# Patient Record
Sex: FEMALE | Race: BLACK OR AFRICAN AMERICAN | NOT HISPANIC OR LATINO | URBAN - METROPOLITAN AREA
[De-identification: names, ages, dates, MRNs, and addresses within clinical notes are randomized per-mention and may not be internally consistent; named-entity substitution may affect disease eponyms.]

---

## 2017-01-04 ENCOUNTER — TELEPHONE (OUTPATIENT)
Dept: FAMILY MEDICINE | Facility: CLINIC | Age: 37
End: 2017-01-04

## 2017-01-04 NOTE — TELEPHONE ENCOUNTER
Received notification from [facility] that Carmen is still receiving Olanzapine in addition to her Risperidal (she was supposed to d/c the olanzapine).  I called the [facility] number listed in the notice ([phone]) and it forwarded to the [facility] in [employer], which sounds like is taking over for [facility].  I left a message that they should d/c the olanzapine from her records.

## 2017-01-06 ENCOUNTER — TELEPHONE (OUTPATIENT)
Dept: FAMILY MEDICINE | Facility: CLINIC | Age: 37
End: 2017-01-06

## 2017-01-06 NOTE — TELEPHONE ENCOUNTER
Returned call to home care nurse to give verbal orders per protocol as requested. Nurse verbalized understanding.    Tonia Man RN

## 2017-01-17 PROBLEM — F25.0 SCHIZOAFFECTIVE DISORDER, BIPOLAR TYPE (H): Status: ACTIVE | Noted: 2017-01-17

## 2017-01-17 PROBLEM — R41.89 NEUROCOGNITIVE DEFICITS: Status: ACTIVE | Noted: 2017-01-17

## 2017-01-17 PROBLEM — R29.818 NEUROCOGNITIVE DEFICITS: Status: ACTIVE | Noted: 2017-01-17

## 2017-01-18 ENCOUNTER — DOCUMENTATION ONLY (OUTPATIENT)
Dept: FAMILY MEDICINE | Facility: CLINIC | Age: 37
End: 2017-01-18

## 2017-01-18 NOTE — PROGRESS NOTES
"When opening a documentation only encounter, be sure to enter in \"Chief Complaint\" Forms and in \" Comments\" Title of form, description if needed.    Carmen is a 37 year old  female  Form received via: Fax  Form now resides in: Provider Ready    Anjana Louis CMA      Home healthcare recert faxed to [facility] on 01/18/2017.  Sent copy to patients chart.  No further action required.       Anjana Louis [facility]                "

## 2017-01-19 DIAGNOSIS — F33.1 MAJOR DEPRESSIVE DISORDER, RECURRENT EPISODE, MODERATE (H): Primary | ICD-10-CM

## 2017-01-19 NOTE — TELEPHONE ENCOUNTER
Request for medication refill:    Date of last visit at clinic: 10/25/2016    Please complete refill if appropriate and CLOSE ENCOUNTER.    Closing the encounter signifies the refill is complete.    If refill has been denied, please complete the smart phrase .smirefuse and route it to the [facility] RN TRIAGE pool to inform the patient and the pharmacy.    Anjana Louis, CMA

## 2017-01-25 NOTE — TELEPHONE ENCOUNTER
RUSHM with pt using language line Jorge #[phone], asking the pt to call back and provide the pharmacy she would like to have this medication sent. Once we have the pharmacy the medication can be sent.     ELISABET Orellana

## 2017-01-26 DIAGNOSIS — F33.1 MAJOR DEPRESSIVE DISORDER, RECURRENT EPISODE, MODERATE (H): Primary | ICD-10-CM

## 2017-01-26 DIAGNOSIS — F43.10 POSTTRAUMATIC STRESS DISORDER: ICD-10-CM

## 2017-01-26 NOTE — TELEPHONE ENCOUNTER
[facility] phone call message- patient requesting a refill:    Full Medication Name:  risperiDONE (RISPERDAL) 1 MG tablet Dose: Take 1-2 tablets (1-2 mg) by mouth At Bedtime start 1mg QHS x 1 wk, then increase to 2 mg nightly (while switching from olanzapine)  venlafaxine (EFFEXOR-XR) 150 MG 24 hr capsule Dose: Take 1 capsule (150 mg) by mouth daily    Pharmacy confirmed as   [facility] - [place], [place] -   [address] [place] [id number]  [phone]  : Yes    Additional Comments: Patients relative states patient would like refill for medication listed above.  Also states he would like a call in case there is any reason this cannot be done.     OK to leave a message on voice mail? Yes    Primary language: [place] citizen      needed? If calling relative, no.  If calling patient, yes.    Call taken on January 26, 2017 at 11:02 AM by Carin Vieira

## 2017-01-27 RX ORDER — VENLAFAXINE HYDROCHLORIDE 150 MG/1
150 CAPSULE, EXTENDED RELEASE ORAL DAILY
Qty: 30 CAPSULE | Refills: 2 | Status: SHIPPED | OUTPATIENT
Start: 2017-01-27 | End: 2017-02-14

## 2017-01-27 RX ORDER — RISPERIDONE 1 MG/1
1-2 TABLET ORAL AT BEDTIME
Qty: 60 TABLET | Refills: 3 | Status: SHIPPED | OUTPATIENT
Start: 2017-01-27 | End: 2017-02-14

## 2017-02-14 ENCOUNTER — OFFICE VISIT (OUTPATIENT)
Dept: FAMILY MEDICINE | Facility: CLINIC | Age: 37
End: 2017-02-14

## 2017-02-14 VITALS
RESPIRATION RATE: 18 BRPM | DIASTOLIC BLOOD PRESSURE: 100 MMHG | HEIGHT: 65 IN | WEIGHT: 190 LBS | BODY MASS INDEX: 31.65 KG/M2 | HEART RATE: 96 BPM | OXYGEN SATURATION: 100 % | TEMPERATURE: 97.9 F | SYSTOLIC BLOOD PRESSURE: 140 MMHG

## 2017-02-14 DIAGNOSIS — L50.9 HIVES: ICD-10-CM

## 2017-02-14 DIAGNOSIS — I10 ESSENTIAL HYPERTENSION WITH GOAL BLOOD PRESSURE LESS THAN 130/85: ICD-10-CM

## 2017-02-14 DIAGNOSIS — E56.9 VITAMIN DEFICIENCY: ICD-10-CM

## 2017-02-14 DIAGNOSIS — M79.89 SWELLING OF RIGHT THUMB: Primary | ICD-10-CM

## 2017-02-14 DIAGNOSIS — K21.9 GASTROESOPHAGEAL REFLUX DISEASE WITHOUT ESOPHAGITIS: ICD-10-CM

## 2017-02-14 DIAGNOSIS — F33.1 MAJOR DEPRESSIVE DISORDER, RECURRENT EPISODE, MODERATE (H): ICD-10-CM

## 2017-02-14 DIAGNOSIS — G44.219 EPISODIC TENSION-TYPE HEADACHE, NOT INTRACTABLE: ICD-10-CM

## 2017-02-14 DIAGNOSIS — F43.10 POSTTRAUMATIC STRESS DISORDER: ICD-10-CM

## 2017-02-14 LAB
% GRANULOCYTES: 55.6 %G (ref 40–75)
BUN SERPL-MCNC: 9.4 MG/DL (ref 7–19)
CALCIUM SERPL-MCNC: 9.4 MG/DL (ref 8.5–10.1)
CHLORIDE SERPLBLD-SCNC: 100 MMOL/L (ref 98–110)
CO2 SERPL-SCNC: 25.7 MMOL/L (ref 20–32)
CREAT SERPL-MCNC: 1 MG/DL (ref 0.5–1)
CRP SERPL-MCNC: 33 MG/L (ref 0–8)
ERYTHROCYTE [SEDIMENTATION RATE] IN BLOOD: 85 MM/HR (ref 0–20)
GFR SERPL CREATININE-BSD FRML MDRD: 65.6 ML/MIN/1.7 M2
GLUCOSE SERPL-MCNC: 115 MG'DL (ref 70–99)
GRANULOCYTES #: 4.8 K/UL (ref 1.6–8.3)
HCT VFR BLD AUTO: 41.5 % (ref 35–47)
HEMOGLOBIN: 12.5 G/DL (ref 11.7–15.7)
LYMPHOCYTES # BLD AUTO: 3 K/UL (ref 0.8–5.3)
LYMPHOCYTES NFR BLD AUTO: 34.5 %L (ref 20–48)
MCH RBC QN AUTO: 25.7 PG (ref 26.5–35)
MCHC RBC AUTO-ENTMCNC: 30.1 G/DL (ref 32–36)
MCV RBC AUTO: 85.3 FL (ref 78–100)
MID #: 0.9 K/UL (ref 0–2.2)
MID %: 9.9 %M (ref 0–20)
PLATELET # BLD AUTO: 278 K/UL (ref 150–450)
POTASSIUM SERPL-SCNC: 4.1 MMOL/DL (ref 3.3–4.5)
RBC # BLD AUTO: 4.87 M/UL (ref 3.8–5.2)
SODIUM SERPL-SCNC: 134 MMOL/L (ref 132.6–141.4)
URATE SERPL-MCNC: 5 MG/DL (ref 2.6–6)
WBC # BLD AUTO: 8.7 K/UL (ref 4–11)

## 2017-02-14 RX ORDER — TEMAZEPAM 15 MG/1
15 CAPSULE ORAL
Qty: 90 CAPSULE | Refills: 3 | Status: SHIPPED | OUTPATIENT
Start: 2017-02-14 | End: 2017-04-06

## 2017-02-14 RX ORDER — MULTIPLE VITAMINS W/ MINERALS TAB 9MG-400MCG
1 TAB ORAL DAILY
Qty: 100 EACH | Refills: 3 | Status: SHIPPED | OUTPATIENT
Start: 2017-02-14 | End: 2018-03-27

## 2017-02-14 RX ORDER — LISINOPRIL AND HYDROCHLOROTHIAZIDE 20; 25 MG/1; MG/1
1 TABLET ORAL DAILY
Qty: 90 TABLET | Refills: 3 | Status: SHIPPED | OUTPATIENT
Start: 2017-02-14 | End: 2017-05-02

## 2017-02-14 RX ORDER — CHLORAL HYDRATE 500 MG
2 CAPSULE ORAL DAILY
Qty: 90 CAPSULE | Refills: 3 | Status: SHIPPED | OUTPATIENT
Start: 2017-02-14 | End: 2017-12-21

## 2017-02-14 RX ORDER — RISPERIDONE 1 MG/1
1-2 TABLET ORAL AT BEDTIME
Qty: 60 TABLET | Refills: 3 | Status: SHIPPED | OUTPATIENT
Start: 2017-02-14 | End: 2017-05-02

## 2017-02-14 RX ORDER — TEMAZEPAM 15 MG/1
15 CAPSULE ORAL
Qty: 90 CAPSULE | Refills: 3 | Status: CANCELLED | OUTPATIENT
Start: 2017-02-14

## 2017-02-14 RX ORDER — NICOTINE POLACRILEX 4 MG/1
20 GUM, CHEWING ORAL DAILY
Qty: 90 TABLET | Refills: 2 | Status: SHIPPED | OUTPATIENT
Start: 2017-02-14 | End: 2017-08-31

## 2017-02-14 RX ORDER — VENLAFAXINE HYDROCHLORIDE 150 MG/1
150 CAPSULE, EXTENDED RELEASE ORAL DAILY
Qty: 30 CAPSULE | Refills: 2 | Status: SHIPPED | OUTPATIENT
Start: 2017-02-14 | End: 2017-05-02

## 2017-02-14 RX ORDER — IBUPROFEN 400 MG/1
400 TABLET, FILM COATED ORAL EVERY 6 HOURS PRN
Qty: 120 TABLET | Refills: 1 | Status: SHIPPED | OUTPATIENT
Start: 2017-02-14 | End: 2017-07-20

## 2017-02-14 RX ORDER — CETIRIZINE HYDROCHLORIDE 10 MG/1
10 TABLET ORAL DAILY
Qty: 30 TABLET | Refills: 3 | Status: SHIPPED | OUTPATIENT
Start: 2017-02-14 | End: 2017-05-02

## 2017-02-14 RX ORDER — SUMATRIPTAN 25 MG/1
50 TABLET, FILM COATED ORAL PRN
Qty: 30 TABLET | Refills: 0 | Status: SHIPPED | OUTPATIENT
Start: 2017-02-14 | End: 2017-05-02

## 2017-02-14 RX ORDER — TIZANIDINE 2 MG/1
4 TABLET ORAL EVERY 8 HOURS PRN
Qty: 90 TABLET | Refills: 1 | Status: SHIPPED | OUTPATIENT
Start: 2017-02-14 | End: 2017-05-02

## 2017-02-14 RX ORDER — VENLAFAXINE HYDROCHLORIDE 75 MG/1
75 CAPSULE, EXTENDED RELEASE ORAL DAILY
Qty: 90 CAPSULE | Refills: 3 | Status: SHIPPED | OUTPATIENT
Start: 2017-02-14 | End: 2017-05-02

## 2017-02-14 RX ORDER — AMLODIPINE BESYLATE 5 MG/1
5 TABLET ORAL DAILY
Qty: 30 TABLET | Refills: 0 | Status: SHIPPED | OUTPATIENT
Start: 2017-02-14 | End: 2017-04-06

## 2017-02-14 RX ORDER — NAPROXEN 500 MG/1
500 TABLET ORAL 2 TIMES DAILY WITH MEALS
Qty: 14 TABLET | Refills: 0 | Status: SHIPPED | OUTPATIENT
Start: 2017-02-14 | End: 2017-04-06

## 2017-02-14 RX ORDER — HYDROXYZINE HYDROCHLORIDE 25 MG/1
25-50 TABLET, FILM COATED ORAL EVERY 6 HOURS PRN
Qty: 60 TABLET | Refills: 1 | Status: SHIPPED | OUTPATIENT
Start: 2017-02-14 | End: 2018-12-03

## 2017-02-14 ASSESSMENT — ENCOUNTER SYMPTOMS
NAUSEA: 0
NERVOUS/ANXIOUS: 1
COUGH: 0
DYSPHORIC MOOD: 1
NECK PAIN: 1
DYSURIA: 0
ABDOMINAL PAIN: 1
DIARRHEA: 0
FREQUENCY: 0
SHORTNESS OF BREATH: 0
VOMITING: 0
BACK PAIN: 1
HEADACHES: 0
DIZZINESS: 0

## 2017-02-14 ASSESSMENT — ANXIETY QUESTIONNAIRES
5. BEING SO RESTLESS THAT IT IS HARD TO SIT STILL: NOT AT ALL
3. WORRYING TOO MUCH ABOUT DIFFERENT THINGS: SEVERAL DAYS
7. FEELING AFRAID AS IF SOMETHING AWFUL MIGHT HAPPEN: MORE THAN HALF THE DAYS
2. NOT BEING ABLE TO STOP OR CONTROL WORRYING: NEARLY EVERY DAY
6. BECOMING EASILY ANNOYED OR IRRITABLE: NEARLY EVERY DAY
IF YOU CHECKED OFF ANY PROBLEMS ON THIS QUESTIONNAIRE, HOW DIFFICULT HAVE THESE PROBLEMS MADE IT FOR YOU TO DO YOUR WORK, TAKE CARE OF THINGS AT HOME, OR GET ALONG WITH OTHER PEOPLE: VERY DIFFICULT
GAD7 TOTAL SCORE: 12
1. FEELING NERVOUS, ANXIOUS, OR ON EDGE: MORE THAN HALF THE DAYS

## 2017-02-14 ASSESSMENT — PATIENT HEALTH QUESTIONNAIRE - PHQ9: 5. POOR APPETITE OR OVEREATING: SEVERAL DAYS

## 2017-02-14 NOTE — PATIENT INSTRUCTIONS
Here is the plan from today's visit    1. Major depressive disorder, recurrent episode, moderate (H)  - venlafaxine (EFFEXOR-XR) 150 MG 24 hr capsule; Take 1 capsule (150 mg) by mouth daily  Dispense: 30 capsule; Refill: 2  - risperiDONE (RISPERDAL) 1 MG tablet; Take 1-2 tablets (1-2 mg) by mouth At Bedtime start 1mg QHS x 1 wk, then increase to 2 mg nightly (while switching from olanzapine)  Dispense: 60 tablet; Refill: 3  - venlafaxine (EFFEXOR-XR) 75 MG 24 hr capsule; Take 1 capsule (75 mg) by mouth daily (take with 150mg tablet - total dose 225mg)  Dispense: 90 capsule; Refill: 3  - temazepam (RESTORIL) 15 MG capsule; Take 1 capsule (15 mg) by mouth nightly as needed  Dispense: 90 capsule; Refill: 3  - BEHAVIORAL HEALTH REFERRAL ([facility] and external)    2. Posttraumatic stress disorder  - risperiDONE (RISPERDAL) 1 MG tablet; Take 1-2 tablets (1-2 mg) by mouth At Bedtime start 1mg QHS x 1 wk, then increase to 2 mg nightly (while switching from olanzapine)  Dispense: 60 tablet; Refill: 3  - BEHAVIORAL HEALTH REFERRAL ([facility] external)    3. Hives  - hydrOXYzine (ATARAX) 25 MG tablet; Take 1-2 tablets (25-50 mg) by mouth every 6 hours as needed for itching  Dispense: 60 tablet; Refill: 1  - cetirizine (ZYRTEC) 10 MG tablet; Take 1 tablet (10 mg) by mouth daily  Dispense: 30 tablet; Refill: 3    4. Episodic tension-type headache, not intractable  - tiZANidine (ZANAFLEX) 2 MG tablet; Take 2 tablets (4 mg) by mouth every 8 hours as needed for muscle spasms  Dispense: 90 tablet; Refill: 1  - ibuprofen (ADVIL/MOTRIN) 400 MG tablet; Take 1 tablet (400 mg) by mouth every 6 hours as needed for moderate pain  Dispense: 120 tablet; Refill: 1  - SUMAtriptan (IMITREX) 25 MG tablet; Take 2 tablets (50 mg) by mouth as needed  Dispense: 30 tablet; Refill: 0    5. Gastroesophageal reflux disease without esophagitis  - omeprazole 20 MG tablet; Take 1 tablet (20 mg) by mouth daily Take 30-60 minutes before a  meal.  Dispense: 90 tablet; Refill: 2    6. Vitamin deficiency  - multivitamin, therapeutic with minerals (THERA-VIT-M) TABS tablet; Take 1 tablet by mouth daily  Dispense: 100 each; Refill: 3    7. Essential hypertension with goal blood pressure less than 130/85  - lisinopril-hydrochlorothiazide (PRINZIDE/ZESTORETIC) 20-25 MG per tablet; Take 1 tablet by mouth daily  Dispense: 90 tablet; Refill: 3  - Omega-3 Fatty Acids (OMEGA-3 FISH OIL) 1000 MG CAPS; Take 2 capsules (2 g) by mouth daily  Dispense: 90 capsule; Refill: 3  - Basic Metabolic Panel ([facility])  - amLODIPine (NORVASC) 5 MG tablet; Take 1 tablet (5 mg) by mouth daily  Dispense: 30 tablet; Refill: 0    8. Swelling of right thumb  - XR FINGER RT  - Uric acid  - CRP inflammation  - Erythrocyte Sedimentation Rate ([facility])  - CBC with Diff Plt ([facility])  - naproxen (NAPROSYN) 500 MG tablet; Take 1 tablet (500 mg) by mouth 2 times daily (with meals)  Dispense: 14 tablet; Refill: 0  - Ice thumb every 2 hours for 10-15 minutes       Please call or return to clinic if your symptoms don't go away.    Follow up plan  Please make a clinic appointment for follow up with me (HAYES SPICER) in one week for joint swelling.     Thank you for coming to [facility] today.  Lab Testing:  **If you had lab testing today and your results are reassuring or normal they will be mailed to you or sent through Pryv within 7 days.   **If the lab tests need quick action we will call you with the results.  The phone number we will call with results is # [phone] (home) . If this is not the best number please call our clinic and change the number.  Medication Refills:  If you need any refills please call your pharmacy and they will contact us.   If you need to  your refill at a new pharmacy, please contact the new pharmacy directly. The new pharmacy will help you get your medications transferred faster.   Scheduling:  If you have any concerns about today's  visit or wish to schedule another appointment please call our office during normal business hours [phone] (8-5:00 M-F)  If a referral was made to a [facility] and you don't get a call from central scheduling please call [phone].  If a Mammogram was ordered for you at [facility] call [phone] to schedule or change your appointment.  If you had an XRay/CT/Ultrasound/MRI ordered the number is [phone] to schedule or change your radiology appointment.   Medical Concerns:  If you have urgent medical concerns please call [phone] at any time of the day.

## 2017-02-14 NOTE — LETTER
February 15, 2017      Carmen Mccoy Mohsen  [address]    [facility] [address]        Dear Carmen,    Thank you for getting your care at [facility]. Please see below for your test results. You have elevated inflammatory markers CRP and ESR, which can occur with gout attack. Other labs are normal and reassuring.     Resulted Orders   Basic Metabolic Panel (formerly [facility])   Result Value Ref Range    Urea Nitrogen 9.4 7.0 - 19.0 mg/dL    Calcium 9.4 8.5 - 10.1 mg/dL    Chloride 100.0 98.0 - 110.0 mmol/L    Carbon Dioxide 25.7 20.0 - 32.0 mmol/L    Creatinine 1.0 0.5 - 1.0 mg/dL    Glucose 115.0 (H) 70.0 - 99.0 mg'dL    Potassium 4.1 3.3 - 4.5 mmol/dL    Sodium 134.0 132.6 - 141.4 mmol/L    GFR Estimate 65.6 mL/min/1.7 m2    GFR Estimate If Black 79.4 mL/min/1.7 m2   Uric acid   Result Value Ref Range    Uric Acid 5.0 2.6 - 6.0 mg/dL   CRP inflammation   Result Value Ref Range    CRP Inflammation 33.0 (H) 0.0 - 8.0 mg/L   Erythrocyte Sedimentation Rate (formerly [facility])   Result Value Ref Range    Sed Rate 85 (H) 0 - 20 mm/hr   CBC with Diff Plt (formerly [facility])   Result Value Ref Range    WBC 8.7 4.0 - 11.0 K/uL    Lymphocytes # 3.0 0.8 - 5.3 K/uL    % Lymphocytes 34.5 20.0 - 48.0 %L    Mid # 0.9 0.0 - 2.2 K/uL    Mid % 9.9 0.0 - 20.0 %M    GRANULOCYTES # 4.8 1.6 - 8.3 K/uL    % Granulocytes 55.6 40.0 - 75.0 %G    RBC 4.87 3.80 - 5.20 M/uL    Hemoglobin 12.5 11.7 - 15.7 g/dL    Hematocrit 41.5 35.0 - 47.0 %    MCV 85.3 78.0 - 100.0 fL    MCH 25.7 (L) 26.5 - 35.0 pg    MCHC 30.1 (L) 32.0 - 36.0 g/dL    Platelets 278.0 150.0 - 450.0 K/uL       If you have any questions, please call the clinic to make an appointment with Dr. Arndt for a clinic visit. Please follow up in one week.     Sincerely,    Khushi Arndt MD

## 2017-02-14 NOTE — PROGRESS NOTES
Preceptor Attestation:   Patient seen and discussed with the resident. Assessment and plan reviewed with resident and agreed upon.   Supervising Physician:  Meri Carlson MD  [facility]

## 2017-02-14 NOTE — MR AVS SNAPSHOT
After Visit Summary   2/14/2017    Carmen Connolly    MRN: [id number]           Patient Information     Date Of Birth          1980        Visit Information        Provider Department      2/14/2017 2:20 PM Khushi Arndt MD [facility]        Today's Diagnoses     Swelling of right thumb    -  1    Major depressive disorder, recurrent episode, moderate (H)        Posttraumatic stress disorder        Hives        Episodic tension-type headache, not intractable        Gastroesophageal reflux disease without esophagitis        Vitamin deficiency        Essential hypertension with goal blood pressure less than 130/85          Care Instructions    Here is the plan from today's visit    1. Major depressive disorder, recurrent episode, moderate (H)  - venlafaxine (EFFEXOR-XR) 150 MG 24 hr capsule; Take 1 capsule (150 mg) by mouth daily  Dispense: 30 capsule; Refill: 2  - risperiDONE (RISPERDAL) 1 MG tablet; Take 1-2 tablets (1-2 mg) by mouth At Bedtime start 1mg QHS x 1 wk, then increase to 2 mg nightly (while switching from olanzapine)  Dispense: 60 tablet; Refill: 3  - venlafaxine (EFFEXOR-XR) 75 MG 24 hr capsule; Take 1 capsule (75 mg) by mouth daily (take with 150mg tablet - total dose 225mg)  Dispense: 90 capsule; Refill: 3  - temazepam (RESTORIL) 15 MG capsule; Take 1 capsule (15 mg) by mouth nightly as needed  Dispense: 90 capsule; Refill: 3  - BEHAVIORAL HEALTH REFERRAL ([facility] external)    2. Posttraumatic stress disorder  - risperiDONE (RISPERDAL) 1 MG tablet; Take 1-2 tablets (1-2 mg) by mouth At Bedtime start 1mg QHS x 1 wk, then increase to 2 mg nightly (while switching from olanzapine)  Dispense: 60 tablet; Refill: 3  - BEHAVIORAL HEALTH REFERRAL ([facility])    3. Hives  - hydrOXYzine (ATARAX) 25 MG tablet; Take 1-2 tablets (25-50 mg) by mouth every 6 hours as needed for itching  Dispense: 60 tablet; Refill: 1  - cetirizine  (ZYRTEC) 10 MG tablet; Take 1 tablet (10 mg) by mouth daily  Dispense: 30 tablet; Refill: 3    4. Episodic tension-type headache, not intractable  - tiZANidine (ZANAFLEX) 2 MG tablet; Take 2 tablets (4 mg) by mouth every 8 hours as needed for muscle spasms  Dispense: 90 tablet; Refill: 1  - ibuprofen (ADVIL/MOTRIN) 400 MG tablet; Take 1 tablet (400 mg) by mouth every 6 hours as needed for moderate pain  Dispense: 120 tablet; Refill: 1  - SUMAtriptan (IMITREX) 25 MG tablet; Take 2 tablets (50 mg) by mouth as needed  Dispense: 30 tablet; Refill: 0    5. Gastroesophageal reflux disease without esophagitis  - omeprazole 20 MG tablet; Take 1 tablet (20 mg) by mouth daily Take 30-60 minutes before a meal.  Dispense: 90 tablet; Refill: 2    6. Vitamin deficiency  - multivitamin, therapeutic with minerals (THERA-VIT-M) TABS tablet; Take 1 tablet by mouth daily  Dispense: 100 each; Refill: 3    7. Essential hypertension with goal blood pressure less than 130/85  - lisinopril-hydrochlorothiazide (PRINZIDE/ZESTORETIC) 20-25 MG per tablet; Take 1 tablet by mouth daily  Dispense: 90 tablet; Refill: 3  - Omega-3 Fatty Acids (OMEGA-3 FISH OIL) 1000 MG CAPS; Take 2 capsules (2 g) by mouth daily  Dispense: 90 capsule; Refill: 3  - Basic Metabolic Panel ([facility])  - amLODIPine (NORVASC) 5 MG tablet; Take 1 tablet (5 mg) by mouth daily  Dispense: 30 tablet; Refill: 0    8. Swelling of right thumb  - XR FINGER RT  - Uric acid  - CRP inflammation  - Erythrocyte Sedimentation Rate ([facility])  - CBC with Diff Plt ([facility])  - naproxen (NAPROSYN) 500 MG tablet; Take 1 tablet (500 mg) by mouth 2 times daily (with meals)  Dispense: 14 tablet; Refill: 0  - Ice thumb every 2 hours for 10-15 minutes       Please call or return to clinic if your symptoms don't go away.    Follow up plan  Please make a clinic appointment for follow up with me (HAYES SPICER) in one week for joint swelling.     Thank you for coming to [facility]  today.  Lab Testing:  **If you had lab testing today and your results are reassuring or normal they will be mailed to you or sent through Insights within 7 days.   **If the lab tests need quick action we will call you with the results.  The phone number we will call with results is # [phone] (home) . If this is not the best number please call our clinic and change the number.  Medication Refills:  If you need any refills please call your pharmacy and they will contact us.   If you need to  your refill at a new pharmacy, please contact the new pharmacy directly. The new pharmacy will help you get your medications transferred faster.   Scheduling:  If you have any concerns about today's visit or wish to schedule another appointment please call our office during normal business hours [phone] (8-5:00 M-F)  If a referral was made to a [facility] and you don't get a call from central scheduling please call [phone].  If a Mammogram was ordered for you at [facility] call [phone] to schedule or change your appointment.  If you had an XRay/CT/Ultrasound/MRI ordered the number is [phone] to schedule or change your radiology appointment.   Medical Concerns:  If you have urgent medical concerns please call [phone] at any time of the day.          Follow-ups after your visit        Additional Services     BEHAVIORAL HEALTH REFERRAL ([facility])       Referring MD: HAYES SPICER    May leave message on voicemail: Yes  PHQ-9 Score: 14  GAD7 Score: 14                  Your next 10 appointments already scheduled     Feb 21, 2017  2:40 PM CST   Return Visit with MD [facility] ([facility])    [address],  [address]  [facility] [address] [phone]              Who to contact     Please call your clinic at [phone] to:    Ask questions about your health    Make or cancel  "appointments    Discuss your medicines    Learn about your test results    Speak to your doctor   If you have compliments or concerns about an experience at your clinic, or if you wish to file a complaint, please contact [facility] Patient Relations at [phone] or email us at [email or web address]         Additional Information About Your Visit        [employer] Information     [employer] is an electronic gateway that provides easy, online access to your medical records. With [employer], you can request a clinic appointment, read your test results, renew a prescription or communicate with your care team.     To sign up for [employer] visit the website at [email or web address]   You will be asked to enter the access code listed below, as well as some personal information. Please follow the directions to create your username and password.     Your access code is: [email or web address]  Expires: 5/15/2017  3:53 PM     Your access code will  in 90 days. If you need help or a new code, please contact your [facility] or call [phone] for assistance.        Care EveryWhere ID     This is your Care EveryWhere ID. This could be used by other organizations to access your [facility] medical records  [id number]        Your Vitals Were     Pulse Temperature Respirations Height Pulse Oximetry BMI (Body Mass Index)    96 97.9  F (36.6  C) (Oral) 18 5' 5\" (165.1 cm) 100% 31.62 kg/m2       Blood Pressure from Last 3 Encounters:   17 (!) 140/100   10/25/16 (!) 137/98   10/20/16 (!) 148/103    Weight from Last 3 Encounters:   17 190 lb (86.2 kg)   10/25/16 181 lb (82.1 kg)   10/20/16 181 lb (82.1 kg)              We Performed the Following     Basic Metabolic Panel ([facility])     BEHAVIORAL HEALTH REFERRAL ([facility] external)     CBC with Diff Plt ([facility])     CRP inflammation     Erythrocyte Sedimentation Rate ([facility])     Uric acid     XR " FINGER RT          Today's Medication Changes          These changes are accurate as of: 2/14/17  3:53 PM.  If you have any questions, ask your nurse or doctor.               Start taking these medicines.        Dose/Directions    amLODIPine 5 MG tablet   Commonly known as:  NORVASC   Used for:  Essential hypertension with goal blood pressure less than 130/85   Started by:  Khushi Arndt MD        Dose:  5 mg   Take 1 tablet (5 mg) by mouth daily   Quantity:  30 tablet   Refills:  0       naproxen 500 MG tablet   Commonly known as:  NAPROSYN   Used for:  Swelling of right thumb   Started by:  Khushi Arndt MD        Dose:  500 mg   Take 1 tablet (500 mg) by mouth 2 times daily (with meals)   Quantity:  14 tablet   Refills:  0         These medicines have changed or have updated prescriptions.        Dose/Directions    Omega-3 Fish Oil 1000 MG Caps   This may have changed:  medication strength   Used for:  Essential hypertension with goal blood pressure less than 130/85   Changed by:  Khushi Arndt MD        Dose:  2 g   Take 2 capsules (2 g) by mouth daily   Quantity:  90 capsule   Refills:  3            Where to get your medicines      These medications were sent to [facility] [address] [place] [facility] [address] [id number]     Phone:  [phone]     amLODIPine 5 MG tablet    cetirizine 10 MG tablet    hydrOXYzine 25 MG tablet    ibuprofen 400 MG tablet    lisinopril-hydrochlorothiazide 20-25 MG per tablet    multivitamin, therapeutic with minerals Tabs tablet    naproxen 500 MG tablet    Omega-3 Fish Oil 1000 MG Caps    omeprazole 20 MG tablet    risperiDONE 1 MG tablet    SUMAtriptan 25 MG tablet    tiZANidine 2 MG tablet    venlafaxine 150 MG 24 hr capsule    venlafaxine 75 MG 24 hr capsule         Some of these will need a paper prescription and others can be bought over the counter.  Ask your nurse if you have questions.     Bring a paper  prescription for each of these medications     temazepam 15 MG capsule                Primary Care Provider Office Phone # Fax #    Khushi Cadena [phone]       [facility] [address] [place] [id number]        Thank you!     Thank you for choosing [facility]  for your care. Our goal is always to provide you with excellent care. Hearing back from our patients is one way we can continue to improve our services. Please take a few minutes to complete the written survey that you may receive in the mail after your visit with us. Thank you!             Your Updated Medication List - Protect others around you: Learn how to safely use, store and throw away your medicines at www.disposemymeds.org.          This list is accurate as of: 2/14/17  3:53 PM.  Always use your most recent med list.                   Brand Name Dispense Instructions for use    amLODIPine 5 MG tablet    NORVASC    30 tablet    Take 1 tablet (5 mg) by mouth daily       cetirizine 10 MG tablet    zyrTEC    30 tablet    Take 1 tablet (10 mg) by mouth daily       hydrOXYzine 25 MG tablet    ATARAX    60 tablet    Take 1-2 tablets (25-50 mg) by mouth every 6 hours as needed for itching       ibuprofen 400 MG tablet    ADVIL/MOTRIN    120 tablet    Take 1 tablet (400 mg) by mouth every 6 hours as needed for moderate pain       lisinopril-hydrochlorothiazide 20-25 MG per tablet    PRINZIDE/ZESTORETIC    90 tablet    Take 1 tablet by mouth daily       multivitamin, therapeutic with minerals Tabs tablet     100 each    Take 1 tablet by mouth daily       naproxen 500 MG tablet    NAPROSYN    14 tablet    Take 1 tablet (500 mg) by mouth 2 times daily (with meals)       Omega-3 Fish Oil 1000 MG Caps     90 capsule    Take 2 capsules (2 g) by mouth daily       omeprazole 20 MG tablet     90 tablet    Take 1 tablet (20 mg) by mouth daily Take 30-60 minutes before a meal.       risperiDONE 1 MG tablet     risperDAL    60 tablet    Take 1-2 tablets (1-2 mg) by mouth At Bedtime start 1mg QHS x 1 wk, then increase to 2 mg nightly (while switching from olanzapine)       SUMAtriptan 25 MG tablet    IMITREX    30 tablet    Take 2 tablets (50 mg) by mouth as needed       temazepam 15 MG capsule    RESTORIL    90 capsule    Take 1 capsule (15 mg) by mouth nightly as needed       tiZANidine 2 MG tablet    ZANAFLEX    90 tablet    Take 2 tablets (4 mg) by mouth every 8 hours as needed for muscle spasms       * venlafaxine 150 MG 24 hr capsule    EFFEXOR-XR    30 capsule    Take 1 capsule (150 mg) by mouth daily       * venlafaxine 75 MG 24 hr capsule    EFFEXOR-XR    90 capsule    Take 1 capsule (75 mg) by mouth daily (take with 150mg tablet - total dose 225mg)       * Notice:  This list has 2 medication(s) that are the same as other medications prescribed for you. Read the directions carefully, and ask your doctor or other care provider to review them with you.

## 2017-02-15 ASSESSMENT — ENCOUNTER SYMPTOMS
CONSTIPATION: 1
SLEEP DISTURBANCE: 0

## 2017-02-15 ASSESSMENT — PATIENT HEALTH QUESTIONNAIRE - PHQ9: SUM OF ALL RESPONSES TO PHQ QUESTIONS 1-9: 14

## 2017-02-15 ASSESSMENT — ANXIETY QUESTIONNAIRES: GAD7 TOTAL SCORE: 12

## 2017-02-16 ENCOUNTER — TELEPHONE (OUTPATIENT)
Dept: FAMILY MEDICINE | Facility: CLINIC | Age: 37
End: 2017-02-16

## 2017-02-16 NOTE — TELEPHONE ENCOUNTER
I called the patient using the language line. Patient 's daughter answered and stated she was not home. I asked her to have her mom call me at [facility].    Alethea Wang CMA

## 2017-02-16 NOTE — TELEPHONE ENCOUNTER
Mental Health Referral:  Please schedule with any on the team with availability.      If you are unable to reach the patient after two phone attempts, please send a letter and close the encounter.      Thank you!

## 2017-02-20 NOTE — TELEPHONE ENCOUNTER
Called patient again with the language line and scheduled a [facility] Intake on 02/22/17 at 11:00 AM with Dr. Edge.

## 2017-02-21 ENCOUNTER — TELEPHONE (OUTPATIENT)
Dept: FAMILY MEDICINE | Facility: CLINIC | Age: 37
End: 2017-02-21

## 2017-02-21 ENCOUNTER — CARE COORDINATION (OUTPATIENT)
Dept: FAMILY MEDICINE | Facility: CLINIC | Age: 37
End: 2017-02-21

## 2017-02-21 ENCOUNTER — OFFICE VISIT (OUTPATIENT)
Dept: FAMILY MEDICINE | Facility: CLINIC | Age: 37
End: 2017-02-21

## 2017-02-21 ENCOUNTER — OFFICE VISIT (OUTPATIENT)
Dept: PHARMACY | Facility: CLINIC | Age: 37
End: 2017-02-21

## 2017-02-21 VITALS
DIASTOLIC BLOOD PRESSURE: 89 MMHG | HEART RATE: 114 BPM | TEMPERATURE: 98.2 F | WEIGHT: 188.6 LBS | SYSTOLIC BLOOD PRESSURE: 135 MMHG | BODY MASS INDEX: 31.38 KG/M2 | OXYGEN SATURATION: 94 %

## 2017-02-21 DIAGNOSIS — Z76.89 HEALTH CARE HOME: ICD-10-CM

## 2017-02-21 DIAGNOSIS — I10 ESSENTIAL HYPERTENSION: ICD-10-CM

## 2017-02-21 DIAGNOSIS — F25.0 SCHIZOAFFECTIVE DISORDER, BIPOLAR TYPE (H): ICD-10-CM

## 2017-02-21 DIAGNOSIS — M19.90 ARTHRITIS: ICD-10-CM

## 2017-02-21 DIAGNOSIS — F25.0 SCHIZOAFFECTIVE DISORDER, BIPOLAR TYPE (H): Primary | ICD-10-CM

## 2017-02-21 DIAGNOSIS — F43.10 POSTTRAUMATIC STRESS DISORDER: ICD-10-CM

## 2017-02-21 DIAGNOSIS — I10 ESSENTIAL HYPERTENSION: Primary | ICD-10-CM

## 2017-02-21 ASSESSMENT — ENCOUNTER SYMPTOMS
CONFUSION: 1
DYSPHORIC MOOD: 0
WEAKNESS: 0
SLEEP DISTURBANCE: 1
FEVER: 0
GASTROINTESTINAL NEGATIVE: 1
CARDIOVASCULAR NEGATIVE: 1
DIZZINESS: 0
NUMBNESS: 0
RESPIRATORY NEGATIVE: 1
ACTIVITY CHANGE: 0
DECREASED CONCENTRATION: 1
HEADACHES: 0
HALLUCINATIONS: 1
FATIGUE: 1
APPETITE CHANGE: 0
NERVOUS/ANXIOUS: 1
MUSCULOSKELETAL NEGATIVE: 1
TREMORS: 0

## 2017-02-21 NOTE — MR AVS SNAPSHOT
After Visit Summary   2/21/2017    Carmen Connolly    MRN: [id number]           Patient Information     Date Of Birth          1980        Visit Information        Provider Department      2/21/2017 2:40 PM Khushi Cadena MD [facility]        Today's Diagnoses     Essential hypertension    -  1    Posttraumatic stress disorder        Schizoaffective disorder, bipolar type (H)        Arthritis          Care Instructions    Here is the plan from today's visit    1. Essential hypertension  Continue current medications    2. Posttraumatic stress disorder  Plan to follow up with HHN (PharmD to check)  May adjust meds based on what she is actually taking  Consider discussing prasozin with Dr. Thrasher (given the nightmares)    3. Schizoaffective disorder, bipolar type (H)  As above  Follow up with psychologist - M/W as scheduled    4. Arthritis  Resolved  Can use Naproxen prn with flare    Please call or return to clinic if your symptoms don't go away.    Follow up plan - tomorrow with Fadia Edge    Thank you for coming to [facility] today.  Lab Testing:  **If you had lab testing today and your results are reassuring or normal they will be mailed to you or sent through [employer] within 7 days.   **If the lab tests need quick action we will call you with the results.  The phone number we will call with results is # [phone] (home) . If this is not the best number please call our clinic and change the number.  Medication Refills:  If you need any refills please call your pharmacy and they will contact us.   If you need to  your refill at a new pharmacy, please contact the new pharmacy directly. The new pharmacy will help you get your medications transferred faster.   Scheduling:  If you have any concerns about today's visit or wish to schedule another appointment please call our office during normal business hours [phone] (8-5:00 M-F)  If a referral was made to a  [facility] and you don't get a call from central scheduling please call [phone].  If a Mammogram was ordered for you at [facility] call [phone] to schedule or change your appointment.  If you had an XRay/CT/Ultrasound/MRI ordered the number is [phone] to schedule or change your radiology appointment.   Medical Concerns:  If you have urgent medical concerns please call [phone] at any time of the day.          Follow-ups after your visit        Your next 10 appointments already scheduled     2017 11:00 AM CST   Return Visit with Fadia Edge [facility] ([facility])    [address],  [address]  [place] [address]   [phone]              Who to contact     Please call your clinic at [phone] to:    Ask questions about your health    Make or cancel appointments    Discuss your medicines    Learn about your test results    Speak to your doctor   If you have compliments or concerns about an experience at your clinic, or if you wish to file a complaint, please contact [facility] Patient Relations at [phone] or email us at [email or web address]         Additional Information About Your Visit        MyChart Information     [employer] is an electronic gateway that provides easy, online access to your medical records. With [employer], you can request a clinic appointment, read your test results, renew a prescription or communicate with your care team.     To sign up for [employer] visit the website at [email or web address]   You will be asked to enter the access code listed below, as well as some personal information. Please follow the directions to create your username and password.     Your access code is: [email or web address]  Expires: 5/15/2017  3:53 PM     Your access code will  in 90 days. If you need help or a new code, please contact your [facility] or call [phone] for assistance.        Care EveryWhere ID     This is your Care EveryWhere ID. This could be used by other organizations to access your [place] medical records  [id number]        Your Vitals Were     Pulse Temperature Last Period Pulse Oximetry Breastfeeding? BMI (Body Mass Index)    114 98.2  F (36.8  C) (Oral) 02/20/2017 94% No 31.38 kg/m2       Blood Pressure from Last 3 Encounters:   02/21/17 135/89   02/14/17 (!) 140/100   10/25/16 (!) 137/98    Weight from Last 3 Encounters:   02/21/17 188 lb 9.6 oz (85.5 kg)   02/14/17 190 lb (86.2 kg)   10/25/16 181 lb (82.1 kg)              Today, you had the following     No orders found for display       Primary Care Provider Office Phone # Fax #    Khushi Cadena [phone]       [facility] [address] [address]        Thank you!     Thank you for choosing [facility]  for your care. Our goal is always to provide you with excellent care. Hearing back from our patients is one way we can continue to improve our services. Please take a few minutes to complete the written survey that you may receive in the mail after your visit with us. Thank you!             Your Updated Medication List - Protect others around you: Learn how to safely use, store and throw away your medicines at www.disposemymeds.org.          This list is accurate as of: 2/21/17  3:32 PM.  Always use your most recent med list.                   Brand Name Dispense Instructions for use    amLODIPine 5 MG tablet    NORVASC    30 tablet    Take 1 tablet (5 mg) by mouth daily       cetirizine 10 MG tablet    zyrTEC    30 tablet    Take 1 tablet (10 mg) by mouth daily       hydrOXYzine 25 MG tablet    ATARAX    60 tablet    Take 1-2 tablets (25-50 mg) by mouth every 6 hours as needed for itching       ibuprofen 400 MG tablet    ADVIL/MOTRIN    120 tablet    Take 1 tablet (400 mg) by mouth every 6  hours as needed for moderate pain       lisinopril-hydrochlorothiazide 20-25 MG per tablet    PRINZIDE/ZESTORETIC    90 tablet    Take 1 tablet by mouth daily       multivitamin, therapeutic with minerals Tabs tablet     100 each    Take 1 tablet by mouth daily       naproxen 500 MG tablet    NAPROSYN    14 tablet    Take 1 tablet (500 mg) by mouth 2 times daily (with meals)       Omega-3 Fish Oil 1000 MG Caps     90 capsule    Take 2 capsules (2 g) by mouth daily       omeprazole 20 MG tablet     90 tablet    Take 1 tablet (20 mg) by mouth daily Take 30-60 minutes before a meal.       risperiDONE 1 MG tablet    risperDAL    60 tablet    Take 1-2 tablets (1-2 mg) by mouth At Bedtime start 1mg QHS x 1 wk, then increase to 2 mg nightly (while switching from olanzapine)       SUMAtriptan 25 MG tablet    IMITREX    30 tablet    Take 2 tablets (50 mg) by mouth as needed       temazepam 15 MG capsule    RESTORIL    90 capsule    Take 1 capsule (15 mg) by mouth nightly as needed       tiZANidine 2 MG tablet    ZANAFLEX    90 tablet    Take 2 tablets (4 mg) by mouth every 8 hours as needed for muscle spasms       * venlafaxine 150 MG 24 hr capsule    EFFEXOR-XR    30 capsule    Take 1 capsule (150 mg) by mouth daily       * venlafaxine 75 MG 24 hr capsule    EFFEXOR-XR    90 capsule    Take 1 capsule (75 mg) by mouth daily (take with 150mg tablet - total dose 225mg)       * Notice:  This list has 2 medication(s) that are the same as other medications prescribed for you. Read the directions carefully, and ask your doctor or other care provider to review them with you.

## 2017-02-21 NOTE — PATIENT INSTRUCTIONS
Here is the plan from today's visit    1. Essential hypertension  Continue current medications    2. Posttraumatic stress disorder  Plan to follow up with HHN (PharmD to check)  May adjust meds based on what she is actually taking  Consider discussing prasozin with Dr. Thrasher (given the nightmares)    3. Schizoaffective disorder, bipolar type (H)  As above  Follow up with psychologist - M/W as scheduled    4. Arthritis  Resolved  Can use Naproxen prn with flare    Please call or return to clinic if your symptoms don't go away.    Follow up plan - tomorrow with Fadia Edge    Thank you for coming to [facility] today.  Lab Testing:  **If you had lab testing today and your results are reassuring or normal they will be mailed to you or sent through [employer] within 7 days.   **If the lab tests need quick action we will call you with the results.  The phone number we will call with results is # [phone] (home) . If this is not the best number please call our clinic and change the number.  Medication Refills:  If you need any refills please call your pharmacy and they will contact us.   If you need to  your refill at a new pharmacy, please contact the new pharmacy directly. The new pharmacy will help you get your medications transferred faster.   Scheduling:  If you have any concerns about today's visit or wish to schedule another appointment please call our office during normal business hours [phone] (8-5:00 M-F)  If a referral was made to a [facility] and you don't get a call from central scheduling please call [phone].  If a Mammogram was ordered for you at [facility] call [phone] to schedule or change your appointment.  If you had an XRay/CT/Ultrasound/MRI ordered the number is [phone] to schedule or change your radiology appointment.   Medical Concerns:  If you have urgent medical concerns please call [phone] at any time of the day.

## 2017-02-21 NOTE — PROGRESS NOTES
"  Clinical Pharmacy Note     Carmen was referred by Dr. Cadena for pharmacy services for medication therapy management.      Med List Review:  Discussed today medication indications, dosage and effectiveness.    Patient does not bring their medication to the visit today.  Discussed use of OTC meds today: none    Patient reported being compliant all of the time   Patient reports no side effects.    Subjective:  Carmen is here today for medication follow up with Dr. Cadena and PharmD was consulted for medication review.     Patient states that she has a home health care nurse set up her medications on a weekly basis and she therefore does not know the names of her medications. Her only complaint today is trouble sleeping at night. She has been taking temazepam at bedtime for \"a long time\" and feels it no longer works. Patient agreed for me to call her home care nurse and pharmacy to discuss her medications.    Per home care nurse, medication discrepancies include the following:    Olanzapine 5 mg at bedtime still on list (but not in EPIC) - per pharmacy, last filled 1/20/17 (risperidone last filled 2/18)    Amlodipine 5 mg daily not on list (but in EPIC, started 2/14) - per pharmacy, filled 2/14/17    Tizanidine 4 mg every 8 hours prn not on list (but in EPIC) - last filled 2/14/17    RN recalls there being an amitriptyline prescription at the home, but this is not in [facility]. Pharmacy states this has not been filled. Nortriptyline in EPIC from May 2016.   All other medications are consistent with what home care is aware of. Rose, RN, states that last week an LPN filled in for her, so she is not sure how the medications were set up. Rose would like an updated medication list so that she sets up the right medications at the scheduled home visit tomorrow, 2/22/17.     Other pertinent information from call with patient's pharmacy:    Last temazepam refill on 1/18/17    Venlafaxine 150 mg last filled 2/18/17 and 75 mg last " filled 2/14/17    Objective:    GFR Estimate   Date Value Ref Range Status   02/14/2017 65.6 mL/min/1.7 m2 Final   04/11/2016 77 >60 mL/min/1.7m2 Final     Comment:     Non  GFR Calc   02/24/2016 74.5 mL/min/1.7 m2 Final     GFR Estimate If Black   Date Value Ref Range Status   02/14/2017 79.4 mL/min/1.7 m2 Final   04/11/2016 >90   GFR Calc   >60 mL/min/1.7m2 Final   02/24/2016 90.1 mL/min/1.7 m2 Final       BP Readings from Last 3 Encounters:   02/21/17 135/89   02/14/17 (!) 140/100   10/25/16 (!) 137/98       Drug Therapy Assessment:  Drug therapy problems identified:     1. Schizoaffective disorder, bipolar type (H)       Status:  Not assesed       DTP:  Unnecessary Drug Therapy: duplicate therapy, DTP degree:2            Home care nurse had both risperidone and olanzapine on her medication list. It is unclear whether the patient has been taking both of these medications concomitantly or not, but the last refill of olanzapine appears to have been 1/20 and risperidone was 2/18, which leads me to believe that the patient should be out of olanzapine and should just be taking risperidone.     2. Essential hypertension       Status:  controlled       DTP:  Patient may not be taking - amlodipine, DTP degree:1            Home care nurse did not have amlodipine on her list; however, this medication was filled at the pharmacy, so she may have started taking it. Due to the patient's historically high BP despite lisinopril/HCTZ, would recommend that amlodipine be continued at this time.     3. Arthritis       Status:  controlled       DTP:  Adverse Drug Reaction: clinically relevant drug interaction, DTP degree:2            It is unclear what the patient is currently taking, but at the last clinic visit, both ibuprofen and naproxen were prescribed. These medications are both NSAIDs and should thus not be taken concomitantly to prevent kidney injury and GI bleed. Recommend that patient take  only one of these medications at a time for arthritis - preferably whichever is more effective for her. It appears as though the naproxen was a short-course, so she can resume ibuprofen after naproxen is finished.    4. Insomnia       Status:  uncontrolled       DTP:  Medication not available, DTP degree:2            Although patient states that temazepam is no longer effective, she does not know which medications she takes in the evening and so I have suspicions that she ran out of temazepam pills and has actually been taking risperidone at bedtime, thinking this is her sleeping pill. Her last temazepam prescription was filled on 1/18/17 and there is a refill available, so I wonder if she ran out of medication. Recommend that patient refill this medication and assess her sleeping patterns for this month. Will discuss further with PCP.      All medications were reviewed and found to be indicated, effective, safe and convenient unless drug therapy problem identified as described above.        Drug Therapy Plan and Follow up:    Plan    Spoke with home care nurse to ensure the following medications are included in the patient's pill box:    Amlodipine 5 mg daily     Cetirizine 10 mg daily     Lisinopril/HCTZ 20-25 mg daily    Multivitamin daily     Omega3 2 g daily    Omeprazole 20 mg daily    Risperidone 2 mg daily at bedtime (olanzapine should be removed)    Temazepam 15 mg daily at bedtime    Venlafaxine 150 mg + 75 mg daily     As-needed medications include: hydroxyzine, naproxen, ibuprofen, sumatriptan, tizanidine     Patient and home care nurse instructed to not take naproxen and ibuprofen in the same day      Follow up: with Dr. Cadena in one month  Patient was provided with written instructions/medication list via AVS        Options for treatment and/or follow-up care were reviewed with the patient. Patient was engaged and actively involved in the decision making process.  Carmen Connolly verbalized  understanding of the options discussed and was satisfied with the final plan.      Dr. Cadena was provided my action  in clinic today and Dr. Cadena was available for supervision during this visit and is the authorizing prescriber for this visit through the pharmacist collaborative practice agreement.      Dorinda Garcia, Pharm.D         Total Time: 20  # DTPs Identified: 4    Medical Condition 1: Pain (i.e. somatic, visceral, neuropathic), Goals of therapy: At Goal, Drug Class: Analgesic, OTC,  ,  , Safety: Drug - drug interaction,  , Intervention: Discontinue drug, Verification: Patient Agreed - OTC, Patient Agreed - Compliance/Education  Medical Condition 2: HTN, Goals of therapy 2: At goal, Drug Class 2: CCB,  ,  ,  , Convenience 2: Other (Pt may not be taking), Intervention 2: Educate patient, Verification 2: Patient Agreed - Compliance/Education  Medical Condition 3: Other psychiatric (i.e. bipolar schizophrenia), Goals of therapy 3: At Goal, Drug Class 3: Antipsychotic, Indication 3: Unnecessary drug therapy,  ,  ,  , Intervention 3: Discontinue drug, Verification 3: Provider Agreed  Medical Condition 4: Insomnia, Goals of therapy 4: Not at goal, Drug Class 4: Z-drugs,

## 2017-02-21 NOTE — PROGRESS NOTES
HPI:       Carmen Connolly is a 37 year old who presents for the following  Patient presents with:  RECHECK: re check Blood pressure        Mood Symptoms     Concerns: night is the worst, daytime better    How long have you been feeling this way? years    Description:   Depressed Mood?: YES - sometimes, often feels angry  Anxious Mood?: No     Accompanying Signs & Symptoms:  Still participating in activities that you used to enjoy?: Yes  Fatigue:  YES   Irritability:  YES   Difficulty concentrating:  YES   Changes in appetite: No   Problems with sleep:  YES   ++++++++++++++++++++++++++++++++      Social Support           Who do you live with? Children (6)          Who do you turn to for support? Family, some therapy         Resources         What makes you feel better?: talking, getting rest         What do you do to manage stress? rest           Today's PHQ-9 Score:   PHQ-9 SCORE 2/14/2017   Total Score 14       ANDRY Score:  ANDRY-7 SCORE 7/20/2016 11/23/2016 2/14/2017   Total Score 17 15 12          Sleep disturbances - wakes up with nightmares, fearful, hard to rest  - still having visual hallucinations, worse without sleep  - some auditory hallucinations, too (screaming)    BP - seems better, no CP, vision disturbance, SOB  - gets some exercise, no issues    Still following with therapist in [place] ([place])  - sees therapist M/W  - was referred to our behavioral health team last week, but unclear if they were aware she has a therapist already    Unaware of her medications - need to check with HHN and pharmacy    Treated for painful joint last week - much better now, stopped the naproxen, no swelling or redness    A [employer]  was used for  this visit.      Problem, Medication and Allergy Lists were reviewed and are current.  Patient is an established patient of this clinic.         Review of Systems:   Review of Systems   Constitutional: Positive for fatigue. Negative for activity change, appetite  "change and fever.   Respiratory: Negative.    Cardiovascular: Negative.    Gastrointestinal: Negative.    Musculoskeletal: Negative.    Neurological: Negative for dizziness, tremors, weakness, numbness and headaches.   Psychiatric/Behavioral: Positive for confusion, decreased concentration, hallucinations (see HPI) and sleep disturbance (see HPI). Negative for dysphoric mood, self-injury and suicidal ideas. The patient is nervous/anxious.              Physical Exam:   Patient Vitals for the past 24 hrs:   BP Temp Temp src Pulse SpO2 Weight   02/21/17 1450 135/89 98.2  F (36.8  C) Oral 114 94 % 188 lb 9.6 oz (85.5 kg)     Body mass index is 31.38 kg/(m^2).  Vitals were reviewed and were normal     Physical Exam   Constitutional: She appears well-developed and well-nourished.   HENT:   Head: Normocephalic and atraumatic.   Cardiovascular: Normal rate and regular rhythm.    Pulmonary/Chest: Effort normal and breath sounds normal.   Abdominal: Soft.   Musculoskeletal: Normal range of motion. She exhibits no edema.   Neurological: She is alert.     Alertness:  alert but a little disoriented  Appearance:  well groomed  Behavior/Demeanor:  cooperative, pleasant and calm, with good  eye contact.  Speech:  normal  Psychomotor:  normal or unremarkable    Mood:  \"ok\"  Affect:  appropriate and a little flat and was congruent to speech content.  Thought Process/Associations: unremarkable   Thought Content: devoid of  active/passive suicidal ideation.   Perception: significant for report of AH/VH - especially at night, not attending to them now  Insight:  limited.  Judgment: limited.    These cognitive functions grossly appear as described, but were not formally tested.    Results:     Results from last visit:  Office Visit on 02/14/2017   Component Date Value Ref Range Status     Urea Nitrogen 02/14/2017 9.4  7.0 - 19.0 mg/dL Final     Calcium 02/14/2017 9.4  8.5 - 10.1 mg/dL Final     Chloride 02/14/2017 100.0  98.0 - 110.0 " mmol/L Final     Carbon Dioxide 02/14/2017 25.7  20.0 - 32.0 mmol/L Final     Creatinine 02/14/2017 1.0  0.5 - 1.0 mg/dL Final     Glucose 02/14/2017 115.0* 70.0 - 99.0 mg'dL Final     Potassium 02/14/2017 4.1  3.3 - 4.5 mmol/dL Final     Sodium 02/14/2017 134.0  132.6 - 141.4 mmol/L Final     GFR Estimate 02/14/2017 65.6  mL/min/1.7 m2 Final     GFR Estimate If Black 02/14/2017 79.4  mL/min/1.7 m2 Final     Uric Acid 02/14/2017 5.0  2.6 - 6.0 mg/dL Final     CRP Inflammation 02/14/2017 33.0* 0.0 - 8.0 mg/L Final     Sed Rate 02/14/2017 85* 0 - 20 mm/hr Final     WBC 02/14/2017 8.7  4.0 - 11.0 K/uL Final     Lymphocytes # 02/14/2017 3.0  0.8 - 5.3 K/uL Final     % Lymphocytes 02/14/2017 34.5  20.0 - 48.0 %L Final     Mid # 02/14/2017 0.9  0.0 - 2.2 K/uL Final     Mid % 02/14/2017 9.9  0.0 - 20.0 %M Final     GRANULOCYTES # 02/14/2017 4.8  1.6 - 8.3 K/uL Final     % Granulocytes 02/14/2017 55.6  40.0 - 75.0 %G Final     RBC 02/14/2017 4.87  3.80 - 5.20 M/uL Final     Hemoglobin 02/14/2017 12.5  11.7 - 15.7 g/dL Final     Hematocrit 02/14/2017 41.5  35.0 - 47.0 % Final     MCV 02/14/2017 85.3  78.0 - 100.0 fL Final     MCH 02/14/2017 25.7* 26.5 - 35.0 pg Final     MCHC 02/14/2017 30.1* 32.0 - 36.0 g/dL Final     Platelets 02/14/2017 278.0  150.0 - 450.0 K/uL Final     Assessment and Plan     Patient Instructions   Here is the plan from today's visit    1. Essential hypertension  Continue current medications - BP ok today    2. Posttraumatic stress disorder  Plan to follow up with HHN (PharmD to check)  May adjust meds based on what she is actually taking  Consider discussing prasozin with Dr. Thrasher (given the nightmares)    3. Schizoaffective disorder, bipolar type (H)  As above  Follow up with psychologist - M/W as scheduled    4. Arthritis  Resolved  Can use Naproxen prn with flare    Please call or return to clinic if your symptoms don't go away.    Follow up plan - will cancel appt with Fadia Edge, continue  psychotherapy in [place]  - follow up monthly with Dr. Cadena  - waiting on HHN/pharmacy confirmation of medications    There are no discontinued medications.  Will gather information form HHN from   Options for treatment and follow-up care were reviewed with the patient. Carmen Connolly  engaged in the decision making process and verbalized understanding of the options discussed and agreed with the final plan.    Khushi Cadena MD

## 2017-02-21 NOTE — MR AVS SNAPSHOT
After Visit Summary   2017    Carmen Connolly    MRN: [id number]           Patient Information     Date Of Birth          1980        Visit Information        Provider Department      2017 3:00 PM Dorinda Garcia [facility]        Today's Diagnoses     Schizoaffective disorder, bipolar type (H)    -  1    Essential hypertension        Arthritis           Follow-ups after your visit        Who to contact     Please call your clinic at [phone] to:    Ask questions about your health    Make or cancel appointments    Discuss your medicines    Learn about your test results    Speak to your doctor   If you have compliments or concerns about an experience at your clinic, or if you wish to file a complaint, please contact [facility] Patient Relations at [phone] or email us at [email or web address]         Additional Information About Your Visit        MyChart Information     K[employer]Zhong is an electronic gateway that provides easy, online access to your medical records. With [employer]hong, you can request a clinic appointment, read your test results, renew a prescription or communicate with your care team.     To sign up for [employer] visit the website at [email or web address]   You will be asked to enter the access code listed below, as well as some personal information. Please follow the directions to create your username and password.     Your access code is: [email or web address]  Expires: 5/15/2017  3:53 PM     Your access code will  in 90 days. If you need help or a new code, please contact your [facility] or call [phone] for assistance.        Care EveryWhere ID     This is your Care EveryWhere ID. This could be used by other organizations to access your [place] medical records  [id number]        Your Vitals Were     Last Period                   2017            Blood Pressure  from Last 3 Encounters:   02/21/17 135/89   02/14/17 (!) 140/100   10/25/16 (!) 137/98    Weight from Last 3 Encounters:   02/21/17 188 lb 9.6 oz (85.5 kg)   02/14/17 190 lb (86.2 kg)   10/25/16 181 lb (82.1 kg)              Today, you had the following     No orders found for display       Primary Care Provider Office Phone # Fax #    Khushi Cadena [phone]       [facility] [address] [place] [id number]        Thank you!     Thank you for choosing [facility]  for your care. Our goal is always to provide you with excellent care. Hearing back from our patients is one way we can continue to improve our services. Please take a few minutes to complete the written survey that you may receive in the mail after your visit with us. Thank you!             Your Updated Medication List - Protect others around you: Learn how to safely use, store and throw away your medicines at www.disposemymeds.org.          This list is accurate as of: 2/21/17 11:59 PM.  Always use your most recent med list.                   Brand Name Dispense Instructions for use    amLODIPine 5 MG tablet    NORVASC    30 tablet    Take 1 tablet (5 mg) by mouth daily       cetirizine 10 MG tablet    zyrTEC    30 tablet    Take 1 tablet (10 mg) by mouth daily       hydrOXYzine 25 MG tablet    ATARAX    60 tablet    Take 1-2 tablets (25-50 mg) by mouth every 6 hours as needed for itching       ibuprofen 400 MG tablet    ADVIL/MOTRIN    120 tablet    Take 1 tablet (400 mg) by mouth every 6 hours as needed for moderate pain       lisinopril-hydrochlorothiazide 20-25 MG per tablet    PRINZIDE/ZESTORETIC    90 tablet    Take 1 tablet by mouth daily       multivitamin, therapeutic with minerals Tabs tablet     100 each    Take 1 tablet by mouth daily       naproxen 500 MG tablet    NAPROSYN    14 tablet    Take 1 tablet (500 mg) by mouth 2 times daily (with meals)       Omega-3 Fish Oil 1000 MG  Caps     90 capsule    Take 2 capsules (2 g) by mouth daily       omeprazole 20 MG tablet     90 tablet    Take 1 tablet (20 mg) by mouth daily Take 30-60 minutes before a meal.       risperiDONE 1 MG tablet    risperDAL    60 tablet    Take 1-2 tablets (1-2 mg) by mouth At Bedtime start 1mg QHS x 1 wk, then increase to 2 mg nightly (while switching from olanzapine)       SUMAtriptan 25 MG tablet    IMITREX    30 tablet    Take 2 tablets (50 mg) by mouth as needed       temazepam 15 MG capsule    RESTORIL    90 capsule    Take 1 capsule (15 mg) by mouth nightly as needed       tiZANidine 2 MG tablet    ZANAFLEX    90 tablet    Take 2 tablets (4 mg) by mouth every 8 hours as needed for muscle spasms       * venlafaxine 150 MG 24 hr capsule    EFFEXOR-XR    30 capsule    Take 1 capsule (150 mg) by mouth daily       * venlafaxine 75 MG 24 hr capsule    EFFEXOR-XR    90 capsule    Take 1 capsule (75 mg) by mouth daily (take with 150mg tablet - total dose 225mg)       * Notice:  This list has 2 medication(s) that are the same as other medications prescribed for you. Read the directions carefully, and ask your doctor or other care provider to review them with you.

## 2017-02-21 NOTE — TELEPHONE ENCOUNTER
Per Alyssa Seth, home care nurse [facility] requested verbal order for skilled nursing once per week for 9 weeks.    Returned call to home care nurse to give verbal orders per protocol as requested. Nurse verbalized understanding.    Tonia Man RN

## 2017-02-24 ENCOUNTER — MEDICAL CORRESPONDENCE (OUTPATIENT)
Dept: HEALTH INFORMATION MANAGEMENT | Facility: CLINIC | Age: 37
End: 2017-02-24

## 2017-02-28 PROBLEM — Z76.89 HEALTH CARE HOME: Status: ACTIVE | Noted: 2017-02-21

## 2017-02-28 NOTE — PROGRESS NOTES
"[facility] Initial    Health care home care coordination discussed with patient.  Patient agrees to participate in care coordination.  Date of enrollment 2/21/17  Patient has been informed of his/her care coordinator. Yes  Patient was informed of 24/7 access to the clinic by calling the main clinic number.  Patient understands the answering service may answer and a provider will call patient back. Yes  Patient was given the \"Welcome to your Health Care Home\" sheet? Yes  Patient's preferred communication: Home number on file [phone] (home)  Patient's race/ethnicity:   Patient's primary language: Ethiopian     needed? Yes  Name of : Javid  Telephone number for : [phone]    In the past year, how many times have you been to the Emergency Room? 2  In the past year, how many times have you been hospitalized? 0    Major diagnoses and/or issues requiring coordination services  See problem list    Summary notes: Met with patient, she agrees to participate in [facility]. No urgent needs at this time. Would like assistance in keeping appointments organized. Per patient, she does see a therapist in [facility] on Monday's and Wednesday's. Patient has my direct number and will call me directly for all her needs, questions.    Counseling and coordination activities with: patient, PCP and Care Coordinator    Follow-up date: monthly follow up per PCP, next appointment not yet scheduled.  "

## 2017-03-08 NOTE — PROGRESS NOTES
===================    Attestation Statement:    I have reviewed and agree with the pharmacy assessment and plan presented.    Cruz Torres PharmD.    ===================

## 2017-03-10 ENCOUNTER — DOCUMENTATION ONLY (OUTPATIENT)
Dept: FAMILY MEDICINE | Facility: CLINIC | Age: 37
End: 2017-03-10

## 2017-03-10 NOTE — PROGRESS NOTES
"When opening a documentation only encounter, be sure to enter in \"Chief Complaint\" Forms and in \" Comments\" Title of form, description if needed.    Carmen is a 37 year old  female  Form received via: Fax  Form now resides in: Provider Ready    Anjana Louis CMA                Forms faxed to [facility].  Sent copy to patients chart.  No further action required.     Anjana Louis CMA    "

## 2017-04-06 ENCOUNTER — TELEPHONE (OUTPATIENT)
Dept: PHARMACY | Facility: CLINIC | Age: 37
End: 2017-04-06

## 2017-04-06 DIAGNOSIS — F33.1 MAJOR DEPRESSIVE DISORDER, RECURRENT EPISODE, MODERATE (H): ICD-10-CM

## 2017-04-06 DIAGNOSIS — I10 ESSENTIAL HYPERTENSION WITH GOAL BLOOD PRESSURE LESS THAN 130/85: ICD-10-CM

## 2017-04-06 RX ORDER — TEMAZEPAM 15 MG/1
15 CAPSULE ORAL
Qty: 90 CAPSULE | Refills: 3 | Status: SHIPPED | OUTPATIENT
Start: 2017-04-06 | End: 2017-04-20

## 2017-04-06 RX ORDER — AMLODIPINE BESYLATE 5 MG/1
5 TABLET ORAL DAILY
Qty: 90 TABLET | Refills: 1 | Status: SHIPPED | OUTPATIENT
Start: 2017-04-06 | End: 2017-11-30

## 2017-04-06 NOTE — TELEPHONE ENCOUNTER
Request for medication refill:    Date of last visit at clinic: 2/21/17    Please complete refill if appropriate and CLOSE ENCOUNTER.    Closing the encounter signifies the refill is complete.    If refill has been denied, please complete the smart phrase .smirefuse and route it to the [facility] RN TRIAGE pool to inform the patient and the pharmacy.    Diane Gil

## 2017-04-06 NOTE — TELEPHONE ENCOUNTER
Clinical Pharmacy Note - Telephone Encounter     PharmD called patient's HHN, [facility], to discuss patient's medication list. There has been confusion in the past about which medications she should be taking, so Dr. Cadena consulted PharmD to complete another medication reconciliation.     Per HHN, these were the discrepancies identified:    Amlodipine on list but out of refills    Amitriptyline on list but out of refills - believes this should have been stopped    Instructed HHN to not administer amitriptyline any longer - this has been discontinued. PharmD refilled amlodipine prescription.    HHN mentioned that a LPN would be visiting the patient next Wednesday and HHN left a note for the LPN to call and schedule a follow up appointment with a provider at [facility], as requested by Dr. Cadena.      Dr. Wegener was the authorizing prescriber for this visit through the pharmacist collaborative practice agreement.    Dorinda Garcia, Pharm.D.

## 2017-04-20 DIAGNOSIS — F33.1 MAJOR DEPRESSIVE DISORDER, RECURRENT EPISODE, MODERATE (H): ICD-10-CM

## 2017-04-20 RX ORDER — TEMAZEPAM 15 MG/1
15 CAPSULE ORAL
Qty: 90 CAPSULE | Refills: 3 | Status: SHIPPED | OUTPATIENT
Start: 2017-04-20 | End: 2017-09-28

## 2017-04-20 NOTE — TELEPHONE ENCOUNTER
Date of last visit at clinic: 2-21-17    Please complete refill and CLOSE ENCOUNTER.  Closing the encounter signifies the refill is complete.

## 2017-04-21 ENCOUNTER — TELEPHONE (OUTPATIENT)
Dept: FAMILY MEDICINE | Facility: CLINIC | Age: 37
End: 2017-04-21

## 2017-04-21 NOTE — TELEPHONE ENCOUNTER
[facility] phone call message - order or referral request for patient:     Order or referral being requested: Home Care      Additional Comments: 1x per week for 9 weeks, 3 prn    OK to leave a message on voice mail? Yes    Primary language: Honduran      needed? Yes    Call taken on April 21, 2017 at 9:25 AM by Alethea Wang

## 2017-04-25 ENCOUNTER — MEDICAL CORRESPONDENCE (OUTPATIENT)
Dept: HEALTH INFORMATION MANAGEMENT | Facility: CLINIC | Age: 37
End: 2017-04-25

## 2017-05-02 ENCOUNTER — OFFICE VISIT (OUTPATIENT)
Dept: FAMILY MEDICINE | Facility: CLINIC | Age: 37
End: 2017-05-02

## 2017-05-02 VITALS
DIASTOLIC BLOOD PRESSURE: 86 MMHG | HEART RATE: 101 BPM | SYSTOLIC BLOOD PRESSURE: 124 MMHG | OXYGEN SATURATION: 97 % | WEIGHT: 186.6 LBS | BODY MASS INDEX: 31.05 KG/M2 | TEMPERATURE: 97.9 F | RESPIRATION RATE: 22 BRPM

## 2017-05-02 DIAGNOSIS — G44.219 EPISODIC TENSION-TYPE HEADACHE, NOT INTRACTABLE: ICD-10-CM

## 2017-05-02 DIAGNOSIS — F43.10 POSTTRAUMATIC STRESS DISORDER: ICD-10-CM

## 2017-05-02 DIAGNOSIS — I10 ESSENTIAL HYPERTENSION WITH GOAL BLOOD PRESSURE LESS THAN 130/85: ICD-10-CM

## 2017-05-02 DIAGNOSIS — F33.1 MAJOR DEPRESSIVE DISORDER, RECURRENT EPISODE, MODERATE (H): ICD-10-CM

## 2017-05-02 RX ORDER — VENLAFAXINE HYDROCHLORIDE 150 MG/1
150 CAPSULE, EXTENDED RELEASE ORAL DAILY
Qty: 90 CAPSULE | Refills: 3 | Status: SHIPPED | OUTPATIENT
Start: 2017-05-02 | End: 2018-05-02

## 2017-05-02 RX ORDER — CETIRIZINE HYDROCHLORIDE 10 MG/1
10 TABLET ORAL DAILY
Qty: 30 TABLET | Refills: 11 | Status: SHIPPED | OUTPATIENT
Start: 2017-05-02 | End: 2018-05-02

## 2017-05-02 RX ORDER — SUMATRIPTAN 25 MG/1
50 TABLET, FILM COATED ORAL PRN
Qty: 30 TABLET | Refills: 3 | Status: SHIPPED | OUTPATIENT
Start: 2017-05-02 | End: 2018-05-02

## 2017-05-02 RX ORDER — LISINOPRIL AND HYDROCHLOROTHIAZIDE 20; 25 MG/1; MG/1
1 TABLET ORAL DAILY
Qty: 90 TABLET | Refills: 3 | Status: SHIPPED | OUTPATIENT
Start: 2017-05-02 | End: 2018-03-27

## 2017-05-02 RX ORDER — KETOROLAC TROMETHAMINE 30 MG/ML
30 INJECTION, SOLUTION INTRAMUSCULAR; INTRAVENOUS ONCE
Qty: 1 ML | Refills: 0 | OUTPATIENT
Start: 2017-05-02 | End: 2017-05-02

## 2017-05-02 RX ORDER — RISPERIDONE 1 MG/1
2 TABLET ORAL AT BEDTIME
Qty: 60 TABLET | Refills: 11 | Status: SHIPPED | OUTPATIENT
Start: 2017-05-02 | End: 2017-11-16

## 2017-05-02 RX ORDER — VENLAFAXINE HYDROCHLORIDE 75 MG/1
75 CAPSULE, EXTENDED RELEASE ORAL DAILY
Qty: 90 CAPSULE | Refills: 3 | Status: SHIPPED | OUTPATIENT
Start: 2017-05-02 | End: 2018-05-02

## 2017-05-02 ASSESSMENT — ENCOUNTER SYMPTOMS
GASTROINTESTINAL NEGATIVE: 1
DECREASED CONCENTRATION: 1
CARDIOVASCULAR NEGATIVE: 1
SLEEP DISTURBANCE: 0
HALLUCINATIONS: 0
ACTIVITY CHANGE: 0
NERVOUS/ANXIOUS: 1
APPETITE CHANGE: 1
HEADACHES: 1
LIGHT-HEADEDNESS: 0
FATIGUE: 0
RESPIRATORY NEGATIVE: 1
DYSPHORIC MOOD: 1

## 2017-05-02 NOTE — PATIENT INSTRUCTIONS
Here is the plan from today's visit    1. Major depressive disorder, recurrent episode, moderate (H)  Refills on meds today  - venlafaxine (EFFEXOR-XR) 150 MG 24 hr capsule; Take 1 capsule (150 mg) by mouth daily  Dispense: 90 capsule; Refill: 3  - risperiDONE (RISPERDAL) 1 MG tablet; Take 2 tablets (2 mg) by mouth At Bedtime  Dispense: 60 tablet; Refill: 11  - venlafaxine (EFFEXOR-XR) 75 MG 24 hr capsule; Take 1 capsule (75 mg) by mouth daily (take with 150mg tablet - total dose 225mg)  Dispense: 90 capsule; Refill: 3    2. Posttraumatic stress disorder  Refills on meds  Continue with outpatient therapy  - risperiDONE (RISPERDAL) 1 MG tablet; Take 2 tablets (2 mg) by mouth At Bedtime  Dispense: 60 tablet; Refill: 11    3. Essential hypertension with goal blood pressure less than 130/85  Refill on meds  BP well controlled  Monitor salt intake  - lisinopril-hydrochlorothiazide (PRINZIDE/ZESTORETIC) 20-25 MG per tablet; Take 1 tablet by mouth daily  Dispense: 90 tablet; Refill: 3    4. Episodic tension-type headache, not intractable  Toradol today in clinic  Resume prn imitrex (refills provided)  - SUMAtriptan (IMITREX) 25 MG tablet; Take 2 tablets (50 mg) by mouth as needed  Dispense: 30 tablet; Refill: 3  - ketorolac (TORADOL) 30 MG/ML injection; Inject 1 mL (30 mg) into the muscle once for 1 dose  Dispense: 1 mL; Refill: 0    Please call or return to clinic if your symptoms don't go away.    Follow up plan  prn    Thank you for coming to [facility] today.  Lab Testing:  **If you had lab testing today and your results are reassuring or normal they will be mailed to you or sent through [employer] within 7 days.   **If the lab tests need quick action we will call you with the results.  The phone number we will call with results is # [phone] (home) . If this is not the best number please call our clinic and change the number.  Medication Refills:  If you need any refills please call your pharmacy and they will  contact us.   If you need to  your refill at a new pharmacy, please contact the new pharmacy directly. The new pharmacy will help you get your medications transferred faster.   Scheduling:  If you have any concerns about today's visit or wish to schedule another appointment please call our office during normal business hours [phone] (8-5:00 M-F)  If a referral was made to a [facility] and you don't get a call from central scheduling please call [phone].  If a Mammogram was ordered for you at [facility] call [phone] to schedule or change your appointment.  If you had an XRay/CT/Ultrasound/MRI ordered the number is [phone] to schedule or change your radiology appointment.   Medical Concerns:  If you have urgent medical concerns please call [phone] at any time of the day.  If you have a medical emergency please call 100.

## 2017-05-02 NOTE — PROGRESS NOTES
HPI:       Carmen Connolly is a 37 year old who presents for the following  Patient presents with:  Refill Request: all meds  Hypertension: taking Bp at home in range 140-130/80-90  Heart Problem: feeling much better      Due for some refills today  - BP in good control  - denies CP, palpitations, vision changes  - taking meds as prescribed    Headache     Onset: about 1 week ago, on and off    Description:   Location: bilateral in the temporal area   Character: throbbing pain  Frequency:  daily  Duration:  Comes and goes    Intensity: moderate    Progression of Symptoms:  same    Accompanying Signs & Symptoms:  Stiff neck: no  Neck or upper back pain: no  Fever: no  Sinus pressure: no  Nausea or vomitingno  Dizziness: { no yes  :no  Numbness::no  Weakness (in one part of the body?:no  Visual changes: no   History:   Head trauma: no  Family history of migraines: no  Previous tests for headaches: no  Neurologist evaluations: no  Able to do daily activities: YES  Wake with a headaches: no  Do headaches wake you up:no  Daily pain medication use:  Yes Details: ibuprofen  New life stressors  :no    Precipitating factors:   Does light make it worse: no  Does sound make it worse: no    Alleviating factors:  Does sleep help: YES         Therapies Tried and outcome: Ibuprofen (Advil, Motrin), no longer has imitrex at home    Eating/drinking ok  - only a cup of tea today  - not feeling hungry recently  - eating less over the past month    A [employer]  was used for  this visit.      Problem, Medication and Allergy Lists were reviewed and are current.  Patient is an established patient of this clinic.         Review of Systems:   Review of Systems   Constitutional: Positive for appetite change (not really very hungry). Negative for activity change and fatigue.   Respiratory: Negative.    Cardiovascular: Negative.    Gastrointestinal: Negative.    Neurological: Positive for headaches. Negative for light-headedness.    Psychiatric/Behavioral: Positive for decreased concentration and dysphoric mood. Negative for hallucinations and sleep disturbance. The patient is nervous/anxious.              Physical Exam:   Patient Vitals for the past 24 hrs:   BP Temp Temp src Pulse Resp SpO2 Weight   05/02/17 1500 124/86 97.9  F (36.6  C) Oral 101 22 97 % 186 lb 9.6 oz (84.6 kg)     Body mass index is 31.05 kg/(m^2).  Vitals were reviewed and were normal     Physical Exam   Constitutional: She is oriented to person, place, and time. She appears well-developed and well-nourished.   Cardiovascular: Normal rate and regular rhythm.    Pulmonary/Chest: Effort normal and breath sounds normal.   Musculoskeletal: Normal range of motion. She exhibits no edema.   Neurological: She is alert and oriented to person, place, and time.     MENTAL STATUS EXAM  Appearance: appropriate  Attitude: cooperative  Behavior: normal  Eye Contact: normal  Speech: normal  Orientation: oriented to person, place, time and situation  Mood: normal  Affect: Congruent with mood - a little flat  Thought Process: appropriate  Suicidal Ideation: reports no suicidal ideation  Hallucination: denies currently       Results:     Results from last visit:  Office Visit on 02/14/2017   Component Date Value Ref Range Status     Urea Nitrogen 02/14/2017 9.4  7.0 - 19.0 mg/dL Final     Calcium 02/14/2017 9.4  8.5 - 10.1 mg/dL Final     Chloride 02/14/2017 100.0  98.0 - 110.0 mmol/L Final     Carbon Dioxide 02/14/2017 25.7  20.0 - 32.0 mmol/L Final     Creatinine 02/14/2017 1.0  0.5 - 1.0 mg/dL Final     Glucose 02/14/2017 115.0* 70.0 - 99.0 mg'dL Final     Potassium 02/14/2017 4.1  3.3 - 4.5 mmol/dL Final     Sodium 02/14/2017 134.0  132.6 - 141.4 mmol/L Final     GFR Estimate 02/14/2017 65.6  mL/min/1.7 m2 Final     GFR Estimate If Black 02/14/2017 79.4  mL/min/1.7 m2 Final     Uric Acid 02/14/2017 5.0  2.6 - 6.0 mg/dL Final     CRP Inflammation 02/14/2017 33.0* 0.0 - 8.0 mg/L Final      Sed Rate 02/14/2017 85* 0 - 20 mm/hr Final     WBC 02/14/2017 8.7  4.0 - 11.0 K/uL Final     Lymphocytes # 02/14/2017 3.0  0.8 - 5.3 K/uL Final     % Lymphocytes 02/14/2017 34.5  20.0 - 48.0 %L Final     Mid # 02/14/2017 0.9  0.0 - 2.2 K/uL Final     Mid % 02/14/2017 9.9  0.0 - 20.0 %M Final     GRANULOCYTES # 02/14/2017 4.8  1.6 - 8.3 K/uL Final     % Granulocytes 02/14/2017 55.6  40.0 - 75.0 %G Final     RBC 02/14/2017 4.87  3.80 - 5.20 M/uL Final     Hemoglobin 02/14/2017 12.5  11.7 - 15.7 g/dL Final     Hematocrit 02/14/2017 41.5  35.0 - 47.0 % Final     MCV 02/14/2017 85.3  78.0 - 100.0 fL Final     MCH 02/14/2017 25.7* 26.5 - 35.0 pg Final     MCHC 02/14/2017 30.1* 32.0 - 36.0 g/dL Final     Platelets 02/14/2017 278.0  150.0 - 450.0 K/uL Final     Assessment and Plan     Patient Instructions   Here is the plan from today's visit    1. Major depressive disorder, recurrent episode, moderate (H)  Refills on meds today  - venlafaxine (EFFEXOR-XR) 150 MG 24 hr capsule; Take 1 capsule (150 mg) by mouth daily  Dispense: 90 capsule; Refill: 3  - risperiDONE (RISPERDAL) 1 MG tablet; Take 2 tablets (2 mg) by mouth At Bedtime  Dispense: 60 tablet; Refill: 11  - venlafaxine (EFFEXOR-XR) 75 MG 24 hr capsule; Take 1 capsule (75 mg) by mouth daily (take with 150mg tablet - total dose 225mg)  Dispense: 90 capsule; Refill: 3    2. Posttraumatic stress disorder  Refills on meds  Continue with outpatient therapy  - risperiDONE (RISPERDAL) 1 MG tablet; Take 2 tablets (2 mg) by mouth At Bedtime  Dispense: 60 tablet; Refill: 11    3. Essential hypertension with goal blood pressure less than 130/85  Refill on meds  BP well controlled  Monitor salt intake  - lisinopril-hydrochlorothiazide (PRINZIDE/ZESTORETIC) 20-25 MG per tablet; Take 1 tablet by mouth daily  Dispense: 90 tablet; Refill: 3    4. Episodic tension-type headache, not intractable  Toradol today in clinic  Resume prn imitrex (refills provided)  - SUMAtriptan (IMITREX) 25  MG tablet; Take 2 tablets (50 mg) by mouth as needed  Dispense: 30 tablet; Refill: 3  - ketorolac (TORADOL) 30 MG/ML injection; Inject 1 mL (30 mg) into the muscle once for 1 dose  Dispense: 1 mL; Refill: 0    Please call or return to clinic if your symptoms don't go away.    Follow up plan  prn    There are no discontinued medications.  Options for treatment and follow-up care were reviewed with the patient. Carmen Connolly  engaged in the decision making process and verbalized understanding of the options discussed and agreed with the final plan.    Khushi Cadena MD

## 2017-05-02 NOTE — MR AVS SNAPSHOT
After Visit Summary   5/2/2017    Carmen Connolly    MRN: [id number]           Patient Information     Date Of Birth          1980        Visit Information        Provider Department      5/2/2017 2:40 PM Khushi Cadena MD [facility]        Today's Diagnoses     Major depressive disorder, recurrent episode, moderate (H)        Posttraumatic stress disorder        Essential hypertension with goal blood pressure less than 130/85        Episodic tension-type headache, not intractable          Care Instructions    Here is the plan from today's visit    1. Major depressive disorder, recurrent episode, moderate (H)  Refills on meds today  - venlafaxine (EFFEXOR-XR) 150 MG 24 hr capsule; Take 1 capsule (150 mg) by mouth daily  Dispense: 90 capsule; Refill: 3  - risperiDONE (RISPERDAL) 1 MG tablet; Take 2 tablets (2 mg) by mouth At Bedtime  Dispense: 60 tablet; Refill: 11  - venlafaxine (EFFEXOR-XR) 75 MG 24 hr capsule; Take 1 capsule (75 mg) by mouth daily (take with 150mg tablet - total dose 225mg)  Dispense: 90 capsule; Refill: 3    2. Posttraumatic stress disorder  Refills on meds  Continue with outpatient therapy  - risperiDONE (RISPERDAL) 1 MG tablet; Take 2 tablets (2 mg) by mouth At Bedtime  Dispense: 60 tablet; Refill: 11    3. Essential hypertension with goal blood pressure less than 130/85  Refill on meds  BP well controlled  Monitor salt intake  - lisinopril-hydrochlorothiazide (PRINZIDE/ZESTORETIC) 20-25 MG per tablet; Take 1 tablet by mouth daily  Dispense: 90 tablet; Refill: 3    4. Episodic tension-type headache, not intractable  Toradol today in clinic  Resume prn imitrex (refills provided)  - SUMAtriptan (IMITREX) 25 MG tablet; Take 2 tablets (50 mg) by mouth as needed  Dispense: 30 tablet; Refill: 3  - ketorolac (TORADOL) 30 MG/ML injection; Inject 1 mL (30 mg) into the muscle once for 1 dose  Dispense: 1 mL; Refill: 0    Please call or return to clinic if your  symptoms don't go away.    Follow up plan  prn    Thank you for coming to [facility] today.  Lab Testing:  **If you had lab testing today and your results are reassuring or normal they will be mailed to you or sent through [facility] within 7 days.   **If the lab tests need quick action we will call you with the results.  The phone number we will call with results is # [phone] (home) . If this is not the best number please call our clinic and change the number.  Medication Refills:  If you need any refills please call your pharmacy and they will contact us.   If you need to  your refill at a new pharmacy, please contact the new pharmacy directly. The new pharmacy will help you get your medications transferred faster.   Scheduling:  If you have any concerns about today's visit or wish to schedule another appointment please call our office during normal business hours [phone] (8-5:00 M-F)  If a referral was made to a [facility] and you don't get a call from central scheduling please call [phone].  If a Mammogram was ordered for you at [facility] call [phone] to schedule or change your appointment.  If you had an XRay/CT/Ultrasound/MRI ordered the number is [phone] to schedule or change your radiology appointment.   Medical Concerns:  If you have urgent medical concerns please call [phone] at any time of the day.  If you have a medical emergency please call [phone].          Follow-ups after your visit        Who to contact     Please call your clinic at [phone] to:    Ask questions about your health    Make or cancel appointments    Discuss your medicines    Learn about your test results    Speak to your doctor   If you have compliments or concerns about an experience at your clinic, or if you wish to file a complaint, please contact [facility] Patient Relations at [phone] or email us at  [email or web address]         Additional Information About Your Visit        [employer] [facility] Information     Gl[employer]et is an electronic gateway that provides easy, online access to your medical records. With Glownet, you can request a clinic appointment, read your test results, renew a prescription or communicate with your care team.     To sign up for Glownet visit the website at [email or web address]   You will be asked to enter the access code listed below, as well as some personal information. Please follow the directions to create your username and password.     Your access code is: [email or web address]  Expires: 5/15/2017  4:53 PM     Your access code will  in 90 days. If you need help or a new code, please contact your [facility] or call [phone] for assistance.        Care EveryWhere ID     This is your Care EveryWhere ID. This could be used by other organizations to access your [place] medical records  [id number]        Your Vitals Were     Pulse Temperature Respirations Last Period Pulse Oximetry BMI (Body Mass Index)    101 97.9  F (36.6  C) (Oral) 22 2017 97% 31.05 kg/m2       Blood Pressure from Last 3 Encounters:   17 124/86   17 135/89   17 (!) 140/100    Weight from Last 3 Encounters:   17 186 lb 9.6 oz (84.6 kg)   17 188 lb 9.6 oz (85.5 kg)   17 190 lb (86.2 kg)              Today, you had the following     No orders found for display         Today's Medication Changes          These changes are accurate as of: 17  3:36 PM.  If you have any questions, ask your nurse or doctor.               Start taking these medicines.        Dose/Directions    ketorolac 30 MG/ML injection   Commonly known as:  TORADOL   Used for:  Episodic tension-type headache, not intractable   Started by:  Khushi Cadena MD        Dose:  30 mg   Inject 1 mL (30 mg) into the muscle once for 1 dose   Quantity:  1 mL   Refills:  0          These medicines have changed or have updated prescriptions.        Dose/Directions    risperiDONE 1 MG tablet   Commonly known as:  risperDAL   This may have changed:    - how much to take  - additional instructions   Used for:  Major depressive disorder, recurrent episode, moderate (H), Posttraumatic stress disorder   Changed by:  Khushi Cadena MD        Dose:  2 mg   Take 2 tablets (2 mg) by mouth At Bedtime   Quantity:  60 tablet   Refills:  11         Stop taking these medicines if you haven't already. Please contact your care team if you have questions.     tiZANidine 2 MG tablet   Commonly known as:  ZANAFLEX   Stopped by:  Khushi Cadena MD                Where to get your medicines      These medications were sent to [facility] [address] [place] [facility] [place], [place] - [address] [facility]  [address] [facility] [address]     Phone:  [phone]     cetirizine 10 MG tablet    lisinopril-hydrochlorothiazide 20-25 MG per tablet    risperiDONE 1 MG tablet    SUMAtriptan 25 MG tablet    venlafaxine 150 MG 24 hr capsule    venlafaxine 75 MG 24 hr capsule         Some of these will need a paper prescription and others can be bought over the counter.  Ask your nurse if you have questions.     You don't need a prescription for these medications     ketorolac 30 MG/ML injection                Primary Care Provider Office Phone # Fax #    Khushi Cadena [phone]       [facility] [address]   [facility] [address]        Thank you!     Thank you for choosing [facility]  for your care. Our goal is always to provide you with excellent care. Hearing back from our patients is one way we can continue to improve our services. Please take a few minutes to complete the written survey that you may receive in the mail after your visit with us. Thank you!             Your Updated Medication List - Protect others around you: Learn how to safely use, store and throw  away your medicines at www.disposemymeds.org.          This list is accurate as of: 5/2/17  3:36 PM.  Always use your most recent med list.                   Brand Name Dispense Instructions for use    amLODIPine 5 MG tablet    NORVASC    90 tablet    Take 1 tablet (5 mg) by mouth daily       cetirizine 10 MG tablet    zyrTEC    30 tablet    Take 1 tablet (10 mg) by mouth daily       fish oil-omega-3 fatty acids 1000 MG capsule     90 capsule    Take 2 capsules (2 g) by mouth daily       hydrOXYzine 25 MG tablet    ATARAX    60 tablet    Take 1-2 tablets (25-50 mg) by mouth every 6 hours as needed for itching       ibuprofen 400 MG tablet    ADVIL/MOTRIN    120 tablet    Take 1 tablet (400 mg) by mouth every 6 hours as needed for moderate pain       ketorolac 30 MG/ML injection    TORADOL    1 mL    Inject 1 mL (30 mg) into the muscle once for 1 dose       lisinopril-hydrochlorothiazide 20-25 MG per tablet    PRINZIDE/ZESTORETIC    90 tablet    Take 1 tablet by mouth daily       multivitamin, therapeutic with minerals Tabs tablet     100 each    Take 1 tablet by mouth daily       omeprazole 20 MG tablet     90 tablet    Take 1 tablet (20 mg) by mouth daily Take 30-60 minutes before a meal.       risperiDONE 1 MG tablet    risperDAL    60 tablet    Take 2 tablets (2 mg) by mouth At Bedtime       SUMAtriptan 25 MG tablet    IMITREX    30 tablet    Take 2 tablets (50 mg) by mouth as needed       temazepam 15 MG capsule    RESTORIL    90 capsule    Take 1 capsule (15 mg) by mouth nightly as needed       * venlafaxine 150 MG 24 hr capsule    EFFEXOR-XR    90 capsule    Take 1 capsule (150 mg) by mouth daily       * venlafaxine 75 MG 24 hr capsule    EFFEXOR-XR    90 capsule    Take 1 capsule (75 mg) by mouth daily (take with 150mg tablet - total dose 225mg)       * Notice:  This list has 2 medication(s) that are the same as other medications prescribed for you. Read the directions carefully, and ask your doctor or other  care provider to review them with you.

## 2017-05-04 ENCOUNTER — DOCUMENTATION ONLY (OUTPATIENT)
Dept: FAMILY MEDICINE | Facility: CLINIC | Age: 37
End: 2017-05-04

## 2017-05-04 NOTE — PROGRESS NOTES
"When opening a documentation only encounter, be sure to enter in \"Chief Complaint\" Forms and in \" Comments\" Title of form, description if needed.    Carmen is a 37 year old  female  Form received via: Fax  Form now resides in: Provider Ready    Anjana Louis CMA      Faxed requested forms to [facility]. Sent copy to patients chart.  No further action required.     Anjana Louis CMA                  "

## 2017-05-16 NOTE — NURSING NOTE
The following medication was given:     MEDICATION: Ketorolac Tromethamine  (30 mg/mL) (Toradol)  ROUTE: IM  SITE: Forearm - Right  DOSE: 1ml  LOT #: [id number]  :  Hospira  EXPIRATION DATE:  03/01/18  NDC#: 5230-2103-77     Was entire vial of medication used? Yes    Dr. Cadena onsite and available for questions at time of injection.   Problem list reviewed for plan for this injection. Patient has orders and plan written on AVS.  Patient should be seen and plan renewed before one year.       ELISABET Bob

## 2017-06-02 ENCOUNTER — TELEPHONE (OUTPATIENT)
Dept: FAMILY MEDICINE | Facility: CLINIC | Age: 37
End: 2017-06-02

## 2017-06-02 DIAGNOSIS — F33.1 MAJOR DEPRESSIVE DISORDER, RECURRENT EPISODE, MODERATE (H): ICD-10-CM

## 2017-06-02 NOTE — TELEPHONE ENCOUNTER
Most recent AVS faxed to Rose at number provided. Rose called and notified.     Isreal Espitia RN

## 2017-06-02 NOTE — TELEPHONE ENCOUNTER
Request for medication refill:    Date of last visit at clinic: 5/2/17    Please complete refill if appropriate and CLOSE ENCOUNTER.    Closing the encounter signifies the refill is complete.    If refill has been denied, please complete the smart phrase .smirefuse and route it to the [facility] RN TRIAGE pool to inform the patient and the pharmacy.    Diane Gil

## 2017-06-02 NOTE — TELEPHONE ENCOUNTER
[facility] phone call message- general phone call:    Reason for call: Rose home care nurse, requesting that a copy of patient's current med list be faxed to her at [phone], attn: Rose.    Return call needed: No    OK to leave a message on voice mail? Yes    Primary language: Norwegian      needed? Yes    Call taken on June 2, 2017 at 2:05 PM by Nidhi Medel

## 2017-06-07 DIAGNOSIS — F33.1 MAJOR DEPRESSIVE DISORDER, RECURRENT EPISODE, MODERATE (H): ICD-10-CM

## 2017-06-07 RX ORDER — TEMAZEPAM 15 MG/1
15 CAPSULE ORAL
Qty: 90 CAPSULE | Refills: 3 | OUTPATIENT
Start: 2017-06-07

## 2017-06-21 ENCOUNTER — TELEPHONE (OUTPATIENT)
Dept: FAMILY MEDICINE | Facility: CLINIC | Age: 37
End: 2017-06-21

## 2017-06-21 NOTE — TELEPHONE ENCOUNTER
[facility] phone call message - order or referral request for patient:     Order or referral being requested: Skilled nursing 1x/week for 9 weeks and 2 PRN    Additional Comments: Rose requesting that copy of current med list be faxed to her at [phone]    OK to leave a message on voice mail? Yes    Primary language: Austrian      needed? Yes    Call taken on June 21, 2017 at 3:33 PM by Nidhi Medel

## 2017-06-21 NOTE — TELEPHONE ENCOUNTER
Returned call to home care nurse to give verbal orders per protocol as requested. Nurse verbalized understanding. Faxed current med list to nurse at number provided, fax successful.    Tonia Man RN

## 2017-06-24 ENCOUNTER — MEDICAL CORRESPONDENCE (OUTPATIENT)
Dept: HEALTH INFORMATION MANAGEMENT | Facility: CLINIC | Age: 37
End: 2017-06-24

## 2017-07-07 ENCOUNTER — DOCUMENTATION ONLY (OUTPATIENT)
Dept: FAMILY MEDICINE | Facility: CLINIC | Age: 37
End: 2017-07-07

## 2017-07-07 NOTE — PROGRESS NOTES
"When opening a documentation only encounter, be sure to enter in \"Chief Complaint\" Forms and in \" Comments\" Title of form, description if needed.    Carmen is a 37 year old  female  Form received via: Fax  Form now resides in: Provider Ready    Anjana Louis CMA            [facility] recert 06/24/2017 through 08/22/2017 faxed to valorie ruff.  Sent copy to patients chart.  No further action required.     Anjana Louis CMA        "

## 2017-07-20 ENCOUNTER — OFFICE VISIT (OUTPATIENT)
Dept: FAMILY MEDICINE | Facility: CLINIC | Age: 37
End: 2017-07-20

## 2017-07-20 VITALS
HEART RATE: 94 BPM | WEIGHT: 189.4 LBS | OXYGEN SATURATION: 96 % | DIASTOLIC BLOOD PRESSURE: 91 MMHG | HEIGHT: 65 IN | BODY MASS INDEX: 31.56 KG/M2 | TEMPERATURE: 98.1 F | SYSTOLIC BLOOD PRESSURE: 130 MMHG

## 2017-07-20 DIAGNOSIS — S46.811A STRAIN OF RIGHT TRAPEZIUS MUSCLE, INITIAL ENCOUNTER: ICD-10-CM

## 2017-07-20 DIAGNOSIS — M25.561 POSTERIOR RIGHT KNEE PAIN: ICD-10-CM

## 2017-07-20 DIAGNOSIS — S43.421A SPRAIN OF RIGHT ROTATOR CUFF CAPSULE, INITIAL ENCOUNTER: Primary | ICD-10-CM

## 2017-07-20 DIAGNOSIS — G44.219 EPISODIC TENSION-TYPE HEADACHE, NOT INTRACTABLE: ICD-10-CM

## 2017-07-20 RX ORDER — IBUPROFEN 400 MG/1
400-800 TABLET, FILM COATED ORAL EVERY 8 HOURS PRN
Qty: 120 TABLET | Refills: 1 | Status: SHIPPED | OUTPATIENT
Start: 2017-07-20 | End: 2018-05-02

## 2017-07-20 NOTE — PROGRESS NOTES
"      HPI:       Carmen Connolly is a 37 year old who presents for the following  Patient presents with:  Shoulder Pain: Right, side pain  Knee Pain: right is worse      R shoulder pain  - new (had an injection in the L shoulder in the past)  - pain with movement, pain in the upper arm (seems to come from the neck first)  - not stuck or difficult to move  - is R handed    R knee pain  - new  - hurts when standing after prolonged sitting  - numbness in the back  - doesn't give away or lock up on her  - no redness, swelling  - no known injury    A [employer]  was used for  this visit.      Problem, Medication and Allergy Lists were reviewed and are current.  Patient is an established patient of this clinic.         Review of Systems:   Review of Systems   Constitutional: Negative for activity change and appetite change.   Respiratory: Negative.    Cardiovascular: Negative.    Musculoskeletal:        See HPI   Psychiatric/Behavioral:        Stable mood, doing well overall; wants to attend Adult Day Program (feels better when she's around people)             Physical Exam:   Patient Vitals for the past 24 hrs:   BP Temp Temp src Pulse SpO2 Height Weight   07/20/17 1251 (!) 130/91 - - - - - -   07/20/17 1248 (!) 132/94 98.1  F (36.7  C) Oral 94 96 % 5' 5\" (165.1 cm) 189 lb 6.4 oz (85.9 kg)     Body mass index is 31.52 kg/(m^2).  Vitals were reviewed and were normal     Physical Exam   Constitutional: She is oriented to person, place, and time. She appears well-developed and well-nourished.   Neck: Normal range of motion. Neck supple.   Pulmonary/Chest: Effort normal.   Musculoskeletal: She exhibits no edema.        Right shoulder: She exhibits tenderness (anterior and posterior joint lines) and spasm (spasm across the upper trap and SCM on R side). She exhibits normal range of motion, no swelling, no effusion, no crepitus, no deformity and normal strength.        Right knee: She exhibits swelling (fullness, but no " obvious cystic structure behind the R knee). She exhibits no effusion, no LCL laxity, normal patellar mobility, normal meniscus and no MCL laxity. Tenderness (tender laterally, behind the knee (along the ligaments)) found. No medial joint line and no lateral joint line tenderness noted.   Empty can test - Positive  Speed's test - Negative  Cross-arm - Negative  Apprehension - Negative   Neurological: She is alert and oriented to person, place, and time.   Psychiatric: She has a normal mood and affect. Her behavior is normal.         Results:       Assessment and Plan     Patient Instructions   Here is the plan from today's visit    1. Sprain of right rotator cuff capsule, initial encounter  - PHYSICAL THERAPY REFERRAL - EXTERNAL    2. Strain of right trapezius muscle, initial encounter  - PHYSICAL THERAPY REFERRAL - EXTERNAL    3. Posterior right knee pain  Use ibuprofen as needed  Ice for comfort, and after more activity  Discuss exercises with PT (home regimen)  - PHYSICAL THERAPY REFERRAL - EXTERNAL  - ibuprofen (ADVIL/MOTRIN) 400 MG tablet; Take 1-2 tablets (400-800 mg) by mouth every 8 hours as needed for moderate pain  Dispense: 120 tablet; Refill: 1      Recommend a Wellness Visit (Physical exam) to review preventive health - pap smear, etc in the next few months    Please call or return to clinic if your symptoms don't go away.    Follow up plan - in 1-2 months      There are no discontinued medications.  Options for treatment and follow-up care were reviewed with the patient. Carmen Connolly  engaged in the decision making process and verbalized understanding of the options discussed and agreed with the final plan.    Khushi Cadena MD

## 2017-07-20 NOTE — PATIENT INSTRUCTIONS
Here is the plan from today's visit    1. Sprain of right rotator cuff capsule, initial encounter  - PHYSICAL THERAPY REFERRAL - EXTERNAL    2. Strain of right trapezius muscle, initial encounter  - PHYSICAL THERAPY REFERRAL - EXTERNAL    3. Posterior right knee pain  Use ibuprofen as needed  Ice for comfort, and after more activity  Discuss exercises with PT (home regimen)  - PHYSICAL THERAPY REFERRAL - EXTERNAL  - ibuprofen (ADVIL/MOTRIN) 400 MG tablet; Take 1-2 tablets (400-800 mg) by mouth every 8 hours as needed for moderate pain  Dispense: 120 tablet; Refill: 1      Recommend a Wellness Visit (Physical exam) to review preventive health - pap smear, etc in the next few months    Please call or return to clinic if your symptoms don't go away.    Follow up plan - in 1-2 months    Thank you for coming to [facility] today.  Lab Testing:  **If you had lab testing today and your results are reassuring or normal they will be mailed to you or sent through [employer] within 7 days.   **If the lab tests need quick action we will call you with the results.  The phone number we will call with results is # [phone] (home) . If this is not the best number please call our clinic and change the number.  Medication Refills:  If you need any refills please call your pharmacy and they will contact us.   If you need to  your refill at a new pharmacy, please contact the new pharmacy directly. The new pharmacy will help you get your medications transferred faster.   Scheduling:  If you have any concerns about today's visit or wish to schedule another appointment please call our office during normal business hours [phone] (8-5:00 M-F)  If a referral was made to a [facility] and you don't get a call from central scheduling please call [phone].  If a Mammogram was ordered for you at [facility] call [phone] to schedule or change your appointment.  If you had an  XRay/CT/Ultrasound/MRI ordered the number is [phone] to schedule or change your radiology appointment.   Medical Concerns:  If you have urgent medical concerns please call [phone] at any time of the day.  If you have a medical emergency please call [phone].

## 2017-07-20 NOTE — MR AVS SNAPSHOT
After Visit Summary   7/20/2017    Carmen Mondragonr    MRN: [id number]           Patient Information     Date Of Birth          1980        Visit Information        Provider Department      7/20/2017 12:50 PM Khushi Cadena MD [facility]        Today's Diagnoses     Sprain of right rotator cuff capsule, initial encounter    -  1    Strain of right trapezius muscle, initial encounter        Posterior right knee pain        Episodic tension-type headache, not intractable          Care Instructions    Here is the plan from today's visit    1. Sprain of right rotator cuff capsule, initial encounter  - PHYSICAL THERAPY REFERRAL - EXTERNAL    2. Strain of right trapezius muscle, initial encounter  - PHYSICAL THERAPY REFERRAL - EXTERNAL    3. Posterior right knee pain  Use ibuprofen as needed  Ice for comfort, and after more activity  Discuss exercises with PT (home regimen)  - PHYSICAL THERAPY REFERRAL - EXTERNAL  - ibuprofen (ADVIL/MOTRIN) 400 MG tablet; Take 1-2 tablets (400-800 mg) by mouth every 8 hours as needed for moderate pain  Dispense: 120 tablet; Refill: 1      Recommend a Wellness Visit (Physical exam) to review preventive health - pap smear, etc in the next few months    Please call or return to clinic if your symptoms don't go away.    Follow up plan - in 1-2 months    Thank you for coming to [facility] today.  Lab Testing:  **If you had lab testing today and your results are reassuring or normal they will be mailed to you or sent through [employer] within 7 days.   **If the lab tests need quick action we will call you with the results.  The phone number we will call with results is # [phone] (home) . If this is not the best number please call our clinic and change the number.  Medication Refills:  If you need any refills please call your pharmacy and they will contact us.   If you need to  your refill at a new pharmacy, please contact the new pharmacy  directly. The new pharmacy will help you get your medications transferred faster.   Scheduling:  If you have any concerns about today's visit or wish to schedule another appointment please call our office during normal business hours [phone] (8-5:00 M-F)  If a referral was made to a [facility] and you don't get a call from central scheduling please call [phone].  If a Mammogram was ordered for you at [facility] call [phone] to schedule or change your appointment.  If you had an XRay/CT/Ultrasound/MRI ordered the number is [phone] to schedule or change your radiology appointment.   Medical Concerns:  If you have urgent medical concerns please call [phone] at any time of the day.  If you have a medical emergency please call 911.            Follow-ups after your visit        Additional Services     PHYSICAL THERAPY REFERRAL - EXTERNAL       [facility]  Address: [address] [place] [address]  Hours: Open today   9AM-5PM  Phone: [phone]    Patient will  to schedule their own appointment    Reason for referral:  1) R shoulder pain - referred from trapezius strain; also rotator cuff tendinosis  2) R posterior knee pain (mild swelling may be consistent with Baker's cyst)    Please evaluate and treat - will need relaxation and rehab of R shoulder                  Who to contact     Please call your clinic at [phone] to:    Ask questions about your health    Make or cancel appointments    Discuss your medicines    Learn about your test results    Speak to your doctor   If you have compliments or concerns about an experience at your clinic, or if you wish to file a complaint, please contact [facility] Patient Relations at [phone] or email us at [email or web address]         Additional Information About Your Visit        [employer] Information     [address] is an electronic gateway  "that provides easy, online access to your medical records. With eyeQ, you can request a clinic appointment, read your test results, renew a prescription or communicate with your care team.     To sign up for eyeQ visit the website at [email or web address]   You will be asked to enter the access code listed below, as well as some personal information. Please follow the directions to create your username and password.     Your access code is: [email or web address]  Expires: 10/18/2017  1:17 PM     Your access code will  in 90 days. If you need help or a new code, please contact your [facility] or call [phone] for assistance.        Care EveryWhere ID     This is your Care EveryWhere ID. This could be used by other organizations to access your [place] medical records  [id number]        Your Vitals Were     Pulse Temperature Height Pulse Oximetry BMI (Body Mass Index)       94 98.1  F (36.7  C) (Oral) 5' 5\" (165.1 cm) 96% 31.52 kg/m2        Blood Pressure from Last 3 Encounters:   17 (!) 130/91   17 124/86   17 135/89    Weight from Last 3 Encounters:   17 189 lb 6.4 oz (85.9 kg)   17 186 lb 9.6 oz (84.6 kg)   17 188 lb 9.6 oz (85.5 kg)              We Performed the Following     PHYSICAL THERAPY REFERRAL - EXTERNAL          Today's Medication Changes          These changes are accurate as of: 17  1:19 PM.  If you have any questions, ask your nurse or doctor.               These medicines have changed or have updated prescriptions.        Dose/Directions    ibuprofen 400 MG tablet   Commonly known as:  ADVIL/MOTRIN   This may have changed:    - how much to take  - when to take this   Used for:  Episodic tension-type headache, not intractable   Changed by:  Khushi Cadena MD        Dose:  400-800 mg   Take 1-2 tablets (400-800 mg) by mouth every 8 hours as needed for moderate pain   Quantity:  120 tablet   Refills:  1          "   Where to get your medicines      These medications were sent to [facility] [id number] [facility] - [place], [place] - [address]  [address] [address]     Phone:  [phone]     ibuprofen 400 MG tablet                Primary Care Provider Office Phone # Fax #    Khushi Cadena [phone]       [facility] [address]  [facility] [address]        Equal Access to Services     BRITTANY ADAMS : Hadii aad ku hadasho Soomaali, waaxda luqadaha, qaybta kaalmada adeegyada, waxay trena wlofseamusisabel pereira . So [facility] [phone].    ATENCIÓN: Si efrala espramsey, tiene a armendariz disposición servicios gratuitos de asistencia lingüística. Akira al [phone].    We comply with applicable federal civil rights laws and [place] laws. We do not discriminate on the basis of race, color, national origin, age, disability sex, sexual orientation or gender identity.            Thank you!     Thank you for choosing [facility]  for your care. Our goal is always to provide you with excellent care. Hearing back from our patients is one way we can continue to improve our services. Please take a few minutes to complete the written survey that you may receive in the mail after your visit with us. Thank you!             Your Updated Medication List - Protect others around you: Learn how to safely use, store and throw away your medicines at www.disposemymeds.org.          This list is accurate as of: 7/20/17  1:19 PM.  Always use your most recent med list.                   Brand Name Dispense Instructions for use Diagnosis    amLODIPine 5 MG tablet    NORVASC    90 tablet    Take 1 tablet (5 mg) by mouth daily    Essential hypertension with goal blood pressure less than 130/85       cetirizine 10 MG tablet    zyrTEC    30 tablet    Take 1 tablet (10 mg) by mouth daily        fish oil-omega-3 fatty acids 1000 MG capsule     90 capsule    Take 2 capsules (2 g) by  mouth daily    Essential hypertension with goal blood pressure less than 130/85       hydrOXYzine 25 MG tablet    ATARAX    60 tablet    Take 1-2 tablets (25-50 mg) by mouth every 6 hours as needed for itching    Hives       ibuprofen 400 MG tablet    ADVIL/MOTRIN    120 tablet    Take 1-2 tablets (400-800 mg) by mouth every 8 hours as needed for moderate pain    Episodic tension-type headache, not intractable       lisinopril-hydrochlorothiazide 20-25 MG per tablet    PRINZIDE/ZESTORETIC    90 tablet    Take 1 tablet by mouth daily    Essential hypertension with goal blood pressure less than 130/85       multivitamin, therapeutic with minerals Tabs tablet     100 each    Take 1 tablet by mouth daily    Vitamin deficiency       omeprazole 20 MG tablet     90 tablet    Take 1 tablet (20 mg) by mouth daily Take 30-60 minutes before a meal.    Gastroesophageal reflux disease without esophagitis       risperiDONE 1 MG tablet    risperDAL    60 tablet    Take 2 tablets (2 mg) by mouth At Bedtime    Major depressive disorder, recurrent episode, moderate (H), Posttraumatic stress disorder       SUMAtriptan 25 MG tablet    IMITREX    30 tablet    Take 2 tablets (50 mg) by mouth as needed    Episodic tension-type headache, not intractable       temazepam 15 MG capsule    RESTORIL    90 capsule    Take 1 capsule (15 mg) by mouth nightly as needed    Major depressive disorder, recurrent episode, moderate (H)       * venlafaxine 150 MG 24 hr capsule    EFFEXOR-XR    90 capsule    Take 1 capsule (150 mg) by mouth daily    Major depressive disorder, recurrent episode, moderate (H)       * venlafaxine 75 MG 24 hr capsule    EFFEXOR-XR    90 capsule    Take 1 capsule (75 mg) by mouth daily (take with 150mg tablet - total dose 225mg)    Major depressive disorder, recurrent episode, moderate (H)       * Notice:  This list has 2 medication(s) that are the same as other medications prescribed for you. Read the directions carefully, and  ask your doctor or other care provider to review them with you.

## 2017-07-21 ASSESSMENT — ENCOUNTER SYMPTOMS
APPETITE CHANGE: 0
ACTIVITY CHANGE: 0
RESPIRATORY NEGATIVE: 1
CARDIOVASCULAR NEGATIVE: 1

## 2017-08-22 ENCOUNTER — TELEPHONE (OUTPATIENT)
Dept: FAMILY MEDICINE | Facility: CLINIC | Age: 37
End: 2017-08-22

## 2017-08-22 NOTE — TELEPHONE ENCOUNTER
[facility] phone call message - order or referral request for patient:     Order or referral being requested: orders for home care 1x every other week for 9 weeks and 3 PRN      Additional Comments: please call home care nurse    OK to leave a message on voice mail? Yes    Primary language: Nepalese      needed? Yes    Call taken on August 22, 2017 at 12:21 PM by Brynn Leary

## 2017-08-22 NOTE — TELEPHONE ENCOUNTER
Returned call to home care nurse to give verbal orders per protocol as requested. Nurse verbalized understanding.    Shaniqua Blair RN

## 2017-08-23 ENCOUNTER — MEDICAL CORRESPONDENCE (OUTPATIENT)
Dept: HEALTH INFORMATION MANAGEMENT | Facility: CLINIC | Age: 37
End: 2017-08-23

## 2017-08-31 ENCOUNTER — OFFICE VISIT (OUTPATIENT)
Dept: FAMILY MEDICINE | Facility: CLINIC | Age: 37
End: 2017-08-31

## 2017-08-31 VITALS
DIASTOLIC BLOOD PRESSURE: 76 MMHG | RESPIRATION RATE: 18 BRPM | BODY MASS INDEX: 30.69 KG/M2 | TEMPERATURE: 98.4 F | WEIGHT: 184.2 LBS | OXYGEN SATURATION: 98 % | HEART RATE: 101 BPM | SYSTOLIC BLOOD PRESSURE: 104 MMHG | HEIGHT: 65 IN

## 2017-08-31 DIAGNOSIS — Z00.00 ROUTINE GENERAL MEDICAL EXAMINATION AT A HEALTH CARE FACILITY: Primary | ICD-10-CM

## 2017-08-31 DIAGNOSIS — Z13.9 SCREENING FOR CONDITION: ICD-10-CM

## 2017-08-31 DIAGNOSIS — K21.9 GASTROESOPHAGEAL REFLUX DISEASE, ESOPHAGITIS PRESENCE NOT SPECIFIED: ICD-10-CM

## 2017-08-31 LAB
CHOLEST SERPL-MCNC: 221 MG/DL (ref 0–200)
CHOLEST/HDLC SERPL: 4.3 {RATIO} (ref 0–5)
HBA1C MFR BLD: 5.4 % (ref 4.1–5.7)
HDLC SERPL-MCNC: 51.3 MG/DL
LDLC SERPL CALC-MCNC: 153 MG/DL (ref 0–129)
TRIGL SERPL-MCNC: 82.3 MG/DL (ref 0–150)
VLDL CHOLESTEROL: 16.5 MG/DL (ref 7–32)

## 2017-08-31 NOTE — LETTER
September 11, 2017      Carmen Mccoy Mohsen  [address]    [place] [address]        Dear Carmen,    Thank you for getting your care at [facility]. Please see below for your test results.  Everything was normal.  Your next pap test will be due in 5 years.    Resulted Orders   Pap imaged thin layer screen with HPV - recommended age 30 - 65 years (select HPV order below)   Result Value Ref Range    PAP NIL     Copath Report         Acc#: [id number]   Signed: 9/5/2017 14:29   MR#: [id number]    SPECIMEN/STAIN PROCESS:  Pap imaged thin layer prep screening (Surepath, FocalPoint with guided  screening)       Pap-Cyto x 1, HPV ordered x 1    SOURCE: Cervical, endocervical  ----------------------------------------------------------------   Pap imaged thin layer prep screening (Surepath, FocalPoint with guided  screening)  SPECIMEN ADEQUACY:  Satisfactory for evaluation.  -Transformation zone component present.    CYTOLOGIC INTERPRETATION:    Negative for intraepithelial lesion or malignancy    Electronically signed by:  RYANNE Hollingsworth (ASCP)    CLINICAL HISTORY:    Papanicolaou Test Limitations:  Cervical cytology is a screening test  with limited sensitivity; regular screening is critical for cancer  prevention; Pap tests are primarily effective for the  diagnosis/prevention of squamous cell carcinoma, not adenocarcinomas or  other cancers.  TESTING LAB LOCATION:  [facility] [address] [address], [phone]  Processed and screened at [facility],  [facility]     HIV Antigen Antibody Combo   Result Value Ref Range    HIV Antigen Antibody Combo Nonreactive NR^Nonreactive          Comment:      HIV-1 p24 Ag & HIV-1/HIV-2 Ab Not Detected   Hemoglobin A1c ([facility])   Result Value Ref Range    Hemoglobin A1C 5.4 4.1 - 5.7 %   Lipid Panel ([facility])   Result Value Ref Range    Cholesterol 221.0  (H) 0.0 - 200.0 mg/dL    Cholesterol/HDL Ratio 4.3 0.0 - 5.0    HDL Cholesterol 51.3 >40.0 mg/dL    LDL Cholesterol Calculated 153 (H) 0 - 129 mg/dL    Triglycerides 82.3 0.0 - 150.0 mg/dL    VLDL Cholesterol 16.5 7.0 - 32.0 mg/dL   HPV High Risk Types DNA Cervical   Result Value Ref Range    HPV 16 DNA Negative NEG^Negative    HPV 18 DNA Negative NEG^Negative    Other HR HPV Negative NEG^Negative    Final Diagnosis This patient's sample is negative for HPV DNA.       Comment:      (Note)  METHODOLOGY:  The Roche renee 4800 system uses automated extraction,   simultaneous amplification of HPV (L1 region) and beta-globin,    followed by  real time detection of fluorescent labeled HPV and beta   globin using specific oligonucleotide probes . The test specifically   identifies types HPV 16 DNA and HPV 18 DNA while concurrently   detecting the rest of the high risk types (31, 33, 35, 39, 45, 51,   52, 56, 58, 59, 66 or 68).  COMMENTS:  This test is not intended for use as a screening device   for women under age 30 with normal cervical cytology.  Results should   be correlated with cytologic and histologic findings. Close clinical   followup is recommended.  This test was developed and its performance characteristics   determined by the [facility], [facility]   [facility]. It has not been cleared or approved by the   FDA. The laboratory is regulated under CLIA as qualified to perform   high-complexity testing. This test is used   for clinical purposes. It   should not be regarded as investigational or for research.      Specimen Description Cervical Cells       Comment:      [id number]       If you have any concerns about these results please call and leave a message for me or send a MyChart message to the clinic.    Sincerely,    Khushi Cadena MD

## 2017-08-31 NOTE — Clinical Note
Hi there - I must have missed ordering the HPV test for this patient, I've placed it now.  Can you add it to the pap from last week??  Thank you!!  Khushi

## 2017-08-31 NOTE — PROGRESS NOTES
"  Female Physical Note          HPI         Concerns today: No special concerns today.  Here to get her routine health screening.  Mood is stable, continues to see her therapist.  No questions about medications.  Did need a few refills (particularly for \"gas\")    A [employer]  was used for  this visit.     Patient Active Problem List   Diagnosis     Essential hypertension     Gastroesophageal reflux disease without esophagitis     Tachycardia     Shortened AL interval     Vitamin D deficiency     Posttraumatic stress disorder     Female circumcision     Chronic tension-type headache, not intractable     Schizoaffective disorder, bipolar type (H)     Neurocognitive deficits     Arthritis     Health Care Home Active Coordination       Past Medical History:   Diagnosis Date     Depressive disorder      Hypertension      Vaginal delivery     6 deliveries        Family History   Problem Relation Age of Onset     DIABETES No family hx of      Coronary Artery Disease No family hx of      Hypertension No family hx of      Other Cancer No family hx of      Breast Cancer No family hx of      Colon Cancer No family hx of               Review of Systems:     Review of Systems:  CONSTITUTIONAL: NEGATIVE for fever, chills, change in weight  INTEGUMENTARY/SKIN: NEGATIVE for worrisome rashes, moles or lesions  EYES: NEGATIVE for vision changes or irritation  ENT/MOUTH: NEGATIVE for ear, mouth and throat problems  RESP: NEGATIVE for significant cough or SOB  BREAST: NEGATIVE for masses, tenderness or discharge  CV: NEGATIVE for chest pain, palpitations or peripheral edema  GI: NEGATIVE for nausea, abdominal pain, heartburn, or change in bowel habits  : NEGATIVE for frequency, dysuria, or hematuria  MUSCULOSKELETAL: NEGATIVE for significant arthralgias or myalgia  NEURO: NEGATIVE for weakness, dizziness or paresthesias  ENDOCRINE: NEGATIVE for temperature intolerance, skin/hair changes  HEME/ALLERGY: NEGATIVE for bleeding " "problems  PSYCHIATRIC: NEGATIVE for changes in mood or affect  Sleep:   Do you snore most or the night (as reported by a family member)? No  Do you feel sleepy or extremely tired during most of the day? No           Social History     Social History     Social History     Marital status: Single     Spouse name: N/A     Number of children: N/A     Years of education: N/A     Occupational History     Not on file.     Social History Main Topics     Smoking status: Never Smoker     Smokeless tobacco: Never Used     Alcohol use No     Drug use: Not on file     Sexual activity: No     Other Topics Concern     Not on file     Social History Narrative    Lives at home with 6 kids (range in age from 8-20); not working outside the home; has been in the [place] for past 5 years       Marital Status:Single  Who lives in your household? Self and children    Has anyone hurt you physically, for example by pushing, hitting, slapping or kicking you or forcing you to have sex? Denies  Do you feel threatened or controlled by a partner, ex-partner or anyone in your life? Denies    Sexual Health     Sexual concerns: No   STI History: Neg  Pregnancy History:   LMP No LMP recorded. \"last month\"  Last Pap Smear Date: No results found for: PAP  Abnormal Pap History: unknown    Recommended Screening     Pap/HPV cotest every 5 years for women 30-65   Recommended and patient accepted testing  HIV screening:  Recommended and patient accepted testing.           Physical Exam:     Vitals: /76  Pulse 101  Temp 98.4  F (36.9  C) (Oral)  Resp 18  Ht 5' 5\" (165.1 cm)  Wt 184 lb 3.2 oz (83.6 kg)  SpO2 98%  BMI 30.65 kg/m2  BMI= Body mass index is 30.65 kg/(m^2).   GENERAL: healthy, alert and no distress  EYES: Eyes grossly normal to inspection, extraocular movements - intact, and PERRL  HENT: ear canals- normal; TMs- normal; Nose- normal; Mouth- no ulcers, no lesions  NECK: no tenderness, no adenopathy, no asymmetry, no masses, no " stiffness; thyroid- normal to palpation  RESP: lungs clear to auscultation - no rales, no rhonchi, no wheezes  BREAST: no masses, no tenderness, no nipple discharge, no palpable axillary masses or adenopathy  CV: regular rates and rhythm, normal S1 S2, no S3 or S4 and no murmur, no click or rub -  ABDOMEN: soft, no tenderness, no  hepatosplenomegaly, no masses, normal bowel sounds  MS: extremities- no gross deformities noted, no edema  SKIN: no suspicious lesions, no rashes  NEURO: strength and tone- normal, sensory exam- grossly normal, mentation- intact, speech- normal, reflexes- symmetric  BACK: no CVA tenderness, no paralumbar tenderness  - female: cervix- normal, no discharge  PSYCH: Alert and oriented times 3; speech- coherent , normal rate and volume; able to articulate logical thoughts, able to abstract reason, no tangential thoughts, no hallucinations or delusions, affect- a little flat  LYMPHATICS: ant. cervical- normal, post. cervical- normal, axillary- normal, supraclavicular- normal      Assessment and Plan      Carmen was seen today for physical.    Diagnoses and all orders for this visit:    Routine general medical examination at a health care facility  -     Pap imaged thin layer screen with HPV - recommended age 30 - 65 years (select HPV order below)  -     HPV High Risk Types DNA Cervical    Gastroesophageal reflux disease, esophagitis presence not specified  -     ranitidine (ZANTAC) 150 MG tablet; Take 1 tablet (150 mg) by mouth 2 times daily    Screening for condition  -     HIV Antigen Antibody Combo  -     Hemoglobin A1c ([facility])  -     Lipid Panel ([facility])      PLAN:  1. For NOREEN, switched to H2 blocker instead of PPI for a trial, attempting to move away from meds that can leech calcium from her bones  2. Contraception: not needed  3. Health screening updated: pap/HPV, Lipids, A1c, HIV  4. F/u as needed on BP, mood - both currently stable    Options for treatment and follow-up care were  reviewed with the patient . Carmen Mccoy Rosston and/or guardian engaged in the decision making process and verbalized understanding of the options discussed and agreed with the final plan.    Khushi Cadena MD

## 2017-08-31 NOTE — MR AVS SNAPSHOT
After Visit Summary   8/31/2017    Carmen Connolly    MRN: [id number]           Patient Information     Date Of Birth          1980        Visit Information        Provider Department      8/31/2017 3:40 PM Khushi Cadena MD [facility]        Today's Diagnoses     Routine general medical examination at a health care facility    -  1    Gastroesophageal reflux disease, esophagitis presence not specified        Screening for condition          Care Instructions      Preventive Health Recommendations  Female Ages 26 - 39  Yearly exam:   See your health care provider every year in order to    Review health changes.     Discuss preventive care.      Review your medicines if you your doctor has prescribed any.    Until age 30: Get a Pap test every three years (more often if you have had an abnormal result).    After age 30: Talk to your doctor about whether you should have a Pap test every 3 years or have a Pap test with HPV screening every 5 years.   You do not need a Pap test if your uterus was removed (hysterectomy) and you have not had cancer.  You should be tested each year for STDs (sexually transmitted diseases), if you're at risk.   Talk to your provider about how often to have your cholesterol checked.  If you are at risk for diabetes, you should have a diabetes test (fasting glucose).  Shots: Get a flu shot each year. Get a tetanus shot every 10 years.   Nutrition:     Eat at least 5 servings of fruits and vegetables each day.    Eat whole-grain bread, whole-wheat pasta and brown rice instead of white grains and rice.    Talk to your provider about Calcium and Vitamin D.     Lifestyle    Exercise at least 150 minutes a week (30 minutes a day, 5 days of the week). This will help you control your weight and prevent disease.    Limit alcohol to one drink per day.    No smoking.     Wear sunscreen to prevent skin cancer.    See your dentist every six months for an exam and  "cleaning.            Follow-ups after your visit        Who to contact     Please call your clinic at [phone] to:    Ask questions about your health    Make or cancel appointments    Discuss your medicines    Learn about your test results    Speak to your doctor   If you have compliments or concerns about an experience at your clinic, or if you wish to file a complaint, please contact [facility] Patient Relations at [phone] or email us at [email or web address]         Additional Information About Your Visit        MagineharSPARQ Information     [employer] is an electronic gateway that provides easy, online access to your medical records. With [employer], you can request a clinic appointment, read your test results, renew a prescription or communicate with your care team.     To sign up for [employer] visit the website at [email or web address]   You will be asked to enter the access code listed below, as well as some personal information. Please follow the directions to create your username and password.     Your access code is: [email or web address]  Expires: 10/18/2017  1:17 PM     Your access code will  in 90 days. If you need help or a new code, please contact your [facility] or call [phone] for assistance.        Care EveryWhere ID     This is your Care EveryWhere ID. This could be used by other organizations to access your [place] medical records  [id number]        Your Vitals Were     Pulse Temperature Respirations Height Pulse Oximetry BMI (Body Mass Index)    101 98.4  F (36.9  C) (Oral) 18 5' 5\" (165.1 cm) 98% 30.65 kg/m2       Blood Pressure from Last 3 Encounters:   17 104/76   17 (!) 130/91   17 124/86    Weight from Last 3 Encounters:   17 184 lb 3.2 oz (83.6 kg)   17 189 lb 6.4 oz (85.9 kg)   17 186 lb 9.6 oz (84.6 kg)              We Performed the Following     Hemoglobin A1c (Gabriela's)     HIV " Antigen Antibody Combo     Lipid Panel (LabDAQ)     Pap imaged thin layer screen with HPV - recommended age 30 - 65 years (select HPV order below)          Today's Medication Changes          These changes are accurate as of: 8/31/17  4:25 PM.  If you have any questions, ask your nurse or doctor.               Start taking these medicines.        Dose/Directions    ranitidine 150 MG tablet   Commonly known as:  ZANTAC   Used for:  Gastroesophageal reflux disease, esophagitis presence not specified   Started by:  Khushi Cadena MD        Dose:  150 mg   Take 1 tablet (150 mg) by mouth 2 times daily   Quantity:  60 tablet   Refills:  11         Stop taking these medicines if you haven't already. Please contact your care team if you have questions.     omeprazole 20 MG tablet   Stopped by:  Khushi Cadena MD                Where to get your medicines      These medications were sent to [facility] [address] [place], [place] - [address]  [address] [address]     Phone:  [phone]     ranitidine 150 MG tablet                Primary Care Provider Office Phone # Fax #    Khushi Cadena [phone]       [address] [address]        Equal Access to Services     [facility] CenterARAB[facility]LLA : Hadii moses mcphersono Somayur, waaxda luqadaha, qaybta kaalmada donis, chalo pereira . So [facility] [phone].    ATENCIÓN: Si habla español, tiene a armendariz disposición servicios gratuitos de asistencia lingüística. Akira al [phone].    We comply with applicable federal civil rights laws and [place] laws. We do not discriminate on the basis of race, color, national origin, age, disability sex, sexual orientation or gender identity.            Thank you!     Thank you for choosing [facility]  for your care. Our goal is always to provide you with excellent care. Hearing back from our patients is one way we can continue  to improve our services. Please take a few minutes to complete the written survey that you may receive in the mail after your visit with us. Thank you!             Your Updated Medication List - Protect others around you: Learn how to safely use, store and throw away your medicines at www.disposemymeds.org.          This list is accurate as of: 8/31/17  4:25 PM.  Always use your most recent med list.                   Brand Name Dispense Instructions for use Diagnosis    amLODIPine 5 MG tablet    NORVASC    90 tablet    Take 1 tablet (5 mg) by mouth daily    Essential hypertension with goal blood pressure less than 130/85       cetirizine 10 MG tablet    zyrTEC    30 tablet    Take 1 tablet (10 mg) by mouth daily        fish oil-omega-3 fatty acids 1000 MG capsule     90 capsule    Take 2 capsules (2 g) by mouth daily    Essential hypertension with goal blood pressure less than 130/85       hydrOXYzine 25 MG tablet    ATARAX    60 tablet    Take 1-2 tablets (25-50 mg) by mouth every 6 hours as needed for itching    Hives       ibuprofen 400 MG tablet    ADVIL/MOTRIN    120 tablet    Take 1-2 tablets (400-800 mg) by mouth every 8 hours as needed for moderate pain    Episodic tension-type headache, not intractable       lisinopril-hydrochlorothiazide 20-25 MG per tablet    PRINZIDE/ZESTORETIC    90 tablet    Take 1 tablet by mouth daily    Essential hypertension with goal blood pressure less than 130/85       multivitamin, therapeutic with minerals Tabs tablet     100 each    Take 1 tablet by mouth daily    Vitamin deficiency       ranitidine 150 MG tablet    ZANTAC    60 tablet    Take 1 tablet (150 mg) by mouth 2 times daily    Gastroesophageal reflux disease, esophagitis presence not specified       risperiDONE 1 MG tablet    risperDAL    60 tablet    Take 2 tablets (2 mg) by mouth At Bedtime    Major depressive disorder, recurrent episode, moderate (H), Posttraumatic stress disorder       SUMAtriptan 25 MG tablet     IMITREX    30 tablet    Take 2 tablets (50 mg) by mouth as needed    Episodic tension-type headache, not intractable       temazepam 15 MG capsule    RESTORIL    90 capsule    Take 1 capsule (15 mg) by mouth nightly as needed    Major depressive disorder, recurrent episode, moderate (H)       * venlafaxine 150 MG 24 hr capsule    EFFEXOR-XR    90 capsule    Take 1 capsule (150 mg) by mouth daily    Major depressive disorder, recurrent episode, moderate (H)       * venlafaxine 75 MG 24 hr capsule    EFFEXOR-XR    90 capsule    Take 1 capsule (75 mg) by mouth daily (take with 150mg tablet - total dose 225mg)    Major depressive disorder, recurrent episode, moderate (H)       * Notice:  This list has 2 medication(s) that are the same as other medications prescribed for you. Read the directions carefully, and ask your doctor or other care provider to review them with you.

## 2017-09-05 LAB
COPATH REPORT: NORMAL
HIV 1+2 AB+HIV1 P24 AG SERPL QL IA: NONREACTIVE
PAP: NORMAL

## 2017-09-06 ENCOUNTER — PATIENT OUTREACH (OUTPATIENT)
Dept: FAMILY MEDICINE | Facility: CLINIC | Age: 37
End: 2017-09-06

## 2017-09-06 LAB
FINAL DIAGNOSIS: NORMAL
HPV HR 12 DNA CVX QL NAA+PROBE: NEGATIVE
HPV16 DNA SPEC QL NAA+PROBE: NEGATIVE
HPV18 DNA SPEC QL NAA+PROBE: NEGATIVE
SPECIMEN DESCRIPTION: NORMAL

## 2017-09-28 DIAGNOSIS — F33.1 MAJOR DEPRESSIVE DISORDER, RECURRENT EPISODE, MODERATE (H): ICD-10-CM

## 2017-09-28 NOTE — TELEPHONE ENCOUNTER
Request for medication refill:    Date of last visit at clinic: 8/31/2017    Please complete refill if appropriate and CLOSE ENCOUNTER.    Closing the encounter signifies the refill is complete.    If refill has been denied, please complete the smart phrase .smirefuse and route it to the [facility] RN TRIAGE pool to inform the patient and the pharmacy.    Bing Fonseca, CMA

## 2017-10-03 RX ORDER — TEMAZEPAM 15 MG/1
15 CAPSULE ORAL
Qty: 90 CAPSULE | Refills: 3 | Status: SHIPPED | OUTPATIENT
Start: 2017-10-03 | End: 2018-05-02

## 2017-10-09 NOTE — TELEPHONE ENCOUNTER
RN faxed signed script to [facility] pharmacy. Fax successful    Shaniqua Blair RN

## 2017-10-18 ENCOUNTER — TELEPHONE (OUTPATIENT)
Dept: FAMILY MEDICINE | Facility: CLINIC | Age: 37
End: 2017-10-18

## 2017-10-18 NOTE — TELEPHONE ENCOUNTER
[facility] phone call message - order or referral request for patient:     Order or referral being requested: continued service for skilled nursing every other week for med set up and 3 prn.      Additional Comments: see above    OK to leave a message on voice mail? Yes    Primary language: Andorran      needed? Yes    Call taken on October 18, 2017 at 2:04 PM by Lorene Garcia

## 2017-10-22 ENCOUNTER — MEDICAL CORRESPONDENCE (OUTPATIENT)
Dept: HEALTH INFORMATION MANAGEMENT | Facility: CLINIC | Age: 37
End: 2017-10-22

## 2017-10-27 ENCOUNTER — TELEPHONE (OUTPATIENT)
Dept: FAMILY MEDICINE | Facility: CLINIC | Age: 37
End: 2017-10-27

## 2017-10-27 NOTE — TELEPHONE ENCOUNTER
"Patient has no showed 2 scheduled appointments. Please use following script to explain no show policy to patient:    \"We see that you have missed some of your recently scheduled appointments. At [facility] we have a new no show policy, where if you miss too many appointments within 1 year, we may not be able to continue to schedule you. If you are unable to make your scheduled appointments, please call the clinic to cancel prior to your appointment time.\"  "

## 2017-11-16 ENCOUNTER — OFFICE VISIT (OUTPATIENT)
Dept: FAMILY MEDICINE | Facility: CLINIC | Age: 37
End: 2017-11-16

## 2017-11-16 VITALS
HEIGHT: 65 IN | TEMPERATURE: 98 F | RESPIRATION RATE: 16 BRPM | SYSTOLIC BLOOD PRESSURE: 134 MMHG | DIASTOLIC BLOOD PRESSURE: 87 MMHG | BODY MASS INDEX: 31.29 KG/M2 | WEIGHT: 187.8 LBS | OXYGEN SATURATION: 97 % | HEART RATE: 98 BPM

## 2017-11-16 DIAGNOSIS — F33.1 MAJOR DEPRESSIVE DISORDER, RECURRENT EPISODE, MODERATE (H): ICD-10-CM

## 2017-11-16 DIAGNOSIS — M25.512 ACUTE PAIN OF LEFT SHOULDER: Primary | ICD-10-CM

## 2017-11-16 DIAGNOSIS — F43.10 POSTTRAUMATIC STRESS DISORDER: ICD-10-CM

## 2017-11-16 DIAGNOSIS — Q69.9 EXTRA DIGITS: ICD-10-CM

## 2017-11-16 RX ORDER — RISPERIDONE 1 MG/1
2 TABLET ORAL AT BEDTIME
Qty: 60 TABLET | Refills: 11 | Status: SHIPPED | OUTPATIENT
Start: 2017-11-16 | End: 2018-12-03

## 2017-11-16 ASSESSMENT — ENCOUNTER SYMPTOMS
CHILLS: 0
MYALGIAS: 0
HEADACHES: 0
SHORTNESS OF BREATH: 0
SLEEP DISTURBANCE: 0
ABDOMINAL PAIN: 0
ARTHRALGIAS: 1
NAUSEA: 0
COUGH: 0
CONSTIPATION: 0
PALPITATIONS: 0
FEVER: 0
VOMITING: 0
BACK PAIN: 0
DYSURIA: 0
DIARRHEA: 0

## 2017-11-16 ASSESSMENT — PATIENT HEALTH QUESTIONNAIRE - PHQ9: SUM OF ALL RESPONSES TO PHQ QUESTIONS 1-9: 0

## 2017-11-16 NOTE — NURSING NOTE
I have recommended that this patient have a flu shot but she declines at this time.Bing Fonseca CMA     name: Zander Harrell  Language: Lao   Agency: [facility]   Phone number: [phone]  Bing Fonseca CMA

## 2017-11-16 NOTE — PATIENT INSTRUCTIONS
Here is the plan from today's visit    1. Major depressive disorder, recurrent episode, moderate (H)  Refill today  - risperiDONE (RISPERDAL) 1 MG tablet; Take 2 tablets (2 mg) by mouth At Bedtime  Dispense: 60 tablet; Refill: 11    2. Posttraumatic stress disorder  Refill today  - risperiDONE (RISPERDAL) 1 MG tablet; Take 2 tablets (2 mg) by mouth At Bedtime  Dispense: 60 tablet; Refill: 11    3. Acute pain of left shoulder  Referral to Sports Med  Gentle range of motion exercises at home  Consider PT if not improving  - [facility] INTERNAL REFERRAL      Please call or return to clinic if your symptoms don't go away.    Follow up plan - as needed  Thank you for coming to [facility] today.  Lab Testing:  **If you had lab testing today and your results are reassuring or normal they will be mailed to you or sent through [employer] within 7 days.   **If the lab tests need quick action we will call you with the results.  The phone number we will call with results is # [phone] (home) . If this is not the best number please call our clinic and change the number.  Medication Refills:  If you need any refills please call your pharmacy and they will contact us.   If you need to  your refill at a new pharmacy, please contact the new pharmacy directly. The new pharmacy will help you get your medications transferred faster.   Scheduling:  If you have any concerns about today's visit or wish to schedule another appointment please call our office during normal business hours [phone] (8-5:00 M-F)  If a referral was made to a [facility] and you don't get a call from central scheduling please call [phone].  If a Mammogram was ordered for you at [facility] call [phone] to schedule or change your appointment.  If you had an XRay/CT/Ultrasound/MRI ordered the number is [phone] to schedule or change your radiology appointment.   Medical Concerns:  If  you have urgent medical concerns please call [phone] at any time of the day.  If you have a medical emergency please call [phone].

## 2017-11-16 NOTE — MR AVS SNAPSHOT
After Visit Summary   11/16/2017    Carmen Connolly    MRN: [id number]           Patient Information     Date Of Birth          1980        Visit Information        Provider Department      11/16/2017 2:00 PM Khushi Cadena MD [facility]        Today's Diagnoses     Acute pain of left shoulder    -  1    Major depressive disorder, recurrent episode, moderate (H)        Posttraumatic stress disorder          Care Instructions    Here is the plan from today's visit    1. Major depressive disorder, recurrent episode, moderate (H)  Refill today  - risperiDONE (RISPERDAL) 1 MG tablet; Take 2 tablets (2 mg) by mouth At Bedtime  Dispense: 60 tablet; Refill: 11    2. Posttraumatic stress disorder  Refill today  - risperiDONE (RISPERDAL) 1 MG tablet; Take 2 tablets (2 mg) by mouth At Bedtime  Dispense: 60 tablet; Refill: 11    3. Acute pain of left shoulder  Referral to Sports Med  Gentle range of motion exercises at home  Consider PT if not improving  [facility]      Please call or return to clinic if your symptoms don't go away.    Follow up plan - as needed  Thank you for coming to [facility] today.  Lab Testing:  **If you had lab testing today and your results are reassuring or normal they will be mailed to you or sent through [employer] within 7 days.   **If the lab tests need quick action we will call you with the results.  The phone number we will call with results is # [phone] (home) . If this is not the best number please call our clinic and change the number.  Medication Refills:  If you need any refills please call your pharmacy and they will contact us.   If you need to  your refill at a new pharmacy, please contact the new pharmacy directly. The new pharmacy will help you get your medications transferred faster.   Scheduling:  If you have any concerns about today's visit or wish to schedule another appointment please  call our office during normal business hours [phone] (8-5:00 M-F)  If a referral was made to a [facility] and you don't get a call from central scheduling please call [phone].  If a Mammogram was ordered for you at [facility] call [phone] to schedule or change your appointment.  If you had an XRay/CT/Ultrasound/MRI ordered the number is [phone] to schedule or change your radiology appointment.   Medical Concerns:  If you have urgent medical concerns please call [phone] at any time of the day.  If you have a medical emergency please call [phone].            Follow-ups after your visit        Additional Services     [facility] INTERNAL REFERRAL       Reason: left shoulder tendonitis (has had a successful CSI on the R in the past, would like one on the L)  Urgency of Appointment: Next Available  Length of Problem: Sub-Acute (3-12 weeks since onset).  What are you requesting be done? Injection                  Who to contact     Please call your clinic at [phone] to:    Ask questions about your health    Make or cancel appointments    Discuss your medicines    Learn about your test results    Speak to your doctor   If you have compliments or concerns about an experience at your clinic, or if you wish to file a complaint, please contact [facility] Patient Relations at [phone] or email us at [email or web address]         Additional Information About Your Visit        [email or web address] Information     [employer] is an electronic gateway that provides easy, online access to your medical records. With [employer], you can request a clinic appointment, read your test results, renew a prescription or communicate with your care team.     To sign up for [employer] visit the website at [email or web address]   You will be asked to enter the access code listed below, as well as some personal information. Please follow the  "directions to create your username and password.     Your access code is: [email or web address]  Expires: 2018  2:53 PM     Your access code will  in 90 days. If you need help or a new code, please contact your [facility] or call [phone] for assistance.        Care EveryWhere ID     This is your Care EveryWhere ID. This could be used by other organizations to access your [place] medical records  [id number]        Your Vitals Were     Pulse Temperature Respirations Height Pulse Oximetry Breastfeeding?    98 98  F (36.7  C) (Oral) 16 5' 5\" (165.1 cm) 97% No    BMI (Body Mass Index)                   31.25 kg/m2            Blood Pressure from Last 3 Encounters:   17 134/87   17 104/76   17 (!) 130/91    Weight from Last 3 Encounters:   17 187 lb 12.8 oz (85.2 kg)   17 184 lb 3.2 oz (83.6 kg)   17 189 lb 6.4 oz (85.9 kg)              We Performed the Following     [facility] INTERNAL REFERRAL          Where to get your medicines      These medications were sent to [place] [address] [place] [facility] [address] [address]     Phone:  [phone]     risperiDONE 1 MG tablet          Primary Care Provider Office Phone # Fax #    Khushi Cadena [phone]        [address] [place] [address]        Equal Access to Services     BRITTANY ADAMS : Hadbrodie mcphersono Somayur, waaxda luqadaha, qaybta kaalmada adesteven, chalo garcias. So [facility] [phone].    ATENCIÓN: Si habla español, tiene a armendariz disposición servicios gratuitos de asistencia lingüística. Llame al [phone].    We comply with applicable federal civil rights laws and [place] laws. We do not discriminate on the basis of race, color, national origin, age, disability, sex, sexual orientation, or gender identity.            Thank you!     Thank you for " choosing [facility]  for your care. Our goal is always to provide you with excellent care. Hearing back from our patients is one way we can continue to improve our services. Please take a few minutes to complete the written survey that you may receive in the mail after your visit with us. Thank you!             Your Updated Medication List - Protect others around you: Learn how to safely use, store and throw away your medicines at www.disposemymeds.org.          This list is accurate as of: 11/16/17  2:53 PM.  Always use your most recent med list.                   Brand Name Dispense Instructions for use Diagnosis    amLODIPine 5 MG tablet    NORVASC    90 tablet    Take 1 tablet (5 mg) by mouth daily    Essential hypertension with goal blood pressure less than 130/85       cetirizine 10 MG tablet    zyrTEC    30 tablet    Take 1 tablet (10 mg) by mouth daily        fish oil-omega-3 fatty acids 1000 MG capsule     90 capsule    Take 2 capsules (2 g) by mouth daily    Essential hypertension with goal blood pressure less than 130/85       hydrOXYzine 25 MG tablet    ATARAX    60 tablet    Take 1-2 tablets (25-50 mg) by mouth every 6 hours as needed for itching    Hives       ibuprofen 400 MG tablet    ADVIL/MOTRIN    120 tablet    Take 1-2 tablets (400-800 mg) by mouth every 8 hours as needed for moderate pain    Episodic tension-type headache, not intractable       lisinopril-hydrochlorothiazide 20-25 MG per tablet    PRINZIDE/ZESTORETIC    90 tablet    Take 1 tablet by mouth daily    Essential hypertension with goal blood pressure less than 130/85       multivitamin, therapeutic with minerals Tabs tablet     100 each    Take 1 tablet by mouth daily    Vitamin deficiency       ranitidine 150 MG tablet    ZANTAC    60 tablet    Take 1 tablet (150 mg) by mouth 2 times daily    Gastroesophageal reflux disease, esophagitis presence not specified       risperiDONE 1 MG tablet    risperDAL    60  tablet    Take 2 tablets (2 mg) by mouth At Bedtime    Major depressive disorder, recurrent episode, moderate (H), Posttraumatic stress disorder       SUMAtriptan 25 MG tablet    IMITREX    30 tablet    Take 2 tablets (50 mg) by mouth as needed    Episodic tension-type headache, not intractable       temazepam 15 MG capsule    RESTORIL    90 capsule    Take 1 capsule (15 mg) by mouth nightly as needed    Major depressive disorder, recurrent episode, moderate (H)       * venlafaxine 150 MG 24 hr capsule    EFFEXOR-XR    90 capsule    Take 1 capsule (150 mg) by mouth daily    Major depressive disorder, recurrent episode, moderate (H)       * venlafaxine 75 MG 24 hr capsule    EFFEXOR-XR    90 capsule    Take 1 capsule (75 mg) by mouth daily (take with 150mg tablet - total dose 225mg)    Major depressive disorder, recurrent episode, moderate (H)       * Notice:  This list has 2 medication(s) that are the same as other medications prescribed for you. Read the directions carefully, and ask your doctor or other care provider to review them with you.

## 2017-11-16 NOTE — PROGRESS NOTES
HPI:       Carmen Connolly is a 37 year old who presents for the following  Patient presents with:  RECHECK: F/U Results  Pain: Left shoulder pain X5-6 weeks    38y/o F with h/o controlled HTN presents for lab f/u and 6wks of atraumatic left shoulder arthralgia she describes as aching, exacerbated by active ROM. Pt has tried PT in the past but is no longer interested as massage caused pain. She would like CSI since this was effective for her right shoulder '16. Pt declines flu shot this year and has no other concerns.    Also here to f/u on recent labs (review results)    Joint/Muscle Pain      Onset: 5-6 weeks ago    Injury?  no    Description:   Location(s): Left shoulder  Character: Dull ache    Intensity: moderate    Progression of Symptoms: worse    Accompanying Signs & Symptoms:  Other symptoms: none    History:   Previous similar pain: no      Worsened by    Overuse?: no  Morning Stiffness?:no    Alleviating factors:  Improved by: nothing and Ibuprofen  Therapies Tried and outcome: Nothing      A [employer]  was used for  this visit.      Concern: Review of labs   Description of the problem :Pt would like to talk about the results from lab     Adherence and Exercise  Medication side effects: no  How often is a medication missed? Never  Exercise:walking  2-3 days/week for an average of 15-30 minutes     Problem, Medication and Allergy Lists were reviewed and are current.  Patient is an established patient of this clinic.         Review of Systems:   Review of Systems   Constitutional: Negative for chills and fever.   Respiratory: Negative for cough and shortness of breath.    Cardiovascular: Negative for chest pain, palpitations and leg swelling.   Gastrointestinal: Negative for abdominal pain, constipation, diarrhea, nausea and vomiting.   Genitourinary: Negative for dysuria.   Musculoskeletal: Positive for arthralgias (L shoulder aches). Negative for back pain, gait problem and myalgias.  "  Neurological: Negative for headaches.   Psychiatric/Behavioral: Negative for sleep disturbance.             Physical Exam:   Patient Vitals for the past 24 hrs:   BP Temp Temp src Pulse Resp SpO2 Height Weight   11/16/17 1408 134/87 98  F (36.7  C) Oral 98 16 97 % 5' 5\" (165.1 cm) 187 lb 12.8 oz (85.2 kg)     Body mass index is 31.25 kg/(m^2).  Vitals were reviewed and were normal  Blood pressure 134/87, pulse 98, temperature 98  F (36.7  C), temperature source Oral, resp. rate 16, height 5' 5\" (165.1 cm), weight 187 lb 12.8 oz (85.2 kg), SpO2 97 %, not currently breastfeeding.       Physical Exam   Constitutional: She is oriented to person, place, and time. She appears well-developed and well-nourished. No distress.   HENT:   Head: Normocephalic and atraumatic.   Mouth/Throat: Oropharynx is clear and moist.   Eyes: EOM are normal. No scleral icterus.   Pulmonary/Chest: Effort normal.   Musculoskeletal: She exhibits tenderness (left AC joint and superior scapula). She exhibits no edema or deformity.        Left shoulder: She exhibits decreased range of motion (decreased due to pain with active ROM) and tenderness (AC joint). She exhibits no swelling, no effusion and no crepitus.   Can't really complete Neer due to discomfort with passive ROM; +Nathan; negative infraspinatus testing; strength testing is normal; negative Speed's test   Neurological: She is alert and oriented to person, place, and time.   Skin: She is not diaphoretic.   Psychiatric: She has a normal mood and affect. Her behavior is normal. Judgment and thought content normal.         Results:     Results from last visit:  Office Visit on 08/31/2017   Component Date Value Ref Range Status     PAP 08/31/2017 NIL   Final     Copath Report 08/31/2017    Final                    Value:  Acc#: [id number]   Signed: 9/5/2017 14:29   MR#: [id number]    SPECIMEN/STAIN PROCESS:  Pap imaged thin layer prep screening (Surepath, FocalPoint with " guided  screening)       Pap-Cyto x 1, HPV ordered x 1    SOURCE: Cervical, endocervical  ----------------------------------------------------------------   Pap imaged thin layer prep screening (Surepath, FocalPoint with guided  screening)  SPECIMEN ADEQUACY:  Satisfactory for evaluation.  -Transformation zone component present.    CYTOLOGIC INTERPRETATION:    Negative for intraepithelial lesion or malignancy    Electronically signed by:  RYANNE Hollingsworth (ASCP)    CLINICAL HISTORY:    Papanicolaou Test Limitations:  Cervical cytology is a screening test  with limited sensitivity; regular screening is critical for cancer  prevention; Pap tests are primarily effective for the  diagnosis/prevention of squamous cell carcinoma, not adenocarcinomas or  other cancers.  TESTING LAB LOCATION:  [facility] [address] [address], [phone]  Processed and screened at [facility],  [facility]       HIV Antigen Antibody Combo 08/31/2017 Nonreactive  NR^Nonreactive     Final    HIV-1 p24 Ag & HIV-1/HIV-2 Ab Not Detected     Hemoglobin A1C 08/31/2017 5.4  4.1 - 5.7 % Final     Cholesterol 08/31/2017 221.0* 0.0 - 200.0 mg/dL Final     Cholesterol/HDL Ratio 08/31/2017 4.3  0.0 - 5.0 Final     HDL Cholesterol 08/31/2017 51.3  >40.0 mg/dL Final     LDL Cholesterol Calculated 08/31/2017 153* 0 - 129 mg/dL Final     Triglycerides 08/31/2017 82.3  0.0 - 150.0 mg/dL Final     VLDL Cholesterol 08/31/2017 16.5  7.0 - 32.0 mg/dL Final     HPV 16 DNA 08/31/2017 Negative  NEG^Negative Final     HPV 18 DNA 08/31/2017 Negative  NEG^Negative Final     Other HR HPV 08/31/2017 Negative  NEG^Negative Final     Final Diagnosis 08/31/2017 This patient's sample is negative for HPV DNA.   Final    Comment: (Note)  METHODOLOGY:  The Roche renee 4800 system uses automated extraction,   simultaneous  amplification of HPV (L1 region) and beta-globin,    followed by  real time detection of fluorescent labeled HPV and beta   globin using specific oligonucleotide probes . The test specifically   identifies types HPV 16 DNA and HPV 18 DNA while concurrently   detecting the rest of the high risk types (31, 33, 35, 39, 45, 51,   52, 56, 58, 59, 66 or 68).  COMMENTS:  This test is not intended for use as a screening device   for women under age 30 with normal cervical cytology.  Results should   be correlated with cytologic and histologic findings. Close clinical   followup is recommended.  This test was developed and its performance characteristics   determined by the [facility]. It has not been cleared or approved by the   FDA. The laboratory is regulated under CLIA as qualified to perform   high-complexity testing. This test is used                            for clinical purposes. It   should not be regarded as investigational or for research.       Specimen Description 08/31/2017 Cervical Cells   Final    C17 30626     Assessment and Plan     1. Acute pain of left shoulder  Strength intact w/ 6wks of pain and ROM limited by pain. Rotator cuff dysfunction (likely supraspinatus tendonitis) seems consistent w/ Hx and PE. Unlikely to be adhesive capsulitis given ROM.  - Instruct pt to practice gentle ROM exercises at home daily, PT if not improving  - [facility], consider CSI  - may benefit from XR to eval for possible OA during Sports Med appt.     2. Hyperlipidemia  - no therapy indicated as CV risk 0.9%    Scribed by medical student, Edwin Sepulveda, on behalf of Dr. Cadena.    There are no discontinued medications.  Options for treatment and follow-up care were reviewed with the patient. Carmen Connolly  engaged in the decision making process and verbalized understanding of the options discussed and agreed with  the final plan.    The medical student acted as scribe and the encounter documented above was completely performed by myself and the documentation reflects the work I have performed today. Khushi Cadena MD

## 2017-11-28 ENCOUNTER — OFFICE VISIT (OUTPATIENT)
Dept: FAMILY MEDICINE | Facility: CLINIC | Age: 37
End: 2017-11-28

## 2017-11-28 VITALS
TEMPERATURE: 97.5 F | SYSTOLIC BLOOD PRESSURE: 138 MMHG | WEIGHT: 184.8 LBS | RESPIRATION RATE: 16 BRPM | DIASTOLIC BLOOD PRESSURE: 83 MMHG | HEART RATE: 96 BPM | OXYGEN SATURATION: 95 % | BODY MASS INDEX: 30.75 KG/M2

## 2017-11-28 DIAGNOSIS — S46.812A STRAIN OF LEFT TRAPEZIUS MUSCLE, INITIAL ENCOUNTER: Primary | ICD-10-CM

## 2017-11-28 DIAGNOSIS — E55.9 VITAMIN D DEFICIENCY: ICD-10-CM

## 2017-11-28 RX ORDER — ACETAMINOPHEN 325 MG/1
650 TABLET ORAL EVERY 4 HOURS PRN
Qty: 100 TABLET | Refills: 0 | Status: SHIPPED | OUTPATIENT
Start: 2017-11-28 | End: 2018-05-02

## 2017-11-28 NOTE — PROGRESS NOTES
HPI:       Carmen Connolly is a 37 year old who presents for the following  Patient presents with:  Shoulder Pain: sharp pain in her shoulder when she lifts her arm. ROM is limited. No tingling or numbness per patient. x's 6 wks.     Joint/Right shoulder pain      Onset: Six weeks ago     Injury?  no    Description:   Location(s): Left shoulder pain that radiates down the arm   Character: Sharp    Intensity: mild, 7/10    Progression of Symptoms: same    Accompanying Signs & Symptoms:  Other symptoms: radiation of pain down the arm. She denies numbness, tingling, warmth, swelling, redness and discoloration of the affected shoulder     History:   Previous similar pain: no      Worsened by    Overuse?: no  Morning Stiffness?:no    Alleviating factors:  Improved by: immobilization  Therapies Tried and outcome: Tylenol with mild improvement.  Patient has a history of right shoulder pain that improved after a steroid injection last year. She would like shoulder evaluated for a possible repeated injection. She does not have a job but takes care of her older kids at home.       A [employer]  was used for  this visit.      Problem, Medication and Allergy Lists were   reviewed and are current.     Patient Active Problem List    Diagnosis Date Noted     Arthritis 02/21/2017     Priority: Medium     Health Care Home Active Coordination 02/21/2017     Priority: Medium     Tier 2  Care Coordination Accept Date: 02/21/17  Language/Barrier to Learning: Yes         Schizoaffective disorder, bipolar type (H) 01/17/2017     Priority: Medium     See Psychological Evaluation from [facility] (3/2016) in Media Tab; reports occasional visual hallucinations (especially when alone); recommend ongoing PCA services, case management and possible [facility] or [facility] services       Neurocognitive deficits 01/17/2017     Priority: Medium     See [facility] Psychological evaluation (3/2016) in Media tab; determined to be unable to make legal and  financial decisions; recommended that she obtain guardianship or conservatorship       Vitamin D deficiency 04/15/2016     Priority: Medium     Posttraumatic stress disorder 04/15/2016     Priority: Medium     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     Priority: Medium     Chronic tension-type headache, not intractable 04/15/2016     Priority: Medium     Tachycardia 04/06/2016     Priority: Medium     Shortened OK interval 04/06/2016     Priority: Medium     Gastroesophageal reflux disease without esophagitis 02/24/2016     Priority: Medium     Essential hypertension 02/23/2016     Priority: Medium         Current Outpatient Prescriptions   Medication Sig Dispense Refill     acetaminophen (TYLENOL) 325 MG tablet Take 2 tablets (650 mg) by mouth every 4 hours as needed for mild pain 100 tablet 0     diclofenac (VOLTAREN) 1 % GEL topical gel Place 2 g onto the skin 3 times daily as needed for moderate pain 100 g 0     order for DME One Thera-cane 1 Units 0     risperiDONE (RISPERDAL) 1 MG tablet Take 2 tablets (2 mg) by mouth At Bedtime 60 tablet 11     temazepam (RESTORIL) 15 MG capsule Take 1 capsule (15 mg) by mouth nightly as needed 90 capsule 3     ranitidine (ZANTAC) 150 MG tablet Take 1 tablet (150 mg) by mouth 2 times daily 60 tablet 11     ibuprofen (ADVIL/MOTRIN) 400 MG tablet Take 1-2 tablets (400-800 mg) by mouth every 8 hours as needed for moderate pain 120 tablet 1     venlafaxine (EFFEXOR-XR) 150 MG 24 hr capsule Take 1 capsule (150 mg) by mouth daily 90 capsule 3     venlafaxine (EFFEXOR-XR) 75 MG 24 hr capsule Take 1 capsule (75 mg) by mouth daily (take with 150mg tablet - total dose 225mg) 90 capsule 3     lisinopril-hydrochlorothiazide (PRINZIDE/ZESTORETIC) 20-25 MG per tablet Take 1 tablet by mouth daily 90 tablet 3     SUMAtriptan (IMITREX) 25 MG tablet Take 2 tablets (50 mg) by mouth as needed 30 tablet 3     cetirizine (ZYRTEC) 10 MG tablet Take 1 tablet (10 mg) by mouth daily 30 tablet 11      amLODIPine (NORVASC) 5 MG tablet Take 1 tablet (5 mg) by mouth daily 90 tablet 1     hydrOXYzine (ATARAX) 25 MG tablet Take 1-2 tablets (25-50 mg) by mouth every 6 hours as needed for itching 60 tablet 1     multivitamin, therapeutic with minerals (THERA-VIT-M) TABS tablet Take 1 tablet by mouth daily 100 each 3     Omega-3 Fatty Acids (OMEGA-3 FISH OIL) 1000 MG CAPS Take 2 capsules (2 g) by mouth daily 90 capsule 3       No Known Allergies  Patient is an established patient of this clinic.         Review of Systems:   Review of Systems Review of system negative except as mentioned in HPI.           Physical Exam:   Patient Vitals for the past 24 hrs:   BP Temp Temp src Pulse Resp SpO2 Weight   11/28/17 1127 138/83 - - - - - -   11/28/17 1126 (!) 144/96 97.5  F (36.4  C) Oral 96 16 95 % 184 lb 12.8 oz (83.8 kg)     Body mass index is 30.75 kg/(m^2).  Vitals were reviewed and were normal     Physical Exam   Constitutional: She appears well-developed and well-nourished.   Musculoskeletal: She exhibits no edema.        Back:    Point tenderness of the trapezius muscle. Tense muscle in comparison for the right side. No bony tenderness noted (Clavicle, sternum, acromion). Special test (Bear can, lift off) all unremarkable. Unable to abduct left arm past 90 degrees secondary to pain. No midline cervical vertebral tenderness. 4/5 strength LUE vs 5/5 RUE.          Results:      Results from the last 24 hoursNo results found for this or any previous visit (from the past 24 hour(s)).  Assessment and Plan     Carmen was seen today for shoulder pain.    Diagnoses and all orders for this visit:    Strain of left trapezius muscle, initial encounter:  Patient presented with left sided shoulder pain. Her physical exam revealed point tenderness of the trapezius muscle. Special rotator cuff test unremarkable making shoulder joint pathology unlikely. Discussed analgesics and heat packs. Home exercise instructions was provided. GABRIELLA  order for Thera-Cane provided. She will follow up as needed or if symptoms worsening. Physical therapy referral would be considered if symptoms worsen.   -     acetaminophen (TYLENOL) 325 MG tablet; Take 2 tablets (650 mg) by mouth every 4 hours as needed for mild pain  -     diclofenac (VOLTAREN) 1 % GEL topical gel; Place 2 g onto the skin 3 times daily as needed for moderate pain  -     order for DME; One Thera-cane    There are no discontinued medications.  Options for treatment and follow-up care were reviewed with the patient. Carmen Connolly  engaged in the decision making process and verbalized understanding of the options discussed and agreed with the final plan.    Giovanni Bergman. MD  PGY3 [facility] Resident   Pager: [phone]

## 2017-11-28 NOTE — PATIENT INSTRUCTIONS
Here is the plan from today's visit    1. Strain of left trapezius muscle, initial encounter    - acetaminophen (TYLENOL) 325 MG tablet; Take 2 tablets (650 mg) by mouth every 4 hours as needed for mild pain  Dispense: 100 tablet; Refill: 0  - diclofenac (VOLTAREN) 1 % GEL topical gel; Place 2 g onto the skin 3 times daily as needed for moderate pain  Dispense: 100 g; Refill: 0    Please call or return to clinic if your symptoms don't go away.    Follow up plan: As needed     Thank you for coming to [facility] today.  Lab Testing:  **If you had lab testing today and your results are reassuring or normal they will be mailed to you or sent through [facility] within 7 days.   **If the lab tests need quick action we will call you with the results.  The phone number we will call with results is # [phone] (home) . If this is not the best number please call our clinic and change the number.  Medication Refills:  If you need any refills please call your pharmacy and they will contact us.   If you need to  your refill at a new pharmacy, please contact the new pharmacy directly. The new pharmacy will help you get your medications transferred faster.   Scheduling:  If you have any concerns about today's visit or wish to schedule another appointment please call our office during normal business hours [phone] (8-5:00 M-F)  If a referral was made to a [facility] and you don't get a call from central scheduling please call [phone].  If a Mammogram was ordered for you at [facility] call [phone] to schedule or change your appointment.  If you had an XRay/CT/Ultrasound/MRI ordered the number is [phone] to schedule or change your radiology appointment.   Medical Concerns:  If you have urgent medical concerns please call [phone] at any time of the day.

## 2017-11-28 NOTE — MR AVS SNAPSHOT
After Visit Summary   11/28/2017    Carmen Connolly    MRN: [id number]           Patient Information     Date Of Birth          1980        Visit Information        Provider Department      11/28/2017 11:20 AM Meri Carlson MD [facility]        Today's Diagnoses     Strain of left trapezius muscle, initial encounter    -  1      Care Instructions    Here is the plan from today's visit    1. Strain of left trapezius muscle, initial encounter    - acetaminophen (TYLENOL) 325 MG tablet; Take 2 tablets (650 mg) by mouth every 4 hours as needed for mild pain  Dispense: 100 tablet; Refill: 0  - diclofenac (VOLTAREN) 1 % GEL topical gel; Place 2 g onto the skin 3 times daily as needed for moderate pain  Dispense: 100 g; Refill: 0    Please call or return to clinic if your symptoms don't go away.    Follow up plan: As needed     Thank you for coming to [facility] today.  Lab Testing:  **If you had lab testing today and your results are reassuring or normal they will be mailed to you or sent through [employer] within 7 days.   **If the lab tests need quick action we will call you with the results.  The phone number we will call with results is # [phone] (home) . If this is not the best number please call our clinic and change the number.  Medication Refills:  If you need any refills please call your pharmacy and they will contact us.   If you need to  your refill at a new pharmacy, please contact the new pharmacy directly. The new pharmacy will help you get your medications transferred faster.   Scheduling:  If you have any concerns about today's visit or wish to schedule another appointment please call our office during normal business hours [phone] (8-5:00 M-F)  If a referral was made to a [facility] and you don't get a call from central scheduling please call [phone].  If a Mammogram was ordered for you at [facility] call [phone] to schedule or change your appointment.  If you had an XRay/CT/Ultrasound/MRI ordered the number is [phone] to schedule or change your radiology appointment.   Medical Concerns:  If you have urgent medical concerns please call [phone] at any time of the day.            Follow-ups after your visit        Who to contact     Please call your clinic at [phone] to:    Ask questions about your health    Make or cancel appointments    Discuss your medicines    Learn about your test results    Speak to your doctor   If you have compliments or concerns about an experience at your clinic, or if you wish to file a complaint, please contact [facility] Patient Relations at [phone] or email us at [email or web address]         Additional Information About Your Visit        [email or web address] Information     [employer] is an electronic gateway that provides easy, online access to your medical records. With [employer], you can request a clinic appointment, read your test results, renew a prescription or communicate with your care team.     To sign up for [employer] visit the website at [email or web address]   You will be asked to enter the access code listed below, as well as some personal information. Please follow the directions to create your username and password.     Your access code is: [email or web address]  Expires: 2018  2:53 PM     Your access code will  in 90 days. If you need help or a new code, please contact your [facility] or call [phone] for assistance.        Care EveryWhere ID     This is your Care EveryWhere ID. This could be used by other organizations to access your [place] medical records  [id number]        Your Vitals Were     Pulse Temperature Respirations Pulse Oximetry BMI (Body Mass Index)       96 97.5  F (36.4  C) (Oral) 16 95% 30.75 kg/m2        Blood Pressure from Last 3 Encounters:   17  138/83   11/16/17 134/87   08/31/17 104/76    Weight from Last 3 Encounters:   11/28/17 184 lb 12.8 oz (83.8 kg)   11/16/17 187 lb 12.8 oz (85.2 kg)   08/31/17 184 lb 3.2 oz (83.6 kg)              Today, you had the following     No orders found for display         Today's Medication Changes          These changes are accurate as of: 11/28/17 12:10 PM.  If you have any questions, ask your nurse or doctor.               Start taking these medicines.        Dose/Directions    acetaminophen 325 MG tablet   Commonly known as:  TYLENOL   Used for:  Strain of left trapezius muscle, initial encounter   Started by:  Meri Carlson MD        Dose:  650 mg   Take 2 tablets (650 mg) by mouth every 4 hours as needed for mild pain   Quantity:  100 tablet   Refills:  0       diclofenac 1 % Gel topical gel   Commonly known as:  VOLTAREN   Used for:  Strain of left trapezius muscle, initial encounter   Started by:  Meri Carlson MD        Dose:  2 g   Place 2 g onto the skin 3 times daily as needed for moderate pain   Quantity:  100 g   Refills:  0            Where to get your medicines      These medications were sent to [facility] [address] [place], [place] - [address]  [address] [address]     Phone:  [phone]     acetaminophen 325 MG tablet    diclofenac 1 % Gel topical gel                Primary Care Provider Office Phone # Fax #    Khushi Cadena [phone]       [address] [address]        Equal Access to Services     BRITTANY ADAMS AH: Hadbrodie mcphersono Somayur, waaxda luqadaha, qaybta kaalmada adeegyada, chalo garcias. So [facility] [phone].    ATENCIÓN: Si habla español, tiene a armendariz disposición servicios gratuitos de asistencia lingüística. Llame al [phone].    We comply with applicable federal civil rights laws and [place] laws. We do not discriminate on the basis of race, color,  national origin, age, disability, sex, sexual orientation, or gender identity.            Thank you!     Thank you for choosing [facility]  for your care. Our goal is always to provide you with excellent care. Hearing back from our patients is one way we can continue to improve our services. Please take a few minutes to complete the written survey that you may receive in the mail after your visit with us. Thank you!             Your Updated Medication List - Protect others around you: Learn how to safely use, store and throw away your medicines at www.disposemymeds.org.          This list is accurate as of: 11/28/17 12:10 PM.  Always use your most recent med list.                   Brand Name Dispense Instructions for use Diagnosis    acetaminophen 325 MG tablet    TYLENOL    100 tablet    Take 2 tablets (650 mg) by mouth every 4 hours as needed for mild pain    Strain of left trapezius muscle, initial encounter       amLODIPine 5 MG tablet    NORVASC    90 tablet    Take 1 tablet (5 mg) by mouth daily    Essential hypertension with goal blood pressure less than 130/85       cetirizine 10 MG tablet    zyrTEC    30 tablet    Take 1 tablet (10 mg) by mouth daily        diclofenac 1 % Gel topical gel    VOLTAREN    100 g    Place 2 g onto the skin 3 times daily as needed for moderate pain    Strain of left trapezius muscle, initial encounter       fish oil-omega-3 fatty acids 1000 MG capsule     90 capsule    Take 2 capsules (2 g) by mouth daily    Essential hypertension with goal blood pressure less than 130/85       hydrOXYzine 25 MG tablet    ATARAX    60 tablet    Take 1-2 tablets (25-50 mg) by mouth every 6 hours as needed for itching    Hives       ibuprofen 400 MG tablet    ADVIL/MOTRIN    120 tablet    Take 1-2 tablets (400-800 mg) by mouth every 8 hours as needed for moderate pain    Episodic tension-type headache, not intractable       lisinopril-hydrochlorothiazide 20-25 MG per tablet     PRINZIDE/ZESTORETIC    90 tablet    Take 1 tablet by mouth daily    Essential hypertension with goal blood pressure less than 130/85       multivitamin, therapeutic with minerals Tabs tablet     100 each    Take 1 tablet by mouth daily    Vitamin deficiency       ranitidine 150 MG tablet    ZANTAC    60 tablet    Take 1 tablet (150 mg) by mouth 2 times daily    Gastroesophageal reflux disease, esophagitis presence not specified       risperiDONE 1 MG tablet    risperDAL    60 tablet    Take 2 tablets (2 mg) by mouth At Bedtime    Major depressive disorder, recurrent episode, moderate (H), Posttraumatic stress disorder       SUMAtriptan 25 MG tablet    IMITREX    30 tablet    Take 2 tablets (50 mg) by mouth as needed    Episodic tension-type headache, not intractable       temazepam 15 MG capsule    RESTORIL    90 capsule    Take 1 capsule (15 mg) by mouth nightly as needed    Major depressive disorder, recurrent episode, moderate (H)       * venlafaxine 150 MG 24 hr capsule    EFFEXOR-XR    90 capsule    Take 1 capsule (150 mg) by mouth daily    Major depressive disorder, recurrent episode, moderate (H)       * venlafaxine 75 MG 24 hr capsule    EFFEXOR-XR    90 capsule    Take 1 capsule (75 mg) by mouth daily (take with 150mg tablet - total dose 225mg)    Major depressive disorder, recurrent episode, moderate (H)       * Notice:  This list has 2 medication(s) that are the same as other medications prescribed for you. Read the directions carefully, and ask your doctor or other care provider to review them with you.

## 2017-11-28 NOTE — PROGRESS NOTES
Preceptor Attestation:   Patient seen and discussed with the resident. Assessment and plan reviewed with resident and agreed upon.   Supervising Physician:  Meri Carlson MD  [facility]

## 2017-11-30 DIAGNOSIS — I10 ESSENTIAL HYPERTENSION WITH GOAL BLOOD PRESSURE LESS THAN 130/85: ICD-10-CM

## 2017-11-30 NOTE — TELEPHONE ENCOUNTER
Request for medication refill:    Date of last visit at clinic: 11/28/2017    Please complete refill if appropriate and CLOSE ENCOUNTER.    Closing the encounter signifies the refill is complete.    If refill has been denied, please complete the smart phrase .smirefuse and route it to the [facility] RN TRIAGE pool to inform the patient and the pharmacy.    Marga Burgess MA

## 2017-12-03 PROBLEM — Q69.9 EXTRA DIGITS: Status: ACTIVE | Noted: 2017-12-03

## 2017-12-03 RX ORDER — AMLODIPINE BESYLATE 5 MG/1
5 TABLET ORAL DAILY
Qty: 90 TABLET | Refills: 1 | Status: SHIPPED | OUTPATIENT
Start: 2017-12-03 | End: 2018-05-02

## 2017-12-20 ENCOUNTER — TELEPHONE (OUTPATIENT)
Dept: FAMILY MEDICINE | Facility: CLINIC | Age: 37
End: 2017-12-20

## 2017-12-20 NOTE — TELEPHONE ENCOUNTER
[facility] phone call message - order or referral request for patient:     Order or referral being requested: home care nurse       Additional Comments:fv home care nurse requesting skill nursing visit every ather week for 9 weeks 3PRN    OK to leave a message on voice mail? Yes    Primary language: Romanian      needed? Yes    Call taken on December 20, 2017 at 1:31 PM by Jose Pisano

## 2017-12-21 ENCOUNTER — MEDICAL CORRESPONDENCE (OUTPATIENT)
Dept: HEALTH INFORMATION MANAGEMENT | Facility: CLINIC | Age: 37
End: 2017-12-21

## 2017-12-21 DIAGNOSIS — I10 ESSENTIAL HYPERTENSION WITH GOAL BLOOD PRESSURE LESS THAN 130/85: ICD-10-CM

## 2017-12-21 NOTE — TELEPHONE ENCOUNTER
Request for medication refill:    Date of last visit at clinic: 11/28/17    Please complete refill if appropriate and CLOSE ENCOUNTER.    Closing the encounter signifies the refill is complete.    If refill has been denied, please complete the smart phrase .smirefuse and route it to the [facility] RN TRIAGE pool to inform the patient and the pharmacy.    Diane Gil

## 2017-12-22 RX ORDER — CHLORAL HYDRATE 500 MG
2 CAPSULE ORAL DAILY
Qty: 90 CAPSULE | Refills: 3 | Status: SHIPPED | OUTPATIENT
Start: 2017-12-22 | End: 2018-02-16

## 2018-01-23 ENCOUNTER — TELEPHONE (OUTPATIENT)
Dept: FAMILY MEDICINE | Facility: CLINIC | Age: 38
End: 2018-01-23

## 2018-01-24 ENCOUNTER — OFFICE VISIT (OUTPATIENT)
Dept: FAMILY MEDICINE | Facility: CLINIC | Age: 38
End: 2018-01-24
Payer: COMMERCIAL

## 2018-01-24 VITALS
HEIGHT: 65 IN | WEIGHT: 183.4 LBS | SYSTOLIC BLOOD PRESSURE: 130 MMHG | OXYGEN SATURATION: 100 % | BODY MASS INDEX: 30.56 KG/M2 | TEMPERATURE: 97.2 F | HEART RATE: 108 BPM | DIASTOLIC BLOOD PRESSURE: 89 MMHG | RESPIRATION RATE: 16 BRPM

## 2018-01-24 DIAGNOSIS — M75.02 ADHESIVE CAPSULITIS OF LEFT SHOULDER: Primary | ICD-10-CM

## 2018-01-24 ASSESSMENT — ENCOUNTER SYMPTOMS
PSYCHIATRIC NEGATIVE: 1
DIZZINESS: 0
HEADACHES: 0
ADENOPATHY: 0
WEAKNESS: 0
APPETITE CHANGE: 0
ARTHRALGIAS: 1
FATIGUE: 0
ACTIVITY CHANGE: 0

## 2018-01-24 NOTE — MR AVS SNAPSHOT
After Visit Summary   1/24/2018    Carmen Connolly    MRN: [id number]           Patient Information     Date Of Birth          1980        Visit Information        Provider Department      1/24/2018 3:20 PM Khushi Cadena MD [facility]        Today's Diagnoses     Adhesive capsulitis of left shoulder    -  1      Care Instructions    Here is the plan from today's visit    1. Adhesive capsulitis of left shoulder  Ice for 20 minutes twice daily  Gentle home range of motion  Follow up with PT  Ibuprofen 600mg every 6 hours as needed  - DRAIN/INJECT LARGE JOINT/BURSA  - PHYSICAL THERAPY REFERRAL - EXTERNAL      Please call or return to clinic if your symptoms don't go away.    Follow up plan - if not improving    Thank you for coming to [facility] today.  Lab Testing:  **If you had lab testing today and your results are reassuring or normal they will be mailed to you or sent through [employer] within 7 days.   **If the lab tests need quick action we will call you with the results.  The phone number we will call with results is # [phone] (home) . If this is not the best number please call our clinic and change the number.  Medication Refills:  If you need any refills please call your pharmacy and they will contact us.   If you need to  your refill at a new pharmacy, please contact the new pharmacy directly. The new pharmacy will help you get your medications transferred faster.   Scheduling:  If you have any concerns about today's visit or wish to schedule another appointment please call our office during normal business hours [phone] (8-5:00 M-F)  If a referral was made to a [facility] and you don't get a call from central scheduling please call [phone].  If a Mammogram was ordered for you at [facility] call [phone] to schedule or change your appointment.  If you had an XRay/CT/Ultrasound/MRI ordered the number is  [phone] to schedule or change your radiology appointment.   Medical Concerns:  If you have urgent medical concerns please call [phone] at any time of the day.  If you have a medical emergency please call 911.            Follow-ups after your visit        Additional Services     PHYSICAL THERAPY REFERRAL - EXTERNAL       [facility]  Address: [address] [place] [address]  Hours: Closes soon: 5PM   Opens 9AM Thu  Phone: [phone]                  Who to contact     Please call your clinic at [phone] to:    Ask questions about your health    Make or cancel appointments    Discuss your medicines    Learn about your test results    Speak to your doctor   If you have compliments or concerns about an experience at your clinic, or if you wish to file a complaint, please contact [facility] Patient Relations at [phone] or email us at [email or web address]         Additional Information About Your Visit        [employer] Information     [employer] is an electronic gateway that provides easy, online access to your medical records. With [employer], you can request a clinic appointment, read your test results, renew a prescription or communicate with your care team.     To sign up for [employer] visit the website at [email or web address]   You will be asked to enter the access code listed below, as well as some personal information. Please follow the directions to create your username and password.     Your access code is: [email or web address]  Expires: 2018  2:53 PM     Your access code will  in 90 days. If you need help or a new code, please contact your [facility] or call [phone] for assistance.        Care EveryWhere ID     This is your Care EveryWhere ID. This could be used by other organizations to access your [place] medical records  [id number]        Your Vitals Were     Pulse Temperature  "Respirations Height Pulse Oximetry Breastfeeding?    108 97.2  F (36.2  C) (Oral) 16 5' 5\" (165.1 cm) 100% No    BMI (Body Mass Index)                   30.52 kg/m2            Blood Pressure from Last 3 Encounters:   01/24/18 130/89   11/28/17 138/83   11/16/17 134/87    Weight from Last 3 Encounters:   01/24/18 183 lb 6.4 oz (83.2 kg)   11/28/17 184 lb 12.8 oz (83.8 kg)   11/16/17 187 lb 12.8 oz (85.2 kg)              We Performed the Following     DRAIN/INJECT LARGE JOINT/BURSA     PHYSICAL THERAPY REFERRAL - EXTERNAL        Primary Care Provider Office Phone # Fax #    Khushi Cadena [phone]       [address] [address]        Equal Access to Services     [facility]: Hadii moses Navas, waaxbetzaida edgar, qaybta kaalmada donis, chalo pereira . So [facility] [phone].    ATENCIÓN: Si habla español, tiene a armendraiz disposición servicios gratuitos de asistencia lingüística. Akira al [phone].    We comply with applicable federal civil rights laws and [place] laws. We do not discriminate on the basis of race, color, national origin, age, disability, sex, sexual orientation, or gender identity.            Thank you!     Thank you for choosing [facility]  for your care. Our goal is always to provide you with excellent care. Hearing back from our patients is one way we can continue to improve our services. Please take a few minutes to complete the written survey that you may receive in the mail after your visit with us. Thank you!             Your Updated Medication List - Protect others around you: Learn how to safely use, store and throw away your medicines at www.disposemymeds.org.          This list is accurate as of 1/24/18  4:39 PM.  Always use your most recent med list.                   Brand Name Dispense Instructions for use Diagnosis    acetaminophen 325 MG tablet    TYLENOL    100 tablet    Take 2 " tablets (650 mg) by mouth every 4 hours as needed for mild pain    Strain of left trapezius muscle, initial encounter       amLODIPine 5 MG tablet    NORVASC    90 tablet    Take 1 tablet (5 mg) by mouth daily    Essential hypertension with goal blood pressure less than 130/85       cetirizine 10 MG tablet    zyrTEC    30 tablet    Take 1 tablet (10 mg) by mouth daily        diclofenac 1 % Gel topical gel    VOLTAREN    100 g    Place 2 g onto the skin 3 times daily as needed for moderate pain    Strain of left trapezius muscle, initial encounter       fish oil-omega-3 fatty acids 1000 MG capsule     90 capsule    Take 2 capsules (2 g) by mouth daily    Essential hypertension with goal blood pressure less than 130/85       hydrOXYzine 25 MG tablet    ATARAX    60 tablet    Take 1-2 tablets (25-50 mg) by mouth every 6 hours as needed for itching    Hives       ibuprofen 400 MG tablet    ADVIL/MOTRIN    120 tablet    Take 1-2 tablets (400-800 mg) by mouth every 8 hours as needed for moderate pain    Episodic tension-type headache, not intractable       lisinopril-hydrochlorothiazide 20-25 MG per tablet    PRINZIDE/ZESTORETIC    90 tablet    Take 1 tablet by mouth daily    Essential hypertension with goal blood pressure less than 130/85       multivitamin, therapeutic with minerals Tabs tablet     100 each    Take 1 tablet by mouth daily    Vitamin deficiency       order for DME     1 Units    One Thera-cane    Strain of left trapezius muscle, initial encounter       ranitidine 150 MG tablet    ZANTAC    60 tablet    Take 1 tablet (150 mg) by mouth 2 times daily    Gastroesophageal reflux disease, esophagitis presence not specified       risperiDONE 1 MG tablet    risperDAL    60 tablet    Take 2 tablets (2 mg) by mouth At Bedtime    Major depressive disorder, recurrent episode, moderate (H), Posttraumatic stress disorder       SUMAtriptan 25 MG tablet    IMITREX    30 tablet    Take 2 tablets (50 mg) by mouth as  needed    Episodic tension-type headache, not intractable       temazepam 15 MG capsule    RESTORIL    90 capsule    Take 1 capsule (15 mg) by mouth nightly as needed    Major depressive disorder, recurrent episode, moderate (H)       * venlafaxine 150 MG 24 hr capsule    EFFEXOR-XR    90 capsule    Take 1 capsule (150 mg) by mouth daily    Major depressive disorder, recurrent episode, moderate (H)       * venlafaxine 75 MG 24 hr capsule    EFFEXOR-XR    90 capsule    Take 1 capsule (75 mg) by mouth daily (take with 150mg tablet - total dose 225mg)    Major depressive disorder, recurrent episode, moderate (H)       * Notice:  This list has 2 medication(s) that are the same as other medications prescribed for you. Read the directions carefully, and ask your doctor or other care provider to review them with you.

## 2018-01-24 NOTE — PATIENT INSTRUCTIONS
Here is the plan from today's visit    1. Adhesive capsulitis of left shoulder  Ice for 20 minutes twice daily  Gentle home range of motion  Follow up with PT  Ibuprofen 600mg every 6 hours as needed  - DRAIN/INJECT LARGE JOINT/BURSA  - PHYSICAL THERAPY REFERRAL - EXTERNAL      Please call or return to clinic if your symptoms don't go away.    Follow up plan - if not improving    Thank you for coming to [facility] today.  Lab Testing:  **If you had lab testing today and your results are reassuring or normal they will be mailed to you or sent through [employer] within 7 days.   **If the lab tests need quick action we will call you with the results.  The phone number we will call with results is # [phone] (home) . If this is not the best number please call our clinic and change the number.  Medication Refills:  If you need any refills please call your pharmacy and they will contact us.   If you need to  your refill at a new pharmacy, please contact the new pharmacy directly. The new pharmacy will help you get your medications transferred faster.   Scheduling:  If you have any concerns about today's visit or wish to schedule another appointment please call our office during normal business hours [phone] (8-5:00 M-F)  If a referral was made to a [facility] and you don't get a call from central scheduling please call [phone].  If a Mammogram was ordered for you at [facility] call [phone] to schedule or change your appointment.  If you had an XRay/CT/Ultrasound/MRI ordered the number is [phone] to schedule or change your radiology appointment.   Medical Concerns:  If you have urgent medical concerns please call [phone] at any time of the day.  If you have a medical emergency please call [phone].

## 2018-01-24 NOTE — NURSING NOTE
name: Zander Harrell  Language: Montenegrin   Agency: [employer]   Phone number: [phone]  Bing Fonseca CMA

## 2018-01-24 NOTE — PROGRESS NOTES
"      HPI:       Carmen Connolly is a 38 year old who presents for the following  Patient presents with:  Pain: Left Shoulder pain  Fatigue: X2 weeks      Joint/Muscle Pain      Onset: Ongoing    Injury?  no    Description:   Location(s): Left Shoulder  Character: Sharp    Intensity: moderate    Progression of Symptoms: same    Accompanying Signs & Symptoms:  Other symptoms: none    History:   Previous similar pain: no      Worsened by    Overuse?: no  Morning Stiffness?:no    Alleviating factors:  Improved by: nothing  Therapies Tried and outcome: Nothing    A [employer]  was used for  this visit.      Problem, Medication and Allergy Lists were reviewed and are current.  Patient is an established patient of this clinic.         Review of Systems:   Review of Systems   Constitutional: Negative for activity change, appetite change and fatigue.   Musculoskeletal: Positive for arthralgias (L shoulder pain - see HPI).   Skin: Negative for rash.   Neurological: Negative for dizziness, weakness and headaches.   Hematological: Negative for adenopathy.   Psychiatric/Behavioral: Negative.              Physical Exam:   Patient Vitals for the past 24 hrs:   BP Temp Temp src Pulse Resp SpO2 Height Weight   01/24/18 1525 130/89 97.2  F (36.2  C) Oral 108 16 100 % 5' 5\" (165.1 cm) 183 lb 6.4 oz (83.2 kg)     Body mass index is 30.52 kg/(m^2).  Vitals were reviewed and were normal  Blood pressure 130/89, pulse 108, temperature 97.2  F (36.2  C), temperature source Oral, resp. rate 16, height 5' 5\" (165.1 cm), weight 183 lb 6.4 oz (83.2 kg), SpO2 100 %, not currently breastfeeding.       Physical Exam   Constitutional: She is oriented to person, place, and time. She appears well-developed and well-nourished.   HENT:   Head: Normocephalic and atraumatic.   Neck: Neck supple.   Some limited neck ROM due to pain in shoulder   Pulmonary/Chest: Effort normal.   Lymphadenopathy:     She has no cervical adenopathy.   Neurological: " She is alert and oriented to person, place, and time.   Psychiatric: She has a normal mood and affect. Her behavior is normal. Judgment and thought content normal.      Left Shoulder Exam     Tenderness   The patient is experiencing tenderness in the AC joint, acromion.    Range of Motion   Active Abduction:                       80  Passive Abduction:                    110  Forward Flexion:                        120    Tests   Impingement:   Positive  Nathan:          Positive  Cross Arm:      Positive  Drop Arm:        Negative  Apprehension: Negative            Results:     [facility]   Injection Note    Carmen Connolly would like a L shoulder injection    Consent: Affirmation of informed consent was signed and scanned into the medical record. Risks, benefits and alternatives were discussed. Patient's questions were elicited and answered.   Procedure safety checklist was completed:  Yes  Time Out (Pause for the Cause) completed: Yes    Preoperative Diagnosis: adhesive capsulitis  Postoperative Diagnosis: same  The left shoulder was prepped  in the usual sterile fashion   INJECTION:  Using 4 mL of 1% lidocaine mixed with 40 mg of kenalog, the L shoulder  was successfully injected from posterior approach without complication.  Patient did experience some pain relief following injection.    Technique:   Skin prep chloroprep  Anesthesia 1% lidocaine  Complications:  No  Tolerance:  Pt tolerated procedure well and was in stable condition.     Follow up: Patient was instructed to use ice on the affected area tonight for at least 30 minutes and  that there will be a return to pain in a couple hours followed by relief in the next several days to a week. Patient was advised to call if increasing redness and swelling.     Follow up in 1- 2 months (after some PT)    Faculty: Khushi Cadena MD      Assessment and Plan     Patient Instructions   Here is the plan from today's visit    1. Adhesive capsulitis  of left shoulder  Ice for 20 minutes twice daily  Gentle home range of motion  Follow up with PT  Ibuprofen 600mg every 6 hours as needed  - DRAIN/INJECT LARGE JOINT/BURSA  - PHYSICAL THERAPY REFERRAL - EXTERNAL      Please call or return to clinic if your symptoms don't go away.    Follow up plan - if not improving (1-2 months)        There are no discontinued medications.  Options for treatment and follow-up care were reviewed with the patient. Carmen Connolly  engaged in the decision making process and verbalized understanding of the options discussed and agreed with the final plan.    Khushi Cadena MD

## 2018-02-15 ENCOUNTER — TELEPHONE (OUTPATIENT)
Dept: FAMILY MEDICINE | Facility: CLINIC | Age: 38
End: 2018-02-15

## 2018-02-15 NOTE — TELEPHONE ENCOUNTER
[facility] phone call message - order or referral request for patient:     Order or referral being requested: Skilled nursing      Additional Comments: skilled nursing one time a week, every other week and 3 PRN    OK to leave a message on voice mail? Yes    Primary language: Lebanese      needed? Yes    Call taken on February 15, 2018 at 9:56 AM by Lorene Garcia

## 2018-02-16 DIAGNOSIS — I10 ESSENTIAL HYPERTENSION WITH GOAL BLOOD PRESSURE LESS THAN 130/85: ICD-10-CM

## 2018-02-16 NOTE — TELEPHONE ENCOUNTER
Request for medication refill:    Date of last visit at clinic: 01/24/2018    Request Omeprazole 20mg    Please complete refill if appropriate and CLOSE ENCOUNTER.    Closing the encounter signifies the refill is complete.    If refill has been denied, please complete the smart phrase .smirefuse and route it to the [facility] RN TRIAGE pool to inform the patient and the pharmacy.    Juaquin Hart, CMA

## 2018-02-18 RX ORDER — CHLORAL HYDRATE 500 MG
2 CAPSULE ORAL DAILY
Qty: 90 CAPSULE | Refills: 3 | Status: SHIPPED | OUTPATIENT
Start: 2018-02-18 | End: 2018-02-21

## 2018-02-19 ENCOUNTER — MEDICAL CORRESPONDENCE (OUTPATIENT)
Dept: HEALTH INFORMATION MANAGEMENT | Facility: CLINIC | Age: 38
End: 2018-02-19

## 2018-02-19 NOTE — TELEPHONE ENCOUNTER
"Per PCP \"Please call in the prescription for the omega-3 fatty acids.  Looks like it is not e-prescribing on my end. Khushi \"    RN called [facility] pharmacy and called in rx for omega 3. Pharmacist verbalized understanding    Shaniqua Blair RN    "

## 2018-02-21 DIAGNOSIS — I10 ESSENTIAL HYPERTENSION WITH GOAL BLOOD PRESSURE LESS THAN 130/85: ICD-10-CM

## 2018-02-21 RX ORDER — CHLORAL HYDRATE 500 MG
2 CAPSULE ORAL DAILY
Qty: 90 CAPSULE | Refills: 3 | Status: SHIPPED | OUTPATIENT
Start: 2018-02-21 | End: 2018-12-03

## 2018-02-21 NOTE — TELEPHONE ENCOUNTER
Rx for fish oil is not e-prescribing, will need to be phoned in.  Also, it appears there is a request for omeprazole.  We had switched her to ranitidine to avoid issues with calcium absorption.  Recommend we discuss at next visit if the ranitidine does not seem to be helping with heartburn symptoms.    Khushi Cadena MD

## 2018-02-21 NOTE — TELEPHONE ENCOUNTER
Request for medication refill:The pharmacy is requesting Omeprazole 20mg. This Rx is on the patients discontinued list. Please add and reorder. Thanks  Date of last visit at clinic: 1-24-18    Please complete refill if appropriate and CLOSE ENCOUNTER.    Closing the encounter signifies the refill is complete.    If refill has been denied, please complete the smart phrase .smirefuse and route it to the [facility] RN TRIAGE pool to inform the patient and the pharmacy.    Narda Yoo, CMA

## 2018-03-06 ENCOUNTER — CARE COORDINATION (OUTPATIENT)
Dept: FAMILY MEDICINE | Facility: CLINIC | Age: 38
End: 2018-03-06

## 2018-03-06 NOTE — PROGRESS NOTES
3/6/18 Patient is due for follow up with  on 3/13/18 # 1:20p.m. I have ordered a [facility]  thru [facility] and will have Jose contact patient with appointment information.    Lorene Garcia  Care Coordinator

## 2018-03-27 DIAGNOSIS — I10 ESSENTIAL HYPERTENSION WITH GOAL BLOOD PRESSURE LESS THAN 130/85: ICD-10-CM

## 2018-03-27 DIAGNOSIS — E56.9 VITAMIN DEFICIENCY: ICD-10-CM

## 2018-03-27 NOTE — TELEPHONE ENCOUNTER
Request for medication refill:    Date of last visit at clinic: 01/24/2018    Please complete refill if appropriate and CLOSE ENCOUNTER.    Closing the encounter signifies the refill is complete.    If refill has been denied, please complete the smart phrase .smirefuse and route it to the [facility] RN TRIAGE pool to inform the patient and the pharmacy.    Juaquin Hart, CMA

## 2018-03-29 RX ORDER — LISINOPRIL AND HYDROCHLOROTHIAZIDE 20; 25 MG/1; MG/1
1 TABLET ORAL DAILY
Qty: 90 TABLET | Refills: 3 | Status: SHIPPED | OUTPATIENT
Start: 2018-03-29 | End: 2018-12-03

## 2018-03-29 RX ORDER — MULTIPLE VITAMINS W/ MINERALS TAB 9MG-400MCG
1 TAB ORAL DAILY
Qty: 100 EACH | Refills: 3 | Status: SHIPPED | OUTPATIENT
Start: 2018-03-29 | End: 2018-12-03

## 2018-04-04 DIAGNOSIS — M25.511 RIGHT SHOULDER PAIN, UNSPECIFIED CHRONICITY: Primary | ICD-10-CM

## 2018-04-04 NOTE — PROGRESS NOTES
[facility] referral  Faxed referral to [phone]. They will review and contact patient directly to schedule

## 2018-04-18 ENCOUNTER — TELEPHONE (OUTPATIENT)
Dept: FAMILY MEDICINE | Facility: CLINIC | Age: 38
End: 2018-04-18

## 2018-04-18 NOTE — TELEPHONE ENCOUNTER
[facility] phone call message - order or referral request for patient:     Order or referral being requested: Home care nurse        Additional Comments: need verbal order for home care 1 time a week every other week for 9 weeks and 3 PRN     OK to leave a message on voice mail? Yes    Primary language: [place] citizen      needed? Yes    Call taken on April 18, 2018 at 12:12 PM by Marga Burgess

## 2018-04-20 ENCOUNTER — MEDICAL CORRESPONDENCE (OUTPATIENT)
Dept: HEALTH INFORMATION MANAGEMENT | Facility: CLINIC | Age: 38
End: 2018-04-20

## 2018-05-02 ENCOUNTER — OFFICE VISIT (OUTPATIENT)
Dept: FAMILY MEDICINE | Facility: CLINIC | Age: 38
End: 2018-05-02
Payer: COMMERCIAL

## 2018-05-02 VITALS
BODY MASS INDEX: 30.95 KG/M2 | HEART RATE: 101 BPM | DIASTOLIC BLOOD PRESSURE: 79 MMHG | RESPIRATION RATE: 20 BRPM | WEIGHT: 186 LBS | SYSTOLIC BLOOD PRESSURE: 113 MMHG | OXYGEN SATURATION: 95 % | TEMPERATURE: 97.9 F

## 2018-05-02 DIAGNOSIS — J30.2 SEASONAL ALLERGIC RHINITIS, UNSPECIFIED CHRONICITY, UNSPECIFIED TRIGGER: Primary | ICD-10-CM

## 2018-05-02 DIAGNOSIS — G44.219 EPISODIC TENSION-TYPE HEADACHE, NOT INTRACTABLE: ICD-10-CM

## 2018-05-02 DIAGNOSIS — F33.1 MAJOR DEPRESSIVE DISORDER, RECURRENT EPISODE, MODERATE (H): ICD-10-CM

## 2018-05-02 DIAGNOSIS — S46.812A STRAIN OF LEFT TRAPEZIUS MUSCLE, INITIAL ENCOUNTER: ICD-10-CM

## 2018-05-02 DIAGNOSIS — I10 ESSENTIAL HYPERTENSION WITH GOAL BLOOD PRESSURE LESS THAN 130/85: ICD-10-CM

## 2018-05-02 DIAGNOSIS — K21.9 GASTROESOPHAGEAL REFLUX DISEASE, ESOPHAGITIS PRESENCE NOT SPECIFIED: ICD-10-CM

## 2018-05-02 RX ORDER — CETIRIZINE HYDROCHLORIDE 10 MG/1
10 TABLET ORAL DAILY
Qty: 30 TABLET | Refills: 11 | Status: SHIPPED | OUTPATIENT
Start: 2018-05-02 | End: 2018-12-03

## 2018-05-02 RX ORDER — VENLAFAXINE HYDROCHLORIDE 150 MG/1
150 CAPSULE, EXTENDED RELEASE ORAL DAILY
Qty: 90 CAPSULE | Refills: 3 | Status: SHIPPED | OUTPATIENT
Start: 2018-05-02 | End: 2018-12-03

## 2018-05-02 RX ORDER — SUMATRIPTAN 25 MG/1
50 TABLET, FILM COATED ORAL PRN
Qty: 30 TABLET | Refills: 3 | Status: SHIPPED | OUTPATIENT
Start: 2018-05-02 | End: 2018-12-03

## 2018-05-02 RX ORDER — AMLODIPINE BESYLATE 5 MG/1
5 TABLET ORAL DAILY
Qty: 90 TABLET | Refills: 3 | Status: SHIPPED | OUTPATIENT
Start: 2018-05-02 | End: 2018-12-03

## 2018-05-02 RX ORDER — IBUPROFEN 400 MG/1
400-800 TABLET, FILM COATED ORAL EVERY 8 HOURS PRN
Qty: 120 TABLET | Refills: 3 | Status: SHIPPED | OUTPATIENT
Start: 2018-05-02 | End: 2018-09-06

## 2018-05-02 RX ORDER — TEMAZEPAM 15 MG/1
15 CAPSULE ORAL
Qty: 90 CAPSULE | Refills: 3 | Status: SHIPPED | OUTPATIENT
Start: 2018-05-02 | End: 2018-11-06

## 2018-05-02 RX ORDER — ACETAMINOPHEN 325 MG/1
650 TABLET ORAL EVERY 4 HOURS PRN
Qty: 100 TABLET | Refills: 3 | Status: SHIPPED | OUTPATIENT
Start: 2018-05-02 | End: 2018-12-03

## 2018-05-02 RX ORDER — VENLAFAXINE HYDROCHLORIDE 75 MG/1
75 CAPSULE, EXTENDED RELEASE ORAL DAILY
Qty: 90 CAPSULE | Refills: 3 | Status: SHIPPED | OUTPATIENT
Start: 2018-05-02 | End: 2018-12-03

## 2018-05-02 NOTE — MR AVS SNAPSHOT
After Visit Summary   2018    Carmen Mccoy Mohsen    MRN: [id number]           Patient Information     Date Of Birth          1980        Visit Information        Provider Department      2018 4:20 PM Khushi Cadena MD [facility]        Today's Diagnoses     Seasonal allergic rhinitis, unspecified chronicity, unspecified trigger    -  1    Essential hypertension with goal blood pressure less than 130/85        Strain of left trapezius muscle, initial encounter        Gastroesophageal reflux disease, esophagitis presence not specified        Major depressive disorder, recurrent episode, moderate (H)        Episodic tension-type headache, not intractable           Follow-ups after your visit        Who to contact     Please call your clinic at [phone] to:    Ask questions about your health    Make or cancel appointments    Discuss your medicines    Learn about your test results    Speak to your doctor            Additional Information About Your Visit        MyChart Information     FreshOffice is an electronic gateway that provides easy, online access to your medical records. With FreshOffice, you can request a clinic appointment, read your test results, renew a prescription or communicate with your care team.     To sign up for [employer] visit the website at [email or web address]   You will be asked to enter the access code listed below, as well as some personal information. Please follow the directions to create your username and password.     Your access code is: [email or web address]  Expires: 2018 10:07 PM     Your access code will  in 90 days. If you need help or a new code, please contact your [facility] or call [phone] for assistance.        Care EveryWhere ID     This is your Care EveryWhere ID. This could be used by other organizations to access your [place] medical records  [id number]        Your Vitals Were     Pulse  Temperature Respirations Pulse Oximetry Breastfeeding? BMI (Body Mass Index)    101 97.9  F (36.6  C) (Oral) 20 95% No 30.95 kg/m2       Blood Pressure from Last 3 Encounters:   05/02/18 113/79   01/24/18 130/89   11/28/17 138/83    Weight from Last 3 Encounters:   05/02/18 186 lb (84.4 kg)   01/24/18 183 lb 6.4 oz (83.2 kg)   11/28/17 184 lb 12.8 oz (83.8 kg)              Today, you had the following     No orders found for display         Where to get your medicines      These medications were sent to [facility] [address] [place] [facility] [address] [facility]  [address] [facility] [address]     Phone:  [phone]     acetaminophen 325 MG tablet    amLODIPine 5 MG tablet    cetirizine 10 MG tablet    diclofenac 1 % Gel topical gel    ibuprofen 400 MG tablet    ranitidine 150 MG tablet    SUMAtriptan 25 MG tablet    venlafaxine 150 MG 24 hr capsule    venlafaxine 75 MG 24 hr capsule         Some of these will need a paper prescription and others can be bought over the counter.  Ask your nurse if you have questions.     Bring a paper prescription for each of these medications     temazepam 15 MG capsule          Primary Care Provider Office Phone # Fax #    Khushi Cadena [phone]       [address] [address]        Equal Access to Services     BRITTANY ADAMS AH: Ladonna johnson Somayur, waaxda luqadaha, qaybta kaalmada donis, chalo garcias. So [facility] [phone].    ATENCIÓN: Si habla español, tiene a armendariz disposición servicios gratuitos de asistencia lingüística. Akira al [phone].    We comply with applicable federal civil rights laws and [place] laws. We do not discriminate on the basis of race, color, national origin, age, disability, sex, sexual orientation, or gender identity.            Thank you!     Thank you for choosing [facility]  for your care. Our goal is always to provide you with  excellent care. Hearing back from our patients is one way we can continue to improve our services. Please take a few minutes to complete the written survey that you may receive in the mail after your visit with us. Thank you!             Your Updated Medication List - Protect others around you: Learn how to safely use, store and throw away your medicines at www.disposemymeds.org.          This list is accurate as of 5/2/18 11:59 PM.  Always use your most recent med list.                   Brand Name Dispense Instructions for use Diagnosis    acetaminophen 325 MG tablet    TYLENOL    100 tablet    Take 2 tablets (650 mg) by mouth every 4 hours as needed for mild pain    Strain of left trapezius muscle, initial encounter       amLODIPine 5 MG tablet    NORVASC    90 tablet    Take 1 tablet (5 mg) by mouth daily    Essential hypertension with goal blood pressure less than 130/85       cetirizine 10 MG tablet    zyrTEC    30 tablet    Take 1 tablet (10 mg) by mouth daily    Seasonal allergic rhinitis, unspecified chronicity, unspecified trigger       diclofenac 1 % Gel topical gel    VOLTAREN    100 g    Place 2 g onto the skin 3 times daily as needed for moderate pain    Strain of left trapezius muscle, initial encounter       fish oil-omega-3 fatty acids 1000 MG capsule     90 capsule    Take 2 capsules (2 g) by mouth daily    Essential hypertension with goal blood pressure less than 130/85       hydrOXYzine 25 MG tablet    ATARAX    60 tablet    Take 1-2 tablets (25-50 mg) by mouth every 6 hours as needed for itching    Hives       ibuprofen 400 MG tablet    ADVIL/MOTRIN    120 tablet    Take 1-2 tablets (400-800 mg) by mouth every 8 hours as needed for moderate pain    Episodic tension-type headache, not intractable       lisinopril-hydrochlorothiazide 20-25 MG per tablet    PRINZIDE/ZESTORETIC    90 tablet    Take 1 tablet by mouth daily    Essential hypertension with goal blood pressure less than 130/85        multivitamin, therapeutic with minerals Tabs tablet     100 each    Take 1 tablet by mouth daily    Vitamin deficiency       order for DME     1 Units    One Thera-cane    Strain of left trapezius muscle, initial encounter       ranitidine 150 MG tablet    ZANTAC    60 tablet    Take 1 tablet (150 mg) by mouth 2 times daily    Gastroesophageal reflux disease, esophagitis presence not specified       risperiDONE 1 MG tablet    risperDAL    60 tablet    Take 2 tablets (2 mg) by mouth At Bedtime    Major depressive disorder, recurrent episode, moderate (H), Posttraumatic stress disorder       SUMAtriptan 25 MG tablet    IMITREX    30 tablet    Take 2 tablets (50 mg) by mouth as needed    Episodic tension-type headache, not intractable       temazepam 15 MG capsule    RESTORIL    90 capsule    Take 1 capsule (15 mg) by mouth nightly as needed    Major depressive disorder, recurrent episode, moderate (H)       * venlafaxine 75 MG 24 hr capsule    EFFEXOR-XR    90 capsule    Take 1 capsule (75 mg) by mouth daily (take with 150mg tablet - total dose 225mg)    Major depressive disorder, recurrent episode, moderate (H)       * venlafaxine 150 MG 24 hr capsule    EFFEXOR-XR    90 capsule    Take 1 capsule (150 mg) by mouth daily    Major depressive disorder, recurrent episode, moderate (H)       * Notice:  This list has 2 medication(s) that are the same as other medications prescribed for you. Read the directions carefully, and ask your doctor or other care provider to review them with you.

## 2018-05-02 NOTE — LETTER
May 4, 2018    Carmen Connolly  [address]    [facility] [address]     1980, MR# [id number]      To: [facility]  From: Khushi Cadena MD    Regarding Patient: Carmen Connolly    To Whom it May Concern:     Our patient, Carmen Connolly, is applying for public housing.  I support this patient's efforts toward a change of living situation and finding appropriate, affordable housing. The ability to maintain or improve medical health is influenced by one's present living situation.     Her current diagnoses include:    Schizoaffective disorder    PTSD    Neurocognitive disorder NOS    Hypertension    Chronic headaches    Arthritis    In 2016, Carmen underwent formal neuropsychological testing and it was recommended that she pursue a conservatorship as it was determined that she was unable to make legal and/or financial decisions on her own without assistance.  Having housing in which such existing health needs can be addressed is important.     Carmen Connolly has been my patient for the last 2-3 years.      In regards to her character.  Ms. Connolly is honest and hardworking, and has done her  best to be adherent to our treatement plan.     Please contact us if you require additional information.     Thank you.     Khushi Cadena MD

## 2018-05-02 NOTE — PROGRESS NOTES
HPI:       Carmen Connolly is a 38 year old who presents for the following  Patient presents with:  Refill Request: Medication refill   Letter Request: Need a letter stating her neurological problems to speed up the process to get housing     Needs a letter for public housing  - Jeannette Amparo    - leave at the      Questions about refills    Otherwise is doing well - stable on medications  - still following with her therapist  A [employer]  was used for  this visit.      Problem, Medication and Allergy Lists were reviewed and are current.  Patient is an established patient of this clinic.         Review of Systems:   Review of Systems   Constitutional: Negative for activity change, appetite change, fatigue and fever.   Respiratory: Negative.    Cardiovascular: Negative.    Musculoskeletal: Negative.    Psychiatric/Behavioral: Positive for confusion (sometimes). Negative for suicidal ideas. The patient is nervous/anxious (stable).              Physical Exam:   Patient Vitals for the past 24 hrs:   BP Temp Temp src Pulse Resp SpO2 Weight   05/02/18 1630 113/79 97.9  F (36.6  C) Oral 101 20 95 % 186 lb (84.4 kg)     Body mass index is 30.95 kg/(m^2).  Vitals were reviewed and were normal     Physical Exam   Constitutional: She is oriented to person, place, and time. She appears well-developed and well-nourished.   HENT:   Head: Normocephalic and atraumatic.   Eyes: EOM are normal.   Pulmonary/Chest: Effort normal.   Musculoskeletal: Normal range of motion. She exhibits no edema.   Neurological: She is alert and oriented to person, place, and time.   Psychiatric: She has a normal mood and affect. Her behavior is normal. Judgment and thought content normal.         Results:       Assessment and Plan     1. Essential hypertension with goal blood pressure less than 130/85  Refills, stable  - amLODIPine (NORVASC) 5 MG tablet; Take 1 tablet (5 mg) by mouth daily  Dispense: 90 tablet; Refill: 3    2.  Strain of left trapezius muscle, initial encounter  Refills, stable  - diclofenac (VOLTAREN) 1 % GEL topical gel; Place 2 g onto the skin 3 times daily as needed for moderate pain  Dispense: 100 g; Refill: 1  - acetaminophen (TYLENOL) 325 MG tablet; Take 2 tablets (650 mg) by mouth every 4 hours as needed for mild pain  Dispense: 100 tablet; Refill: 3    3. Gastroesophageal reflux disease, esophagitis presence not specified  Refills, stable  - ranitidine (ZANTAC) 150 MG tablet; Take 1 tablet (150 mg) by mouth 2 times daily  Dispense: 60 tablet; Refill: 11    4. Major depressive disorder, recurrent episode, moderate (H)  Refills, stable - cont with therapist  - venlafaxine (EFFEXOR-XR) 75 MG 24 hr capsule; Take 1 capsule (75 mg) by mouth daily (take with 150mg tablet - total dose 225mg)  Dispense: 90 capsule; Refill: 3  - venlafaxine (EFFEXOR-XR) 150 MG 24 hr capsule; Take 1 capsule (150 mg) by mouth daily  Dispense: 90 capsule; Refill: 3  - temazepam (RESTORIL) 15 MG capsule; Take 1 capsule (15 mg) by mouth nightly as needed  Dispense: 90 capsule; Refill: 3    5. Seasonal allergic rhinitis, unspecified chronicity, unspecified trigger  refills  - cetirizine (ZYRTEC) 10 MG tablet; Take 1 tablet (10 mg) by mouth daily  Dispense: 30 tablet; Refill: 11    6. Episodic tension-type headache, not intractable  Refills, stable  - ibuprofen (ADVIL/MOTRIN) 400 MG tablet; Take 1-2 tablets (400-800 mg) by mouth every 8 hours as needed for moderate pain  Dispense: 120 tablet; Refill: 3  - SUMAtriptan (IMITREX) 25 MG tablet; Take 2 tablets (50 mg) by mouth as needed  Dispense: 30 tablet; Refill: 3    Form filled out for housing, will write letter and send both    There are no discontinued medications.  Options for treatment and follow-up care were reviewed with the patient. Carmen Connolly  engaged in the decision making process and verbalized understanding of the options discussed and agreed with the final plan.    Khushi  MD Surinder

## 2018-05-03 ASSESSMENT — PATIENT HEALTH QUESTIONNAIRE - PHQ9: SUM OF ALL RESPONSES TO PHQ QUESTIONS 1-9: 10

## 2018-05-14 ASSESSMENT — ENCOUNTER SYMPTOMS
APPETITE CHANGE: 0
RESPIRATORY NEGATIVE: 1
NERVOUS/ANXIOUS: 1
FATIGUE: 0
MUSCULOSKELETAL NEGATIVE: 1
ACTIVITY CHANGE: 0
FEVER: 0
CARDIOVASCULAR NEGATIVE: 1
CONFUSION: 1

## 2018-05-17 NOTE — TELEPHONE ENCOUNTER
[facility] phone call message - order or referral request for patient:     Order or referral being requested: Re-certify skilled nursing 1x/week for 9 weeks    Additional Comments: none    OK to leave a message on voice mail? Yes    Primary language: Montserratian      needed? Yes    Call taken on January 6, 2017 at 2:21 PM by Nidhi Medel     LATESHA Equipment Usage Summary :            Set Mode :BIPAP W FLEX          Usage in Hours:5;29          90% Pressure (EPAP) : 16.7           90% Insp Pressure (IPAP);22.4          AHI : 4.9               Supplemental Oxygen :      LPM

## 2018-06-06 ENCOUNTER — PATIENT OUTREACH (OUTPATIENT)
Dept: FAMILY MEDICINE | Facility: CLINIC | Age: 38
End: 2018-06-06

## 2018-07-10 ENCOUNTER — OFFICE VISIT (OUTPATIENT)
Dept: FAMILY MEDICINE | Facility: CLINIC | Age: 38
End: 2018-07-10
Payer: COMMERCIAL

## 2018-07-10 VITALS
HEART RATE: 105 BPM | DIASTOLIC BLOOD PRESSURE: 80 MMHG | OXYGEN SATURATION: 97 % | RESPIRATION RATE: 18 BRPM | WEIGHT: 178.4 LBS | SYSTOLIC BLOOD PRESSURE: 119 MMHG | BODY MASS INDEX: 29.69 KG/M2

## 2018-07-10 DIAGNOSIS — F43.10 POSTTRAUMATIC STRESS DISORDER: ICD-10-CM

## 2018-07-10 DIAGNOSIS — F25.0 SCHIZOAFFECTIVE DISORDER, BIPOLAR TYPE (H): ICD-10-CM

## 2018-07-10 DIAGNOSIS — R29.818 NEUROCOGNITIVE DEFICITS: ICD-10-CM

## 2018-07-10 DIAGNOSIS — R09.81 NASAL CONGESTION: Primary | ICD-10-CM

## 2018-07-10 DIAGNOSIS — I10 ESSENTIAL HYPERTENSION: ICD-10-CM

## 2018-07-10 DIAGNOSIS — R41.89 NEUROCOGNITIVE DEFICITS: ICD-10-CM

## 2018-07-10 RX ORDER — FLUTICASONE PROPIONATE 50 MCG
1-2 SPRAY, SUSPENSION (ML) NASAL DAILY
Qty: 1 BOTTLE | Refills: 1 | Status: SHIPPED | OUTPATIENT
Start: 2018-07-10 | End: 2018-09-05

## 2018-07-10 NOTE — NURSING NOTE
Due to patient being non-English speaking/uses sign language, an  was used for this visit. Only for face-to-face interpretation by an external agency, date and length of interpretation can be found on the scanned worksheet.     name: Zander   Agency: [employer]  Language: Eritrean   Telephone number: [phone]  Type of interpretation: Face-to-face, spoken   ELISABET Riggs

## 2018-07-10 NOTE — Clinical Note
Hi there - could you help me get Carmen's home health and PCA services reestablished?  Let me know if I need to reorder anything.  Thanks!

## 2018-07-10 NOTE — PROGRESS NOTES
HPI       Carmen Connolly is a 38 year old  who presents for   Chief Complaint   Patient presents with     Generalized Body Aches     Anxiety       Traveled to [place] - had a good time, but very hot and too many people!    URI/allergy symptoms  - 2 wks  - sneezing, congestion  - no itchy eyes  - clear nasal drainage   - hasn't tried anything  - maybe some mild symptoms in the past  - Traveled in [facility], just came back  - no fevers/chills        Mood Symptoms     Concerns: Worrying    How long have you been feeling this way? 1 month      Description:   Depressed Mood?: YES   Anxious Mood?:  YES     Accompanying Signs & Symptoms:  Still participating in activities that you used to enjoy?: Yes  Fatigue: No   Irritability:  YES   Difficulty concentrating: No   Changes in appetite: No   Problems with sleep:  YES   ++++++++++++++++++++++++++++++++      Substance Use: No        Alcohol Use:never                Other drug use:  Never Used    Social Support           Who do you live with? Her and her children          Who do you turn to for support? My children and friends    Resources         What makes you feel better?: resting         What do you do to manage stress? medication        Exercise:No exercise None     History  Has there been a time in your life when you needed much less sleep than usual? No   Heart racing/beating fast :  YES   Thoughts of hurting yourself or others: No   Previous treatment Antidepressants - risperidone (Risperdal)                                  Therapy: No    Doesn't like to be alone at home  - feeling scared  - has been seeing things again  - started while she was in [facility]  - sees things in front of her/ similar to what happened in the past  - not hearing things  - happening almost daily    Hasn't been back to see the therapist since she came back in  - needs to taking sleeping pill to help with sleep  - helps to be with others    Need to resume care - was out of town  for >32 days and her services were closed  - hasn't had HHN help to set up meds  - no PCS at present  - feels that her routine is disrupted    Today's PHQ-9 Score:   PHQ-9 SCORE 5/2/2018   Total Score 10          ANDRY Score:  ANDRY-7 SCORE 7/20/2016 11/23/2016 2/14/2017   Total Score 17 15 12       A Vincentian  was used for  this visit.      Problem, Medication and Allergy Lists were reviewed and updated if needed..    Patient is an established patient of this clinic..         Review of Systems:   Review of Systems   Constitutional: Negative for activity change, appetite change, fatigue and fever.   HENT: Positive for congestion, postnasal drip, rhinorrhea and sneezing. Negative for ear pain, sinus pain, sinus pressure, sore throat and tinnitus.    Eyes: Negative for discharge and itching.   Respiratory: Negative.    Cardiovascular: Negative.    Gastrointestinal: Negative.    Psychiatric/Behavioral: Positive for hallucinations. Negative for decreased concentration, dysphoric mood, sleep disturbance and suicidal ideas. The patient is nervous/anxious.             Physical Exam:     Vitals:    07/10/18 1427   BP: 119/80   Pulse: 105   Resp: 18   SpO2: 97%   Weight: 178 lb 6.4 oz (80.9 kg)     Body mass index is 29.69 kg/(m^2).  Vitals were reviewed and were normal     Physical Exam   Constitutional: She appears well-developed and well-nourished.   HENT:   Head: Normocephalic and atraumatic.   Right Ear: Hearing and tympanic membrane normal.   Left Ear: Hearing and tympanic membrane normal.   Nose: Mucosal edema and rhinorrhea present. No nasal deformity or septal deviation. Right sinus exhibits no maxillary sinus tenderness and no frontal sinus tenderness. Left sinus exhibits no maxillary sinus tenderness and no frontal sinus tenderness.   Mouth/Throat: Posterior oropharyngeal erythema present. No oropharyngeal exudate.   Eyes: Conjunctivae and EOM are normal.   Neck: Normal range of motion. Neck supple. No  "thyromegaly present.   Lymphadenopathy:     She has no cervical adenopathy.     MENTAL STATUS EXAM  Appearance: appropriate  Attitude: cooperative  Behavior: normal  Eye Contact: normal  Speech: normal  Orientation: oriented to person, place, time and situation  Mood: anxious  Affect: Congruent with mood  Thought Process: appropriate  Suicidal Ideation: reports no suicidal ideation  Hallucination: see HPI, reports seeing \"figures\" that appear out of nowhere, causing her distress      Results:       Assessment and Plan        Patient Instructions   Here is the plan from today's visit    1. Nasal congestion  Trial of nasal steroid  - fluticasone (FLONASE) 50 MCG/ACT spray; Spray 1-2 sprays into both nostrils daily  Dispense: 1 Bottle; Refill: 1    2. Essential hypertension  Refills today    3. Posttraumatic stress disorder  4. Schizoaffective disorder, bipolar type (H)  Continue medications  Return to see your therapist  May need to get psychiatrist involved again (will reassess after you resume therapy or if symptoms get worse)  [facility] (Lorene, [phone]) will reconnect with services to find out what orders need to be place to resume care at home  - may need to reorder Home Health and PCA services      Please call or return to clinic if your symptoms don't go away.    Follow up plan - as needed, particularly if symptoms get worse           There are no discontinued medications.    Options for treatment and follow-up care were reviewed with the patient. Carmen Connolly  engaged in the decision making process and verbalized understanding of the options discussed and agreed with the final plan.    Khushi Cadena MD  "

## 2018-07-10 NOTE — MR AVS SNAPSHOT
After Visit Summary   7/10/2018    Carmen Connolly    MRN: [id number]           Patient Information     Date Of Birth          1980        Visit Information        Provider Department      7/10/2018 2:20 PM Khushi Cadena MD [facility]        Today's Diagnoses     Nasal congestion    -  1    Essential hypertension        Posttraumatic stress disorder        Schizoaffective disorder, bipolar type (H)        Neurocognitive deficits          Care Instructions    Here is the plan from today's visit    1. Nasal congestion  Trial of nasal steroid  - fluticasone (FLONASE) 50 MCG/ACT spray; Spray 1-2 sprays into both nostrils daily  Dispense: 1 Bottle; Refill: 1    2. Essential hypertension  Refills today    3. Posttraumatic stress disorder  4. Schizoaffective disorder, bipolar type (H)  Return to see your therapist  May need to get psychiatrist involved again (will reassess after you resume therapy or if symptoms get worse)  [facility] ([place]orene, [phone]) will reconnect with services to find out what orders need to be place to resume care at home      Please call or return to clinic if your symptoms don't go away.    Follow up plan - as needed, particularly if symptoms get worse    Thank you for coming to [facility] today.  Lab Testing:  **If you had lab testing today and your results are reassuring or normal they will be mailed to you or sent through [employer] within 7 days.   **If the lab tests need quick action we will call you with the results.  The phone number we will call with results is # [phone] (home) . If this is not the best number please call our clinic and change the number.  Medication Refills:  If you need any refills please call your pharmacy and they will contact us.   If you need to  your refill at a new pharmacy, please contact the new pharmacy directly. The new pharmacy will help you get your medications transferred faster.    Scheduling:  If you have any concerns about today's visit or wish to schedule another appointment please call our office during normal business hours [phone] (8-5:00 M-F)  If a referral was made to a [facility] and you don't get a call from central scheduling please call [phone].  If a Mammogram was ordered for you at [facility] call [phone] to schedule or change your appointment.  If you had an XRay/CT/Ultrasound/MRI ordered the number is [phone] to schedule or change your radiology appointment.   Medical Concerns:  If you have urgent medical concerns please call [phone] at any time of the day.  If you have a medical emergency please call [phone].            Follow-ups after your visit        Who to contact     Please call your clinic at [phone] to:    Ask questions about your health    Make or cancel appointments    Discuss your medicines    Learn about your test results    Speak to your doctor            Additional Information About Your Visit        Care EveryWhere ID     This is your Care EveryWhere ID. This could be used by other organizations to access your [place] medical records  [id number]        Your Vitals Were     Pulse Respirations Pulse Oximetry BMI (Body Mass Index)          105 18 97% 29.69 kg/m2         Blood Pressure from Last 3 Encounters:   07/10/18 119/80   05/02/18 113/79   01/24/18 130/89    Weight from Last 3 Encounters:   07/10/18 178 lb 6.4 oz (80.9 kg)   05/02/18 186 lb (84.4 kg)   01/24/18 183 lb 6.4 oz (83.2 kg)              Today, you had the following     No orders found for display         Today's Medication Changes          These changes are accurate as of 7/10/18  3:15 PM.  If you have any questions, ask your nurse or doctor.               Start taking these medicines.        Dose/Directions    fluticasone 50 MCG/ACT spray   Commonly known as:  FLONASE   Used for:  Nasal congestion   Started by:  Khushi Cadena MD         Dose:  1-2 spray   Spray 1-2 sprays into both nostrils daily   Quantity:  1 Bottle   Refills:  1            Where to get your medicines      These medications were sent to [facility] [address] [place] [facility] - [place], [place] - [address]  [address] [address]     Phone:  [phone]     fluticasone 50 MCG/ACT spray                Primary Care Provider Office Phone # Fax #    Khushi Cadena [phone]       [address] [address]        Equal Access to Services     BRITTANY ADAMS : Hadii moses ku hadasho Soomaali, waaxda luqadaha, qaybta kaalmada adeegyada, chalo pereira . So [facility] [phone].    ATENCIÓN: Si habla español, tiene a armendariz disposición servicios gratuitos de asistencia lingüística. [facility] [phone].    We comply with applicable federal civil rights laws and [place] laws. We do not discriminate on the basis of race, color, national origin, age, disability, sex, sexual orientation, or gender identity.            Thank you!     Thank you for choosing [facility]  for your care. Our goal is always to provide you with excellent care. Hearing back from our patients is one way we can continue to improve our services. Please take a few minutes to complete the written survey that you may receive in the mail after your visit with us. Thank you!             Your Updated Medication List - Protect others around you: Learn how to safely use, store and throw away your medicines at www.disposemymeds.org.          This list is accurate as of 7/10/18  3:15 PM.  Always use your most recent med list.                   Brand Name Dispense Instructions for use Diagnosis    acetaminophen 325 MG tablet    TYLENOL    100 tablet    Take 2 tablets (650 mg) by mouth every 4 hours as needed for mild pain    Strain of left trapezius muscle, initial encounter       amLODIPine 5 MG tablet    NORVASC    90 tablet    Take 1  tablet (5 mg) by mouth daily    Essential hypertension with goal blood pressure less than 130/85       cetirizine 10 MG tablet    zyrTEC    30 tablet    Take 1 tablet (10 mg) by mouth daily    Seasonal allergic rhinitis, unspecified chronicity, unspecified trigger       diclofenac 1 % Gel topical gel    VOLTAREN    100 g    Place 2 g onto the skin 3 times daily as needed for moderate pain    Strain of left trapezius muscle, initial encounter       fish oil-omega-3 fatty acids 1000 MG capsule     90 capsule    Take 2 capsules (2 g) by mouth daily    Essential hypertension with goal blood pressure less than 130/85       fluticasone 50 MCG/ACT spray    FLONASE    1 Bottle    Spray 1-2 sprays into both nostrils daily    Nasal congestion       hydrOXYzine 25 MG tablet    ATARAX    60 tablet    Take 1-2 tablets (25-50 mg) by mouth every 6 hours as needed for itching    Hives       ibuprofen 400 MG tablet    ADVIL/MOTRIN    120 tablet    Take 1-2 tablets (400-800 mg) by mouth every 8 hours as needed for moderate pain    Episodic tension-type headache, not intractable       lisinopril-hydrochlorothiazide 20-25 MG per tablet    PRINZIDE/ZESTORETIC    90 tablet    Take 1 tablet by mouth daily    Essential hypertension with goal blood pressure less than 130/85       multivitamin, therapeutic with minerals Tabs tablet     100 each    Take 1 tablet by mouth daily    Vitamin deficiency       order for DME     1 Units    One Thera-cane    Strain of left trapezius muscle, initial encounter       ranitidine 150 MG tablet    ZANTAC    60 tablet    Take 1 tablet (150 mg) by mouth 2 times daily    Gastroesophageal reflux disease, esophagitis presence not specified       risperiDONE 1 MG tablet    risperDAL    60 tablet    Take 2 tablets (2 mg) by mouth At Bedtime    Major depressive disorder, recurrent episode, moderate (H), Posttraumatic stress disorder       SUMAtriptan 25 MG tablet    IMITREX    30 tablet    Take 2 tablets (50 mg)  by mouth as needed    Episodic tension-type headache, not intractable       temazepam 15 MG capsule    RESTORIL    90 capsule    Take 1 capsule (15 mg) by mouth nightly as needed    Major depressive disorder, recurrent episode, moderate (H)       * venlafaxine 75 MG 24 hr capsule    EFFEXOR-XR    90 capsule    Take 1 capsule (75 mg) by mouth daily (take with 150mg tablet - total dose 225mg)    Major depressive disorder, recurrent episode, moderate (H)       * venlafaxine 150 MG 24 hr capsule    EFFEXOR-XR    90 capsule    Take 1 capsule (150 mg) by mouth daily    Major depressive disorder, recurrent episode, moderate (H)       * Notice:  This list has 2 medication(s) that are the same as other medications prescribed for you. Read the directions carefully, and ask your doctor or other care provider to review them with you.

## 2018-07-10 NOTE — PATIENT INSTRUCTIONS
Here is the plan from today's visit    1. Nasal congestion  Trial of nasal steroid  - fluticasone (FLONASE) 50 MCG/ACT spray; Spray 1-2 sprays into both nostrils daily  Dispense: 1 Bottle; Refill: 1    2. Essential hypertension  Refills today    3. Posttraumatic stress disorder  4. Schizoaffective disorder, bipolar type (H)  Return to see your therapist  May need to get psychiatrist involved again (will reassess after you resume therapy or if symptoms get worse)  [facility] (Lorene, [phone]) will reconnect with services to find out what orders need to be place to resume care at home      Please call or return to clinic if your symptoms don't go away.    Follow up plan - as needed, particularly if symptoms get worse    Thank you for coming to [facility] today.  Lab Testing:  **If you had lab testing today and your results are reassuring or normal they will be mailed to you or sent through [employer] within 7 days.   **If the lab tests need quick action we will call you with the results.  The phone number we will call with results is # [phone] (home) . If this is not the best number please call our clinic and change the number.  Medication Refills:  If you need any refills please call your pharmacy and they will contact us.   If you need to  your refill at a new pharmacy, please contact the new pharmacy directly. The new pharmacy will help you get your medications transferred faster.   Scheduling:  If you have any concerns about today's visit or wish to schedule another appointment please call our office during normal business hours [phone] (8-5:00 M-F)  If a referral was made to a [facility] and you don't get a call from central scheduling please call [phone].  If a Mammogram was ordered for you at [facility] call [phone] to schedule or change your appointment.  If you had an XRay/CT/Ultrasound/MRI ordered the number is [phone] to  schedule or change your radiology appointment.   Medical Concerns:  If you have urgent medical concerns please call [phone] at any time of the day.  If you have a medical emergency please call [phone].

## 2018-07-12 ASSESSMENT — ENCOUNTER SYMPTOMS
DYSPHORIC MOOD: 0
ACTIVITY CHANGE: 0
RESPIRATORY NEGATIVE: 1
CARDIOVASCULAR NEGATIVE: 1
RHINORRHEA: 1
FEVER: 0
SINUS PAIN: 0
EYE ITCHING: 0
NERVOUS/ANXIOUS: 1
EYE DISCHARGE: 0
APPETITE CHANGE: 0
SLEEP DISTURBANCE: 0
DECREASED CONCENTRATION: 0
FATIGUE: 0
SINUS PRESSURE: 0
SORE THROAT: 0
HALLUCINATIONS: 1
GASTROINTESTINAL NEGATIVE: 1

## 2018-07-12 ASSESSMENT — PATIENT HEALTH QUESTIONNAIRE - PHQ9: SUM OF ALL RESPONSES TO PHQ QUESTIONS 1-9: 14

## 2018-08-09 ENCOUNTER — OFFICE VISIT (OUTPATIENT)
Dept: FAMILY MEDICINE | Facility: CLINIC | Age: 38
End: 2018-08-09
Payer: COMMERCIAL

## 2018-08-09 VITALS
DIASTOLIC BLOOD PRESSURE: 87 MMHG | SYSTOLIC BLOOD PRESSURE: 125 MMHG | OXYGEN SATURATION: 100 % | HEART RATE: 120 BPM | WEIGHT: 184.4 LBS | TEMPERATURE: 98 F | BODY MASS INDEX: 30.69 KG/M2 | RESPIRATION RATE: 16 BRPM

## 2018-08-09 DIAGNOSIS — R00.0 TACHYCARDIA: ICD-10-CM

## 2018-08-09 DIAGNOSIS — I10 ESSENTIAL HYPERTENSION: Primary | ICD-10-CM

## 2018-08-09 DIAGNOSIS — F43.10 POSTTRAUMATIC STRESS DISORDER: ICD-10-CM

## 2018-08-09 ASSESSMENT — ENCOUNTER SYMPTOMS
WHEEZING: 0
WEAKNESS: 0
SLEEP DISTURBANCE: 0
SHORTNESS OF BREATH: 0
FEVER: 0
APPETITE CHANGE: 0
MUSCULOSKELETAL NEGATIVE: 1
FATIGUE: 0
ACTIVITY CHANGE: 0
EYES NEGATIVE: 1
DIZZINESS: 0
PALPITATIONS: 1
NERVOUS/ANXIOUS: 1
CHEST TIGHTNESS: 0
LIGHT-HEADEDNESS: 0
CONFUSION: 0
DECREASED CONCENTRATION: 0
COUGH: 0

## 2018-08-09 NOTE — PROGRESS NOTES
HPI       Carmen Connolly is a 38 year old  who presents for   Chief Complaint   Patient presents with     Forms     Adult  Form ongoing heart palpatations, per pt she was referred for futher testing within the [place]       Heart palpitations  - irregular heartrate again  - work up 2 years ago with holter monitor and ECHO was normal  - occasionally still happening, not associated with anything in particular, she rests and it goes away    Needed forms filled out for adult     Still hasn't had her home health nurse resume care -- will need to check on that    A Sri Lankan  was used for  this visit.    +++++++    Problem, Medication and Allergy Lists were reviewed and updated if needed..    Patient is an established patient of this clinic..         Review of Systems:   Review of Systems   Constitutional: Negative for activity change, appetite change, fatigue and fever.   HENT: Negative.    Eyes: Negative.    Respiratory: Negative for cough, chest tightness, shortness of breath and wheezing.    Cardiovascular: Positive for palpitations. Negative for chest pain and leg swelling.   Genitourinary: Negative.    Musculoskeletal: Negative.    Neurological: Negative for dizziness, weakness and light-headedness.   Psychiatric/Behavioral: Negative for confusion, decreased concentration, self-injury, sleep disturbance and suicidal ideas. The patient is nervous/anxious.             Physical Exam:     Vitals:    08/09/18 1546   BP: 125/87   BP Location: Left arm   Patient Position: Sitting   Cuff Size: Adult Regular   Pulse: 120   Resp: 16   Temp: 98  F (36.7  C)   TempSrc: Oral   SpO2: 100%   Weight: 184 lb 6.4 oz (83.6 kg)     Body mass index is 30.69 kg/(m^2).  Vitals were reviewed and were normal     Physical Exam   Constitutional: She is oriented to person, place, and time. She appears well-developed and well-nourished.   Cardiovascular: Regular rhythm and normal heart sounds.    No murmur  heard.  Elevated HR with VS, but normalized on exam   Pulmonary/Chest: Effort normal. No respiratory distress. She exhibits no tenderness.   Musculoskeletal: Normal range of motion.   Neurological: She is alert and oriented to person, place, and time.     MENTAL STATUS EXAM  Appearance: appropriate  Attitude: cooperative  Behavior: normal  Eye Contact: normal  Speech: normal  Orientation: oriented to person, place, time and situation  Mood: normal  Affect: Congruent with mood  Thought Process: appropriate  Suicidal Ideation: reports no suicidal ideation  Hallucination: denies    Results:       Assessment and Plan        1. Essential hypertension  Continue current medications    2. Tachycardia  Recurrent sinus tachy noted in the past (seems to be back again)  Curious if taking all medications, will have HHN resume visits  May need to add propranolol to help with Sinus tach  Full cardiac w/u done in the past (and was normal); will reconsider if repeat testing needed if symptoms worsen  - Home Care Nursing Referral ([place])    3. Posttraumatic stress disorder  Resumed care with therapist  Needs in-home assistance with mediation set up  - Home Care Nursing Referral ([place])    Forms for adult  filled out and signed     There are no discontinued medications.    Options for treatment and follow-up care were reviewed with the patient. Carmen Connolly  engaged in the decision making process and verbalized understanding of the options discussed and agreed with the final plan.    Khushi Cadena MD

## 2018-08-09 NOTE — MR AVS SNAPSHOT
After Visit Summary   8/9/2018    Carmen Connolly    MRN: [id number]           Patient Information     Date Of Birth          1980        Visit Information        Provider Department      8/9/2018 3:40 PM Khushi Cadena MD [facility]        Today's Diagnoses     Essential hypertension    -  1    Tachycardia        Posttraumatic stress disorder           Follow-ups after your visit        Additional Services     Home Care Nursing Referral ([place])       **Order classes of: [facility], [facility] and [facility] will route to the Home Care and Hospice Referral Pool.  Home Care or Hospice will then contact the patient to schedule their appointment.**    If you do not hear from Home Care and Hospice, or you would like to call to schedule, please call the referring place of service that your provider has listed below.  ______________________________________________________________________    Your provider has referred you to: [facility]: [facility] [phone]   [email or web address]    Extended Emergency Contact Information  Primary Emergency Contact: Queenie Blackburn   [facility]  Home Phone: [phone]  Relation:   Secondary Emergency Contact: Brooklyn Hooper   [facility]  Home Phone: [phone]  Mobile Phone: [phone]  Relation: Sister    Patient Anticipated Discharge Date: n/a   RN, PT, HHA to begin 24 - 48 hours after discharge.  PLEASE EVALUATE AND TREAT (Evaluation timeline is 24 - 48 hrs. Please call if there is need for a variance to this timeline).    REASON FOR REFERRAL: Assessment & Treatment: RN and medication evaluation and assistance with set up    ADDITIONAL SERVICES NEEDED: possibly SW (to check on access to services)    OTHER PERTINENT INFORMATION: Patient was last seen by provider on 8/9 for depression, tachycardia.    Current Outpatient Prescriptions:  acetaminophen (TYLENOL) 325 MG  tablet, Take 2 tablets (650 mg) by mouth every 4 hours as needed for mild pain, Disp: 100 tablet, Rfl: 3  amLODIPine (NORVASC) 5 MG tablet, Take 1 tablet (5 mg) by mouth daily, Disp: 90 tablet, Rfl: 3  cetirizine (ZYRTEC) 10 MG tablet, Take 1 tablet (10 mg) by mouth daily, Disp: 30 tablet, Rfl: 11  diclofenac (VOLTAREN) 1 % GEL topical gel, Place 2 g onto the skin 3 times daily as needed for moderate pain, Disp: 100 g, Rfl: 1  fish oil-omega-3 fatty acids (OMEGA-3 FISH OIL) 1000 MG capsule, Take 2 capsules (2 g) by mouth daily, Disp: 90 capsule, Rfl: 3  fluticasone (FLONASE) 50 MCG/ACT spray, Spray 1-2 sprays into both nostrils daily, Disp: 1 Bottle, Rfl: 1  hydrOXYzine (ATARAX) 25 MG tablet, Take 1-2 tablets (25-50 mg) by mouth every 6 hours as needed for itching, Disp: 60 tablet, Rfl: 1  ibuprofen (ADVIL/MOTRIN) 400 MG tablet, Take 1-2 tablets (400-800 mg) by mouth every 8 hours as needed for moderate pain, Disp: 120 tablet, Rfl: 3  lisinopril-hydrochlorothiazide (PRINZIDE/ZESTORETIC) 20-25 MG per tablet, Take 1 tablet by mouth daily, Disp: 90 tablet, Rfl: 3  multivitamin, therapeutic with minerals (THERA-VIT-M) TABS tablet, Take 1 tablet by mouth daily, Disp: 100 each, Rfl: 3  order for DME, One Thera-cane, Disp: 1 Units, Rfl: 0  ranitidine (ZANTAC) 150 MG tablet, Take 1 tablet (150 mg) by mouth 2 times daily, Disp: 60 tablet, Rfl: 11  risperiDONE (RISPERDAL) 1 MG tablet, Take 2 tablets (2 mg) by mouth At Bedtime, Disp: 60 tablet, Rfl: 11  SUMAtriptan (IMITREX) 25 MG tablet, Take 2 tablets (50 mg) by mouth as needed, Disp: 30 tablet, Rfl: 3  temazepam (RESTORIL) 15 MG capsule, Take 1 capsule (15 mg) by mouth nightly as needed, Disp: 90 capsule, Rfl: 3  venlafaxine (EFFEXOR-XR) 150 MG 24 hr capsule, Take 1 capsule (150 mg) by mouth daily, Disp: 90 capsule, Rfl: 3  venlafaxine (EFFEXOR-XR) 75 MG 24 hr capsule, Take 1 capsule (75 mg) by mouth daily (take with 150mg tablet - total dose 225mg), Disp: 90 capsule, Rfl:  3      Patient Active Problem List:     Essential hypertension     Gastroesophageal reflux disease without esophagitis     Tachycardia     Shortened KS interval     Vitamin D deficiency     Posttraumatic stress disorder     Female circumcision     Chronic tension-type headache, not intractable     Schizoaffective disorder, bipolar type (H)     Neurocognitive deficits     Arthritis     Health Care Home Active Coordination     Extra digits     Immigrant with language difficulty     Pulmonary tuberculosis confirmed by sputum microscopy      Documentation of Face to Face and Certification for Home Health Services    I certify that patient, Carmen Connolly is under my care and that I, or a Nurse Practitioner or Physician's Assistant working with me, had a face-to-face encounter that meets the physician face-to-face encounter requirements with this patient on: 8/9/2018.    This encounter with the patient was in whole, or in part, for the following medical condition, which is the primary reason for Home Health Care: PTSD, schizoaffective disorder.    I certify that, based on my findings, the following services are medically necessary Home Health Services: Nursing    My clinical findings support the need for the above services because: Nurse is needed: To provide assessment and oversight required in the home to assure adherence to the medical plan due to: disordered mood and confusion with medications at times.    Further, I certify that my clinical findings support that this patient is homebound (i.e. absences from home require considerable and taxing effort and are for medical reasons or [employer] services or infrequently or of short duration when for other reasons) because: Mental health symptoms including: Mental health condition is exacerbated by exposure to crowds, unfamiliar environment or new stressful situations..    Based on the above findings, I certify that this patient is confined to the home and needs  intermittent skilled nursing care, physical therapy and/or speech therapy.  The patient is under my care, and I have initiated the establishment of the plan of care.  This patient will be followed by a physician who will periodically review the plan of care.    Physician/Provider to provide follow up care: Khushi Cadena    Please be aware that coverage of these services is subject to the terms and limitations of your health insurance plan.  Call member services at your health plan with any benefit or coverage questions.                  Who to contact     Please call your clinic at [phone] to:    Ask questions about your health    Make or cancel appointments    Discuss your medicines    Learn about your test results    Speak to your doctor            Additional Information About Your Visit        Care EveryWhere ID     This is your Care EveryWhere ID. This could be used by other organizations to access your [facility] medical records  [id number]        Your Vitals Were     Pulse Temperature Respirations Last Period Pulse Oximetry Breastfeeding?    120 98  F (36.7  C) (Oral) 16 08/02/2018 100% No    BMI (Body Mass Index)                   30.69 kg/m2            Blood Pressure from Last 3 Encounters:   08/09/18 125/87   07/10/18 119/80   05/02/18 113/79    Weight from Last 3 Encounters:   08/09/18 184 lb 6.4 oz (83.6 kg)   07/10/18 178 lb 6.4 oz (80.9 kg)   05/02/18 186 lb (84.4 kg)              We Performed the Following     Home Care Nursing Referral ([place])        Primary Care Provider Office Phone # Fax #    Khushi Cadena [phone]       [address] [address]        Equal Access to Services     BRITTANY ADAMS : Ladonna Navas, wajudson edgar, qaybta kaalchalo wright. So [facility] [phone].    ATENCIÓN: Si habla español, tiene a armendariz disposición servicios gratuitos de asistencia lingüística. Llame al  [phone].    We comply with applicable federal civil rights laws and [place] laws. We do not discriminate on the basis of race, color, national origin, age, disability, sex, sexual orientation, or gender identity.            Thank you!     Thank you for choosing [facility]  for your care. Our goal is always to provide you with excellent care. Hearing back from our patients is one way we can continue to improve our services. Please take a few minutes to complete the written survey that you may receive in the mail after your visit with us. Thank you!             Your Updated Medication List - Protect others around you: Learn how to safely use, store and throw away your medicines at www.disposemymeds.org.          This list is accurate as of 8/9/18 11:59 PM.  Always use your most recent med list.                   Brand Name Dispense Instructions for use Diagnosis    acetaminophen 325 MG tablet    TYLENOL    100 tablet    Take 2 tablets (650 mg) by mouth every 4 hours as needed for mild pain    Strain of left trapezius muscle, initial encounter       amLODIPine 5 MG tablet    NORVASC    90 tablet    Take 1 tablet (5 mg) by mouth daily    Essential hypertension with goal blood pressure less than 130/85       cetirizine 10 MG tablet    zyrTEC    30 tablet    Take 1 tablet (10 mg) by mouth daily    Seasonal allergic rhinitis, unspecified chronicity, unspecified trigger       diclofenac 1 % Gel topical gel    VOLTAREN    100 g    Place 2 g onto the skin 3 times daily as needed for moderate pain    Strain of left trapezius muscle, initial encounter       fish oil-omega-3 fatty acids 1000 MG capsule     90 capsule    Take 2 capsules (2 g) by mouth daily    Essential hypertension with goal blood pressure less than 130/85       fluticasone 50 MCG/ACT spray    FLONASE    1 Bottle    Spray 1-2 sprays into both nostrils daily    Nasal congestion       hydrOXYzine 25 MG tablet    ATARAX    60 tablet     Take 1-2 tablets (25-50 mg) by mouth every 6 hours as needed for itching    Hives       ibuprofen 400 MG tablet    ADVIL/MOTRIN    120 tablet    Take 1-2 tablets (400-800 mg) by mouth every 8 hours as needed for moderate pain    Episodic tension-type headache, not intractable       lisinopril-hydrochlorothiazide 20-25 MG per tablet    PRINZIDE/ZESTORETIC    90 tablet    Take 1 tablet by mouth daily    Essential hypertension with goal blood pressure less than 130/85       multivitamin, therapeutic with minerals Tabs tablet     100 each    Take 1 tablet by mouth daily    Vitamin deficiency       order for DME     1 Units    One Thera-cane    Strain of left trapezius muscle, initial encounter       ranitidine 150 MG tablet    ZANTAC    60 tablet    Take 1 tablet (150 mg) by mouth 2 times daily    Gastroesophageal reflux disease, esophagitis presence not specified       risperiDONE 1 MG tablet    risperDAL    60 tablet    Take 2 tablets (2 mg) by mouth At Bedtime    Major depressive disorder, recurrent episode, moderate (H), Posttraumatic stress disorder       SUMAtriptan 25 MG tablet    IMITREX    30 tablet    Take 2 tablets (50 mg) by mouth as needed    Episodic tension-type headache, not intractable       temazepam 15 MG capsule    RESTORIL    90 capsule    Take 1 capsule (15 mg) by mouth nightly as needed    Major depressive disorder, recurrent episode, moderate (H)       * venlafaxine 75 MG 24 hr capsule    EFFEXOR-XR    90 capsule    Take 1 capsule (75 mg) by mouth daily (take with 150mg tablet - total dose 225mg)    Major depressive disorder, recurrent episode, moderate (H)       * venlafaxine 150 MG 24 hr capsule    EFFEXOR-XR    90 capsule    Take 1 capsule (150 mg) by mouth daily    Major depressive disorder, recurrent episode, moderate (H)       * Notice:  This list has 2 medication(s) that are the same as other medications prescribed for you. Read the directions carefully, and ask your doctor or other care  provider to review them with you.

## 2018-08-09 NOTE — Clinical Note
Hi there - I reordered home health; I don't think they ever resumed care after she came back from [place].  Can you make sure that they revisit?  Thank you!

## 2018-08-09 NOTE — NURSING NOTE
Due to patient being non-English speaking/uses sign language, an  was used for this visit. Only for face-to-face interpretation by an external agency, date and length of interpretation can be found on the scanned worksheet.     name: Zander  Agency: [employer]  Language: [place] Islander   Telephone number: [phone]  Type of interpretation: Face-to-face, spoken   ELISABET Lainez 3:48 PM August 9, 2018

## 2018-08-20 ENCOUNTER — TELEPHONE (OUTPATIENT)
Dept: FAMILY MEDICINE | Facility: CLINIC | Age: 38
End: 2018-08-20

## 2018-08-20 NOTE — PATIENT INSTRUCTIONS
[facility]  [phone]  Referral auto sent, [facility] to review and will contact patient/family directly to set up.

## 2018-09-05 DIAGNOSIS — R09.81 NASAL CONGESTION: ICD-10-CM

## 2018-09-05 RX ORDER — FLUTICASONE PROPIONATE 50 MCG
1-2 SPRAY, SUSPENSION (ML) NASAL DAILY
Qty: 1 BOTTLE | Refills: 1 | Status: SHIPPED | OUTPATIENT
Start: 2018-09-05 | End: 2018-12-03

## 2018-09-05 NOTE — TELEPHONE ENCOUNTER

## 2018-09-06 DIAGNOSIS — G44.219 EPISODIC TENSION-TYPE HEADACHE, NOT INTRACTABLE: ICD-10-CM

## 2018-09-06 NOTE — TELEPHONE ENCOUNTER

## 2018-09-07 RX ORDER — IBUPROFEN 400 MG/1
400-800 TABLET, FILM COATED ORAL EVERY 8 HOURS PRN
Qty: 120 TABLET | Refills: 3 | Status: SHIPPED | OUTPATIENT
Start: 2018-09-07 | End: 2018-12-03

## 2018-11-06 DIAGNOSIS — F33.1 MAJOR DEPRESSIVE DISORDER, RECURRENT EPISODE, MODERATE (H): ICD-10-CM

## 2018-11-09 RX ORDER — TEMAZEPAM 15 MG/1
15 CAPSULE ORAL
Qty: 90 CAPSULE | Refills: 3 | Status: SHIPPED | OUTPATIENT
Start: 2018-11-09 | End: 2018-12-03

## 2018-12-03 ENCOUNTER — OFFICE VISIT (OUTPATIENT)
Dept: FAMILY MEDICINE | Facility: CLINIC | Age: 38
End: 2018-12-03
Payer: COMMERCIAL

## 2018-12-03 VITALS
TEMPERATURE: 98 F | DIASTOLIC BLOOD PRESSURE: 91 MMHG | RESPIRATION RATE: 16 BRPM | HEART RATE: 106 BPM | BODY MASS INDEX: 31.45 KG/M2 | WEIGHT: 189 LBS | OXYGEN SATURATION: 100 % | SYSTOLIC BLOOD PRESSURE: 133 MMHG

## 2018-12-03 DIAGNOSIS — S46.812A STRAIN OF LEFT TRAPEZIUS MUSCLE, INITIAL ENCOUNTER: ICD-10-CM

## 2018-12-03 DIAGNOSIS — J30.2 SEASONAL ALLERGIC RHINITIS, UNSPECIFIED TRIGGER: Primary | ICD-10-CM

## 2018-12-03 DIAGNOSIS — F43.10 POSTTRAUMATIC STRESS DISORDER: ICD-10-CM

## 2018-12-03 DIAGNOSIS — E56.9 VITAMIN DEFICIENCY: ICD-10-CM

## 2018-12-03 DIAGNOSIS — R00.0 SINUS TACHYCARDIA: ICD-10-CM

## 2018-12-03 DIAGNOSIS — G44.219 EPISODIC TENSION-TYPE HEADACHE, NOT INTRACTABLE: ICD-10-CM

## 2018-12-03 DIAGNOSIS — L50.9 HIVES: ICD-10-CM

## 2018-12-03 DIAGNOSIS — R09.81 NASAL CONGESTION: ICD-10-CM

## 2018-12-03 DIAGNOSIS — F33.1 MAJOR DEPRESSIVE DISORDER, RECURRENT EPISODE, MODERATE (H): ICD-10-CM

## 2018-12-03 DIAGNOSIS — I10 ESSENTIAL HYPERTENSION WITH GOAL BLOOD PRESSURE LESS THAN 130/85: ICD-10-CM

## 2018-12-03 DIAGNOSIS — K21.9 GASTROESOPHAGEAL REFLUX DISEASE, ESOPHAGITIS PRESENCE NOT SPECIFIED: ICD-10-CM

## 2018-12-03 LAB
HGB BLD-MCNC: 11.8 G/DL (ref 11.7–15.7)
TSH SERPL DL<=0.005 MIU/L-ACNC: 2.6 MU/L (ref 0.4–4)

## 2018-12-03 RX ORDER — MULTIPLE VITAMINS W/ MINERALS TAB 9MG-400MCG
1 TAB ORAL DAILY
Qty: 100 EACH | Refills: 3 | Status: SHIPPED | OUTPATIENT
Start: 2018-12-03 | End: 2020-08-14

## 2018-12-03 RX ORDER — TEMAZEPAM 15 MG/1
15 CAPSULE ORAL
Qty: 30 CAPSULE | Refills: 5 | Status: SHIPPED | OUTPATIENT
Start: 2018-12-03 | End: 2020-08-14

## 2018-12-03 RX ORDER — VENLAFAXINE HYDROCHLORIDE 75 MG/1
75 CAPSULE, EXTENDED RELEASE ORAL DAILY
Qty: 30 CAPSULE | Refills: 11 | Status: SHIPPED | OUTPATIENT
Start: 2018-12-03 | End: 2020-04-28

## 2018-12-03 RX ORDER — VENLAFAXINE HYDROCHLORIDE 150 MG/1
150 CAPSULE, EXTENDED RELEASE ORAL DAILY
Qty: 30 CAPSULE | Refills: 11 | Status: SHIPPED | OUTPATIENT
Start: 2018-12-03 | End: 2020-04-28

## 2018-12-03 RX ORDER — PROPRANOLOL HCL 60 MG
60 CAPSULE, EXTENDED RELEASE 24HR ORAL DAILY
Qty: 30 CAPSULE | Refills: 3 | Status: SHIPPED | OUTPATIENT
Start: 2018-12-03 | End: 2020-04-28

## 2018-12-03 RX ORDER — HYDROXYZINE HYDROCHLORIDE 25 MG/1
25-50 TABLET, FILM COATED ORAL EVERY 6 HOURS PRN
Qty: 60 TABLET | Refills: 1 | Status: SHIPPED | OUTPATIENT
Start: 2018-12-03 | End: 2020-01-10

## 2018-12-03 RX ORDER — CETIRIZINE HYDROCHLORIDE 10 MG/1
10 TABLET ORAL DAILY
Qty: 30 TABLET | Refills: 11 | Status: SHIPPED | OUTPATIENT
Start: 2018-12-03 | End: 2020-04-28

## 2018-12-03 RX ORDER — RISPERIDONE 1 MG/1
2 TABLET ORAL AT BEDTIME
Qty: 60 TABLET | Refills: 11 | Status: SHIPPED | OUTPATIENT
Start: 2018-12-03 | End: 2020-04-28

## 2018-12-03 RX ORDER — SUMATRIPTAN 25 MG/1
50 TABLET, FILM COATED ORAL PRN
Qty: 30 TABLET | Refills: 3 | Status: SHIPPED | OUTPATIENT
Start: 2018-12-03 | End: 2020-01-10

## 2018-12-03 RX ORDER — IBUPROFEN 400 MG/1
400-800 TABLET, FILM COATED ORAL EVERY 8 HOURS PRN
Qty: 120 TABLET | Refills: 3 | Status: SHIPPED | OUTPATIENT
Start: 2018-12-03 | End: 2019-09-25

## 2018-12-03 RX ORDER — FLUTICASONE PROPIONATE 50 MCG
1-2 SPRAY, SUSPENSION (ML) NASAL DAILY
Qty: 1 BOTTLE | Refills: 1 | Status: SHIPPED | OUTPATIENT
Start: 2018-12-03 | End: 2019-01-28

## 2018-12-03 RX ORDER — LISINOPRIL AND HYDROCHLOROTHIAZIDE 20; 25 MG/1; MG/1
1 TABLET ORAL DAILY
Qty: 90 TABLET | Refills: 3 | Status: SHIPPED | OUTPATIENT
Start: 2018-12-03 | End: 2020-04-28

## 2018-12-03 RX ORDER — CHLORAL HYDRATE 500 MG
2 CAPSULE ORAL DAILY
Qty: 90 CAPSULE | Refills: 3 | Status: SHIPPED | OUTPATIENT
Start: 2018-12-03 | End: 2019-03-27

## 2018-12-03 RX ORDER — AMLODIPINE BESYLATE 5 MG/1
5 TABLET ORAL DAILY
Qty: 30 TABLET | Refills: 3 | Status: SHIPPED | OUTPATIENT
Start: 2018-12-03 | End: 2019-10-13

## 2018-12-03 RX ORDER — ACETAMINOPHEN 325 MG/1
650 TABLET ORAL EVERY 4 HOURS PRN
Qty: 100 TABLET | Refills: 11 | Status: SHIPPED | OUTPATIENT
Start: 2018-12-03 | End: 2020-03-05

## 2018-12-03 ASSESSMENT — ENCOUNTER SYMPTOMS
SLEEP DISTURBANCE: 1
TREMORS: 0
FEVER: 0
DIAPHORESIS: 0
GASTROINTESTINAL NEGATIVE: 1
FATIGUE: 0
UNEXPECTED WEIGHT CHANGE: 0
SHORTNESS OF BREATH: 0
NERVOUS/ANXIOUS: 1
CHEST TIGHTNESS: 0
WEAKNESS: 0
COUGH: 0
PALPITATIONS: 1
ACTIVITY CHANGE: 0
CHILLS: 0
DIZZINESS: 0
HEADACHES: 1
APPETITE CHANGE: 0

## 2018-12-03 ASSESSMENT — PATIENT HEALTH QUESTIONNAIRE - PHQ9: SUM OF ALL RESPONSES TO PHQ QUESTIONS 1-9: 10

## 2018-12-03 NOTE — MR AVS SNAPSHOT
After Visit Summary   12/3/2018    Carmen Mccoy [place]    MRN: [id number]           Patient Information     Date Of Birth          1980        Visit Information        Provider Department      12/3/2018 10:20 AM Khushi Cadnea MD [facility]        Today's Diagnoses     Seasonal allergic rhinitis, unspecified trigger    -  1    Essential hypertension with goal blood pressure less than 130/85        Strain of left trapezius muscle, initial encounter        Nasal congestion        Hives        Episodic tension-type headache, not intractable        Vitamin deficiency        Gastroesophageal reflux disease, esophagitis presence not specified        Major depressive disorder, recurrent episode, moderate (H)        Posttraumatic stress disorder        Sinus tachycardia          Care Instructions    Here is the plan from today's visit    1. Essential hypertension with goal blood pressure less than 130/85  Follow up in 2-4 weeks to follow up on BP (ideally on a day when our PharmD is here)  Bring all medications with you (and your med box)  Labs today  Added 1 extra medication today (propranolol)  - amLODIPine (NORVASC) 5 MG tablet; Take 1 tablet (5 mg) by mouth daily  Dispense: 30 tablet; Refill: 3  - fish oil-omega-3 fatty acids (OMEGA-3 FISH OIL) 1000 MG capsule; Take 2 capsules (2 g) by mouth daily  Dispense: 90 capsule; Refill: 3  - lisinopril-hydrochlorothiazide (PRINZIDE/ZESTORETIC) 20-25 MG tablet; Take 1 tablet by mouth daily  Dispense: 90 tablet; Refill: 3  - multivitamin w/minerals (THERA-VIT-M) tablet; Take 1 tablet by mouth daily  Dispense: 100 each; Refill: 3  - propranolol ER (INDERAL LA) 60 MG 24 hr capsule; Take 1 capsule (60 mg) by mouth daily  Dispense: 30 capsule; Refill: 3    2. Strain of left trapezius muscle, initial encounter  - diclofenac (VOLTAREN) 1 % topical gel; Place 2 g onto the skin 3 times daily as needed for moderate pain  Dispense: 100 g; Refill: 1  -  ibuprofen (ADVIL/MOTRIN) 400 MG tablet; Take 1-2 tablets (400-800 mg) by mouth every 8 hours as needed for moderate pain  Dispense: 120 tablet; Refill: 3  - multivitamin w/minerals (THERA-VIT-M) tablet; Take 1 tablet by mouth daily  Dispense: 100 each; Refill: 3  - acetaminophen (TYLENOL) 325 MG tablet; Take 2 tablets (650 mg) by mouth every 4 hours as needed for mild pain  Dispense: 100 tablet; Refill: 11    3. Nasal congestion  - fluticasone (FLONASE) 50 MCG/ACT nasal spray; Spray 1-2 sprays into both nostrils daily  Dispense: 1 Bottle; Refill: 1  - multivitamin w/minerals (THERA-VIT-M) tablet; Take 1 tablet by mouth daily  Dispense: 100 each; Refill: 3    4. Hives  - multivitamin w/minerals (THERA-VIT-M) tablet; Take 1 tablet by mouth daily  Dispense: 100 each; Refill: 3    5. Episodic tension-type headache, not intractable  - multivitamin w/minerals (THERA-VIT-M) tablet; Take 1 tablet by mouth daily  Dispense: 100 each; Refill: 3  - SUMAtriptan (IMITREX) 25 MG tablet; Take 2 tablets (50 mg) by mouth as needed  Dispense: 30 tablet; Refill: 3  - acetaminophen (TYLENOL) 325 MG tablet; Take 2 tablets (650 mg) by mouth every 4 hours as needed for mild pain  Dispense: 100 tablet; Refill: 11    6. Vitamin deficiency  - multivitamin w/minerals (THERA-VIT-M) tablet; Take 1 tablet by mouth daily  Dispense: 100 each; Refill: 3    7. Gastroesophageal reflux disease, esophagitis presence not specified  - multivitamin w/minerals (THERA-VIT-M) tablet; Take 1 tablet by mouth daily  Dispense: 100 each; Refill: 3  - ranitidine (ZANTAC) 150 MG tablet; Take 1 tablet (150 mg) by mouth 2 times daily  Dispense: 60 tablet; Refill: 11    8. Major depressive disorder, recurrent episode, moderate (H)  - hydrOXYzine (ATARAX) 25 MG tablet; Take 1-2 tablets (25-50 mg) by mouth every 6 hours as needed for itching  Dispense: 60 tablet; Refill: 1  - multivitamin w/minerals (THERA-VIT-M) tablet; Take 1 tablet by mouth daily  Dispense: 100 each;  Refill: 3  - risperiDONE (RISPERDAL) 1 MG tablet; Take 2 tablets (2 mg) by mouth At Bedtime  Dispense: 60 tablet; Refill: 11  - temazepam (RESTORIL) 15 MG capsule; Take 1 capsule (15 mg) by mouth nightly as needed  Dispense: 30 capsule; Refill: 5  - venlafaxine (EFFEXOR-XR) 150 MG 24 hr capsule; Take 1 capsule (150 mg) by mouth daily  Dispense: 30 capsule; Refill: 11  - venlafaxine (EFFEXOR-XR) 75 MG 24 hr capsule; Take 1 capsule (75 mg) by mouth daily (take with 150mg tablet - total dose 225mg)  Dispense: 30 capsule; Refill: 11    9. Posttraumatic stress disorder  - hydrOXYzine (ATARAX) 25 MG tablet; Take 1-2 tablets (25-50 mg) by mouth every 6 hours as needed for itching  Dispense: 60 tablet; Refill: 1  - multivitamin w/minerals (THERA-VIT-M) tablet; Take 1 tablet by mouth daily  Dispense: 100 each; Refill: 3  - risperiDONE (RISPERDAL) 1 MG tablet; Take 2 tablets (2 mg) by mouth At Bedtime  Dispense: 60 tablet; Refill: 11  - temazepam (RESTORIL) 15 MG capsule; Take 1 capsule (15 mg) by mouth nightly as needed  Dispense: 30 capsule; Refill: 5  - venlafaxine (EFFEXOR-XR) 150 MG 24 hr capsule; Take 1 capsule (150 mg) by mouth daily  Dispense: 30 capsule; Refill: 11  - venlafaxine (EFFEXOR-XR) 75 MG 24 hr capsule; Take 1 capsule (75 mg) by mouth daily (take with 150mg tablet - total dose 225mg)  Dispense: 30 capsule; Refill: 11    10. Seasonal allergic rhinitis, unspecified trigger  - fluticasone (FLONASE) 50 MCG/ACT nasal spray; Spray 1-2 sprays into both nostrils daily  Dispense: 1 Bottle; Refill: 1  - multivitamin w/minerals (THERA-VIT-M) tablet; Take 1 tablet by mouth daily  Dispense: 100 each; Refill: 3  - cetirizine (ZYRTEC) 10 MG tablet; Take 1 tablet (10 mg) by mouth daily  Dispense: 30 tablet; Refill: 11 11. Sinus tachycardia  - Hemoglobin  - TSH with free T4 reflex  - propranolol ER (INDERAL LA) 60 MG 24 hr capsule; Take 1 capsule (60 mg) by mouth daily  Dispense: 30 capsule; Refill: 3      Please call or  return to clinic if your symptoms don't go away.    Follow up plan - 2-4 wks for BP follow up    Thank you for coming to [facility] today.  Lab Testing:  **If you had lab testing today and your results are reassuring or normal they will be mailed to you or sent through [employer] within 7 days.   **If the lab tests need quick action we will call you with the results.  The phone number we will call with results is # [phone] (home) . If this is not the best number please call our clinic and change the number.  Medication Refills:  If you need any refills please call your pharmacy and they will contact us.   If you need to  your refill at a new pharmacy, please contact the new pharmacy directly. The new pharmacy will help you get your medications transferred faster.   Scheduling:  If you have any concerns about today's visit or wish to schedule another appointment please call our office during normal business hours [phone] (8-5:00 M-F)  If a referral was made to a [facility] and you don't get a call from central scheduling please call [phone].  If a Mammogram was ordered for you at [facility] call [phone] to schedule or change your appointment.  If you had an XRay/CT/Ultrasound/MRI ordered the number is [phone] to schedule or change your radiology appointment.   Medical Concerns:  If you have urgent medical concerns please call [phone] at any time of the day.  If you have a medical emergency please call [phone].            Follow-ups after your visit        Who to contact     Please call your clinic at [phone] to:    Ask questions about your health    Make or cancel appointments    Discuss your medicines    Learn about your test results    Speak to your doctor            Additional Information About Your Visit        Care EveryWhere ID     This is your Care EveryWhere ID. This could be used by other organizations to access your [facility]  records  [id number]        Your Vitals Were     Pulse Temperature Respirations Last Period Pulse Oximetry BMI (Body Mass Index)    106 98  F (36.7  C) (Oral) 16 11/28/2018 100% 31.45 kg/m2       Blood Pressure from Last 3 Encounters:   12/03/18 (!) 133/91   08/09/18 125/87   07/10/18 119/80    Weight from Last 3 Encounters:   12/03/18 189 lb (85.7 kg)   08/09/18 184 lb 6.4 oz (83.6 kg)   07/10/18 178 lb 6.4 oz (80.9 kg)              We Performed the Following     Hemoglobin     TSH with free T4 reflex          Today's Medication Changes          These changes are accurate as of 12/3/18 11:09 AM.  If you have any questions, ask your nurse or doctor.               Start taking these medicines.        Dose/Directions    propranolol ER 60 MG 24 hr capsule   Commonly known as:  INDERAL LA   Used for:  Essential hypertension with goal blood pressure less than 130/85, Sinus tachycardia   Started by:  Khushi Cadena MD        Dose:  60 mg   Take 1 capsule (60 mg) by mouth daily   Quantity:  30 capsule   Refills:  3            Where to get your medicines      These medications were sent to [facility] [address] [place] [facility] [address] [facility]  [address] [facility] [address]     Phone:  [phone]     acetaminophen 325 MG tablet    amLODIPine 5 MG tablet    cetirizine 10 MG tablet    diclofenac 1 % topical gel    fluticasone 50 MCG/ACT nasal spray    hydrOXYzine 25 MG tablet    ibuprofen 400 MG tablet    lisinopril-hydrochlorothiazide 20-25 MG tablet    multivitamin w/minerals tablet    propranolol ER 60 MG 24 hr capsule    ranitidine 150 MG tablet    risperiDONE 1 MG tablet    SUMAtriptan 25 MG tablet    venlafaxine 150 MG 24 hr capsule    venlafaxine 75 MG 24 hr capsule         Some of these will need a paper prescription and others can be bought over the counter.  Ask your nurse if you have questions.     Bring a paper prescription for each of these medications     fish oil-omega-3 fatty acids 1000 MG  capsule    temazepam 15 MG capsule                Primary Care Provider Office Phone # Fax #    Khushi Cadena [phone]       [address] [address]        Equal Access to Services     BRITTANY ADAMS : Hadii aad ku hadsendy Navas, wadulce mariada luqadaha, qaybta kaalmada adesteven, chalo tang dontejewels nunes kelli garcias. So [facility] [phone].    ATENCIÓN: Si habla español, tiene a armendariz disposición servicios gratuitos de asistencia lingüística. [facility] [phone].    We comply with applicable federal civil rights laws and [place] laws. We do not discriminate on the basis of race, color, national origin, age, disability, sex, sexual orientation, or gender identity.            Thank you!     Thank you for choosing [facility]  for your care. Our goal is always to provide you with excellent care. Hearing back from our patients is one way we can continue to improve our services. Please take a few minutes to complete the written survey that you may receive in the mail after your visit with us. Thank you!             Your Updated Medication List - Protect others around you: Learn how to safely use, store and throw away your medicines at www.disposemymeds.org.          This list is accurate as of 12/3/18 11:09 AM.  Always use your most recent med list.                   Brand Name Dispense Instructions for use Diagnosis    acetaminophen 325 MG tablet    TYLENOL    100 tablet    Take 2 tablets (650 mg) by mouth every 4 hours as needed for mild pain    Strain of left trapezius muscle, initial encounter, Episodic tension-type headache, not intractable       amLODIPine 5 MG tablet    NORVASC    30 tablet    Take 1 tablet (5 mg) by mouth daily    Essential hypertension with goal blood pressure less than 130/85       cetirizine 10 MG tablet    zyrTEC    30 tablet    Take 1 tablet (10 mg) by mouth daily    Seasonal allergic rhinitis, unspecified trigger        diclofenac 1 % topical gel    VOLTAREN    100 g    Place 2 g onto the skin 3 times daily as needed for moderate pain    Strain of left trapezius muscle, initial encounter       fish oil-omega-3 fatty acids 1000 MG capsule     90 capsule    Take 2 capsules (2 g) by mouth daily    Essential hypertension with goal blood pressure less than 130/85       fluticasone 50 MCG/ACT nasal spray    FLONASE    1 Bottle    Spray 1-2 sprays into both nostrils daily    Nasal congestion, Seasonal allergic rhinitis, unspecified trigger       hydrOXYzine 25 MG tablet    ATARAX    60 tablet    Take 1-2 tablets (25-50 mg) by mouth every 6 hours as needed for itching    Major depressive disorder, recurrent episode, moderate (H), Posttraumatic stress disorder       ibuprofen 400 MG tablet    ADVIL/MOTRIN    120 tablet    Take 1-2 tablets (400-800 mg) by mouth every 8 hours as needed for moderate pain    Strain of left trapezius muscle, initial encounter       lisinopril-hydrochlorothiazide 20-25 MG tablet    PRINZIDE/ZESTORETIC    90 tablet    Take 1 tablet by mouth daily    Essential hypertension with goal blood pressure less than 130/85       multivitamin w/minerals tablet     100 each    Take 1 tablet by mouth daily    Vitamin deficiency, Essential hypertension with goal blood pressure less than 130/85, Strain of left trapezius muscle, initial encounter, Nasal congestion, Hives, Episodic tension-type headache, not intractable, Gastroesophageal reflux disease, esophagitis presence not specified, Major depressive disorder, recurrent episode, moderate (H), Posttraumatic stress disorder, Seasonal allergic rhinitis, unspecified trigger       order for DME     1 Units    One Thera-cane    Strain of left trapezius muscle, initial encounter       propranolol ER 60 MG 24 hr capsule    INDERAL LA    30 capsule    Take 1 capsule (60 mg) by mouth daily    Essential hypertension with goal blood pressure less than 130/85, Sinus tachycardia        ranitidine 150 MG tablet    ZANTAC    60 tablet    Take 1 tablet (150 mg) by mouth 2 times daily    Gastroesophageal reflux disease, esophagitis presence not specified       risperiDONE 1 MG tablet    risperDAL    60 tablet    Take 2 tablets (2 mg) by mouth At Bedtime    Major depressive disorder, recurrent episode, moderate (H), Posttraumatic stress disorder       SUMAtriptan 25 MG tablet    IMITREX    30 tablet    Take 2 tablets (50 mg) by mouth as needed    Episodic tension-type headache, not intractable       temazepam 15 MG capsule    RESTORIL    30 capsule    Take 1 capsule (15 mg) by mouth nightly as needed    Major depressive disorder, recurrent episode, moderate (H), Posttraumatic stress disorder       * venlafaxine 150 MG 24 hr capsule    EFFEXOR-XR    30 capsule    Take 1 capsule (150 mg) by mouth daily    Major depressive disorder, recurrent episode, moderate (H), Posttraumatic stress disorder       * venlafaxine 75 MG 24 hr capsule    EFFEXOR-XR    30 capsule    Take 1 capsule (75 mg) by mouth daily (take with 150mg tablet - total dose 225mg)    Major depressive disorder, recurrent episode, moderate (H), Posttraumatic stress disorder       * Notice:  This list has 2 medication(s) that are the same as other medications prescribed for you. Read the directions carefully, and ask your doctor or other care provider to review them with you.

## 2018-12-03 NOTE — PROGRESS NOTES
"       HPI       Carmen Connolly is a 38 year old  who presents for   Chief Complaint   Patient presents with     Palpitations     x 1 week happens at anytime      Headache     x 2 weeks     RECHECK     3 month follow up        1 week h/o HS, elevated BP, heart racing  Had work up in 2016 after similar findings (also with \"fainting spells\") - no dizziness or fainting at this time  - had event monitor in 2016 (normal sinus tachycardia)  - normal cardiac ECHO 2016  - normal labs  No significant caffeine use (1 cup of \"Niuean coffee\" in the AM)  Doesn't drink much water  Takes lots of medications  Underlying anxiety  BP at home  - 130-40/90s    No HHN currently (said no)  - daughter is setting up meds for her  - gets meds delivered every month    Refills needed today - gets \"1 month at a time\"      Depression/Anxiety follow up      Your mood since your last visit: No change    Medication? Effexor, restoril, risperdal    Therapy? Yes, in [place]    Exercise? Sometimes (limited)    What is going well? Feeling well, family support    Life Stressors:nothing significant at present    Other associated symptoms :None    How is your sleep? Fair    New significant life event:No    Current substance use: None    Anxiety / Panic symptoms: No     Recent PHQ-9 Scores:     PHQ-9 SCORE 5/2/2018 7/11/2018 12/3/2018   PHQ-9 Total Score 10 14 10     Recent ANDRY-7 Scores:      ANDRY-7 SCORE 7/20/2016 11/23/2016 2/14/2017   Total Score 17 15 12     Hypertension Follow-up  <140/90    Outpatient blood pressures are being checked at home.  Results are 140s/90s.    Chest Pain? :No     Low Salt Diet: low salt    Daily NSAID Use?No     Did patient take their HTN pills today/last night as usual?  Yes    Last Basic Metabolic Panel:  Lab Results   Component Value Date    .0 02/14/2017      Lab Results   Component Value Date    POTASSIUM 4.1 02/14/2017     Lab Results   Component Value Date    CHLORIDE 100.0 02/14/2017     Lab Results "   Component Value Date    YASMANY 9.4 02/14/2017     Lab Results   Component Value Date    CO2 25.7 02/14/2017     Lab Results   Component Value Date    BUN 9.4 02/14/2017     Lab Results   Component Value Date    CR 1.0 02/14/2017     Lab Results   Component Value Date    .0 02/14/2017         A [facility]  was used for  this visit.    +++++++    Problem, Medication and Allergy Lists were reviewed and updated if needed..    Patient is an established patient of this clinic..         Review of Systems:   Review of Systems   Constitutional: Negative for activity change, appetite change, chills, diaphoresis, fatigue, fever and unexpected weight change.   HENT: Negative for congestion.    Respiratory: Negative for cough, chest tightness and shortness of breath.    Cardiovascular: Positive for palpitations. Negative for chest pain and leg swelling.   Gastrointestinal: Negative.    Neurological: Positive for headaches. Negative for dizziness, tremors and weakness.   Psychiatric/Behavioral: Positive for sleep disturbance (sometimes has nightmares). Negative for self-injury and suicidal ideas. The patient is nervous/anxious.             Physical Exam:     Vitals:    12/03/18 1036 12/03/18 1037   BP: (!) 140/92 (!) 133/91   Pulse: 114 106   Resp: 16    Temp: 98  F (36.7  C)    TempSrc: Oral    SpO2: 100%    Weight: 189 lb (85.7 kg)      Body mass index is 31.45 kg/(m^2).  Vitals were reviewed and were normal     Physical Exam   Constitutional: She is oriented to person, place, and time. She appears well-developed and well-nourished. No distress.   HENT:   Head: Normocephalic and atraumatic.   Neck: Normal range of motion. Neck supple. No thyromegaly present.   Cardiovascular: Normal rate, regular rhythm and intact distal pulses.  Exam reveals no gallop.    No murmur heard.  Pulmonary/Chest: Effort normal and breath sounds normal. She has no wheezes. She has no rales. She exhibits no tenderness.   Musculoskeletal:  Normal range of motion. She exhibits no edema.   Lymphadenopathy:     She has no cervical adenopathy.   Neurological: She is alert and oriented to person, place, and time. No cranial nerve deficit. Coordination normal.   Skin: She is not diaphoretic.     MENTAL STATUS EXAM  Appearance: appropriate  Attitude: cooperative  Behavior: normal  Eye Contact: normal  Speech: normal  Orientation: oriented to person, place, time and situation  Mood: normal  Affect: Congruent with mood  Thought Process: appropriate  Suicidal Ideation: reports no suicidal ideation  Hallucination: denies    Results:   Results are ordered and pending    Assessment and Plan        Patient Instructions   Here is the plan from today's visit    1. Essential hypertension with goal blood pressure less than 130/85  Follow up in 2-4 weeks to follow up on BP (ideally on a day when our PharmD is here)  Bring all medications with you (and your med box)  Labs today  Added 1 extra medication today (propranolol)  - amLODIPine (NORVASC) 5 MG tablet; Take 1 tablet (5 mg) by mouth daily  Dispense: 30 tablet; Refill: 3  - fish oil-omega-3 fatty acids (OMEGA-3 FISH OIL) 1000 MG capsule; Take 2 capsules (2 g) by mouth daily  Dispense: 90 capsule; Refill: 3  - lisinopril-hydrochlorothiazide (PRINZIDE/ZESTORETIC) 20-25 MG tablet; Take 1 tablet by mouth daily  Dispense: 90 tablet; Refill: 3  - multivitamin w/minerals (THERA-VIT-M) tablet; Take 1 tablet by mouth daily  Dispense: 100 each; Refill: 3  - propranolol ER (INDERAL LA) 60 MG 24 hr capsule; Take 1 capsule (60 mg) by mouth daily  Dispense: 30 capsule; Refill: 3    2. Strain of left trapezius muscle, initial encounter  - diclofenac (VOLTAREN) 1 % topical gel; Place 2 g onto the skin 3 times daily as needed for moderate pain  Dispense: 100 g; Refill: 1  - ibuprofen (ADVIL/MOTRIN) 400 MG tablet; Take 1-2 tablets (400-800 mg) by mouth every 8 hours as needed for moderate pain  Dispense: 120 tablet; Refill: 3  -  multivitamin w/minerals (THERA-VIT-M) tablet; Take 1 tablet by mouth daily  Dispense: 100 each; Refill: 3  - acetaminophen (TYLENOL) 325 MG tablet; Take 2 tablets (650 mg) by mouth every 4 hours as needed for mild pain  Dispense: 100 tablet; Refill: 11    3. Nasal congestion  - fluticasone (FLONASE) 50 MCG/ACT nasal spray; Spray 1-2 sprays into both nostrils daily  Dispense: 1 Bottle; Refill: 1  - multivitamin w/minerals (THERA-VIT-M) tablet; Take 1 tablet by mouth daily  Dispense: 100 each; Refill: 3    4. Hives  - multivitamin w/minerals (THERA-VIT-M) tablet; Take 1 tablet by mouth daily  Dispense: 100 each; Refill: 3    5. Episodic tension-type headache, not intractable  - multivitamin w/minerals (THERA-VIT-M) tablet; Take 1 tablet by mouth daily  Dispense: 100 each; Refill: 3  - SUMAtriptan (IMITREX) 25 MG tablet; Take 2 tablets (50 mg) by mouth as needed  Dispense: 30 tablet; Refill: 3  - acetaminophen (TYLENOL) 325 MG tablet; Take 2 tablets (650 mg) by mouth every 4 hours as needed for mild pain  Dispense: 100 tablet; Refill: 11    6. Vitamin deficiency  - multivitamin w/minerals (THERA-VIT-M) tablet; Take 1 tablet by mouth daily  Dispense: 100 each; Refill: 3    7. Gastroesophageal reflux disease, esophagitis presence not specified  - multivitamin w/minerals (THERA-VIT-M) tablet; Take 1 tablet by mouth daily  Dispense: 100 each; Refill: 3  - ranitidine (ZANTAC) 150 MG tablet; Take 1 tablet (150 mg) by mouth 2 times daily  Dispense: 60 tablet; Refill: 11    8. Major depressive disorder, recurrent episode, moderate (H)  - hydrOXYzine (ATARAX) 25 MG tablet; Take 1-2 tablets (25-50 mg) by mouth every 6 hours as needed for itching  Dispense: 60 tablet; Refill: 1  - multivitamin w/minerals (THERA-VIT-M) tablet; Take 1 tablet by mouth daily  Dispense: 100 each; Refill: 3  - risperiDONE (RISPERDAL) 1 MG tablet; Take 2 tablets (2 mg) by mouth At Bedtime  Dispense: 60 tablet; Refill: 11  - temazepam (RESTORIL) 15 MG  capsule; Take 1 capsule (15 mg) by mouth nightly as needed  Dispense: 30 capsule; Refill: 5  - venlafaxine (EFFEXOR-XR) 150 MG 24 hr capsule; Take 1 capsule (150 mg) by mouth daily  Dispense: 30 capsule; Refill: 11  - venlafaxine (EFFEXOR-XR) 75 MG 24 hr capsule; Take 1 capsule (75 mg) by mouth daily (take with 150mg tablet - total dose 225mg)  Dispense: 30 capsule; Refill: 11    9. Posttraumatic stress disorder  - hydrOXYzine (ATARAX) 25 MG tablet; Take 1-2 tablets (25-50 mg) by mouth every 6 hours as needed for itching  Dispense: 60 tablet; Refill: 1  - multivitamin w/minerals (THERA-VIT-M) tablet; Take 1 tablet by mouth daily  Dispense: 100 each; Refill: 3  - risperiDONE (RISPERDAL) 1 MG tablet; Take 2 tablets (2 mg) by mouth At Bedtime  Dispense: 60 tablet; Refill: 11  - temazepam (RESTORIL) 15 MG capsule; Take 1 capsule (15 mg) by mouth nightly as needed  Dispense: 30 capsule; Refill: 5  - venlafaxine (EFFEXOR-XR) 150 MG 24 hr capsule; Take 1 capsule (150 mg) by mouth daily  Dispense: 30 capsule; Refill: 11  - venlafaxine (EFFEXOR-XR) 75 MG 24 hr capsule; Take 1 capsule (75 mg) by mouth daily (take with 150mg tablet - total dose 225mg)  Dispense: 30 capsule; Refill: 11    10. Seasonal allergic rhinitis, unspecified trigger  - fluticasone (FLONASE) 50 MCG/ACT nasal spray; Spray 1-2 sprays into both nostrils daily  Dispense: 1 Bottle; Refill: 1  - multivitamin w/minerals (THERA-VIT-M) tablet; Take 1 tablet by mouth daily  Dispense: 100 each; Refill: 3  - cetirizine (ZYRTEC) 10 MG tablet; Take 1 tablet (10 mg) by mouth daily  Dispense: 30 tablet; Refill: 11    11. Sinus tachycardia  - Hemoglobin  - TSH with free T4 reflex  - propranolol ER (INDERAL LA) 60 MG 24 hr capsule; Take 1 capsule (60 mg) by mouth daily  Dispense: 30 capsule; Refill: 3      Please call or return to clinic if your symptoms don't go away.    Follow up plan - 2-4 wks for BP follow up           There are no discontinued medications.    Options  for treatment and follow-up care were reviewed with the patient. Carmen Connolly  engaged in the decision making process and verbalized understanding of the options discussed and agreed with the final plan.    Khushi Cadena MD

## 2018-12-03 NOTE — NURSING NOTE
Due to patient being non-English speaking/uses sign language, an  was used for this visit. Only for face-to-face interpretation by an external agency, date and length of interpretation can be found on the scanned worksheet.     name: Zander Pisano  Agency: [employer]  Language: Andorran   Telephone number: [phone]  Type of interpretation: Face-to-face, spoken     Yesenia Lara CMA 10:29 AM December 3, 2018

## 2018-12-03 NOTE — PATIENT INSTRUCTIONS
Here is the plan from today's visit    1. Essential hypertension with goal blood pressure less than 130/85  Follow up in 2-4 weeks to follow up on BP (ideally on a day when our PharmD is here)  Bring all medications with you (and your med box)  Labs today  Added 1 extra medication today (propranolol)  - amLODIPine (NORVASC) 5 MG tablet; Take 1 tablet (5 mg) by mouth daily  Dispense: 30 tablet; Refill: 3  - fish oil-omega-3 fatty acids (OMEGA-3 FISH OIL) 1000 MG capsule; Take 2 capsules (2 g) by mouth daily  Dispense: 90 capsule; Refill: 3  - lisinopril-hydrochlorothiazide (PRINZIDE/ZESTORETIC) 20-25 MG tablet; Take 1 tablet by mouth daily  Dispense: 90 tablet; Refill: 3  - multivitamin w/minerals (THERA-VIT-M) tablet; Take 1 tablet by mouth daily  Dispense: 100 each; Refill: 3  - propranolol ER (INDERAL LA) 60 MG 24 hr capsule; Take 1 capsule (60 mg) by mouth daily  Dispense: 30 capsule; Refill: 3    2. Strain of left trapezius muscle, initial encounter  - diclofenac (VOLTAREN) 1 % topical gel; Place 2 g onto the skin 3 times daily as needed for moderate pain  Dispense: 100 g; Refill: 1  - ibuprofen (ADVIL/MOTRIN) 400 MG tablet; Take 1-2 tablets (400-800 mg) by mouth every 8 hours as needed for moderate pain  Dispense: 120 tablet; Refill: 3  - multivitamin w/minerals (THERA-VIT-M) tablet; Take 1 tablet by mouth daily  Dispense: 100 each; Refill: 3  - acetaminophen (TYLENOL) 325 MG tablet; Take 2 tablets (650 mg) by mouth every 4 hours as needed for mild pain  Dispense: 100 tablet; Refill: 11    3. Nasal congestion  - fluticasone (FLONASE) 50 MCG/ACT nasal spray; Spray 1-2 sprays into both nostrils daily  Dispense: 1 Bottle; Refill: 1  - multivitamin w/minerals (THERA-VIT-M) tablet; Take 1 tablet by mouth daily  Dispense: 100 each; Refill: 3    4. Hives  - multivitamin w/minerals (THERA-VIT-M) tablet; Take 1 tablet by mouth daily  Dispense: 100 each; Refill: 3    5. Episodic tension-type headache, not intractable  -  multivitamin w/minerals (THERA-VIT-M) tablet; Take 1 tablet by mouth daily  Dispense: 100 each; Refill: 3  - SUMAtriptan (IMITREX) 25 MG tablet; Take 2 tablets (50 mg) by mouth as needed  Dispense: 30 tablet; Refill: 3  - acetaminophen (TYLENOL) 325 MG tablet; Take 2 tablets (650 mg) by mouth every 4 hours as needed for mild pain  Dispense: 100 tablet; Refill: 11    6. Vitamin deficiency  - multivitamin w/minerals (THERA-VIT-M) tablet; Take 1 tablet by mouth daily  Dispense: 100 each; Refill: 3    7. Gastroesophageal reflux disease, esophagitis presence not specified  - multivitamin w/minerals (THERA-VIT-M) tablet; Take 1 tablet by mouth daily  Dispense: 100 each; Refill: 3  - ranitidine (ZANTAC) 150 MG tablet; Take 1 tablet (150 mg) by mouth 2 times daily  Dispense: 60 tablet; Refill: 11    8. Major depressive disorder, recurrent episode, moderate (H)  - hydrOXYzine (ATARAX) 25 MG tablet; Take 1-2 tablets (25-50 mg) by mouth every 6 hours as needed for itching  Dispense: 60 tablet; Refill: 1  - multivitamin w/minerals (THERA-VIT-M) tablet; Take 1 tablet by mouth daily  Dispense: 100 each; Refill: 3  - risperiDONE (RISPERDAL) 1 MG tablet; Take 2 tablets (2 mg) by mouth At Bedtime  Dispense: 60 tablet; Refill: 11  - temazepam (RESTORIL) 15 MG capsule; Take 1 capsule (15 mg) by mouth nightly as needed  Dispense: 30 capsule; Refill: 5  - venlafaxine (EFFEXOR-XR) 150 MG 24 hr capsule; Take 1 capsule (150 mg) by mouth daily  Dispense: 30 capsule; Refill: 11  - venlafaxine (EFFEXOR-XR) 75 MG 24 hr capsule; Take 1 capsule (75 mg) by mouth daily (take with 150mg tablet - total dose 225mg)  Dispense: 30 capsule; Refill: 11    9. Posttraumatic stress disorder  - hydrOXYzine (ATARAX) 25 MG tablet; Take 1-2 tablets (25-50 mg) by mouth every 6 hours as needed for itching  Dispense: 60 tablet; Refill: 1  - multivitamin w/minerals (THERA-VIT-M) tablet; Take 1 tablet by mouth daily  Dispense: 100 each; Refill: 3  - risperiDONE  (RISPERDAL) 1 MG tablet; Take 2 tablets (2 mg) by mouth At Bedtime  Dispense: 60 tablet; Refill: 11  - temazepam (RESTORIL) 15 MG capsule; Take 1 capsule (15 mg) by mouth nightly as needed  Dispense: 30 capsule; Refill: 5  - venlafaxine (EFFEXOR-XR) 150 MG 24 hr capsule; Take 1 capsule (150 mg) by mouth daily  Dispense: 30 capsule; Refill: 11  - venlafaxine (EFFEXOR-XR) 75 MG 24 hr capsule; Take 1 capsule (75 mg) by mouth daily (take with 150mg tablet - total dose 225mg)  Dispense: 30 capsule; Refill: 11    10. Seasonal allergic rhinitis, unspecified trigger  - fluticasone (FLONASE) 50 MCG/ACT nasal spray; Spray 1-2 sprays into both nostrils daily  Dispense: 1 Bottle; Refill: 1  - multivitamin w/minerals (THERA-VIT-M) tablet; Take 1 tablet by mouth daily  Dispense: 100 each; Refill: 3  - cetirizine (ZYRTEC) 10 MG tablet; Take 1 tablet (10 mg) by mouth daily  Dispense: 30 tablet; Refill: 11    11. Sinus tachycardia  - Hemoglobin  - TSH with free T4 reflex  - propranolol ER (INDERAL LA) 60 MG 24 hr capsule; Take 1 capsule (60 mg) by mouth daily  Dispense: 30 capsule; Refill: 3      Please call or return to clinic if your symptoms don't go away.    Follow up plan - 2-4 wks for BP follow up    Thank you for coming to [facility] today.  Lab Testing:  **If you had lab testing today and your results are reassuring or normal they will be mailed to you or sent through [employer] within 7 days.   **If the lab tests need quick action we will call you with the results.  The phone number we will call with results is # [phone] (home) . If this is not the best number please call our clinic and change the number.  Medication Refills:  If you need any refills please call your pharmacy and they will contact us.   If you need to  your refill at a new pharmacy, please contact the new pharmacy directly. The new pharmacy will help you get your medications transferred faster.   Scheduling:  If you have any concerns about  today's visit or wish to schedule another appointment please call our office during normal business hours [phone] (8-5:00 M-F)  If a referral was made to a [facility] and you don't get a call from central scheduling please call [phone].  If a Mammogram was ordered for you at [facility] call [phone] to schedule or change your appointment.  If you had an XRay/CT/Ultrasound/MRI ordered the number is [phone] to schedule or change your radiology appointment.   Medical Concerns:  If you have urgent medical concerns please call [phone] at any time of the day.  If you have a medical emergency please call [phone].

## 2019-01-24 DIAGNOSIS — J30.2 SEASONAL ALLERGIC RHINITIS, UNSPECIFIED TRIGGER: ICD-10-CM

## 2019-01-24 DIAGNOSIS — R09.81 NASAL CONGESTION: ICD-10-CM

## 2019-01-24 NOTE — TELEPHONE ENCOUNTER

## 2019-01-28 RX ORDER — FLUTICASONE PROPIONATE 50 MCG
1-2 SPRAY, SUSPENSION (ML) NASAL DAILY
Qty: 1 BOTTLE | Refills: 1 | Status: SHIPPED | OUTPATIENT
Start: 2019-01-28 | End: 2020-03-13

## 2019-03-19 ENCOUNTER — OFFICE VISIT (OUTPATIENT)
Dept: FAMILY MEDICINE | Facility: CLINIC | Age: 39
End: 2019-03-19
Payer: COMMERCIAL

## 2019-03-19 VITALS
WEIGHT: 199 LBS | OXYGEN SATURATION: 97 % | DIASTOLIC BLOOD PRESSURE: 81 MMHG | SYSTOLIC BLOOD PRESSURE: 116 MMHG | BODY MASS INDEX: 33.12 KG/M2 | HEART RATE: 94 BPM | TEMPERATURE: 98 F

## 2019-03-19 DIAGNOSIS — I10 ESSENTIAL HYPERTENSION: ICD-10-CM

## 2019-03-19 DIAGNOSIS — G44.229 CHRONIC TENSION-TYPE HEADACHE, NOT INTRACTABLE: Primary | ICD-10-CM

## 2019-03-19 RX ORDER — MULTIVITAMIN WITH IRON
2 TABLET ORAL DAILY
Qty: 60 TABLET | Refills: 3 | Status: SHIPPED | OUTPATIENT
Start: 2019-03-19 | End: 2019-09-25

## 2019-03-19 NOTE — NURSING NOTE
Due to patient being non-English speaking/uses sign language, an  was used for this visit. Only for face-to-face interpretation by an external agency, date and length of interpretation can be found on the scanned worksheet.     name: fatemeh Harrell  Agency: [employer]  Language: Croatian   Telephone number: [phone]  Type of interpretation: Face-to-face, spoken

## 2019-03-19 NOTE — PATIENT INSTRUCTIONS
Here is the plan from today's visit    1. Chronic tension-type headache, not intractable  Take 2 tablets ibuprofen and 2 tablets tylenol twice daily   Add magnesium daily  Drink 5-6 glasses of water daily  Consider physical therapy with biofeedback if not improving  - magnesium 250 MG tablet; Take 2 tablets (500 mg) by mouth daily  Dispense: 60 tablet; Refill: 3      Bring your blood pressure cuff to your next visit - your BP today was normal (116/81)      Please call or return to clinic if your symptoms don't go away.    Follow up plan - 2-3 weeks    Thank you for coming to [facility] today.  Lab Testing:  **If you had lab testing today and your results are reassuring or normal they will be mailed to you or sent through [employer] within 7 days.   **If the lab tests need quick action we will call you with the results.  The phone number we will call with results is # [phone] (home) . If this is not the best number please call our clinic and change the number.  Medication Refills:  If you need any refills please call your pharmacy and they will contact us.   If you need to  your refill at a new pharmacy, please contact the new pharmacy directly. The new pharmacy will help you get your medications transferred faster.   Scheduling:  If you have any concerns about today's visit or wish to schedule another appointment please call our office during normal business hours [phone] (8-5:00 M-F)  If a referral was made to a [facility] and you don't get a call from central scheduling please call [phone].  If a Mammogram was ordered for you at [facility] call [phone] to schedule or change your appointment.  If you had an XRay/CT/Ultrasound/MRI ordered the number is [phone] to schedule or change your radiology appointment.   Medical Concerns:  If you have urgent medical concerns please call [phone] at any time of the day.  If you have a medical emergency please  call 911.

## 2019-03-19 NOTE — PROGRESS NOTES
HPI       Carmen Connolly is a 39 year old  who presents for   Chief Complaint   Patient presents with     Hypertension     Headache x1w with elevated bp 150/95     BP: 150/95 (yesterday) - worried about it  Usually 140-145/90-92  - denies CP, SOB, but has been having more headaches recently  - taking all medications  - no vision changes    Headache  Onset: 1 week    Description:   Location: bilateral in the temporal area, bilateral in the occipital area   Character: throbbing pain  Frequency:  constant  Duration:  constant  Head trauma?: no  Able to do daily activities?: Has no daily activities  Do you wake with a headache in the am?: Yes   Do headaches wake you up at night?:no  Daily pain medication use:  Yes Details: ibuprofen 400mg BID and tylenol 650mg once daily needed as PRN.   New life stressors  - Planning to move to a new apartment complex (bigger facility) and likely causing some anxiety.     Intensity: 9/10    Progression of Symptoms:  worsening    Accompanying Signs & Symptoms:  Stiff neck: no  Neck or upper back pain: Yes Details: neck pain  Fever: no  Sinus pressure: no  Nausea or vomitingno  Dizziness:  :no  Numbness: occasionally has left upper extremity numbness (for unknown reasons - nonspecific distribution)  Weakness (in one part of the body?:no  Visual changes: no    History:   Family history of migraines: no  Previous tests for headaches: Yes - 2016 CT-head to evaluate for HTN headache - Negative study.   Neurologist evaluations: no    Precipitating factors:   Does light make it worse: Yes   Does sound make it worse: Yes     Alleviating factors:  Does sleep help: - unknown  Therapies Tried and outcome: Ibuprofen (Advil, Motrin) and Tylenol      A [employer]  was used for  this visit.    +++++++    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     Priority: Medium     Extra digit on L hand hanging  "next to 5th digit on a stalk, appears healthy (she considers it \"davon\" and likes it, usually holding it in her palm)       Arthritis 02/21/2017     Priority: Medium     Health Care Home Active Coordination 02/21/2017     Priority: Medium     Tier 2  Care Coordination Accept Date: 02/21/17  Language/Barrier to Learning: Yes         Schizoaffective disorder, bipolar type (H) 01/17/2017     Priority: Medium     See Psychological Evaluation from [facility] (3/2016) in Media Tab; reports occasional visual hallucinations (especially when alone); recommend ongoing PCA services, case management and possible [facility] or \[facility]\"" services       Neurocognitive deficits 01/17/2017     Priority: Medium     See [facility] Psychological evaluation (3/2016) in Media tab; determined to be unable to make legal and financial decisions; recommended that she obtain guardianship or conservatorship       Vitamin D deficiency 04/15/2016     Priority: Medium     Posttraumatic stress disorder 04/15/2016     Priority: Medium     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     Priority: Medium     Chronic tension-type headache, not intractable 04/15/2016     Priority: Medium     Tachycardia 04/06/2016     Priority: Medium     Shortened MI interval 04/06/2016     Priority: Medium     Gastroesophageal reflux disease without esophagitis 02/24/2016     Priority: Medium     Essential hypertension 02/23/2016     Priority: Medium     Recurrent major depression (H) 05/07/2012     Priority: Medium     Pulmonary tuberculosis confirmed by sputum microscopy 08/18/2011     Priority: Medium     Immigrant with language difficulty 06/14/2011     Priority: Medium   ,     Current Outpatient Medications   Medication Sig Dispense Refill     acetaminophen (TYLENOL) 325 MG tablet Take 2 tablets (650 mg) by mouth every 4 hours as needed for mild pain 100 tablet 11     amLODIPine (NORVASC) 5 MG tablet Take 1 tablet (5 mg) by mouth daily 30 tablet 3     cetirizine (ZYRTEC) 10 MG tablet Take 1 " tablet (10 mg) by mouth daily 30 tablet 11     diclofenac (VOLTAREN) 1 % topical gel Place 2 g onto the skin 3 times daily as needed for moderate pain 100 g 1     fish oil-omega-3 fatty acids (OMEGA-3 FISH OIL) 1000 MG capsule Take 2 capsules (2 g) by mouth daily 90 capsule 3     fluticasone (FLONASE) 50 MCG/ACT nasal spray Spray 1-2 sprays into both nostrils daily 1 Bottle 1     hydrOXYzine (ATARAX) 25 MG tablet Take 1-2 tablets (25-50 mg) by mouth every 6 hours as needed for itching 60 tablet 1     ibuprofen (ADVIL/MOTRIN) 400 MG tablet Take 1-2 tablets (400-800 mg) by mouth every 8 hours as needed for moderate pain 120 tablet 3     lisinopril-hydrochlorothiazide (PRINZIDE/ZESTORETIC) 20-25 MG tablet Take 1 tablet by mouth daily 90 tablet 3     magnesium 250 MG tablet Take 2 tablets (500 mg) by mouth daily 60 tablet 3     multivitamin w/minerals (THERA-VIT-M) tablet Take 1 tablet by mouth daily 100 each 3     order for DME One Thera-cane 1 Units 0     propranolol ER (INDERAL LA) 60 MG 24 hr capsule Take 1 capsule (60 mg) by mouth daily 30 capsule 3     ranitidine (ZANTAC) 150 MG tablet Take 1 tablet (150 mg) by mouth 2 times daily 60 tablet 11     risperiDONE (RISPERDAL) 1 MG tablet Take 2 tablets (2 mg) by mouth At Bedtime 60 tablet 11     SUMAtriptan (IMITREX) 25 MG tablet Take 2 tablets (50 mg) by mouth as needed 30 tablet 3     temazepam (RESTORIL) 15 MG capsule Take 1 capsule (15 mg) by mouth nightly as needed 30 capsule 5     venlafaxine (EFFEXOR-XR) 150 MG 24 hr capsule Take 1 capsule (150 mg) by mouth daily 30 capsule 11     venlafaxine (EFFEXOR-XR) 75 MG 24 hr capsule Take 1 capsule (75 mg) by mouth daily (take with 150mg tablet - total dose 225mg) 30 capsule 11   ,   No Known Allergies.    Patient is   an established patient of this clinic.  Past Medical History:   Diagnosis Date     Depressive disorder      Hypertension      Vaginal delivery     6 deliveries     Family History   Problem Relation Age of  Onset     Diabetes No family hx of      Coronary Artery Disease No family hx of      Hypertension No family hx of      Other Cancer No family hx of      Breast Cancer No family hx of      Colon Cancer No family hx of      Social History     Social History Narrative    Lives at home with 6 kids (range in age from 8-20); not working outside the home; has been in the [place] for past 5 years   .         Review of Systems:   Review of Systems  Skin: negative except as above  Respiratory: negative except as above  Cardiovascular: negative except as above  Gastrointestinal: negative except as above  Genitourinary: negative except as above  Musculoskeletal: negative except as above  Neurologic: negative except as above  Psychiatric: negative except as above  Hematologic/Lymphatic/Immunologic: negative except as above  Endocrine: negative except as above         Physical Exam:     Vitals:    03/19/19 1320   BP: 116/81   Pulse: 94   Temp: 98  F (36.7  C)   TempSrc: Oral   SpO2: 97%   Weight: 90.3 kg (199 lb)     Body mass index is 33.12 kg/m .  Vitals were reviewed and were normal    Physical Exam  Constitutional: Oriented to person, place, and time. Appears well-developed and well-nourished.   HENT:   Head: Normocephalic and atraumatic. TTP at bilateral temporal region as well as occipital area of scalp.   Eyes: Conjunctivae are normal. PERRLA.   Neck: Normal range of motion. Moderate TTP over cervical and thoracic paraspinal musculature.   Cardiovascular: Normal rate, regular rhythm, normal heart sounds and intact distal pulses.    Pulmonary/Chest: Effort normal and breath sounds normal. No respiratory distress. No wheezes.   Musculoskeletal: Normal range of motion.   Neurological: Alert and oriented to person, place, and time. CN 2-12 intact. Normal strength in upper and lower extremities.   Skin: Skin is warm and dry.   Psychiatric: Has a normal mood and affect. Behavior is normal.       Results:   No testing ordered  today    Assessment and Plan        1. Chronic tension-type headache, not intractable  Take 2 tablets ibuprofen and 2 tablets tylenol twice daily   Add magnesium daily  Drink 5-6 glasses of water daily  Consider physical therapy with biofeedback if not improving  - magnesium 250 MG tablet; Take 2 tablets (500 mg) by mouth daily  Dispense: 60 tablet; Refill: 3    2. Essential HTN  - reassured given today's BP in the office  Bring your blood pressure cuff to your next visit - your BP today was normal (116/81)    Please call or return to clinic if your symptoms don't go away.    Follow up plan - 2-3 weeks    Thank you for coming to [facility] today.     There are no discontinued medications.     Abiola Ayers MD    Options for treatment and follow-up care were reviewed with the patient. Carmen Connolly  engaged in the decision making process and verbalized understanding of the options discussed and agreed with the final plan.      Preceptor Attestation:  Patient seen, evaluated and discussed with the resident. I have verified the content of the note, which accurately reflects my assessment of the patient and the plan of care.  Supervising Physician:  Khushi Cadena MD

## 2019-03-21 DIAGNOSIS — I10 ESSENTIAL HYPERTENSION WITH GOAL BLOOD PRESSURE LESS THAN 130/85: ICD-10-CM

## 2019-03-21 NOTE — TELEPHONE ENCOUNTER

## 2019-03-27 RX ORDER — CHLORAL HYDRATE 500 MG
2 CAPSULE ORAL DAILY
Qty: 90 CAPSULE | Refills: 3 | Status: CANCELLED | OUTPATIENT
Start: 2019-03-27

## 2019-03-27 RX ORDER — PNV NO.95/FERROUS FUM/FOLIC AC 28MG-0.8MG
2000 TABLET ORAL DAILY
Qty: 180 CAPSULE | Refills: 3 | Status: SHIPPED | OUTPATIENT
Start: 2019-03-27 | End: 2020-04-28

## 2019-06-28 ENCOUNTER — OFFICE VISIT (OUTPATIENT)
Dept: FAMILY MEDICINE | Facility: CLINIC | Age: 39
End: 2019-06-28
Payer: COMMERCIAL

## 2019-06-28 VITALS
WEIGHT: 187 LBS | DIASTOLIC BLOOD PRESSURE: 81 MMHG | SYSTOLIC BLOOD PRESSURE: 120 MMHG | HEIGHT: 65 IN | BODY MASS INDEX: 31.16 KG/M2 | RESPIRATION RATE: 16 BRPM | TEMPERATURE: 98.3 F | OXYGEN SATURATION: 98 % | HEART RATE: 95 BPM

## 2019-06-28 DIAGNOSIS — M25.562 ACUTE PAIN OF BOTH KNEES: Primary | ICD-10-CM

## 2019-06-28 DIAGNOSIS — M25.561 ACUTE PAIN OF BOTH KNEES: Primary | ICD-10-CM

## 2019-06-28 DIAGNOSIS — M54.50 ACUTE BILATERAL LOW BACK PAIN WITHOUT SCIATICA: ICD-10-CM

## 2019-06-28 RX ORDER — CHOLECALCIFEROL (VITAMIN D3) 50 MCG
1 TABLET ORAL DAILY
Qty: 90 TABLET | Refills: 3 | Status: SHIPPED | OUTPATIENT
Start: 2019-06-28 | End: 2020-04-28

## 2019-06-28 ASSESSMENT — MIFFLIN-ST. JEOR: SCORE: 1514.11

## 2019-06-28 NOTE — PATIENT INSTRUCTIONS
Here is the plan from today's visit    1. Acute pain of both knees  2. Acute bilateral low back pain without sciatica  - vitamin D3 (CHOLECALCIFEROL) 2000 units (50 mcg) tablet; Take 1 tablet (2,000 Units) by mouth daily  Dispense: 90 tablet; Refill: 3  - [facility], PT, HAND AND CHIROPRACTIC REFERRAL - [facility]; Future  Try cold in the knees and heat/cold in the low back  Loosing weight can help with all the joints    Take ibuprofen and tylenol every 8 hours  8am: ibuprofen 600 mg  12pm: tylenol 650 mg (2 tabs)  4 PM: ibuprofen    Please call or return to clinic if your symptoms don't go away.    Follow up plan  Come back after 4-6 weeks of physical therapy, sooner if you have any new symptoms or worsening      Thank you for coming to [facility] today.  Lab Testing:  **If you had lab testing today and your results are reassuring or normal they will be mailed to you or sent through [facility] within 7 days.   **If the lab tests need quick action we will call you with the results.  The phone number we will call with results is # [phone] (home) . If this is not the best number please call our clinic and change the number.  Medication Refills:  If you need any refills please call your pharmacy and they will contact us.   If you need to  your refill at a new pharmacy, please contact the new pharmacy directly. The new pharmacy will help you get your medications transferred faster.   Scheduling:  If you have any concerns about today's visit or wish to schedule another appointment please call our office during normal business hours [phone] (8-5:00 M-F)  If a referral was made to a [facility] and you don't get a call from central scheduling please call [phone].  If a Mammogram was ordered for you at [facility] call [phone] to schedule or change your appointment.  If you had an XRay/CT/Ultrasound/MRI ordered the number is [phone] to schedule or change your radiology  appointment.   Medical Concerns:  If you have urgent medical concerns please call [phone] at any time of the day.    Charu Chavarria MD

## 2019-06-28 NOTE — PROGRESS NOTES
Preceptor Attestation:   Patient seen, evaluated and discussed with the resident. I have verified the content of the note, which accurately reflects my assessment of the patient and the plan of care.   Supervising Physician:  Eric Iqbal MD

## 2019-06-28 NOTE — PROGRESS NOTES
PETER       Carmen Connolly is a 39 year old female, who presents with:  Chief Complaint   Patient presents with     Pain     whole body - bilat knees and low back worst     Body aches x2 weeks. No fever or chills.     Back Pain      Duration: daily x 2 weeks        Specific cause: none    Description:   Location of pain: low back bilateral  Character of pain: constant  Pain radiation:none  New numbness or weakness in legs, not attributed to pain:  No   Any new bowel or bladder incontinence?No     Intensity: worst in the AM until she takes ibuprofen. Does not wake her up at night.     History:   Pain interferes with job/home/school:  YES, cant do chores or cook  History of back problems: no prior back problems  Any previous MRI or X-rays: None  Sees a specialist for back pain:  No  Therapies tried without relief: heat    Alleviating factors:   Improved by: NSAIDs, rest, standing and stretch      Precipitating factors:  Worsened by: Bending and Sitting    Accompanying Signs & Symptoms:  Risk of Fracture: No   Risk of Cauda Equina:No   Risk of Infection:  No   Risk of Cancer:  No     Has h/o vitamin D deficiency per chart review, but no levels on file, not on supplements.     Knee Pain  Patient presents with bilateral and symmetric knee pain. Onset of the symptoms was just before the back pain. Inciting event: none known. Current symptoms include pain located anteriorly. No locking, catching, instability, swelling, redness or warmth. Pain is aggravated by going up and down stairs, kneeling, rising after sitting and walking. Worse when going upstairs. Not with squatting. No morning stiffness. Patient has had no prior knee problems. Evaluation to date: none. Treatment to date: avoidance of offending activity, OTC analgesics which are effective and massage with oils, which help.    A [employer]  was used for this visit.     Problem, Medication and Allergy Lists were reviewed and are current..    Patient is an  "established patient of this clinic.         Review of Systems     Complete ROS negative except as described in HPI.          Physical Exam     Vital signs:  /81 (BP Location: Right arm, Patient Position: Sitting, Cuff Size: Adult Large)   Pulse 95   Temp 98.3  F (36.8  C) (Oral)   Resp 16   Ht 1.651 m (5' 5\")   Wt 84.8 kg (187 lb)   LMP 06/27/2019   SpO2 98%   BMI 31.12 kg/m      Vitals were reviewed and were normal    General: Very pleasant, obese [place] woman. Alert, interactive. NAD, sitting comfortably.     HEENT: No signs of trauma. No rhinorrhea. Moist membranes.   Eyes: Conjunctivae are normal. Pupils are equal.   Neck: Normal range of motion.    Resp: No respiratory distress.    Neuro: The patient is alert and interactive. Speech normal. No gross focal findings.   Skin: No lesion or sign of trauma noted.   Psych: Affect is concordant with mood, behavior is normal.    Right Ankle Exam   Right ankle exam is normal.      Left Ankle Exam   Left ankle exam is normal.      Right Knee Exam     Tenderness   The patient is experiencing tenderness in the patellar tendon.    Range of Motion   The patient has normal right knee ROM.    Tests   Varus: negative Valgus: negative  Lachman:  Anterior - negative    Posterior - negative  Drawer:  Anterior - negative    Posterior - negative  Patellar apprehension: negative    Other   Erythema: absent  Sensation: normal  Pulse: present  Swelling: none    Comments:  Strength: 4/5 for knee flexion and extension      Left Knee Exam     Tenderness   The patient is experiencing tenderness in the patellar tendon.    Range of Motion   The patient has normal left knee ROM.    Tests   Varus: negative Valgus: negative  Lachman:  Anterior - negative    Posterior - negative  Drawer:  Anterior - negative     Posterior - negative  Patellar apprehension: negative    Other   Erythema: absent  Sensation: normal  Pulse: present  Swelling: none    Comments:  Strength: 4/5 for knee " flexion and extension      Right Hip Exam     Tenderness   The patient is experiencing no tenderness.     Range of Motion   The patient has normal right hip ROM.    Muscle Strength   Abduction: 4/5   Adduction: 4/5       Left Hip Exam     Tenderness   The patient is experiencing no tenderness.     Range of Motion   The patient has normal left hip ROM.  Abduction: normal   Adduction: normal   Flexion: normal   Internal rotation: normal     Muscle Strength   Abduction: 4/5       Back Exam     Tenderness   Back tenderness location: No midline tenderness, ttp of paraspinal lumbar muscles and possibly SI joints.    Muscle Strength   Right Quadriceps:  4/5   Left Quadriceps:  4/5   Right Hamstrings:  4/5   Left Hamstrings:  4/5     Tests   Straight leg raise right: negative  Straight leg raise left: negative    Reflexes   Patellar: normal  Achilles: normal    Other   Sensation: normal  Gait: normal   Erythema: no back redness    Comments:  Exam limited by body habitus and pain. ROM limited by pain            Results     None     Assessment and Plan     1. Acute pain of both knees  2. Acute bilateral low back pain without sciatica  - vitamin D3 (CHOLECALCIFEROL) 2000 units (50 mcg) tablet; Take 1 tablet (2,000 Units) by mouth daily  Dispense: 90 tablet; Refill: 3  - [facility], PT, HAND AND CHIROPRACTIC REFERRAL - [facility]; Future    History and exam findings are most consistent with mechanical low back strain. With no direct trauma to the back and no midline tenderness, I do not feel x-rays are indicated at this time. No red flags to suggest any type of acute cord compromise, cauda equina syndrome, nor spinal epidural abscess or hematoma, discitis, spine fracture, or any serious referred acute intra-abdominal or genitourinary process. No urinary symptoms. The patient does not have a history of malignancy or IV drug use.     Regarding her bilateral knee pain, history and exam consistent with patellofemoral pain syndrome, without  evidence of knee instability, ligamentous injury.  The evaluation shows normal knee ligament testing, slightly limited by patients pain. Palpation of the medial and lateral compartments and special maneuvers do not elicit tenderness.  Pain is reproduced with palpation of the patellar tendon, and some discomfort with lateral motion of the patella.    Patient has known OA, however I doubt this is the main etiology of her symptoms given acuity, lack of crepitus or pain with passive ROM.   I do not suspect other causes of knee pain such as fractures, meniscal or ligamentous injury, popliteal cyst rupture; nor inflammatory, crystal or septic arthropathy. No findings concerning for soft tissue infection or DVT.  septic arthritis, cellulitis, acute ligamentous rupture, crystal joint disease, or other worrisome etiology.     The patient was counseled regarding the importance of staying active, physical therapy, ice/heat, scheduled tylenol and ibuprofen, and discussed contribution of excess weight to chronic low back and knee pathology.     There are no discontinued medications.    Options for treatment and follow-up care were reviewed with the patient/caregivers. They engaged in the decision making process and verbalized understanding of the options discussed and agreed with the final plan.      Charu Chavarria MD  Family Medicine Resident [facility]  Pager: [phone]

## 2019-06-28 NOTE — NURSING NOTE
Due to patient being non-English speaking/uses sign language, an  was used for this visit. Only for face-to-face interpretation by an external agency, date and length of interpretation can be found on the scanned worksheet.     name: Zander Harrell  Language: Cymraes   Agency: [employer]   Phone number: [phone]  Type of interpretation: Face-to-face, spoken

## 2019-07-09 PROBLEM — M25.561 ACUTE PAIN OF BOTH KNEES: Status: ACTIVE | Noted: 2019-07-09

## 2019-07-09 PROBLEM — M54.50 ACUTE BILATERAL LOW BACK PAIN WITHOUT SCIATICA: Status: ACTIVE | Noted: 2019-07-09

## 2019-07-09 PROBLEM — M25.562 ACUTE PAIN OF BOTH KNEES: Status: ACTIVE | Noted: 2019-07-09

## 2019-07-10 ENCOUNTER — THERAPY VISIT (OUTPATIENT)
Dept: PHYSICAL THERAPY | Facility: CLINIC | Age: 39
End: 2019-07-10
Payer: COMMERCIAL

## 2019-07-10 DIAGNOSIS — M25.561 ACUTE PAIN OF BOTH KNEES: ICD-10-CM

## 2019-07-10 DIAGNOSIS — M54.50 ACUTE BILATERAL LOW BACK PAIN WITHOUT SCIATICA: ICD-10-CM

## 2019-07-10 DIAGNOSIS — M25.562 ACUTE PAIN OF BOTH KNEES: ICD-10-CM

## 2019-07-10 PROCEDURE — 97161 PT EVAL LOW COMPLEX 20 MIN: CPT | Mod: GP | Performed by: PHYSICAL THERAPIST

## 2019-07-10 PROCEDURE — 97530 THERAPEUTIC ACTIVITIES: CPT | Mod: GP | Performed by: PHYSICAL THERAPIST

## 2019-07-10 ASSESSMENT — ACTIVITIES OF DAILY LIVING (ADL)
GIVING WAY, BUCKLING OR SHIFTING OF KNEE: THE SYMPTOM AFFECTS MY ACTIVITY SEVERELY
WEAKNESS: THE SYMPTOM PREVENTS ME FROM ALL DAILY ACTIVITIES
STIFFNESS: THE SYMPTOM PREVENTS ME FROM ALL DAILY ACTIVITIES
PAIN: THE SYMPTOM PREVENTS ME FROM ALL DAILY ACTIVITIES
LIMPING: I DO NOT HAVE THE SYMPTOM
SWELLING: I DO NOT HAVE THE SYMPTOM

## 2019-07-10 NOTE — PROGRESS NOTES
"Physical Therapy Initial Evaluation: Subjective History     Injury/Condition Details:  Presenting Complaint Low back and leg pain   Onset Timing/Date Unknown  MD referral: 6/28/2019   Mechanism Patient reports no injury or trauma. Gradual onset with no recent changes in her symptoms.      Symptom Behavior Details    Primary Symptoms Constant symptoms; worsen with activity, pain (Location: Bilateral lumbar spine, anterior knees, Quality: Aching/Throbbing), stiffness, weakness, buckling/shifting/giving way   Worst Pain 10/10 (with see below)   Symptom Provocators Unknown - \"It starts to hurt for no reason\"   Best Pain 5/10    Symptom Relievers Unknown - \"it starts to hurt for no reason\"    Time of day dependent? No   Recent symptom change? no change in symptoms     Prior Testing/Intervention for current condition:  Prior Tests None   Prior Treatment none     Lifestyle & General Medical History:  Employment Unemployed   Usual physical activities  (within past year) Goals: sleeping, walking (<5 minutes).    Orthopaedic history See Epic CHart   Notable medical history See Epic Chart   Patient Reported Health fair       Answers for HPI/ROS submitted by the patient on 7/10/2019   History Reported by Patient  Reason for Visit:: physical therapy  When problem began:: 1978  Where?: other  How problem occurred:: not sure  Number scale: 8/10  Pain quality: aching, burning, sharp, shooting, stabbing  Frequency: constant  Pain is: worse during the night, the same all the time  Progression since onset: unchanged  Special tests: MRI, x-ray  Previous treatment: physical therapy  Improvement with previous treatment: mild  General health as reported by patient: fair  Please check all that apply to your current or past medical history: depression, heart problems, high blood pressure, mental illness, pain at night/rest, sleep disorder/apnea  Surgeries: none  Medications you are currently taking: anti-depressants, high blood pressure " medication, muscle relaxants, sleep medication  PHYSICAL THERAPY SPINE EXAMINATION    Dynamic Movement Screen: deferred  Gait: Slow kandy, mild antalgic gait pattern, increased frontal plane trunk movement.     Lumbar & Thoracic Spine ROM Screen: all motions caused increased pain at her low back   RIGHT LEFT   Standing Lumbar Spine ROM   Flexion Hands to mid lower legs   Extension Moderate limit     Tender to palpation at the following structures: Global tenderness to low back    Lower Extremity Neural System Examination   Right Left   NEURAL TENSION     Supine SLR Test (Sciatic n.) Caused anterior knee pain Caused anterior knee pain     Lower Extremity Flexibility Screen:   Right Left   Hamstring + +   - = normal  + = mild tightness  ++ = moderate tightness  +++ = significant tightness    Global tenderness to anterior knee. Poor quadriceps activation    ASSESSMENT/PLAN:  Patient is a 39 year old female with lumbar and both sides knee complaints.    Patient has the following significant findings with corresponding treatment plan.                Diagnosis 1:  Chronic low back pain + anterior knee pain (non-radicular)  Pain -  hot/cold therapy, manual therapy, STS, splint/taping/bracing/orthotics, self management and education  Decreased ROM/flexibility - manual therapy, therapeutic exercise and home program  Decreased joint mobility - manual therapy, therapeutic exercise and home program  Decreased strength - therapeutic exercise, therapeutic activities and home program  Impaired balance - neuro re-education, therapeutic activities and home program  Impaired muscle performance - neuro re-education and home program  Decreased function - therapeutic activities and home program    Therapy Evaluation Codes:   1) History comprised of:   Personal factors that impact the plan of care:      Age, Language and Overall behavior pattern.    Comorbidity factors that impact the plan of care are:      None.     Medications  impacting care: None.  2) Examination of Body Systems comprised of:   Body structures and functions that impact the plan of care:      Knee and Lumbar spine.   Activity limitations that impact the plan of care are:      Lifting, Squatting/kneeling, Standing, Walking and Sleeping.  3) Clinical presentation characteristics are:   Stable/Uncomplicated.  4) Decision-Making    Low complexity using standardized patient assessment instrument and/or measureable assessment of functional outcome.  Cumulative Therapy Evaluation is: Low complexity.    Previous and current functional limitations:  (See Goal Flow Sheet for this information)    Short term and Long term goals: (See Goal Flow Sheet for this information)     Communication ability:  Patient appears to be able to clearly communicate and understand verbal and written communication and follow directions correctly.  Treatment Explanation - The following has been discussed with the patient:   RX ordered/plan of care  Anticipated outcomes  Possible risks and side effects  This patient would benefit from PT intervention to resume normal activities.   Rehab potential is excellent.    Frequency:  1 X week, once daily  Duration:  for 4 weeks  Discharge Plan:  Achieve all LTG.  Independent in home treatment program.  Reach maximal therapeutic benefit.    Please refer to the daily flowsheet for treatment today, total treatment time and time spent performing 1:1 timed codes.

## 2019-07-19 ENCOUNTER — THERAPY VISIT (OUTPATIENT)
Dept: PHYSICAL THERAPY | Facility: CLINIC | Age: 39
End: 2019-07-19
Payer: COMMERCIAL

## 2019-07-19 DIAGNOSIS — M25.561 ACUTE PAIN OF BOTH KNEES: ICD-10-CM

## 2019-07-19 DIAGNOSIS — M25.562 ACUTE PAIN OF BOTH KNEES: ICD-10-CM

## 2019-07-19 DIAGNOSIS — M54.50 ACUTE BILATERAL LOW BACK PAIN WITHOUT SCIATICA: ICD-10-CM

## 2019-07-19 PROCEDURE — T1013 SIGN LANG/ORAL INTERPRETER: HCPCS | Mod: U3

## 2019-07-19 PROCEDURE — 97530 THERAPEUTIC ACTIVITIES: CPT | Mod: GP | Performed by: PHYSICAL THERAPIST

## 2019-07-19 PROCEDURE — 97110 THERAPEUTIC EXERCISES: CPT | Mod: GP | Performed by: PHYSICAL THERAPIST

## 2019-07-24 ENCOUNTER — TELEPHONE (OUTPATIENT)
Dept: FAMILY MEDICINE | Facility: CLINIC | Age: 39
End: 2019-07-24

## 2019-07-24 NOTE — TELEPHONE ENCOUNTER
[facility] phone call message - order or referral request from patient:  Jamar calling in to request the prescriptions for incontinent brief for adults. She says, it looks like prescription has been sent but [facility] says they have never received. So is calling back for the confirmation.     Order or referral being requested: Requested order Incontinent Brief  Additional Details:     Referral only -Specialty none    OK to leave a message on voice mail? Yes    Primary language: Nicaraguan      needed? Yes    Call taken on July 24, 2019 at 3:35 PM by Debra Shepherd    Order request route to [facility] TRIAGE   Referrals Route to [facility] ([facility]/[facility]ple) CARE COORDINATOR

## 2019-07-24 NOTE — TELEPHONE ENCOUNTER
RN attempted reaching Nadine and left voicemail to find out if she happens to know what size the patient is requesting as I do not see a current or past order, and I want to order the correct size for patient. Left my name and callback number.  Kailey Pace RN

## 2019-07-26 NOTE — TELEPHONE ENCOUNTER
RN attempted using language line x 2 to contact patient and it is not working properly and continues to disconnect.    RN attempted reaching Nadine again as well and was unsuccessful. Left message with name and number requesting sizing information if she has it. We have nothing on file and it does not appear we have ordered it before (there was a request in June but it does not appear to have been handled).  Kailey Pace RN

## 2019-08-01 NOTE — TELEPHONE ENCOUNTER
RN is closing this encounter as no one is returning the call to provide sizing. If they call back inquiring about the order- we need sizing in order to place an order.  Kailey Pace RN

## 2019-08-30 PROBLEM — M54.50 ACUTE BILATERAL LOW BACK PAIN WITHOUT SCIATICA: Status: RESOLVED | Noted: 2019-07-09 | Resolved: 2019-08-30

## 2019-08-30 PROBLEM — M25.561 ACUTE PAIN OF BOTH KNEES: Status: RESOLVED | Noted: 2019-07-09 | Resolved: 2019-08-30

## 2019-08-30 PROBLEM — M25.562 ACUTE PAIN OF BOTH KNEES: Status: RESOLVED | Noted: 2019-07-09 | Resolved: 2019-08-30

## 2019-09-03 ENCOUNTER — OFFICE VISIT (OUTPATIENT)
Dept: FAMILY MEDICINE | Facility: CLINIC | Age: 39
End: 2019-09-03
Payer: COMMERCIAL

## 2019-09-03 VITALS
RESPIRATION RATE: 16 BRPM | WEIGHT: 184.4 LBS | BODY MASS INDEX: 34.81 KG/M2 | HEART RATE: 105 BPM | TEMPERATURE: 98.7 F | SYSTOLIC BLOOD PRESSURE: 144 MMHG | DIASTOLIC BLOOD PRESSURE: 88 MMHG | OXYGEN SATURATION: 97 % | HEIGHT: 61 IN

## 2019-09-03 DIAGNOSIS — N92.0 MENORRHAGIA WITH REGULAR CYCLE: Primary | ICD-10-CM

## 2019-09-03 DIAGNOSIS — N39.3 FEMALE STRESS INCONTINENCE: ICD-10-CM

## 2019-09-03 RX ORDER — INCONTINENCE PAD,LINER,DISP
EACH MISCELLANEOUS
Qty: 30 EACH | Refills: 11 | Status: SHIPPED | OUTPATIENT
Start: 2019-09-03 | End: 2019-10-10

## 2019-09-03 RX ORDER — INCONTINENCE PAD,LINER,DISP
EACH MISCELLANEOUS
Qty: 30 EACH | Refills: 11 | Status: SHIPPED | OUTPATIENT
Start: 2019-09-03 | End: 2019-09-03

## 2019-09-03 RX ORDER — MEDROXYPROGESTERONE ACETATE 10 MG
10 TABLET ORAL DAILY
Qty: 10 TABLET | Refills: 0 | Status: SHIPPED | OUTPATIENT
Start: 2019-09-03 | End: 2020-01-11

## 2019-09-03 SDOH — HEALTH STABILITY: MENTAL HEALTH: HOW OFTEN DO YOU HAVE A DRINK CONTAINING ALCOHOL?: NEVER

## 2019-09-03 ASSESSMENT — PAIN SCALES - GENERAL: PAINLEVEL: EXTREME PAIN (8)

## 2019-09-03 ASSESSMENT — ANXIETY QUESTIONNAIRES
6. BECOMING EASILY ANNOYED OR IRRITABLE: SEVERAL DAYS
5. BEING SO RESTLESS THAT IT IS HARD TO SIT STILL: NOT AT ALL
2. NOT BEING ABLE TO STOP OR CONTROL WORRYING: MORE THAN HALF THE DAYS
7. FEELING AFRAID AS IF SOMETHING AWFUL MIGHT HAPPEN: MORE THAN HALF THE DAYS
GAD7 TOTAL SCORE: 8
IF YOU CHECKED OFF ANY PROBLEMS ON THIS QUESTIONNAIRE, HOW DIFFICULT HAVE THESE PROBLEMS MADE IT FOR YOU TO DO YOUR WORK, TAKE CARE OF THINGS AT HOME, OR GET ALONG WITH OTHER PEOPLE: SOMEWHAT DIFFICULT
1. FEELING NERVOUS, ANXIOUS, OR ON EDGE: NOT AT ALL
3. WORRYING TOO MUCH ABOUT DIFFERENT THINGS: NEARLY EVERY DAY

## 2019-09-03 ASSESSMENT — PATIENT HEALTH QUESTIONNAIRE - PHQ9
SUM OF ALL RESPONSES TO PHQ QUESTIONS 1-9: 6
5. POOR APPETITE OR OVEREATING: NOT AT ALL

## 2019-09-03 ASSESSMENT — MIFFLIN-ST. JEOR: SCORE: 1445.42

## 2019-09-03 NOTE — PROGRESS NOTES
HPI      Interview conducted with help of [facility] . Interview conducted with patient and patient's cousin in the room.    Carmen Connolly is a 39 year old with PMHx significant for HTN who presents for 13 days of heavy vaginal bleeding, lower abdominal pain, and lower back pain.  Chief Complaint   Patient presents with     Abnormal Bleeding Problem     patient is here for abnormal bleeding problem for [id number], lower back and abdominal pain, she is taking ibuprofen 400mg for pain      Carmen reports that she had her period starting on 8/21, around when it would usually start. Her periods are normally regular and last for 3-4 days with moderate bleeding and cramping. This time, she reports that her period has been more heavy than usual, and have lasted for 13 days. This has never happened before. She has never had issues with prolonged nosebleeds, bleeding from her gums, bleeding in her joints, heavy periods. She denies any foul smell, discoloration of discharge, severe abdominal pain, fever, chills, unexpected weight loss, fatigue, night sweats, shortness of breath with exertion, lightheadedness. She denies urinary incontinence, urgency, burning with urination. She reports recent loss of appetite sometimes only eating one meal a day, as well as palpitations.    Her primary concern is that she does not understand why her period is lasting so long, and would like an answer to what is causing her bleeding.    Problem, Medication and Allergy Lists were reviewed and updated if needed..    Patient is an established patient of this clinic..         Review of Systems:   Review of Systems   Denies  shortness of breath with exertion, chest pain, nausea, vomiting, constipation, diarrhea.    Remainder of 10 point ROS negative except as reported in HPI above.       Physical Exam:     Vitals:    09/03/19 1430   BP: (!) 144/88   Pulse: 105   Resp: 16   Temp: 98.7  F (37.1  C)   TempSrc: Oral   SpO2: 97%   Weight:  "83.6 kg (184 lb 6.4 oz)   Height: 1.56 m (5' 1.42\")     Body mass index is 34.37 kg/m .  Vitals were reviewed and were normal     Physical Exam  Constitutional: Well appearing, in no acute distress. Pleasant and cooperative on interview.   HEENT: No acute trauma. Face symmetrical. No scleral icterus.  Chest: No accessory muscle use. No wheezing or stridor.  Psych: Pleasant on exam. Restricted affect.   Declined pelvic exam today      Results:     No new lab results.    Pending CBC, TSH.    Assessment and Plan        1. Menorrhagia with regular cycle  Differential diagnoses for menorrhagia includes perimenopausal menorrhagia, uterine polyp, uterine fibroid, endometrial hyperplasia, pregnancy, PID. Given patient's age and prior menstrual history, perimenopausal menorrhagia is most likely. Uterine polyp and uterine fibroid are possible but less likely given no prior history of heavy menstrual periods. Endometrial hyperplasia unlikely given multiparity, no weight loss, fatigue, and patient age, however must be considered. Unlikely PID with no fever, chills, recent sexual activity, history of STI/STD. Unlikely pregnancy given regular periods, menorrhagia with regular cycle, and no recent sexual activity.    - medroxyPROGESTERone (PROVERA) 10 MG tablet; Take 1 tablet (10 mg) by mouth daily for 10 days  Dispense: 10 tablet; Refill: 0  - If bleeding persists or worsens, consider transvaginal ultrasound and/or endometrial biopsy to evaluate for endometrial hyperplasia.    2. Female stress incontinence  Mild incontinence noted (spoke to me privately)  Declines referral to pelvic PT and declines exam today  Discussed timed urination (to decrease chance of leakage)  - Incontinence Supply Disposable (DEPEND UNDERGARMENTS) MISC; Use daily as needed  Dispense: 30 each; Refill: 11       Medications Discontinued During This Encounter   Medication Reason     Incontinence Supply Disposable (DEPEND UNDERGARMENTS) MISC Erroneous Entry "       Options for treatment and follow-up care were reviewed with the patient. Carmen Connolly  engaged in the decision making process and verbalized understanding of the options discussed and agreed with the final plan.    Kalpesh Bennett, MS4  [facility] Clerkship      I was present with the medical student who participated in the service and in the documentation of this note. I have verified the history and personally performed the physical exam and medical decision making, and have verified the content of the note, which accurately reflects my assessment of the patient and the plan of care.   Khushi Cadena MD

## 2019-09-03 NOTE — NURSING NOTE
Due to patient being non-English speaking/uses sign language, an  was used for this visit. Only for face-to-face interpretation by an external agency, date and length of interpretation can be found on the scanned worksheet.     name: Renuka Shafer  Language: Nigerien  Agency: [employer]  Phone number: [phone]  Type of interpretation: Face-to-face, spoken      Julieta Hooper CMA

## 2019-09-04 DIAGNOSIS — E55.9 VITAMIN D DEFICIENCY: ICD-10-CM

## 2019-09-04 ASSESSMENT — ANXIETY QUESTIONNAIRES: GAD7 TOTAL SCORE: 8

## 2019-09-05 LAB — DEPRECATED CALCIDIOL+CALCIFEROL SERPL-MC: 13 UG/L (ref 20–75)

## 2019-09-06 ENCOUNTER — TELEPHONE (OUTPATIENT)
Dept: ORTHOPEDICS | Facility: CLINIC | Age: 39
End: 2019-09-06

## 2019-09-06 DIAGNOSIS — E55.9 VITAMIN D DEFICIENCY: Primary | ICD-10-CM

## 2019-09-06 RX ORDER — ERGOCALCIFEROL 1.25 MG/1
50000 CAPSULE, LIQUID FILLED ORAL WEEKLY
Qty: 8 CAPSULE | Refills: 0 | Status: SHIPPED | OUTPATIENT
Start: 2019-09-06 | End: 2020-01-10

## 2019-09-06 NOTE — TELEPHONE ENCOUNTER
RN called pt via language line and left VM with name and callback number. Please relay when pt calls back    Shaniquaanant Blair RN

## 2019-09-06 NOTE — TELEPHONE ENCOUNTER
She's on 2000 Vit D, but her level is only 13, so I think she needs to be on high dose for next 2 months.    Will send Vit D 50,000 international unit(s) to pharmacy and she'll discontinue 2000    Please inform patient sent to pharmacy off [facility] [place]

## 2019-09-10 NOTE — TELEPHONE ENCOUNTER
Called patient to notify provider's message regarding Vitamin D new dosage [id number]. One capsule per week. Pt verbalized understanding and stated to have picked the new script.     Josi Meier RN

## 2019-09-23 DIAGNOSIS — S46.812A STRAIN OF LEFT TRAPEZIUS MUSCLE, INITIAL ENCOUNTER: ICD-10-CM

## 2019-09-23 DIAGNOSIS — G44.229 CHRONIC TENSION-TYPE HEADACHE, NOT INTRACTABLE: ICD-10-CM

## 2019-09-23 NOTE — TELEPHONE ENCOUNTER
"Request for medication refill: Magnesium Oxide    Providers if patient needs an appointment and you are willing to give a one month supply please refill for one month and  send a letter/MyChart using \".SMILLIMITEDREFILL\" .smillimited and route chart to \"P SMI \" (Giving one month refill in non controlled medications is strongly recommended before denial)    If refill has been denied, meaning absolutely no refills without visit, please complete the smart phrase \".smirxrefuse\" and route it to the \"P [facility] MED REFILLS\"  pool to inform the patient and the pharmacy.    Kimberly Man, CMA        "

## 2019-09-24 PROBLEM — Z76.89 HEALTH CARE HOME: Status: RESOLVED | Noted: 2017-02-21 | Resolved: 2019-09-24

## 2019-09-25 RX ORDER — MULTIVITAMIN WITH IRON
2 TABLET ORAL DAILY
Qty: 60 TABLET | Refills: 3 | Status: SHIPPED | OUTPATIENT
Start: 2019-09-25 | End: 2020-03-05

## 2019-09-25 RX ORDER — IBUPROFEN 400 MG/1
400-800 TABLET, FILM COATED ORAL EVERY 8 HOURS PRN
Qty: 120 TABLET | Refills: 3 | Status: SHIPPED | OUTPATIENT
Start: 2019-09-25 | End: 2020-03-05

## 2019-10-10 ENCOUNTER — OFFICE VISIT (OUTPATIENT)
Dept: FAMILY MEDICINE | Facility: CLINIC | Age: 39
End: 2019-10-10
Payer: COMMERCIAL

## 2019-10-10 VITALS
RESPIRATION RATE: 14 BRPM | OXYGEN SATURATION: 100 % | DIASTOLIC BLOOD PRESSURE: 83 MMHG | TEMPERATURE: 98 F | WEIGHT: 184 LBS | HEIGHT: 62 IN | HEART RATE: 89 BPM | BODY MASS INDEX: 33.86 KG/M2 | SYSTOLIC BLOOD PRESSURE: 135 MMHG

## 2019-10-10 DIAGNOSIS — N39.3 FEMALE STRESS INCONTINENCE: ICD-10-CM

## 2019-10-10 RX ORDER — INCONTINENCE PAD,LINER,DISP
EACH MISCELLANEOUS
Qty: 90 EACH | Refills: 11 | Status: SHIPPED | OUTPATIENT
Start: 2019-10-10 | End: 2020-03-05

## 2019-10-10 ASSESSMENT — MIFFLIN-ST. JEOR: SCORE: 1448.9

## 2019-10-10 ASSESSMENT — ENCOUNTER SYMPTOMS
NERVOUS/ANXIOUS: 1
GASTROINTESTINAL NEGATIVE: 1
ACTIVITY CHANGE: 0
SLEEP DISTURBANCE: 0
APPETITE CHANGE: 0

## 2019-10-10 NOTE — NURSING NOTE
Due to patient being non-English speaking/uses sign language, an  was used for this visit. Only for face-to-face interpretation by an external agency, date and length of interpretation can be found on the scanned worksheet.     name: Zander Harrell  Language: Tongan   Agency: [employer]   Phone number: [phone]  Type of interpretation: Face-to-face, spoken

## 2019-10-10 NOTE — PROGRESS NOTES
"       HPI       Carmen Connolly is a 39 year old  who presents for   Chief Complaint   Patient presents with     RECHECK     results     Came with her cousin - who is her support person    Here for f/u on the heavy vag bleeding she had in August  - Normal period last week - about 4 days  - no recurrence of irreg bleeding or heavy bleeding  - never took the progesterone    Needs refill for more incontinence pads  - uses 2-3/day when needed  - often forgets to go to the bathroom if she is home alone, gets \"too nervous\" to go to another room  - family tries to have someone at home with her most of the time    A [employer]  was used for  this visit.    +++++++  Problem, Medication and Allergy Lists were reviewed and updated if needed..    Patient is an established patient of this clinic..         Review of Systems:   Review of Systems   Constitutional: Negative for activity change and appetite change.   Gastrointestinal: Negative.    Genitourinary: Positive for enuresis. Negative for menstrual problem (improved; normal period in October), vaginal discharge and vaginal pain.   Psychiatric/Behavioral: Negative for sleep disturbance. The patient is nervous/anxious.             Physical Exam:     Vitals:    10/10/19 1326   BP: 135/83   Pulse: 89   Resp: 14   Temp: 98  F (36.7  C)   TempSrc: Oral   SpO2: 100%   Weight: 83.5 kg (184 lb)   Height: 1.568 m (5' 1.75\")     Body mass index is 33.93 kg/m .  Vitals were reviewed and were normal     Physical Exam  Constitutional:       Appearance: Normal appearance.   Neurological:      Mental Status: She is alert.   Psychiatric:         Mood and Affect: Mood is anxious (mild).         Behavior: Behavior normal.         Thought Content: Thought content normal.         Judgement: Judgment normal.         Results:   No testing ordered today    Assessment and Plan        1. Female stress incontinence  Discussed timed urination encouraged her cousin to make sure someone is " reminding her to go to the bathroom on a regular basis  Offered pelvic floor PT  Consider exam to see if she would benefit from a pessary (depending on prolapse)  - Incontinence Supply Disposable (DEPEND UNDERGARMENTS) MISC; Use daily as needed  Dispense: 90 each; Refill: 11    Reassured re: normal menses in October - reassess if irregular/abnormal bleeding returns       There are no discontinued medications.    Options for treatment and follow-up care were reviewed with the patient. Carmen Connolly  engaged in the decision making process and verbalized understanding of the options discussed and agreed with the final plan.    Khushi Cadena MD

## 2019-10-11 DIAGNOSIS — I10 ESSENTIAL HYPERTENSION WITH GOAL BLOOD PRESSURE LESS THAN 130/85: ICD-10-CM

## 2019-10-11 NOTE — TELEPHONE ENCOUNTER
"Request for medication refill: Amlodipine 5mg tabs    Providers if patient needs an appointment and you are willing to give a one month supply please refill for one month and  send a letter/MyChart using \".SMILLIMITEDREFILL\" .smillimited and route chart to \"P [facility] \" (Giving one month refill in non controlled medications is strongly recommended before denial)    If refill has been denied, meaning absolutely no refills without visit, please complete the smart phrase \".smirxrefuse\" and route it to the \"P S[facility] MED REFILLS\"  pool to inform the patient and the pharmacy.    Belkys Reynoso CMA        "

## 2019-10-13 RX ORDER — AMLODIPINE BESYLATE 5 MG/1
5 TABLET ORAL DAILY
Qty: 30 TABLET | Refills: 3 | Status: SHIPPED | OUTPATIENT
Start: 2019-10-13 | End: 2020-03-05

## 2019-12-05 ENCOUNTER — OFFICE VISIT (OUTPATIENT)
Dept: FAMILY MEDICINE | Facility: CLINIC | Age: 39
End: 2019-12-05
Payer: COMMERCIAL

## 2019-12-05 VITALS
DIASTOLIC BLOOD PRESSURE: 83 MMHG | RESPIRATION RATE: 16 BRPM | BODY MASS INDEX: 32.71 KG/M2 | HEIGHT: 63 IN | OXYGEN SATURATION: 95 % | HEART RATE: 102 BPM | WEIGHT: 184.6 LBS | SYSTOLIC BLOOD PRESSURE: 120 MMHG | TEMPERATURE: 98.6 F

## 2019-12-05 DIAGNOSIS — J06.9 UPPER RESPIRATORY TRACT INFECTION, UNSPECIFIED TYPE: ICD-10-CM

## 2019-12-05 DIAGNOSIS — R05.9 COUGH: ICD-10-CM

## 2019-12-05 DIAGNOSIS — R00.0 SINUS TACHYCARDIA: ICD-10-CM

## 2019-12-05 DIAGNOSIS — K13.79 MOUTH SORE: Primary | ICD-10-CM

## 2019-12-05 RX ORDER — GUAIFENESIN 600 MG/1
1200 TABLET, EXTENDED RELEASE ORAL 2 TIMES DAILY
Qty: 28 TABLET | Refills: 0 | Status: SHIPPED | OUTPATIENT
Start: 2019-12-05 | End: 2020-04-28

## 2019-12-05 ASSESSMENT — MIFFLIN-ST. JEOR: SCORE: 1466.34

## 2019-12-05 NOTE — PROGRESS NOTES
"       HPI       Carmen Connolly is a 39 year old  who presents for   Chief Complaint   Patient presents with     Cough     x's 1 week of a dry cough. feeling warm, body aches,        Acute Illness   Concerns: Cough   When did it start?  1 week  Is it getting better, worse or staying the same? unchanged    Fatigue/Achiness?: YES     Fever?: No but feels warm    Chills/Sweats?: No     Headache (location?) YES both temples    Sinus Pressure?: YES     Eye redness/Discharge?: No     Ear Pain?: No     Runny nose?: No     Congestion?:  YES     Sore Throat?: No   Respiratory    Cough?:  YES-non-productive    Wheeze?: No   GI/    Decreased Appetite?:  YES     Nausea?:No     Vomiting?: No     Diarrhea?:  No     Dysuria/Frequency?.:No       Any Illness Exposure?: No     Any foreign travel or contact with anyone ill who travelled abroad? No     Therapies Tried and outcome: Taking OTC nyquil liquid    A [place]  was used for  this visit.    +++++++  Problem, Medication and Allergy Lists were reviewed and updated if needed..    Patient is an established patient of this clinic..         Review of Systems:   Review of Systems         Physical Exam:     Vitals:    12/05/19 1642   BP: 120/83   Pulse: 102   Resp: 16   Temp: 98.6  F (37  C)   TempSrc: Oral   SpO2: 95%   Weight: 83.7 kg (184 lb 9.6 oz)   Height: 1.592 m (5' 2.68\")     Body mass index is 33.04 kg/m .  Vitals were reviewed and were normal     Physical Exam  Vitals signs reviewed.   Constitutional:       General: She is not in acute distress.     Appearance: She is not ill-appearing, toxic-appearing or diaphoretic.   HENT:      Right Ear: Tympanic membrane normal.      Left Ear: Tympanic membrane normal.      Nose: Rhinorrhea present.      Mouth/Throat:      Mouth: Mucous membranes are dry.      Pharynx: No oropharyngeal exudate or posterior oropharyngeal erythema.   Cardiovascular:      Rate and Rhythm: Normal rate and regular rhythm.   Pulmonary:      " Effort: Pulmonary effort is normal.      Breath sounds: Normal breath sounds.   Abdominal:      General: Abdomen is flat. There is no distension.   Neurological:      Mental Status: She is alert. Mental status is at baseline.   Psychiatric:         Mood and Affect: Mood normal.         Behavior: Behavior normal.         Thought Content: Thought content normal.         Judgment: Judgment normal.           Results:   No testing ordered today    Assessment and Plan        Patient without any signs of bacterial infection - no sinusitis, AOM, nor pneumonia. Patient with nasal congestion and cough. She did have dry oral mucosa and tachycardia - I recommended patient double the amount of water she is drinking daily, as she is slightly dry appearing - but not significant enough to warrant ED visit for IVF. We discussed continued use of tylenol and ibuprofen. Patient coughing, productive - recommended mucinex without DM. She has sores on her palate, possibly viral illness related - plan for magic mouthwash to help with discomfort. We discussed warning signs and when to return - including measurable fever (100.4F), sinus tenderness with discharge, difficulty breathing, etc.    1. Mouth sore  - magic mouthwash (ENTER INGREDIENTS IN COMMENTS) suspension; Take 5 mLs by mouth every 4 hours as needed (mouth sore pain)  Dispense: 100 mL; Refill: 0    2. Upper respiratory tract infection, unspecified type  - guaiFENesin (MUCINEX) 600 MG 12 hr tablet; Take 2 tablets (1,200 mg) by mouth 2 times daily  Dispense: 28 tablet; Refill: 0    3. Sinus tachycardia    4. Cough       There are no discontinued medications.    Options for treatment and follow-up care were reviewed with the patient. Carmen Connolly  engaged in the decision making process and verbalized understanding of the options discussed and agreed with the final plan.    Zina Ventura,

## 2019-12-05 NOTE — NURSING NOTE
Due to patient being non-English speaking/uses sign language, an  was used for this visit. Only for face-to-face interpretation by an external agency, date and length of interpretation can be found on the scanned worksheet.     name: Zander Harrell  Language: Tunisian   Agency: [employer]   Phone number: [phone]  Type of interpretation: Face-to-face, spoken        Narda Yoo CMA

## 2019-12-05 NOTE — PROGRESS NOTES
Preceptor Attestation:   Patient seen and discussed with the resident. Assessment and plan reviewed with resident and agreed upon.   Supervising Physician:  Royal Lion MD  [facility]

## 2019-12-06 ENCOUNTER — TELEPHONE (OUTPATIENT)
Dept: FAMILY MEDICINE | Facility: CLINIC | Age: 39
End: 2019-12-06

## 2019-12-06 DIAGNOSIS — K13.79 MOUTH SORE: ICD-10-CM

## 2019-12-06 NOTE — TELEPHONE ENCOUNTER
[facility] phone call message- medication clarification/question:    Full Medication Name: magic mouthwash (ENTER INGREDIENTS IN COMMENTS) suspension   Dose: Take 5 mLs by mouth every 4 hours as needed (mouth sore pain) - Oral    Question/Clarification needed: Pharmacy calling to clarify what kind of ingredients need to be in magic mouthwash. Prescription was sent to pharmacy incomplete.      Pharmacy confirmed as   [id number] - [place], [place] - [address] [place] [id number]  Phone: [phone] Fax: [phone]  : Yes    Please leave ONLY preferred pharmacy    OK to leave a message on voice mail? Yes    Advised patient that RN would call back within 3 hours, unless emergent.    Primary language: [place]      needed? Yes    Call taken on December 6, 2019 at 10:21 AM by Colette Mobley to [facility]

## 2019-12-06 NOTE — TELEPHONE ENCOUNTER
RN spoke with provider and this was a [facility] specific order, so that is why they are having trouble. She gave a verbal for them to do equal parts lidocaine, maalox and benadryl. RN called and gave verbal to pharmacist.  Kailey Pace RN

## 2020-01-10 ENCOUNTER — OFFICE VISIT (OUTPATIENT)
Dept: FAMILY MEDICINE | Facility: CLINIC | Age: 40
End: 2020-01-10
Payer: COMMERCIAL

## 2020-01-10 VITALS
SYSTOLIC BLOOD PRESSURE: 133 MMHG | DIASTOLIC BLOOD PRESSURE: 88 MMHG | WEIGHT: 186.6 LBS | HEART RATE: 94 BPM | OXYGEN SATURATION: 97 % | HEIGHT: 62 IN | BODY MASS INDEX: 34.34 KG/M2 | TEMPERATURE: 98 F | RESPIRATION RATE: 18 BRPM

## 2020-01-10 DIAGNOSIS — J06.9 VIRAL URI: Primary | ICD-10-CM

## 2020-01-10 RX ORDER — DIPHENHYDRAMINE HCL 25 MG
25-50 TABLET ORAL
Qty: 30 TABLET | Refills: 0 | Status: SHIPPED | OUTPATIENT
Start: 2020-01-10 | End: 2020-04-28

## 2020-01-10 RX ORDER — FLUTICASONE PROPIONATE 50 MCG
2 SPRAY, SUSPENSION (ML) NASAL DAILY
Qty: 15.8 ML | Refills: 1 | Status: SHIPPED | OUTPATIENT
Start: 2020-01-10 | End: 2020-04-28

## 2020-01-10 ASSESSMENT — MIFFLIN-ST. JEOR: SCORE: 1462.91

## 2020-01-10 NOTE — PROGRESS NOTES
Preceptor Attestation:   Patient seen and discussed with the resident. Assessment and plan reviewed with resident and agreed upon.   Supervising Physician:  DO [facility]

## 2020-01-10 NOTE — NURSING NOTE
Due to patient being non-English speaking/uses sign language, an  was used for this visit. Only for face-to-face interpretation by an external agency, date and length of interpretation can be found on the scanned worksheet.     name: Zander Harrell  Language: North Korean   Agency: [employer]   Phone number: [phone]  Type of interpretation: Face-to-face, spoken        Cass Jacinto MA on 1/10/2020 at 2:36 PM

## 2020-01-10 NOTE — PROGRESS NOTES
"       HPI       Carmen Connolly is a 40 year old  who presents for   Chief Complaint   Patient presents with     Fever     x 1 week, coughing, runny nose, migrains and sleeping less     1. Acute illness  1 week of headache, cough, congestion, can't sleep for coughing, burning eyes. No sick contacts. Dry cough, nonproductive. Feels subjective fever. No nausea/vomiting/abd pain, rash. Feels things are starting to get a little better. She was very sick last week.  Trying mucinex (rx from here), feels it's helping. Still has a lot of frontal sinus headache.    Problem, Medication and Allergy Lists were reviewed and updated if needed..    Patient is an established patient of this clinic..         Review of Systems:   Review of Systems         Physical Exam:     Vitals:    01/10/20 1434 01/10/20 1436   BP: (!) 138/92 133/88   Pulse: 94    Resp: 18    Temp: 98  F (36.7  C)    TempSrc: Oral    SpO2: 97%    Weight: 84.6 kg (186 lb 9.6 oz)    Height: 1.58 m (5' 2.21\")      Body mass index is 33.91 kg/m .  Vitals were reviewed and were normal     Physical Exam  Constitutional:       General: She is not in acute distress.     Appearance: Normal appearance.   HENT:      Head: Normocephalic and atraumatic.      Right Ear: Hearing, tympanic membrane and ear canal normal.      Left Ear: Hearing, tympanic membrane and ear canal normal.      Nose: Congestion and rhinorrhea present. Rhinorrhea is clear.      Right Sinus: No maxillary sinus tenderness or frontal sinus tenderness.      Left Sinus: No maxillary sinus tenderness or frontal sinus tenderness.      Mouth/Throat:      Mouth: Mucous membranes are moist.      Pharynx: No oropharyngeal exudate or posterior oropharyngeal erythema.      Comments: Cobblestoning of posterior pharynx  Neck:      Musculoskeletal: Neck supple.   Cardiovascular:      Rate and Rhythm: Normal rate and regular rhythm.      Heart sounds: Normal heart sounds.   Pulmonary:      Effort: Pulmonary effort is " normal. No respiratory distress.      Breath sounds: Normal breath sounds.   Abdominal:      Palpations: Abdomen is soft.      Tenderness: There is no abdominal tenderness.   Lymphadenopathy:      Cervical: Cervical adenopathy present.   Neurological:      Mental Status: She is alert.           Results:   No testing ordered today    Assessment and Plan      Carmen is a 40 year old cisgender woman who presents with:    1. Viral URI  Will use flonase and sinus saline rinses for symptomatic relief of congestion and sinus headache. Patient requests medicine to help with sleep- will trial benadryl (although I doubt it's efficacy since she already is taking both risperidone and temazepam without help). If persistent insomnia at next visit with alleviation of uri symptoms, could increase risperidone dose.    - fluticasone (FLONASE) 50 MCG/ACT nasal spray; Spray 2 sprays into both nostrils daily  Dispense: 15.8 mL; Refill: 1  - diphenhydrAMINE (BENADRYL) 25 MG tablet; Take 1-2 tablets (25-50 mg) by mouth nightly as needed for itching or allergies  Dispense: 30 tablet; Refill: 0       There are no discontinued medications.    Options for treatment and follow-up care were reviewed with the patient. Carmen Mccoy Mohsen  engaged in the decision making process and verbalized understanding of the options discussed and agreed with the final plan.    DO [facility] Resident PGY-2  [phone]

## 2020-01-10 NOTE — PATIENT INSTRUCTIONS
Here is the plan from today's visit    1. Viral URI  This should keep getting better  Do the nasal steroid (flonase) AFTER the salt rinse  Make sure you boil the water first (or use bottled water) for the salt rinse, add the packet, and let it cool.  Rinse your sinuses twice a day while sick.  - fluticasone (FLONASE) 50 MCG/ACT nasal spray; Spray 2 sprays into both nostrils daily  Dispense: 15.8 mL; Refill: 1  - diphenhydrAMINE (BENADRYL) 25 MG tablet; Take 1-2 tablets (25-50 mg) by mouth nightly as needed for itching or allergies  Dispense: 30 tablet; Refill: 0      Please call or return to clinic if your symptoms don't go away.    Follow up plan  Follow up with Dr Cadena for a physical    Thank you for coming to [facility] today.  Lab Testing:  **If you had lab testing today and your results are reassuring or normal they will be mailed to you or sent through [employer] within 7 days.   **If the lab tests need quick action we will call you with the results.  The phone number we will call with results is # [phone] (home) . If this is not the best number please call our clinic and change the number.  Medication Refills:  If you need any refills please call your pharmacy and they will contact us.   If you need to  your refill at a new pharmacy, please contact the new pharmacy directly. The new pharmacy will help you get your medications transferred faster.   Scheduling:  If you have any concerns about today's visit or wish to schedule another appointment please call our office during normal business hours [phone] (8-5:00 M-F)  If a referral was made to a [facility] and you don't get a call from central scheduling please call [phone].  If a Mammogram was ordered for you at [facility] call [phone] to schedule or change your appointment.  If you had an XRay/CT/Ultrasound/MRI ordered the number is [phone] to schedule or change your radiology appointment.    Medical Concerns:  If you have urgent medical concerns please call [phone] at any time of the day.    Marcella Velasco, DO

## 2020-03-05 ENCOUNTER — ANCILLARY PROCEDURE (OUTPATIENT)
Dept: GENERAL RADIOLOGY | Facility: CLINIC | Age: 40
End: 2020-03-05
Attending: FAMILY MEDICINE
Payer: COMMERCIAL

## 2020-03-05 ENCOUNTER — OFFICE VISIT (OUTPATIENT)
Dept: FAMILY MEDICINE | Facility: CLINIC | Age: 40
End: 2020-03-05
Payer: COMMERCIAL

## 2020-03-05 VITALS
SYSTOLIC BLOOD PRESSURE: 126 MMHG | WEIGHT: 192.6 LBS | TEMPERATURE: 98 F | HEART RATE: 105 BPM | OXYGEN SATURATION: 99 % | DIASTOLIC BLOOD PRESSURE: 85 MMHG | RESPIRATION RATE: 16 BRPM | BODY MASS INDEX: 34.12 KG/M2 | HEIGHT: 63 IN

## 2020-03-05 DIAGNOSIS — M22.2X1 PATELLOFEMORAL ARTHRALGIA OF BOTH KNEES: ICD-10-CM

## 2020-03-05 DIAGNOSIS — I10 ESSENTIAL HYPERTENSION WITH GOAL BLOOD PRESSURE LESS THAN 130/85: ICD-10-CM

## 2020-03-05 DIAGNOSIS — G47.19 EXCESSIVE DAYTIME SLEEPINESS: Primary | ICD-10-CM

## 2020-03-05 DIAGNOSIS — S46.812A STRAIN OF LEFT TRAPEZIUS MUSCLE, INITIAL ENCOUNTER: ICD-10-CM

## 2020-03-05 DIAGNOSIS — F25.0 SCHIZOAFFECTIVE DISORDER, BIPOLAR TYPE (H): ICD-10-CM

## 2020-03-05 DIAGNOSIS — R06.83 SNORING: ICD-10-CM

## 2020-03-05 DIAGNOSIS — N39.3 FEMALE STRESS INCONTINENCE: ICD-10-CM

## 2020-03-05 DIAGNOSIS — G44.229 CHRONIC TENSION-TYPE HEADACHE, NOT INTRACTABLE: ICD-10-CM

## 2020-03-05 DIAGNOSIS — M22.2X2 PATELLOFEMORAL ARTHRALGIA OF BOTH KNEES: ICD-10-CM

## 2020-03-05 DIAGNOSIS — G44.219 EPISODIC TENSION-TYPE HEADACHE, NOT INTRACTABLE: ICD-10-CM

## 2020-03-05 RX ORDER — MULTIVITAMIN WITH IRON
2 TABLET ORAL DAILY
Qty: 60 TABLET | Refills: 3 | Status: SHIPPED | OUTPATIENT
Start: 2020-03-05 | End: 2020-04-28

## 2020-03-05 RX ORDER — AMLODIPINE BESYLATE 5 MG/1
5 TABLET ORAL DAILY
Qty: 90 TABLET | Refills: 3 | Status: SHIPPED | OUTPATIENT
Start: 2020-03-05 | End: 2020-04-28

## 2020-03-05 RX ORDER — ACETAMINOPHEN 325 MG/1
650 TABLET ORAL EVERY 4 HOURS PRN
Qty: 100 TABLET | Refills: 11 | Status: SHIPPED | OUTPATIENT
Start: 2020-03-05 | End: 2020-04-28

## 2020-03-05 RX ORDER — INCONTINENCE PAD,LINER,DISP
EACH MISCELLANEOUS
Qty: 200 EACH | Refills: 11 | Status: SHIPPED | OUTPATIENT
Start: 2020-03-05 | End: 2021-05-20

## 2020-03-05 RX ORDER — IBUPROFEN 400 MG/1
400-800 TABLET, FILM COATED ORAL EVERY 8 HOURS PRN
Qty: 120 TABLET | Refills: 3 | Status: SHIPPED | OUTPATIENT
Start: 2020-03-05 | End: 2020-04-28

## 2020-03-05 ASSESSMENT — MIFFLIN-ST. JEOR: SCORE: 1499.5

## 2020-03-05 NOTE — PATIENT INSTRUCTIONS
Here is the plan from today's visit    1. Excessive daytime sleepiness  2. Snoring  - SLEEP EVALUATION & MANAGEMENT REFERRAL - ADULT -[facility] [phone] (Age 15 and up); Future    3. Patellofemoral arthralgia of both knees  - ibuprofen (ADVIL/MOTRIN) 400 MG tablet; Take 1-2 tablets (400-800 mg) by mouth every 8 hours as needed for moderate pain  Dispense: 120 tablet; Refill: 3  - acetaminophen (TYLENOL) 325 MG tablet; Take 2 tablets (650 mg) by mouth every 4 hours as needed for mild pain  Dispense: 100 tablet; Refill: 11    4. Essential hypertension with goal blood pressure less than 130/85  - amLODIPine (NORVASC) 5 MG tablet; Take 1 tablet (5 mg) by mouth daily  Dispense: 90 tablet; Refill: 3    5. Chronic tension-type headache, not intractable  - magnesium 250 MG tablet; Take 2 tablets (500 mg) by mouth daily  Dispense: 60 tablet; Refill: 3  - acetaminophen (TYLENOL) 325 MG tablet; Take 2 tablets (650 mg) by mouth every 4 hours as needed for mild pain  Dispense: 100 tablet; Refill: 11      Exercises for Patellofemoral Syndrome  Ice your knee before and after the exercises  1. The Clam Shell  Hold it open for 10 seconds,  repeat 10 times in the AM and 10 in the PM      2 The VMO Exercise  Sit on a hard surface, grab your less painful leg, sit up straight and raise the other leg off the floor just a few inches-don't lean back. Hold it up for 10 sec then relax  -repeat 10 times in the AM and 10 times in the PM       3 The Plank  Hold your body straight as a board for 10 sec then -repeat 10 times in the AM and 10 the  PM            Please call or return to clinic if your symptoms don't go away.    Follow up plan - for CPE    Thank you for coming to [facility] today.  Lab Testing:  **If you had lab testing today and your results are reassuring or normal they will be mailed to you or sent through [email or web address] within 7 days.   **If the lab tests need quick action we will call you with the  results.  The phone number we will call with results is # [phone] (home) . If this is not the best number please call our clinic and change the number.  Medication Refills:  If you need any refills please call your pharmacy and they will contact us.   If you need to  your refill at a new pharmacy, please contact the new pharmacy directly. The new pharmacy will help you get your medications transferred faster.   Scheduling:  If you have any concerns about today's visit or wish to schedule another appointment please call our office during normal business hours [phone] (8-5:00 M-F)  If a referral was made to a [facility] and you don't get a call from central scheduling please call [phone].  If a Mammogram was ordered for you at [facility] call [phone] to schedule or change your appointment.  If you had an XRay/CT/Ultrasound/MRI ordered the number is [phone] to schedule or change your radiology appointment.   Medical Concerns:  If you have urgent medical concerns please call [phone] at any time of the day.  If you have a medical emergency please call [phone].

## 2020-03-05 NOTE — NURSING NOTE
Due to patient being non-English speaking/uses sign language, an  was used for this visit. Only for face-to-face interpretation by an external agency, date and length of interpretation can be found on the scanned worksheet.     name: Zander Harrell  Language: Guamanian   Agency: [employer]   Phone number: [phone]  Type of interpretation: Face-to-face, spoken        Cass Jacitno MA on 3/5/2020 at 2:11 PM

## 2020-03-05 NOTE — PROGRESS NOTES
"       HPI       Carmen Connolly is a 40 year old  who presents for   Chief Complaint   Patient presents with     Physical     CPE, Knee pain, and medication refills     Would prefer to discuss sleep and knee issues - will hold on CPE for now    Sleep  - medication used to work, now only seems to help x 2 hrs  - usually goes to sleep at 10pm, doesn't really fall asleep until 1 AM  - often wakes up around 5/6 AM  - headaches  - no naps, but feels tired in the day  - takes restoril and risperdal around 9:30-10:00 PM  - snores (kids say it's quite loud)  - no evidence of stopping    Knee Pain  - bilateral  - more tender in the morning  - no swelling, redness, warmth  - no clicking, locking; no known injury    Refills  - needs refill on undergarments, runs out with current supply  - needs refills on several medications       [employer]  was used for  this visit.    +++++++  Problem, Medication and Allergy Lists were reviewed and updated if needed..    Patient is an established patient of this clinic..         Review of Systems:   Review of Systems         Physical Exam:     Vitals:    03/05/20 1412 03/05/20 1415   BP: 130/85 126/85   Pulse: 105    Resp: 16    Temp: 98  F (36.7  C)    TempSrc: Oral    SpO2: 99%    Weight: 87.4 kg (192 lb 9.6 oz)    Height: 1.595 m (5' 2.8\")      Body mass index is 34.34 kg/m .  Vitals were reviewed and were normal     Physical Exam  Vitals signs reviewed.   Constitutional:       Appearance: Normal appearance.   Neck:      Musculoskeletal: Normal range of motion and neck supple.   Cardiovascular:      Rate and Rhythm: Normal rate and regular rhythm.      Heart sounds: Normal heart sounds.   Pulmonary:      Effort: Pulmonary effort is normal.      Breath sounds: Normal breath sounds.   Musculoskeletal: Normal range of motion.         General: Tenderness (bilateral medial knee joints) present. No swelling.      Right lower leg: No edema.      Left lower leg: No edema.      " Comments: Clicking with Sanjuanita on L, not on R  Normal patellar movement, but tender along the retinaculum bilaterally   Neurological:      General: No focal deficit present.      Mental Status: She is alert and oriented to person, place, and time.   Psychiatric:         Mood and Affect: Mood normal.         Behavior: Behavior normal.       MENTAL STATUS EXAM  Appearance: appropriate  Attitude: cooperative  Behavior: normal  Eye Contact: normal  Speech: normal  Orientation: oriented to person, place, time and situation  Mood: normal  Affect: Congruent with mood  Thought Process: appropriate  Suicidal Ideation: reports no suicidal ideation  Hallucination: denies      Results:   No testing ordered today    Assessment and Plan        Patient Instructions   Here is the plan from today's visit    1. Excessive daytime sleepiness  2. Snoring  - SLEEP EVALUATION & MANAGEMENT REFERRAL - [facility] [phone] (Age 15 and up); Future    3. Patellofemoral arthralgia of both knees  Exercises given in AVS  Consider PT if not improving with home exercise program  - ibuprofen (ADVIL/MOTRIN) 400 MG tablet; Take 1-2 tablets (400-800 mg) by mouth every 8 hours as needed for moderate pain  Dispense: 120 tablet; Refill: 3  - acetaminophen (TYLENOL) 325 MG tablet; Take 2 tablets (650 mg) by mouth every 4 hours as needed for mild pain  Dispense: 100 tablet; Refill: 11    4. Essential hypertension with goal blood pressure less than 130/85  - amLODIPine (NORVASC) 5 MG tablet; Take 1 tablet (5 mg) by mouth daily  Dispense: 90 tablet; Refill: 3    5. Chronic tension-type headache, not intractable  - magnesium 250 MG tablet; Take 2 tablets (500 mg) by mouth daily  Dispense: 60 tablet; Refill: 3  - acetaminophen (TYLENOL) 325 MG tablet; Take 2 tablets (650 mg) by mouth every 4 hours as needed for mild pain  Dispense: 100 tablet; Refill: 11    6. Schizoaffective disorder  Stable    Please call or return to  clinic if your symptoms don't go away.    Follow up plan - for CPE       There are no discontinued medications.    Options for treatment and follow-up care were reviewed with the patient. Carmen Connolly  engaged in the decision making process and verbalized understanding of the options discussed and agreed with the final plan.    Khushi Cadena MD

## 2020-03-13 DIAGNOSIS — J30.2 SEASONAL ALLERGIC RHINITIS, UNSPECIFIED TRIGGER: ICD-10-CM

## 2020-03-13 DIAGNOSIS — R09.81 NASAL CONGESTION: ICD-10-CM

## 2020-03-13 NOTE — TELEPHONE ENCOUNTER

## 2020-03-15 RX ORDER — FLUTICASONE PROPIONATE 50 MCG
1-2 SPRAY, SUSPENSION (ML) NASAL DAILY
Qty: 16 G | Refills: 11 | Status: SHIPPED | OUTPATIENT
Start: 2020-03-15 | End: 2020-04-28

## 2020-04-15 ENCOUNTER — TELEPHONE (OUTPATIENT)
Dept: PHARMACY | Facility: PHYSICIAN GROUP | Age: 40
End: 2020-04-15

## 2020-04-15 NOTE — TELEPHONE ENCOUNTER
PHARMACY TELEPHONE ENCOUNTER:    Reason: Pharmacy Medication Reconciliation - Fill Dates    Dr. Cadena requested that the pharmacy team conduct a medication reconciliation with the patient's pharmacy to determine when her chronic medications were last refilled. Information on the patient's chronic medications were requested as well as what other medications were filled in the last month. The information acquired is presented below.    Most recent fill dates for Chronic Medications:    Amlodipine: April 7    Cetirizine: November 30     Lisinopril/HCTZ: November 30    Magnesium: April 7    Propranolol: January 2019    Risperidone: Nov 30    Temazepam: April 2019    Effexor 150 mg:  Nov 30     Effexor 75 mg: June 2019    Fish Oil: Nov 30    Vitamin D: March 25      PRN medications filled recently:    Tylenol: March 5    Ibuprofen: March 31    Flonase: March 15      Medications not listed were not inquired on. This note was routed to the patient's PCP Dr. Cadena.    Del Law, Pharm. D.

## 2020-04-22 NOTE — TELEPHONE ENCOUNTER
I need to have a visit with Carmen - and if it's possible to do virtually, great.  We will need an .  This will last quite a bit, because it's hard to get an answer out of her sometimes.  I believe we will need to have a home health nurse start seeing her again, and I don't want to start refilling prescriptions that she hasn't taken for months until I've had a chance to talk with her, and get HHN set up.    Can I have assistance getting this set up?  I could open up time on my schedule, looks like my next clinic is not until May.    Khushi

## 2020-04-23 DIAGNOSIS — F43.10 POSTTRAUMATIC STRESS DISORDER: ICD-10-CM

## 2020-04-23 DIAGNOSIS — F33.1 MAJOR DEPRESSIVE DISORDER, RECURRENT EPISODE, MODERATE (H): ICD-10-CM

## 2020-04-23 RX ORDER — VENLAFAXINE HYDROCHLORIDE 150 MG/1
150 CAPSULE, EXTENDED RELEASE ORAL DAILY
Qty: 30 CAPSULE | Refills: 11 | Status: CANCELLED | OUTPATIENT
Start: 2020-04-23

## 2020-04-23 NOTE — TELEPHONE ENCOUNTER

## 2020-04-24 ENCOUNTER — TELEPHONE (OUTPATIENT)
Dept: FAMILY MEDICINE | Facility: CLINIC | Age: 40
End: 2020-04-24
Payer: COMMERCIAL

## 2020-04-24 NOTE — TELEPHONE ENCOUNTER
"Attempted to reach patient to schedule an appointment per [facility]. [facility] to call clinic. If patient returns clinic call please schedule a virtual visit, video preferably, for follow up medication and home health nurse set up on 4/29     \"I need to have a visit with Carmen - and if it's possible to do virtually, great.  We will need an .  This will last quite a bit, because it's hard to get an answer out of her sometimes.  I believe we will need to have a home health nurse start seeing her again, and I don't want to start refilling prescriptions that she hasn't taken for months until I've had a chance to talk with her, and get HHN set up.     Can I have assistance getting this set up?  I could open up time on my schedule, looks like my next clinic is not until May.       Let's aim for Wednesday afternoon, 4/29.  I'm covering the hospital, but hopefully I will be free.  Whatever works for her is ok with me on timing.\"  "

## 2020-04-28 ENCOUNTER — VIRTUAL VISIT (OUTPATIENT)
Dept: FAMILY MEDICINE | Facility: CLINIC | Age: 40
End: 2020-04-28
Payer: COMMERCIAL

## 2020-04-28 DIAGNOSIS — F43.10 POSTTRAUMATIC STRESS DISORDER: ICD-10-CM

## 2020-04-28 DIAGNOSIS — N39.3 FEMALE STRESS INCONTINENCE: ICD-10-CM

## 2020-04-28 DIAGNOSIS — G44.229 CHRONIC TENSION-TYPE HEADACHE, NOT INTRACTABLE: ICD-10-CM

## 2020-04-28 DIAGNOSIS — M54.50 ACUTE BILATERAL LOW BACK PAIN WITHOUT SCIATICA: ICD-10-CM

## 2020-04-28 DIAGNOSIS — I10 ESSENTIAL HYPERTENSION WITH GOAL BLOOD PRESSURE LESS THAN 130/85: ICD-10-CM

## 2020-04-28 DIAGNOSIS — F33.1 MAJOR DEPRESSIVE DISORDER, RECURRENT EPISODE, MODERATE (H): ICD-10-CM

## 2020-04-28 DIAGNOSIS — F25.0 SCHIZOAFFECTIVE DISORDER, BIPOLAR TYPE (H): Primary | ICD-10-CM

## 2020-04-28 DIAGNOSIS — J30.2 SEASONAL ALLERGIC RHINITIS, UNSPECIFIED TRIGGER: ICD-10-CM

## 2020-04-28 RX ORDER — VENLAFAXINE HYDROCHLORIDE 75 MG/1
75 CAPSULE, EXTENDED RELEASE ORAL DAILY
Qty: 30 CAPSULE | Refills: 0 | Status: SHIPPED | OUTPATIENT
Start: 2020-04-28 | End: 2020-06-11

## 2020-04-28 RX ORDER — CHOLECALCIFEROL (VITAMIN D3) 50 MCG
1 TABLET ORAL DAILY
Qty: 90 TABLET | Refills: 3 | Status: SHIPPED | OUTPATIENT
Start: 2020-04-28 | End: 2020-08-14

## 2020-04-28 RX ORDER — DIPHENHYDRAMINE HCL 25 MG
25-50 TABLET ORAL
Qty: 30 TABLET | Refills: 0 | Status: SHIPPED | OUTPATIENT
Start: 2020-04-28 | End: 2020-06-15

## 2020-04-28 RX ORDER — ACETAMINOPHEN 325 MG/1
650 TABLET ORAL EVERY 4 HOURS PRN
Qty: 100 TABLET | Refills: 11 | Status: SHIPPED | OUTPATIENT
Start: 2020-04-28 | End: 2020-08-14

## 2020-04-28 RX ORDER — TEMAZEPAM 15 MG/1
15 CAPSULE ORAL
Qty: 30 CAPSULE | Refills: 5 | Status: CANCELLED | OUTPATIENT
Start: 2020-04-28

## 2020-04-28 RX ORDER — AMLODIPINE BESYLATE 5 MG/1
5 TABLET ORAL DAILY
Qty: 90 TABLET | Refills: 3 | Status: SHIPPED | OUTPATIENT
Start: 2020-04-28 | End: 2020-08-14

## 2020-04-28 RX ORDER — FLUTICASONE PROPIONATE 50 MCG
2 SPRAY, SUSPENSION (ML) NASAL DAILY
Qty: 15.8 ML | Refills: 1 | Status: SHIPPED | OUTPATIENT
Start: 2020-04-28 | End: 2020-08-14

## 2020-04-28 RX ORDER — RISPERIDONE 1 MG/1
1 TABLET ORAL AT BEDTIME
Qty: 30 TABLET | Refills: 0 | Status: SHIPPED | OUTPATIENT
Start: 2020-04-28 | End: 2020-05-22

## 2020-04-28 RX ORDER — PNV NO.95/FERROUS FUM/FOLIC AC 28MG-0.8MG
2000 TABLET ORAL DAILY
Qty: 180 CAPSULE | Refills: 3 | Status: SHIPPED | OUTPATIENT
Start: 2020-04-28 | End: 2021-06-18

## 2020-04-28 RX ORDER — MULTIVITAMIN WITH IRON
2 TABLET ORAL DAILY
Qty: 60 TABLET | Refills: 3 | Status: SHIPPED | OUTPATIENT
Start: 2020-04-28 | End: 2020-08-14

## 2020-04-28 RX ORDER — VENLAFAXINE HYDROCHLORIDE 75 MG/1
75 CAPSULE, EXTENDED RELEASE ORAL DAILY
Qty: 30 CAPSULE | Refills: 11 | Status: SHIPPED | OUTPATIENT
Start: 2020-04-28 | End: 2020-04-28

## 2020-04-28 RX ORDER — PROPRANOLOL HCL 60 MG
60 CAPSULE, EXTENDED RELEASE 24HR ORAL DAILY
Qty: 30 CAPSULE | Refills: 3 | Status: CANCELLED | OUTPATIENT
Start: 2020-04-28

## 2020-04-28 RX ORDER — IBUPROFEN 400 MG/1
400-800 TABLET, FILM COATED ORAL EVERY 8 HOURS PRN
Qty: 120 TABLET | Refills: 3 | Status: SHIPPED | OUTPATIENT
Start: 2020-04-28 | End: 2020-08-14

## 2020-04-28 RX ORDER — VENLAFAXINE HYDROCHLORIDE 75 MG/1
75 CAPSULE, EXTENDED RELEASE ORAL DAILY
Qty: 30 CAPSULE | Refills: 11 | Status: SHIPPED | OUTPATIENT
Start: 2020-04-28 | End: 2020-06-11

## 2020-04-28 RX ORDER — CETIRIZINE HYDROCHLORIDE 10 MG/1
10 TABLET ORAL DAILY
Qty: 30 TABLET | Refills: 11 | Status: SHIPPED | OUTPATIENT
Start: 2020-04-28 | End: 2020-08-14

## 2020-04-28 RX ORDER — VENLAFAXINE HYDROCHLORIDE 150 MG/1
150 CAPSULE, EXTENDED RELEASE ORAL DAILY
Qty: 30 CAPSULE | Refills: 11 | Status: CANCELLED | OUTPATIENT
Start: 2020-04-28

## 2020-04-28 RX ORDER — LISINOPRIL AND HYDROCHLOROTHIAZIDE 20; 25 MG/1; MG/1
1 TABLET ORAL DAILY
Qty: 90 TABLET | Refills: 3 | Status: CANCELLED | OUTPATIENT
Start: 2020-04-28

## 2020-04-28 ASSESSMENT — PATIENT HEALTH QUESTIONNAIRE - PHQ9: SUM OF ALL RESPONSES TO PHQ QUESTIONS 1-9: 11

## 2020-04-28 NOTE — PATIENT INSTRUCTIONS
[facility]  [phone]    Referral auto sent to [facility], they will review and contact patient directly.

## 2020-04-28 NOTE — PROGRESS NOTES
"Family Medicine Telephone Visit Note    Due to patient being non-English speaking/uses sign language, an  was used for this visit. Only for face-to-face interpretation by an external agency, date and length of interpretation can be found on the scanned worksheet.     name: Toby Pisano  Agency: [employer]  Language: Citizen of [place]   Telephone number: [phone]  Type of interpretation: Telephone, spoken    ELISABET Lainez 1:00 PM April 28, 2020         Telephone Visit Consent   Patient was verbally read the following and verbal consent was obtained.    \"This telephone visit will be conducted via a call between you and your physician/provider. We have found that certain health care needs can be provided without the need for a physical exam.  This service lets us provide the care you need with a short phone conversation.  If a prescription is necessary we can send it directly to your pharmacy.  If lab work is needed we can place an order for that and you can then stop by our lab to have the test done at a later time.    Telephone visits are billed at different rates depending on your insurance coverage. During this emergency period, for some insurers they may be billed the same as an in-person visit.  Please reach out to your insurance provider with any questions.    If during the course of the call the physician/provider feels a telephone visit is not appropriate, you will not be charged for this service.\"    Name person giving consent:  Patient   Date verbal consent given:  4/28/2020  Time verbal consent given:  1:03 PM    Chief Complaint   Patient presents with     Depression     recheck on medication and refill-PHQ9 updated     Refill Request     on all medications      name: Zander   Language: Citizen of [place]   Telephone number:   Type of interpretation: Telephone, spoken         [facility]   Patients name: Carmen  Appointment start time:  1259  BP   BP checks on own sometimes 140 sometimes " 160. More often Daughter checks it for her on the wrist couple times a week. No numbers over 160 or under 120 she can recall.     Has only taken amlodipine hasn't filled propranolol or lisinopril hydrochlorothiazide in over 3 months. No headaches, vision changes, chest pain, SOB.     Mental Health- Schizotypal bipolar hx,   states she had visual non command hallucinations before starting meds intermittently without any auditory. Now has had them come back for past couple of months. Not alarming but present. Mood is okay, frustrated at lake of sleep. 4-5 house, can't fall asleep untl 1-2 AM. Some days will have energy despite this but never for more than a week. Overall more tired. Daughter thinks she was better controlled on 3 below medications, she has only been taking temazepam out of the 3 below for past 3 months.     Temazepam takes nightly for sleep has been   effexor uses for mood would like it back  risperdal daughter thinks helped with sleep.     Migraines  Seems to have been on propranolol as preventive for headaches. Her headaches Fluctuate, has only had 2 she can remember this month. Hasn't taken propranolol bt does take magnesium regularly      .     For all of her medicines she takes them on her own out of bottles laid out. Unless she reads the bottle out which she would during visit occasionally no insight or what names of medicines are or how she takes them.         Due to patient being non-English speaking/uses sign language, an  was used for this visit. Only for face-to-face interpretation by an external agency, date and length of interpretation can be found on the scanned worksheet.        Nurse   Current Outpatient Medications   Medication Sig Dispense Refill     acetaminophen (TYLENOL) 325 MG tablet Take 2 tablets (650 mg) by mouth every 4 hours as needed for mild pain 100 tablet 11     amLODIPine (NORVASC) 5 MG tablet Take 1 tablet (5 mg) by mouth daily 90 tablet 3     cetirizine  (ZYRTEC) 10 MG tablet Take 1 tablet (10 mg) by mouth daily 30 tablet 11     diclofenac (VOLTAREN) 1 % topical gel Place 2 g onto the skin 3 times daily as needed for moderate pain 100 g 1     diphenhydrAMINE (BENADRYL) 25 MG tablet Take 1-2 tablets (25-50 mg) by mouth nightly as needed for itching or allergies 30 tablet 0     fluticasone (FLONASE) 50 MCG/ACT nasal spray Spray 2 sprays into both nostrils daily 15.8 mL 1     ibuprofen (ADVIL/MOTRIN) 400 MG tablet Take 1-2 tablets (400-800 mg) by mouth every 8 hours as needed for moderate pain 120 tablet 3     Incontinence Supply Disposable (DEPEND UNDERGARMENTS) MISC Use daily as needed 200 each 11     lisinopril-hydrochlorothiazide (PRINZIDE/ZESTORETIC) 20-25 MG tablet Take 1 tablet by mouth daily 90 tablet 3     magnesium 250 MG tablet Take 2 tablets (500 mg) by mouth daily 60 tablet 3     multivitamin w/minerals (THERA-VIT-M) tablet Take 1 tablet by mouth daily 100 each 3     Omega-3 Fatty Acids (FISH OIL OMEGA-3) 1000 MG CAPS Take 2,000 mg by mouth daily 180 capsule 3     propranolol ER (INDERAL LA) 60 MG 24 hr capsule Take 1 capsule (60 mg) by mouth daily 30 capsule 3     risperiDONE (RISPERDAL) 1 MG tablet Take 2 tablets (2 mg) by mouth At Bedtime 60 tablet 11     temazepam (RESTORIL) 15 MG capsule Take 1 capsule (15 mg) by mouth nightly as needed 30 capsule 5     venlafaxine (EFFEXOR-XR) 150 MG 24 hr capsule Take 1 capsule (150 mg) by mouth daily 30 capsule 11     venlafaxine (EFFEXOR-XR) 75 MG 24 hr capsule Take 1 capsule (75 mg) by mouth daily (take with 150mg tablet - total dose 225mg) 30 capsule 11     vitamin D3 (CHOLECALCIFEROL) 2000 units (50 mcg) tablet Take 1 tablet (2,000 Units) by mouth daily 90 tablet 3     fluticasone (FLONASE) 50 MCG/ACT nasal spray Spray 1-2 sprays into both nostrils daily (Patient not taking: Reported on 4/28/2020) 16 g 11     guaiFENesin (MUCINEX) 600 MG 12 hr tablet Take 2 tablets (1,200 mg) by mouth 2 times daily (Patient not  "taking: Reported on 4/28/2020) 28 tablet 0     magic mouthwash (ENTER INGREDIENTS IN COMMENTS) suspension Take 5 mLs by mouth every 4 hours as needed (mouth sore pain) (Patient not taking: Reported on 4/28/2020) 100 mL 0     order for DME One Thera-cane (Patient not taking: Reported on 12/5/2019) 1 Units 0     No Known Allergies           Review of Systems:              Physical Exam:     There were no vitals taken for this visit.  Estimated body mass index is 34.34 kg/m  as calculated from the following:    Height as of 3/5/20: 1.595 m (5' 2.8\").    Weight as of 3/5/20: 87.4 kg (192 lb 9.6 oz).    Exam:  Constitutional: healthy, alert and no distress  Psychiatric: mood \"good doctor thanks for asking\" affect seems congruent. Limited Insight into overall health/medications. Denies anxiety, anhedonia, suicidality. Endorses fatigue, appetite is stable, no change in concentration             Assessment and Plan   1. Major depressive disorder, recurrent episode, moderate (H)   8. Schizoaffective disorder, bipolar type (H)2. Posttraumatic stress disorder      - venlafaxine (EFFEXOR-XR) 75 MG 24 hr capsule; Take 1 capsule (75 mg) by mouth daily (take with 150mg tablet - total dose 225mg)  Dispense: 30 capsule; Refill: 11  - risperiDONE (RISPERDAL) 1 MG tablet; Take 1 tablet (1 mg) by mouth At Bedtime  Dispense: 30 tablet; Refill: 0  - venlafaxine (EFFEXOR-XR) 75 MG 24 hr capsule; Take 1 capsule (75 mg) by mouth daily  Dispense: 30 capsule; Refill: 0    Patient states symptoms best controlled when on 225 effexor, 2mg risperdal bedtime, and seeing psychaitry. Also takes temazeam for insomnia 15mg at bedtime. Has not seen psychiatry in quite some time and been off psychoactive meds for over 3 months. Will start her back slowly on effexor at 75mg daily, risperdal 1mg at bedtime. Instructed patient to not fill this until the end of the week with hopes that we can arrange for home health to see her by then and start a pill box " for her.       For the first month I want her to stay on Effexor 75mg daily, Risperdal 1mg night time until instructed by us or psychiatry to do different.    Have placed home health order for patient to have nurse go out and to make pill box. Also see notes on HTN   3. Acute bilateral low back pain without sciatica    Can continue NSAID PRN, voltaren.   4. Essential hypertension with goal blood pressure less than 130/85  Patient has had BP at goal while here since Nov and has only been taking amlodipine 5mg. Previously was on lisinopril hydrochlorothiazide combo and propranolol as well. Will not fill these as patient has no symptoms at this time. Wrist BP unreliable but not terribly above goal. Will have nursing check Bp while setting up pillbox for more reliable info. Daughter was not on phone but would also benefit from buying arm cuff and being shown how to use it.     For now only take 5 amlodpine until other wise instructed .    - Omega-3 Fatty Acids (FISH OIL OMEGA-3) 1000 MG CAPS; Take 2,000 mg by mouth daily  Dispense: 180 capsule; Refill: 3  - amLODIPine (NORVASC) 5 MG tablet; Take 1 tablet (5 mg) by mouth daily  Dispense: 90 tablet; Refill: 3    5. Chronic tension-type headache, not intractable  Has been on mag for chronic migraines in addition to propranolol in past. Headaches seem controlled on just mag. No propranolol for now, reassess if headaches worsen.   - magnesium 250 MG tablet; Take 2 tablets (500 mg) by mouth daily  Dispense: 60 tablet; Refill:       6. Seasonal allergic rhinitis, unspecified trigger  Sx controlled when on zyrtec, refilled.   - cetirizine (ZYRTEC) 10 MG tablet; Take 1 tablet (10 mg) by mouth daily  Dispense: 30 tablet; Refill: 11    I also consulted Dr. Thrasher/Psych to see at what speed we should ramp up or change psychoactive medications.     Will have follow up with Dr Cadena or myself in 4 weeks. Pharmacy weekly for this month x3 until they see Dr. Cadena or I.           After Visit Information:  Patient declined AVS     No follow-ups on file.    Appointment end time:1421  This is a telephone visit that took 26 minutes.      Clinician location:  Gabriela Kellogg MD  I precepted today with [employer].

## 2020-04-28 NOTE — PROGRESS NOTES
Preceptor Attestation:   Patient spoken with and discussed with the resident. I have verified the content of the note, which accurately reflects my assessment of the patient and the plan of care.   Supervising Physician:  Meri Carlson MD

## 2020-04-29 ENCOUNTER — APPOINTMENT (OUTPATIENT)
Dept: INTERPRETER SERVICES | Facility: CLINIC | Age: 40
End: 2020-04-29
Payer: COMMERCIAL

## 2020-04-29 ENCOUNTER — TELEPHONE (OUTPATIENT)
Dept: PSYCHOLOGY | Facility: CLINIC | Age: 40
End: 2020-04-29

## 2020-04-29 NOTE — TELEPHONE ENCOUNTER
Psychiatry Referral: Sent a message to Dr. Thrasher to review and see what would be best next step.

## 2020-04-30 ENCOUNTER — TELEPHONE (OUTPATIENT)
Dept: FAMILY MEDICINE | Facility: CLINIC | Age: 40
End: 2020-04-30

## 2020-04-30 ENCOUNTER — APPOINTMENT (OUTPATIENT)
Dept: INTERPRETER SERVICES | Facility: CLINIC | Age: 40
End: 2020-04-30
Payer: COMMERCIAL

## 2020-04-30 NOTE — TELEPHONE ENCOUNTER
[facility] [[email or web address]] Family Medicine phone call message - order or referral request from patient:     Order or referral being requested: Requested order Verbal okay to delay home care to this weekend. Nurse was unable to reach patient until today.    Additional Details: Perla also had questions regarding the patients TB diagnosis/treatment.    Referral only -Specialty  Location     OK to leave a message on voice mail? Yes    Primary language: Cymro      needed? Yes    Call taken on April 30, 2020 at 2:32 PM by Kay Morales    Order request route to [facility] TRIAGE   Referrals Route to [facility] (Green/[place]/Purple) CARE COORDINATOR

## 2020-04-30 NOTE — TELEPHONE ENCOUNTER
RN returned call to Home Care Nurse to give verbal orders per protocol as requested. Nurse verbalized understanding.    In regards to TB, I relayed that there are no results in her chart that indicate that she has been diagnosed with TB. Perla mentioned that she saw it on the snapshot. I relayed that this was from 2011 and she since had a CT of the chest which did not show any abnormalities pointing to TB. She has also been seen multiple times with no complaints that would lead us to test her for TB.    Kailey Pace RN

## 2020-05-01 ENCOUNTER — TELEPHONE (OUTPATIENT)
Dept: FAMILY MEDICINE | Facility: CLINIC | Age: 40
End: 2020-05-01

## 2020-05-01 ENCOUNTER — MEDICAL CORRESPONDENCE (OUTPATIENT)
Dept: HEALTH INFORMATION MANAGEMENT | Facility: CLINIC | Age: 40
End: 2020-05-01

## 2020-05-01 ENCOUNTER — DOCUMENTATION ONLY (OUTPATIENT)
Dept: FAMILY MEDICINE | Facility: CLINIC | Age: 40
End: 2020-05-01

## 2020-05-01 DIAGNOSIS — F43.10 POSTTRAUMATIC STRESS DISORDER: ICD-10-CM

## 2020-05-01 DIAGNOSIS — F33.1 MAJOR DEPRESSIVE DISORDER, RECURRENT EPISODE, MODERATE (H): ICD-10-CM

## 2020-05-01 RX ORDER — TEMAZEPAM 15 MG/1
15 CAPSULE ORAL
Qty: 30 CAPSULE | Refills: 5 | Status: CANCELLED | OUTPATIENT
Start: 2020-05-01

## 2020-05-01 NOTE — TELEPHONE ENCOUNTER
Returned call to home care nurse to give verbal orders per protocol as requested. Nurse verbalized understanding.    RN went over medication list with her. I was able to further clarify what provider went over in visit so that she does not take the lisinopril as there was confusion today during home care visit. Patient did not take any medications today and her BP was 150/108. Home care nurse is going to monitor and let us know if it is elevated on days she does take medication.     She also is having difficulty sleeping and is hoping for her Restoril to be prescribed. Routing to PCP to send in restoril if appropriate.  Kailey Pace, RN     Kailey Pace, RN

## 2020-05-01 NOTE — PROGRESS NOTES
"When opening a documentation only encounter, be sure to enter in \"Chief Complaint\" Forms and in \" Comments\" Title of form, description if needed.    Carmen is a 40 year old  female  Form received via: Fax  Form now resides in: Provider Deisy Mercado CMA                  "

## 2020-05-01 NOTE — TELEPHONE ENCOUNTER
[facility] Family Medicine phone call message - order or referral request from patient:     Order or referral being requested: Requested order Skilled Nursing, 2 times a week for 1 week, 1 time a week for 8 weeks, 3 as needed. For medication management and treatment    Additional Details: Home care nurse would also like to go over the patients medication list       OK to leave a message on voice mail? Yes    Primary language: Cypriot      needed? Yes    Call taken on May 1, 2020 at 2:48 PM by Kay Morales    Order request route to [facility] TRIAGE   Referrals Route to [facility] (Green/[place]/Purple) CARE COORDINATOR

## 2020-05-04 ENCOUNTER — TELEPHONE (OUTPATIENT)
Dept: PSYCHIATRY | Facility: CLINIC | Age: 40
End: 2020-05-04

## 2020-05-04 NOTE — TELEPHONE ENCOUNTER
PSYCHIATRY CLINIC PHONE INTAKE     SERVICES REQUESTED / INTERESTED IN          Med Management    Presenting Problem and Brief History                              What would you like to be seen for? (brief description):  Pt stated she doesn't have bi-polar but experiences symptoms. She has symptoms of visual and auditory hallucinations, such as someone calling her or talking to her at home. This happens when she's alone, and she will walk out of the house following the voice calling her name. With her medications her symptoms are better, but she can't get them all refilled. She needs an assessment, before she get the rest of her refills. Her medications on file are up to date, however, she can't get a refill on her sleep medication, stress, and acid reflux, and hypertension medication. Pt gets nurse visit, monitering her blood pressure. She struggles with falling asleep without her medication. If she takes her other medications she's only able to sleep for 30 minutes.     Have you received a mental health diagnosis? unknown   Which one (s): never diagnosed  Is there any history of developmental delay?  No   Are you currently seeing a mental health provider?  No            Who / month last seen:  NA  Do you have mental health records elsewhere?  No  Will you sign a release so we can obtain them?  No    Have you ever been hospitalized for psychiatric reasons?  No  Describe:  NA    Do you have current thoughts of self-harm?  No    Do you currently have thoughts of harming others?  No       Substance Use History     Do you have any history of alcohol / illicit drug use?  No  Describe:  NA  Have you ever received treatment for this?  No    Describe:  NA     Social History     Who is the patient's a guardian?  No    Name / number:Ignacio Nelson (Son) - [phone] - Son isn't the guardian but takes care of his mother and is responsible.   Have you had an ACT team in last 12 months?  No  Describe: NA   Do you have any  current or past legal issues?  No  Describe: NA   OK to leave a detailed voicemail?  Yes    Medical/ Surgical History                                   Patient Active Problem List   Diagnosis     Essential hypertension     Gastroesophageal reflux disease without esophagitis     Shortened ID interval     Vitamin D deficiency     Posttraumatic stress disorder     Female circumcision     Chronic tension-type headache, not intractable     Schizoaffective disorder, bipolar type (H)     Neurocognitive deficits     Arthritis     Extra digits     Immigrant with language difficulty     Pulmonary tuberculosis confirmed by sputum microscopy     Recurrent major depression (H)          Medications             Current Outpatient Medications   Medication Sig Dispense Refill     acetaminophen (TYLENOL) 325 MG tablet Take 2 tablets (650 mg) by mouth every 4 hours as needed for mild pain 100 tablet 11     amLODIPine (NORVASC) 5 MG tablet Take 1 tablet (5 mg) by mouth daily 90 tablet 3     cetirizine (ZYRTEC) 10 MG tablet Take 1 tablet (10 mg) by mouth daily 30 tablet 11     diclofenac (VOLTAREN) 1 % topical gel Place 2 g onto the skin 3 times daily as needed for moderate pain 100 g 1     diphenhydrAMINE (BENADRYL) 25 MG tablet Take 1-2 tablets (25-50 mg) by mouth nightly as needed for itching or allergies 30 tablet 0     fluticasone (FLONASE) 50 MCG/ACT nasal spray Spray 2 sprays into both nostrils daily 15.8 mL 1     ibuprofen (ADVIL/MOTRIN) 400 MG tablet Take 1-2 tablets (400-800 mg) by mouth every 8 hours as needed for moderate pain 120 tablet 3     Incontinence Supply Disposable (DEPEND UNDERGARMENTS) MISC Use daily as needed 200 each 11     magnesium 250 MG tablet Take 2 tablets (500 mg) by mouth daily 60 tablet 3     multivitamin w/minerals (THERA-VIT-M) tablet Take 1 tablet by mouth daily 100 each 3     Omega-3 Fatty Acids (FISH OIL OMEGA-3) 1000 MG CAPS Take 2,000 mg by mouth daily 180 capsule 3     risperiDONE (RISPERDAL) 1  MG tablet Take 1 tablet (1 mg) by mouth At Bedtime 30 tablet 0     temazepam (RESTORIL) 15 MG capsule Take 1 capsule (15 mg) by mouth nightly as needed 30 capsule 5     venlafaxine (EFFEXOR-XR) 75 MG 24 hr capsule Take 1 capsule (75 mg) by mouth daily 30 capsule 0     venlafaxine (EFFEXOR-XR) 75 MG 24 hr capsule Take 1 capsule (75 mg) by mouth daily 30 capsule 11     vitamin D3 (CHOLECALCIFEROL) 2000 units (50 mcg) tablet Take 1 tablet (2,000 Units) by mouth daily 90 tablet 3         DISPOSITION      5/4/20 Intake complete. Sending to  for review.   Negar Austin,

## 2020-05-04 NOTE — TELEPHONE ENCOUNTER
Sent referral to psychiary.  They have contacted the patient for initial phone screen.  They will be calling back to schedule patient after Dr. Thrasher reviews the screen.

## 2020-05-05 ENCOUNTER — TELEPHONE (OUTPATIENT)
Dept: FAMILY MEDICINE | Facility: CLINIC | Age: 40
End: 2020-05-05

## 2020-05-05 DIAGNOSIS — I10 ESSENTIAL HYPERTENSION WITH GOAL BLOOD PRESSURE LESS THAN 130/85: Primary | ICD-10-CM

## 2020-05-05 NOTE — TELEPHONE ENCOUNTER
[facility] Family Medicine phone call message - order or referral request from patient:     Order or referral being requested: Requested order: BP cuff mcahine    Additional Details: none    OK to leave a message on voice mail? Yes    Primary language: [place]anian      needed? Yes    Call taken on May 5, 2020 at 11:49 AM by Nidhi Medel    Order request route to [facility] TRIAGE   Referrals Route to [facility] (Green/[place]/Purple) CARE COORDINATOR

## 2020-05-05 NOTE — TELEPHONE ENCOUNTER
RN ordered blood pressure monitor.  home medical automatically gets it through the order set, is what I was informed.  Kailey Pace RN

## 2020-05-06 NOTE — TELEPHONE ENCOUNTER
RN attempted reaching patient via language line and could not reach. Left message for her that her doctor would like to talk to her in a phone visit before the end of the month regarding her sleep since we just adjusted her other medications (did not list specific medication names in voicemail). Left callback number for her to schedule.  Kailey Pace RN

## 2020-05-06 NOTE — TELEPHONE ENCOUNTER
She only just resumed the effexor and risperdal - and risperdal should help with sleep. I don't want to add back restoril while she is titrating the effexor.  Will address at next visit (please make sure she has one before the end of May).    Ru

## 2020-05-06 NOTE — PROGRESS NOTES
Form has been completed by provider.     Form sent out via: Fax to [facility] at Fax Number: [phone]  Patient informed: n/a  Output date: May 6, 2020    Cass Jacinto MA      **Please close the encounter**

## 2020-05-18 ENCOUNTER — DOCUMENTATION ONLY (OUTPATIENT)
Dept: PSYCHOLOGY | Facility: CLINIC | Age: 40
End: 2020-05-18

## 2020-05-18 NOTE — PROGRESS NOTES
Disclaimer  The above treatment recommendations are based on consultation with the patient's primary care provider and a review of relevant information in EPIC.  I have not personally examined the patient.  All recommendations should be implemented with considerations of the patient's relevant prior history and current clinical status.  Please contact me with any questions about the care of this patient.    Primary Care Behavioral Health Consult Note    Requesting Provider: Dr. Kellogg    Assessment:  I have not personally met with or evaluated this patient.  See below for current problem list:    Patient Active Problem List   Diagnosis     Essential hypertension     Gastroesophageal reflux disease without esophagitis     Shortened IL interval     Vitamin D deficiency     Posttraumatic stress disorder     Female circumcision     Chronic tension-type headache, not intractable     Schizoaffective disorder, bipolar type (H)     Neurocognitive deficits     Arthritis     Extra digits     Immigrant with language difficulty     Pulmonary tuberculosis confirmed by sputum microscopy     Recurrent major depression (H)       PHQ-9 SCORE 12/3/2018 9/3/2019 4/28/2020   PHQ-9 Total Score 10 6 11       ANDRY-7 SCORE 11/23/2016 2/14/2017 9/3/2019   Total Score 15 12 8         Recommendations and Plan:    Mahad Marti, in that case, I would say your instinct was good. Just start at Effexor XR 75mg and risperidone 1mg for 1 week, then can increase to Effexor 150mg and risperidone 2mg and monitor for progress, checking in on her after another ~4 weeks. If she is doing better, could leave it, otherwise, could increase Effexor further if mood is still unsteady and could increase risperidone further for psychosis.       Would recommend checking for movement related issues, possible TD symptoms, due to long term antipsychotic use. Do you guys do any AIMS or DISCUS exams at hospitals? Essentially this is looking for any abnormal involuntary  movements of the mouth, body, extremities. TD involves non-rhythmic, random movements that patient may or may not have awareness of.       Also, annual labs to get while on second gen antipsychotics include CBC, CMP, A1c, and fasting lipids.    Let me know if there are any additional questions!    ----- Message -----    From: Figueroa Kellogg MD    Sent: 5/15/2020   1:17 PM CDT    To: Yunier Phelan MD    Subject: RE: another assessment and determination of *       Correct chart review would be okay.       Relatively well controlled on her medicines prior it seems but fell off the wagon. Has had 6 months without being on her full regimen. Was really just wanting instructions on how to safely ramp up her medications to her prior doses.       Figueroa       ----- Message -----    From: Yunier Phelan MD    Sent: 5/15/2020  10:23 AM CDT    To: Ana Samaniego PsyD, *    Subject: RE: another assessment and determination of *       Mahad Perez and Figueroa, I was going to see if it may be possible to just do a chart review on this person. The referral seemed to indicate that the main issue is that she has had trouble accessing her medications, so ran out, and experienced recurrence of symptoms.       Was the patient relatively stable prior to running out of medications? If the question is just how to safely restart her regimen, I could do an interaction check and provide recommendations for that via chart consult. If the question is whether further changes are needed in the context of the pandemic, I could do a med management consult with her. If the bigger issue is that she is having trouble with access to her meds, I can't really help with that.       Please clarify, and I am happy to help facilitate the needed action!    ----- Message -----    From: Ana Samaniego PsyD    Sent: 5/14/2020   8:38 PM CDT    To: Anita Thrasher MD    Subject: RE: another assessment and determination of *       Hi  - I was just checking to see if you have heard anything more about this patient getting an appt?  It doesn't look like she has an appt, so just wanted to reach out. Thank you, Ana    ----- Message -----    From: Anita Thrasher MD    Sent: 2020  10:02 PM CDT    To: Jim Herring MD, *    Subject: another assessment and determination of care       Dony AVINA have a handful, or so, of pts needing an assessment and the residents/ APRNs are full due to it being the end of the year and Miriam leaving/ transferring 400 pts to the APRNs.    This pt has psychosis and gets primary care at [facility] ([facility]).       Can she see you and then you decide what to do about follow-up?    Once stable, she can return to [facility].    There are happy to take care of serious mental illness once stabilized.    Thanks for considering.    Jo    ----- Message -----    From: Ana Samaniego PsyD    Sent: 2020   9:29 AM CDT    To: Anita Thrasher MD, *    Subject: RE: Order for NELL BAIN Jo - I wasn't sure, but thought that this looked someone that we should refer to the psychiatry clinic instead of a consult here?  Let me know what you think?  Thank you!    ----- Message -----    From: Lorene Garcia    Sent: 2020   3:48 PM CDT    To: Ana Samaniego PsyD    Subject: FW: Order for NELL BAIN                       Thank you!    ----- Message -----    From: Figueroa Kellogg MD    Sent: 2020   3:33 PM CDT    To: [facility] Referrals    Subject: Order for NELL BAIN Mohsen           [id number]               : 1980  F      [address]                                PCP: [id number]                                                CTR: [facility] [id number]         Name: NELL BAIN Date: 2020      Home: [phone]       Payor:                  [facility]     Plan:               [facility] CONNECT [place] ONLY    Sponsor Code:       [id number]    Subscriber ID:      [id number]    Subscriber Name:    NELL BAIN    Subscriber Address: [address] [place] [id number]       Effective From:     01/01/17    Effective To:          Group Number:       [id number]    Group Name  : Not Available          Date       Provider                   Department   Center             4/28/2020  16026-GZRIFIGUEROA KELLOGG*[facility] Owned       Order Date:4/28/2020    Ordering User:FIGUEROA KELLOGG [[email or web address]]    Encounter Provider:Figueroa Kellogg MD [[id number]]    Authorizing Provider: Meri Carlson MD [[id number]]    Department:[facility][[id number]]       Ordering Provider NPI: [id number]  Meri Carlson    [facility]~[address],  [address],    [facility]    [address]    Phone: [phone]             Procedure Requested      9074     BEHAVIORAL HEALTH REFERRAL ([facility]* [#[id number]]        Priority: Routine  Class: External referral        Comment:Referring MD: FIGUEROA KELLOGG                                 May leave message on voicemail: Yes      Associated Diagnoses        F25.0 Schizoaffective disorder, bipolar type (H)        Currently having suicida    l thoughts?  No           What type of referral is requested?  Psychiatry Consult           Reson for consult:  Med Rec for Psychosis Cmt: Patient with schizoaffective                           bipolar, PTSD. FOrmerly on effexor 225, risperdal 2mg                           night time, temezpam 15 PRN sleep. Stopped all meds othr                           than temazepam in Nov. Visual hallucinations back,                           slight decrease in mood    but no overt depression.           Further pertinent information/background:  Question on should we slowly                                                  ramp  up (and if so at what speed)                                                  effexor and risperal. I put her                                                  back on 75/1 mg .. was on 225 and                                                  2mg respective. Or should we                                                      change agents.           Referral should be tracked:  Yes             Carmen Mccoy [place]           [id number]               : 1980  F      [address] [place]                                PCP: [id number]                                            CTR: [facility]*

## 2020-05-20 ENCOUNTER — DOCUMENTATION ONLY (OUTPATIENT)
Dept: FAMILY MEDICINE | Facility: CLINIC | Age: 40
End: 2020-05-20

## 2020-05-20 NOTE — PROGRESS NOTES
"When opening a documentation only encounter, be sure to enter in \"Chief Complaint\" Forms and in \" Comments\" Title of form, description if needed.    Carmen is a 40 year old  female  Form received via: Fax  Form now resides in: Provider ELISABET Perez 4:22 PM May 20, 2020                  "

## 2020-05-21 DIAGNOSIS — F43.10 POSTTRAUMATIC STRESS DISORDER: ICD-10-CM

## 2020-05-21 DIAGNOSIS — F33.1 MAJOR DEPRESSIVE DISORDER, RECURRENT EPISODE, MODERATE (H): ICD-10-CM

## 2020-05-22 ENCOUNTER — VIRTUAL VISIT (OUTPATIENT)
Dept: FAMILY MEDICINE | Facility: CLINIC | Age: 40
End: 2020-05-22
Payer: COMMERCIAL

## 2020-05-22 DIAGNOSIS — F43.10 POSTTRAUMATIC STRESS DISORDER: ICD-10-CM

## 2020-05-22 DIAGNOSIS — I10 ESSENTIAL HYPERTENSION: ICD-10-CM

## 2020-05-22 DIAGNOSIS — K21.9 GASTROESOPHAGEAL REFLUX DISEASE, ESOPHAGITIS PRESENCE NOT SPECIFIED: ICD-10-CM

## 2020-05-22 DIAGNOSIS — F51.04 CHRONIC INSOMNIA: Primary | ICD-10-CM

## 2020-05-22 DIAGNOSIS — G44.229 CHRONIC TENSION-TYPE HEADACHE, NOT INTRACTABLE: ICD-10-CM

## 2020-05-22 RX ORDER — RISPERIDONE 2 MG/1
2 TABLET ORAL AT BEDTIME
Qty: 90 TABLET | Refills: 3 | Status: SHIPPED | OUTPATIENT
Start: 2020-05-22 | End: 2020-08-14

## 2020-05-22 RX ORDER — RISPERIDONE 1 MG/1
TABLET ORAL
Qty: 30 TABLET | Refills: 0 | OUTPATIENT
Start: 2020-05-22

## 2020-05-22 RX ORDER — FAMOTIDINE 20 MG/1
20 TABLET, FILM COATED ORAL 2 TIMES DAILY
Qty: 90 TABLET | Refills: 3 | Status: SHIPPED | OUTPATIENT
Start: 2020-05-22 | End: 2020-08-14

## 2020-05-22 RX ORDER — PROPRANOLOL HCL 60 MG
60 CAPSULE, EXTENDED RELEASE 24HR ORAL DAILY
Qty: 90 CAPSULE | Refills: 3 | Status: SHIPPED | OUTPATIENT
Start: 2020-05-22 | End: 2020-08-14

## 2020-05-22 NOTE — PROGRESS NOTES
Preceptor Attestation:    I talked to the patient on the phone and discussed the patient with the resident. I have verified the content of the note, which accurately reflects my assessment of the patient and the plan of care.   Supervising Physician:  Del Bonilla MD.

## 2020-05-22 NOTE — PATIENT INSTRUCTIONS
Start back propranolol 60mg at bedtime. This will help headaches , blood pressure.     Switch risperdal to night time. Try the two medicines at bedtime together. risperdal 2mg    Instead of doing tylenol and ibuprofen every day for headache space them out. Tylenol some days then ibuprofen some days. Eventually want to only use them 2-3 a week    Let your nurse know about these changes so she can update your pill box.

## 2020-05-22 NOTE — PROGRESS NOTES
"Family Medicine Telephone Visit Note           Telephone Visit Consent   Patient was verbally read the following and verbal consent was obtained.    \"Telephone visits are billed at different rates depending on your insurance coverage. During this emergency period, for some insurers they may be billed the same as an in-person visit.  Please reach out to your insurance provider with any questions.  If during the course of the call the physician/provider feels a telephone visit is not appropriate, you will not be charged for this service.\"    Name person giving consent:  Patient   Date verbal consent given:  5/22/2020  Time verbal consent given:  8:25 AM       Chief Complaint   Patient presents with     Sleep Problem     x 1 week, pt states to be unable o sleep     Hypertension     x 1 week, pt concerned about bp, yesterday pt has had a nurse come in to check her bp and it was 145/95     Medication Request     pt requesting for venlafaxine, propanolol 60mg, lisinopil 25mg, and alternative for ranitidine         Due to patient being non-English speaking/uses sign language, an  was used for this visit. Only for face-to-face interpretation by an external agency, date and length of interpretation can be found on the scanned worksheet.     name: Zander  Agency: [employer]  Language: Ghanaian   Telephone number: [phone]  Type of interpretation: Telephone, spoken         HPI   Patients name: Carmen  Appointment start time:  0848    Was able to get nurse to come out and do pill box. Nurse comes out weekly and also checks BP.     Headaches  Were doing well on magnesium solo. But came back and now Having them daily. Feels like center of head her typical headache. Did well on propranolol before. This headache is similar but more throbbing. Seems to notice link between headache and BP w headaches too. When -155 range headaches are there. Takes ibuprofen and tylenol every day for headaches. "       Current Outpatient Medications   Medication Sig Dispense Refill     acetaminophen (TYLENOL) 325 MG tablet Take 2 tablets (650 mg) by mouth every 4 hours as needed for mild pain 100 tablet 11     amLODIPine (NORVASC) 5 MG tablet Take 1 tablet (5 mg) by mouth daily 90 tablet 3     cetirizine (ZYRTEC) 10 MG tablet Take 1 tablet (10 mg) by mouth daily 30 tablet 11     diclofenac (VOLTAREN) 1 % topical gel Place 2 g onto the skin 3 times daily as needed for moderate pain 100 g 1     diphenhydrAMINE (BENADRYL) 25 MG tablet Take 1-2 tablets (25-50 mg) by mouth nightly as needed for itching or allergies 30 tablet 0     famotidine (PEPCID) 20 MG tablet Take 1 tablet (20 mg) by mouth 2 times daily 90 tablet 3     fluticasone (FLONASE) 50 MCG/ACT nasal spray Spray 2 sprays into both nostrils daily 15.8 mL 1     ibuprofen (ADVIL/MOTRIN) 400 MG tablet Take 1-2 tablets (400-800 mg) by mouth every 8 hours as needed for moderate pain 120 tablet 3     Incontinence Supply Disposable (DEPEND UNDERGARMENTS) MISC Use daily as needed 200 each 11     magnesium 250 MG tablet Take 2 tablets (500 mg) by mouth daily 60 tablet 3     multivitamin w/minerals (THERA-VIT-M) tablet Take 1 tablet by mouth daily 100 each 3     Omega-3 Fatty Acids (FISH OIL OMEGA-3) 1000 MG CAPS Take 2,000 mg by mouth daily 180 capsule 3     propranolol ER (INDERAL LA) 60 MG 24 hr capsule Take 1 capsule (60 mg) by mouth daily 90 capsule 3     risperiDONE (RISPERDAL) 2 MG tablet Take 1 tablet (2 mg) by mouth At Bedtime 90 tablet 3     venlafaxine (EFFEXOR-XR) 75 MG 24 hr capsule Take 1 capsule (75 mg) by mouth daily 30 capsule 0     venlafaxine (EFFEXOR-XR) 75 MG 24 hr capsule Take 1 capsule (75 mg) by mouth daily 30 capsule 11     vitamin D3 (CHOLECALCIFEROL) 2000 units (50 mcg) tablet Take 1 tablet (2,000 Units) by mouth daily 90 tablet 3     temazepam (RESTORIL) 15 MG capsule Take 1 capsule (15 mg) by mouth nightly as needed (Patient not taking: Reported on  "5/22/2020) 30 capsule 5     No Known Allergies           Review of Systems:     Constitutional, HEENT, cardiovascular, pulmonary, gi and gu systems are negative, except as otherwise noted.         Physical Exam:     There were no vitals taken for this visit.  Estimated body mass index is 34.34 kg/m  as calculated from the following:    Height as of 3/5/20: 1.595 m (5' 2.8\").    Weight as of 3/5/20: 87.4 kg (192 lb 9.6 oz).    Exam:  Constitutional: healthy, alert and no distress  Psychiatric: mood still worried mostly about not sleeping. Visual hallucinations still present. No auditory component           Assessment and Plan   1. Posttraumatic stress disorder    - risperiDONE (RISPERDAL) 2 MG tablet; Take 1 tablet (2 mg) by mouth At Bedtime  Dispense: 90 tablet; Refill: 3    2. Chronic insomnia      3. Essential hypertension      4. Chronic tension-type headache, not intractable    - propranolol ER (INDERAL LA) 60 MG 24 hr capsule; Take 1 capsule (60 mg) by mouth daily  Dispense: 90 capsule; Refill: 3    5. Gastroesophageal reflux disease, esophagitis presence not specified    - famotidine (PEPCID) 20 MG tablet; Take 1 tablet (20 mg) by mouth 2 times daily  Dispense: 90 tablet; Refill: 3    Refilled medications that would be required in the next 3 months.     Slowly getting patient back on to prior medications that had previously controlled mental health, BP, headache symptoms. Had issues with COVID interupting her routines and regular aid from sister/(daughter?). Daughter back to helping out again and is PCA. Home weekly nurse visits also going well for her. Pill box very helpful.     Insomnia, return migraines biggest sx. Will start back propranolol and increase risperdal to 2mg and switch to bedtime (was doing in AM). Sleep hygiene discussed,she does well on this on her own. Famotidine added.     Do have some concern there is med reboun effect of headache as well given she takes tylneol and iburprofen daily for " them and has for 1 month. Will try and de escalate to only tylenol or ibuprofen that day. Goal to end up with tylneol or ibuprofen only max 2 times a week. If not sleeping still or headache not better to call in for 1 week visit. Otherwise 2 week follow up on telephone w me or Maureen Thrasher has been helping titrate psych meds back up. Please see her most recent note. At next visit will address effexor. Did not want to make change today given insomnia. Pt is tired and doesn't have decreased need for sleep. Do not think current bipolar episode. Will need to see in person in 1 month for exam to monitor for movement related side effects of meds. AIMS exam     After Visit Information:  Will print and mail AVS     Return in about 2 weeks (around 6/5/2020) for headache and sleep. With me or maureen. .    Appointment end time: 9:24 AM  This is a telephone visit that took 29 minutes.      Clinician location:  Gabriela Kellogg MD  I precepted today with Satin.

## 2020-05-24 ENCOUNTER — DOCUMENTATION ONLY (OUTPATIENT)
Dept: PSYCHIATRY | Facility: CLINIC | Age: 40
End: 2020-05-24

## 2020-05-25 NOTE — PROGRESS NOTES
[facility]    There has been some back and forth exchange regarding this pt's meds.  Dr Phelan made some suggestions on the 18th. I have sent an Epic message to the primary care team inquiring if further assistance is needed. We can see this person in the Psychiatry Clinic but an appt is not currently scheduled. Plan to communicate with the primary care team.    Anita Thrasher MD  Medical Director

## 2020-06-03 NOTE — PROGRESS NOTES
Form has been completed by provider.     Form sent out via: Fax to [facility] at Fax Number: [phone]  Patient informed: n/a  Output date: Sandy 3, 2020    Cass Jacinto MA      **Please close the encounter**

## 2020-06-11 ENCOUNTER — VIRTUAL VISIT (OUTPATIENT)
Dept: FAMILY MEDICINE | Facility: CLINIC | Age: 40
End: 2020-06-11
Payer: COMMERCIAL

## 2020-06-11 DIAGNOSIS — F33.1 MAJOR DEPRESSIVE DISORDER, RECURRENT EPISODE, MODERATE (H): ICD-10-CM

## 2020-06-11 DIAGNOSIS — J30.2 SEASONAL ALLERGIC RHINITIS, UNSPECIFIED TRIGGER: ICD-10-CM

## 2020-06-11 DIAGNOSIS — I10 ESSENTIAL HYPERTENSION: ICD-10-CM

## 2020-06-11 DIAGNOSIS — G44.229 CHRONIC TENSION-TYPE HEADACHE, NOT INTRACTABLE: Primary | ICD-10-CM

## 2020-06-11 DIAGNOSIS — F25.0 SCHIZOAFFECTIVE DISORDER, BIPOLAR TYPE (H): ICD-10-CM

## 2020-06-11 DIAGNOSIS — F43.10 POSTTRAUMATIC STRESS DISORDER: ICD-10-CM

## 2020-06-11 NOTE — TELEPHONE ENCOUNTER
"Request for medication refill: Benadryl    Providers if patient needs an appointment and you are willing to give a one month supply please refill for one month and  send a letter/MyChart using \".SMILLIMITEDREFILL\" .smillimited and route chart to \"P SMI \" (Giving one month refill in non controlled medications is strongly recommended before denial)    If refill has been denied, meaning absolutely no refills without visit, please complete the smart phrase \".smirxrefuse\" and route it to the \"P SMI MED REFILLS\"  pool to inform the patient and the pharmacy.    Kailey Pace RN       "

## 2020-06-11 NOTE — PROGRESS NOTES
Preceptor Attestation:   I talked to the patient on the phone. I discussed the patient with the resident. I have verified the content of the note, which accurately reflects my assessment of the patient and the plan of care.   Supervising Physician:  Sophia Orozco MD.

## 2020-06-11 NOTE — Clinical Note
Please schedule with Dr. Kellogg or Surinder only and before July if possible. If unable to do so call patient and have this be in early August instead.

## 2020-06-11 NOTE — PROGRESS NOTES
"Family Medicine Telephone Visit Note               Telephone Visit Consent   Patient was verbally read the following and verbal consent was obtained.    \"Telephone visits are billed at different rates depending on your insurance coverage. During this emergency period, for some insurers they may be billed the same as an in-person visit.  Please reach out to your insurance provider with any questions.  If during the course of the call the physician/provider feels a telephone visit is not appropriate, you will not be charged for this service.\"    Name person giving consent:  Patient   Date verbal consent given:  6/11/2020  Time verbal consent given:  2:43 PM       Chief Complaint   Patient presents with     RECHECK        Due to patient being non-English speaking/uses sign language, an  was used for this visit. Only for face-to-face interpretation by an external agency, date and length of interpretation can be found on the scanned worksheet.     name: Zander Harrell  Language: Gabonese   Agency: [employer]   Phone number: [phone]  Type of interpretation: Face-to-face, spoken         HPI   Patients name: Carmen  Appointment start time: 1455  Check up    Sleep  Added back effexor in AM for mood. Zyrtec, risperdal at bed time, propranolol at bed time all were added last visit. Doesn't use benadryl at bedtime. Sleeping much better after adding propranolol and risperdal back. This is most important change and very important to her. Very thankful.     BP  Back on amlodipine, propranolol. 133/90. Was in 160s usually. Hasn't seen number over 140 when nurse comes out to do pill bottle since adding on propranolol.     Psych   Depression, hallucinations, schizoaffective disorder   Back on effexor 75 daily and risperdal at bedtime. Risperdal is back to 2mg dose. Effexor room to go up; have discussed with Dr. Thrasher about taking it to 150.  Hallucinations have come down in intensity. 2 times a week or so. The best " her hallucinations have ever been but would like to have them gone completely. Anxiety still present and moderate severity. No thoughts self harm, mood is much much better with better sleep.       Has a therapist sees twice a week. Things going well there.     Headaches   No more tension headaches after starting back propranolol.     Current Outpatient Medications   Medication Sig Dispense Refill     acetaminophen (TYLENOL) 325 MG tablet Take 2 tablets (650 mg) by mouth every 4 hours as needed for mild pain 100 tablet 11     amLODIPine (NORVASC) 5 MG tablet Take 1 tablet (5 mg) by mouth daily 90 tablet 3     cetirizine (ZYRTEC) 10 MG tablet Take 1 tablet (10 mg) by mouth daily 30 tablet 11     diclofenac (VOLTAREN) 1 % topical gel Place 2 g onto the skin 3 times daily as needed for moderate pain 100 g 1     diphenhydrAMINE (BENADRYL) 25 MG tablet Take 1-2 tablets (25-50 mg) by mouth nightly as needed for itching or allergies 30 tablet 0     famotidine (PEPCID) 20 MG tablet Take 1 tablet (20 mg) by mouth 2 times daily 90 tablet 3     fluticasone (FLONASE) 50 MCG/ACT nasal spray Spray 2 sprays into both nostrils daily 15.8 mL 1     ibuprofen (ADVIL/MOTRIN) 400 MG tablet Take 1-2 tablets (400-800 mg) by mouth every 8 hours as needed for moderate pain 120 tablet 3     Incontinence Supply Disposable (DEPEND UNDERGARMENTS) MISC Use daily as needed 200 each 11     magnesium 250 MG tablet Take 2 tablets (500 mg) by mouth daily 60 tablet 3     multivitamin w/minerals (THERA-VIT-M) tablet Take 1 tablet by mouth daily 100 each 3     Omega-3 Fatty Acids (FISH OIL OMEGA-3) 1000 MG CAPS Take 2,000 mg by mouth daily 180 capsule 3     propranolol ER (INDERAL LA) 60 MG 24 hr capsule Take 1 capsule (60 mg) by mouth daily 90 capsule 3     risperiDONE (RISPERDAL) 2 MG tablet Take 1 tablet (2 mg) by mouth At Bedtime 90 tablet 3     temazepam (RESTORIL) 15 MG capsule Take 1 capsule (15 mg) by mouth nightly as needed (Patient not taking:  "Reported on 5/22/2020) 30 capsule 5     venlafaxine (EFFEXOR-XR) 75 MG 24 hr capsule Take 1 capsule (75 mg) by mouth daily 30 capsule 0     venlafaxine (EFFEXOR-XR) 75 MG 24 hr capsule Take 1 capsule (75 mg) by mouth daily 30 capsule 11     vitamin D3 (CHOLECALCIFEROL) 2000 units (50 mcg) tablet Take 1 tablet (2,000 Units) by mouth daily 90 tablet 3     No Known Allergies           Review of Systems:     Constitutional, HEENT, cardiovascular, pulmonary, gi and gu systems are negative, except as otherwise noted.         Physical Exam:     There were no vitals taken for this visit.  Estimated body mass index is 34.34 kg/m  as calculated from the following:    Height as of 3/5/20: 1.595 m (5' 2.8\").    Weight as of 3/5/20: 87.4 kg (192 lb 9.6 oz).    Exam:  Constitutional: healthy, alert and no distress  Psychiatric: mentation appears normal and affect normal/bright. Clearer speaking, easier to keep on track than past visits. Does endorse visual hallucinations but decreased in frequency             Assessment and Plan   1. Chronic tension-type headache, not intractable  Back controlled on propranolol. Possible that this is actually more migraine like given improvement with prop. Continue current therapy     2. Essential hypertension  Controlled during weekly nurse visits. Continue amlodipine/proprnaol    3. Schizoaffective disorder, bipolar type (H)  risperdal back to regular dose. Have discussed case w Dr. Thrasher. Have not re added benzo she used at night time for sleep only and would try not to if it is not necessary. Anxiety symptoms not complteely controlled yet. Will continue to increase effexor as discussed with Dr. Thrasher. Significant improvement in hallucinations. Patient has had these for years.. her goal would be to get rid of them completely. Would like to see her at stable effexor, risperdal dose for 1 month at least then can reach out to psych to see if risks/benefits of trying to further increase medications " to decrease hallucinations would be beneficial.  Continues in therapy   - venlafaxine (EFFEXOR-XR) 150 MG 24 hr capsule; Take 1 capsule (150 mg) by mouth daily  Dispense: 30 capsule; Refill: 3    4. Major depressive disorder, recurrent episode, moderate (H)  See above   - venlafaxine (EFFEXOR-XR) 150 MG 24 hr capsule; Take 1 capsule (150 mg) by mouth daily  Dispense: 30 capsule; Refill: 3    5. Posttraumatic stress disorder  See above   - venlafaxine (EFFEXOR-XR) 150 MG 24 hr capsule; Take 1 capsule (150 mg) by mouth daily  Dispense: 30 capsule; Refill: 3    Refilled medications that would be required in the next 3 months.     Follow up before June ends for a in person exam. She would like to do this with me and I leave in July for a month so we decided to do this now instead of August. AIMS exam looking for extrapyramidal symptoms will be the most pertinent part of that visit. Also will need labs monitoring for metabolic syndrome.     After Visit Information:  Patient declined AVS     No follow-ups on file.    Appointment end time: 1522  This is a telephone visit that took 26 minutes.      Clinician location:  [facility]      Follow up for in person AIMS exam to monitor for movement issues from antipsychotics and lab monitoring.   Figueroa Kellogg MD  I precepted today with Pam .

## 2020-06-15 RX ORDER — DIPHENHYDRAMINE HCL 25 MG
25-50 TABLET ORAL
Qty: 30 TABLET | Refills: 0 | Status: SHIPPED | OUTPATIENT
Start: 2020-06-15 | End: 2020-08-07

## 2020-06-17 RX ORDER — VENLAFAXINE HYDROCHLORIDE 150 MG/1
150 CAPSULE, EXTENDED RELEASE ORAL DAILY
Qty: 30 CAPSULE | Refills: 3 | Status: SHIPPED | OUTPATIENT
Start: 2020-06-17 | End: 2020-08-14

## 2020-06-25 ENCOUNTER — TELEPHONE (OUTPATIENT)
Dept: FAMILY MEDICINE | Facility: CLINIC | Age: 40
End: 2020-06-25

## 2020-06-25 NOTE — TELEPHONE ENCOUNTER
Returned call to home care nurse to give verbal orders per protocol as requested. Nurse verbalized understanding.    Kailey Pace RN

## 2020-06-26 ENCOUNTER — OFFICE VISIT (OUTPATIENT)
Dept: FAMILY MEDICINE | Facility: CLINIC | Age: 40
End: 2020-06-26
Payer: COMMERCIAL

## 2020-06-26 VITALS
TEMPERATURE: 98 F | RESPIRATION RATE: 18 BRPM | OXYGEN SATURATION: 99 % | DIASTOLIC BLOOD PRESSURE: 79 MMHG | SYSTOLIC BLOOD PRESSURE: 121 MMHG | HEART RATE: 110 BPM | BODY MASS INDEX: 35.96 KG/M2 | HEIGHT: 62 IN | WEIGHT: 195.4 LBS

## 2020-06-26 DIAGNOSIS — R42 DIZZINESS: Primary | ICD-10-CM

## 2020-06-26 DIAGNOSIS — G44.229 CHRONIC TENSION-TYPE HEADACHE, NOT INTRACTABLE: ICD-10-CM

## 2020-06-26 DIAGNOSIS — I10 ESSENTIAL HYPERTENSION: ICD-10-CM

## 2020-06-26 DIAGNOSIS — F25.0 SCHIZOAFFECTIVE DISORDER, BIPOLAR TYPE (H): ICD-10-CM

## 2020-06-26 LAB
ALBUMIN SERPL-MCNC: 3.8 MG/DL (ref 3.8–5)
ALP SERPL-CCNC: 88.5 U/L (ref 31.7–110.5)
ALT SERPL-CCNC: 28.8 U/L (ref 0–45)
AST SERPL-CCNC: 23.8 U/L (ref 0–45)
BILIRUB SERPL-MCNC: <0.4 MG/DL (ref 0.2–1.3)
BUN SERPL-MCNC: 11.6 MG/DL (ref 7–19)
CALCIUM SERPL-MCNC: 9.7 MG/DL (ref 8.5–10.1)
CHLORIDE SERPLBLD-SCNC: 101.3 MMOL/L (ref 98–110)
CHOLEST SERPL-MCNC: 221.8 MG/DL (ref 0–200)
CHOLEST/HDLC SERPL: 5.1 {RATIO} (ref 0–5)
CO2 SERPL-SCNC: 24.9 MMOL/L (ref 20–32)
CREAT SERPL-MCNC: 0.6 MG/DL (ref 0.5–1)
GFR SERPL CREATININE-BSD FRML MDRD: >90 ML/MIN/1.7 M2
GLUCOSE SERPL-MCNC: 154.5 MG'DL (ref 70–99)
HBA1C MFR BLD: 5.8 % (ref 4.1–5.7)
HDLC SERPL-MCNC: 43.1 MG/DL
LDLC SERPL CALC-MCNC: 141 MG/DL (ref 0–129)
POTASSIUM SERPL-SCNC: 3.7 MMOL/L (ref 3.3–4.5)
PROT SERPL-MCNC: 8 G/DL (ref 6.8–8.8)
SODIUM SERPL-SCNC: 134.4 MMOL/L (ref 132.6–141.4)
TRIGL SERPL-MCNC: 186.9 MG/DL (ref 0–150)
VLDL CHOLESTEROL: 37.4 MG/DL (ref 7–32)

## 2020-06-26 ASSESSMENT — ENCOUNTER SYMPTOMS
CONSTIPATION: 0
COUGH: 0
MYALGIAS: 0
DIARRHEA: 0
HEADACHES: 0
WEAKNESS: 0
UNEXPECTED WEIGHT CHANGE: 0
WHEEZING: 0
ABDOMINAL PAIN: 0
BLOOD IN STOOL: 0
HEMATURIA: 0
ARTHRALGIAS: 0
VOMITING: 0
NAUSEA: 0
FEVER: 0
DYSURIA: 0
SHORTNESS OF BREATH: 0

## 2020-06-26 ASSESSMENT — MIFFLIN-ST. JEOR: SCORE: 1499.58

## 2020-06-26 NOTE — PROGRESS NOTES
Preceptor Attestation:    Patient seen and evaluated in person. I discussed the patient with the resident. I have verified the content of the note, which accurately reflects my assessment of the patient and the plan of care.   Supervising Physician:  Eric Iqbal MD.

## 2020-06-26 NOTE — PROGRESS NOTES
Due to patient being non-English speaking/uses sign language, an  was used for this visit. Only for face-to-face interpretation by an external agency, date and length of interpretation can be found on the scanned worksheet.     name: Zander Harrell  Language: Venezuelan   Agency: [employer]   Phone number: [phone]  Type of interpretation: Telephone, spoken    .Cass Jacinto MA

## 2020-06-26 NOTE — PATIENT INSTRUCTIONS
On your bed keep 8 oz of cold water. Drink the water while still in bed. Then sit up slowly and pump your legs like I showed you for 20 seconds. And then finally stand up slowly.     - if still feeling dizzy after trying this for a couple of days call clinic and we will do a different plan.     Will call you with results of your labwork     Thank you for coming to [facility].    **If you had lab testing today and your results are reassuring or normal they will be be mailed to you or sent to your MyChart and you will be notified by email within 7 days.   **If the lab tests need quick action we will call you with the results.  The phone number we will call with results is # [phone] (home)    (home) . If this is not the best number please call our clinic and change the number.  **If any referrals were ordered today you should be getting a call in the next week.  If you don't hear about this within a week please call one of the following numbers   If your referral is for [facility] please call [phone]  If your referral is to outside [facility] please call the clinic number ([phone]) and ask for your team care coordinator.  If you need any refills please call your pharmacy and they will contact us.  If you have any concerns about today's visit or wish to schedule another appointment  please call our office during normal business hours  [phone] (8-5:00 M-F)  If you have urgent medical concerns please call [phone] at any time of the day.  If you have a medical emergency please call [phone]    Again thank you for choosing [facility] and please let us know how we can best partner with you to improve your and your family's health.

## 2020-06-26 NOTE — PROGRESS NOTES
"       HPI       Carmen Connolly is a 40 year old  who presents for   Chief Complaint   Patient presents with     Follow Up     Dizziness, Pt states it comes and goes but is better since her last visit       A [place] African  was used for  this visit.    +++++++      Dizziness   - feels it most prominent in AM when waking up. When stands up out of bed feels room spin. Brief, 5-10 seconds. No falls, hearing changes, tinnitus.   - amlodipine, propranolol higher risk medicines for it.         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     Priority: Medium     Extra digit on L hand hanging next to 5th digit on a stalk, appears healthy (she considers it \"davon\" and likes it, usually holding it in her palm)       Arthritis 02/21/2017     Priority: Medium     Schizoaffective disorder, bipolar type (H) 01/17/2017     Priority: Medium     See Psychological Evaluation from [facility] (3/2016) in Media Tab; reports occasional visual hallucinations (especially when alone); recommend ongoing PCA services, case management and possible [facility] services       Neurocognitive deficits 01/17/2017     Priority: Medium     See [facility] Psychological evaluation (3/2016) in Media tab; determined to be unable to make legal and financial decisions; recommended that she obtain guardianship or conservatorship       Vitamin D deficiency 04/15/2016     Priority: Medium     Posttraumatic stress disorder 04/15/2016     Priority: Medium     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     Priority: Medium     Chronic tension-type headache, not intractable 04/15/2016     Priority: Medium     Shortened [place] interval 04/06/2016     Priority: Medium     Gastroesophageal reflux disease without esophagitis 02/24/2016     Priority: Medium     Essential hypertension 02/23/2016     Priority: Medium     Recurrent major depression (H) 05/07/2012     Priority: Medium     Pulmonary " tuberculosis confirmed by sputum microscopy 08/18/2011     Priority: Medium     Immigrant with language difficulty 06/14/2011     Priority: Medium   ,     Current Outpatient Medications   Medication Sig Dispense Refill     acetaminophen (TYLENOL) 325 MG tablet Take 2 tablets (650 mg) by mouth every 4 hours as needed for mild pain 100 tablet 11     amLODIPine (NORVASC) 5 MG tablet Take 1 tablet (5 mg) by mouth daily 90 tablet 3     cetirizine (ZYRTEC) 10 MG tablet Take 1 tablet (10 mg) by mouth daily 30 tablet 11     diclofenac (VOLTAREN) 1 % topical gel Place 2 g onto the skin 3 times daily as needed for moderate pain 100 g 1     diphenhydrAMINE (BENADRYL) 25 MG tablet Take 1-2 tablets (25-50 mg) by mouth nightly as needed for itching or allergies 30 tablet 0     famotidine (PEPCID) 20 MG tablet Take 1 tablet (20 mg) by mouth 2 times daily 90 tablet 3     fluticasone (FLONASE) 50 MCG/ACT nasal spray Spray 2 sprays into both nostrils daily 15.8 mL 1     ibuprofen (ADVIL/MOTRIN) 400 MG tablet Take 1-2 tablets (400-800 mg) by mouth every 8 hours as needed for moderate pain 120 tablet 3     Incontinence Supply Disposable (DEPEND UNDERGARMENTS) MISC Use daily as needed 200 each 11     magnesium 250 MG tablet Take 2 tablets (500 mg) by mouth daily 60 tablet 3     multivitamin w/minerals (THERA-VIT-M) tablet Take 1 tablet by mouth daily 100 each 3     Omega-3 Fatty Acids (FISH OIL OMEGA-3) 1000 MG CAPS Take 2,000 mg by mouth daily 180 capsule 3     propranolol ER (INDERAL LA) 60 MG 24 hr capsule Take 1 capsule (60 mg) by mouth daily 90 capsule 3     risperiDONE (RISPERDAL) 2 MG tablet Take 1 tablet (2 mg) by mouth At Bedtime 90 tablet 3     temazepam (RESTORIL) 15 MG capsule Take 1 capsule (15 mg) by mouth nightly as needed 30 capsule 5     venlafaxine (EFFEXOR-XR) 150 MG 24 hr capsule Take 1 capsule (150 mg) by mouth daily 30 capsule 3     vitamin D3 (CHOLECALCIFEROL) 2000 units (50 mcg) tablet Take 1 tablet (2,000 Units)  "by mouth daily 90 tablet 3   ,   No Known Allergies.    Patient is an established patient of this clinic..         Review of Systems:   Review of Systems   Constitutional: Positive for fatigue. Negative for fever and unexpected weight change.   Respiratory: Negative for cough, shortness of breath and wheezing.    Cardiovascular: Negative for chest pain.   Gastrointestinal: Negative for abdominal pain, blood in stool, constipation, diarrhea, nausea and vomiting.   Genitourinary: Negative for dysuria and hematuria.   Musculoskeletal: Negative for arthralgias and myalgias.   Neurological: Negative for weakness and headaches.   Psychiatric/Behavioral: Positive for sleep disturbance. Negative for decreased concentration, dysphoric mood and suicidal ideas. The patient is nervous/anxious.             Physical Exam:     Vitals:    06/26/20 1006 06/26/20 1007   BP: 122/81 121/79   Pulse: 110    Resp: 18    Temp: 98  F (36.7  C)    TempSrc: Oral    SpO2: 99%    Weight: 88.6 kg (195 lb 6.4 oz)    Height: 1.575 m (5' 2\")      Body mass index is 35.74 kg/m .  Vitals were reviewed and were normal     Physical Exam  Constitutional:       General: She is not in acute distress.     Appearance: She is well-developed.   HENT:      Head: Normocephalic and atraumatic.   Eyes:      Conjunctiva/sclera: Conjunctivae normal.   Cardiovascular:      Rate and Rhythm: Normal rate and regular rhythm.      Heart sounds: Normal heart sounds.   Pulmonary:      Effort: Pulmonary effort is normal. No respiratory distress.   Abdominal:      General: Bowel sounds are normal. There is no distension.      Palpations: Abdomen is soft.      Tenderness: There is no abdominal tenderness.   Musculoskeletal: Normal range of motion.      Comments: No tardive symptoms or any sporadic movements of hands, extremities, tongue.     AIMS exam score 2 only for mildly poor halfway All other parts 0..    Skin:     General: Skin is warm.      Capillary Refill: " Capillary refill takes less than 2 seconds.      Findings: No rash.   Neurological:      Mental Status: She is alert and oriented to person, place, and time.           Results:   Results are ordered and pending    Assessment and Plan        1. Dizziness  Unlikely central cause, or BPPV. Only occurring upong standing first thing in AM suspect BP. See patient instructions below patient wanted to trial non medical intervention first. If fails would stop amlodipine and watch BP closely (wouldn't stop propranolol as this controls migraines).       2. Essential hypertension  Stable CTM    3. Chronic tension-type headache, not intractable  Stable CTM    4. Schizoaffective disorder, bipolar type (H)    Monitoring for antipsychotics long term done today. Labs show early pre diabetes. Pt made aware things could work on diet and doesn't want any more meds at this time. If weight not down at 2 months visit would re discuss lifestyle changes and possible metformin/statin. AIMS non concerning. Repeat labs/exam in 1 year.   - Comprehensive Metabolic Panel ([facility])  - Hemoglobin A1c ([facility])  - Lipid [place] ([facility])    .    On your bed keep 8 oz of cold water. Drink the water while still in bed. Then sit up slowly and pump your legs like I showed you for 20 seconds. And then finally stand up slowly.     - if still feeling dizzy after trying this for a couple of days call clinic and we will do a different plan.     Will call you with results of your labwork      There are no discontinued medications.    Options for treatment and follow-up care were reviewed with the patient. Carmen Connolly  engaged in the decision making process and verbalized understanding of the options discussed and agreed with the final plan.    Figueroa Kellogg MD   Home

## 2020-06-29 ASSESSMENT — ENCOUNTER SYMPTOMS
SLEEP DISTURBANCE: 1
NERVOUS/ANXIOUS: 1
DECREASED CONCENTRATION: 0
FATIGUE: 1
DYSPHORIC MOOD: 0

## 2020-06-30 ENCOUNTER — MEDICAL CORRESPONDENCE (OUTPATIENT)
Dept: HEALTH INFORMATION MANAGEMENT | Facility: CLINIC | Age: 40
End: 2020-06-30

## 2020-07-02 ENCOUNTER — APPOINTMENT (OUTPATIENT)
Dept: INTERPRETER SERVICES | Facility: CLINIC | Age: 40
End: 2020-07-02
Payer: COMMERCIAL

## 2020-07-07 ENCOUNTER — TELEPHONE (OUTPATIENT)
Dept: PSYCHOLOGY | Facility: CLINIC | Age: 40
End: 2020-07-07

## 2020-07-07 NOTE — TELEPHONE ENCOUNTER
This pt has an eval in the psych clinic on 7/22.   Please do what you can to help her get there.   Rescheduling, soon, will be challenging.   Jo    Previous Messages     ----- Message -----   From: Negar Austin   Sent: 7/2/2020   3:24 PM CDT   To: Jim Herring MD, Anita Thrasher MD, *   Subject: RE: See for assessment and brief care?           She's scheduled for a GET Eval w/  and  on 7/22/20     Negar HOYOS   ----- Message -----   From: Anita Thrasher MD   Sent: 5/10/2020  11:02 PM CDT   To: Jim Herring MD, Psychiatry Intake[facility]   Subject: See for assessment and brief care?               Dony,   Does this pt seem like someone you could do an eval with, and brief care, until we can transfer to [address] or [facility] around end of August?   If so, please let INTAKE know and they will tell you how to loop  in .   Thanks for considering.   Jo   ----- Message -----   From: Negar Austin   Sent: 5/4/2020   3:25 PM CDT   To: Anita Thrasher MD

## 2020-07-15 ENCOUNTER — DOCUMENTATION ONLY (OUTPATIENT)
Dept: FAMILY MEDICINE | Facility: CLINIC | Age: 40
End: 2020-07-15

## 2020-07-16 NOTE — PROGRESS NOTES
Form has been completed by provider.     Form sent out via: Fax to [employer] at Fax Number: [phone]  Patient informed: n/a  Output date: July 16, 2020    Cass Jacinto MA      **Please close the encounter**

## 2020-07-20 ENCOUNTER — TELEPHONE (OUTPATIENT)
Dept: FAMILY MEDICINE | Facility: CLINIC | Age: 40
End: 2020-07-20

## 2020-07-20 NOTE — TELEPHONE ENCOUNTER
Attempted to call patient about Psych appt on 7.22 with [employer] voice line. Unable to leave VM phone range without answering.     Figueroa Kellogg MD

## 2020-07-23 DIAGNOSIS — J30.2 SEASONAL ALLERGIC RHINITIS, UNSPECIFIED TRIGGER: ICD-10-CM

## 2020-07-23 RX ORDER — DIPHENHYDRAMINE HYDROCHLORIDE 25 MG/1
CAPSULE ORAL
COMMUNITY
Start: 2020-06-15 | End: 2020-08-07

## 2020-07-23 NOTE — TELEPHONE ENCOUNTER

## 2020-07-27 NOTE — TELEPHONE ENCOUNTER
Can you call this patient to schedule a f/u with Dr. Kellogg since the psychiatry appt did not happen please? Thank you    Diane Gil Mitchell Garrison, MD; Ana Samaniego PsyD; Lorene Garcia    Caller: Unspecified (2 months ago)               Dr Kellogg     I apologize for the late response. It looks like the clinic was not able to confirm the appointment and it was canceled.     Diane covering for Lorene while on vacation    Previous Messages     ----- Message -----   From: Figueroa Kellogg MD   Sent: 7/20/2020   9:36 AM CDT   To: Ana Samaniego PsyD, *   Subject: RE: See for assessment and brief care?           Will be calling her today..     Is the location [facility] in the [facility]? I was assuming it was virtual but looks like it will be in person today. Just wanted to make sure I gave her the correct info.     ----- Message -----   From: Ana Samaniego PsyD   Sent: 7/10/2020  11:40 AM CDT   To: Figueroa Kellogg MD   Subject: RE: See for assessment and brief care?           I think that will be perfect! Thanks!   ----- Message -----   From: Figueroa Kellogg MD   Sent: 7/7/2020  11:41 AM CDT   To: Ana Samaniego PsyD   Subject: RE: See for assessment and brief care?           Sure thing! I set a reminder on my calendar to call them on 7.20 for reminder.       Or do you think it would be better to call earlier? She did quite well coming early to my in person appointment last time!     Kunal   ----- Message -----   From: Ana Samaniego PsyD   Sent: 7/7/2020  10:51 AM CDT   To: Figueroa Kellogg MD, *   Subject: RE: See for assessment and brief care?           Hi Dr. Kellogg - Could you call this patient/family to encourage them to attend this visit on 7/22?  You always seem to have such a great relationship with your patients, I just thought it might be helpful if they had a little boost from you  :)  I am assuming it is virtual.  Lorene - could you call the patient/family closer to the time of the visit as a reminder and to see if there are any barriers to them attending the appointment?  Thank you!

## 2020-08-07 RX ORDER — DIPHENHYDRAMINE HCL 25 MG
25-50 TABLET ORAL
Qty: 30 TABLET | Refills: 0 | Status: SHIPPED | OUTPATIENT
Start: 2020-08-07 | End: 2020-08-14

## 2020-08-07 RX ORDER — DIPHENHYDRAMINE HYDROCHLORIDE 25 MG/1
CAPSULE ORAL
Status: CANCELLED | OUTPATIENT
Start: 2020-08-07

## 2020-08-10 ENCOUNTER — APPOINTMENT (OUTPATIENT)
Dept: INTERPRETER SERVICES | Facility: CLINIC | Age: 40
End: 2020-08-10
Payer: COMMERCIAL

## 2020-08-14 ENCOUNTER — OFFICE VISIT (OUTPATIENT)
Dept: FAMILY MEDICINE | Facility: CLINIC | Age: 40
End: 2020-08-14
Payer: COMMERCIAL

## 2020-08-14 VITALS
WEIGHT: 198.8 LBS | RESPIRATION RATE: 16 BRPM | SYSTOLIC BLOOD PRESSURE: 120 MMHG | DIASTOLIC BLOOD PRESSURE: 85 MMHG | TEMPERATURE: 98.2 F | OXYGEN SATURATION: 97 % | HEIGHT: 63 IN | HEART RATE: 101 BPM | BODY MASS INDEX: 35.22 KG/M2

## 2020-08-14 DIAGNOSIS — F33.1 MAJOR DEPRESSIVE DISORDER, RECURRENT EPISODE, MODERATE (H): ICD-10-CM

## 2020-08-14 DIAGNOSIS — M22.2X2 PATELLOFEMORAL ARTHRALGIA OF BOTH KNEES: Primary | ICD-10-CM

## 2020-08-14 DIAGNOSIS — G47.00 INSOMNIA, UNSPECIFIED TYPE: ICD-10-CM

## 2020-08-14 DIAGNOSIS — G43.809 OTHER MIGRAINE WITHOUT STATUS MIGRAINOSUS, NOT INTRACTABLE: ICD-10-CM

## 2020-08-14 DIAGNOSIS — F25.0 SCHIZOAFFECTIVE DISORDER, BIPOLAR TYPE (H): ICD-10-CM

## 2020-08-14 DIAGNOSIS — M22.2X1 PATELLOFEMORAL ARTHRALGIA OF BOTH KNEES: Primary | ICD-10-CM

## 2020-08-14 DIAGNOSIS — M54.50 ACUTE BILATERAL LOW BACK PAIN WITHOUT SCIATICA: ICD-10-CM

## 2020-08-14 DIAGNOSIS — I10 ESSENTIAL HYPERTENSION WITH GOAL BLOOD PRESSURE LESS THAN 130/85: ICD-10-CM

## 2020-08-14 DIAGNOSIS — K21.9 GASTROESOPHAGEAL REFLUX DISEASE, ESOPHAGITIS PRESENCE NOT SPECIFIED: ICD-10-CM

## 2020-08-14 DIAGNOSIS — E56.9 VITAMIN DEFICIENCY: ICD-10-CM

## 2020-08-14 RX ORDER — MULTIPLE VITAMINS W/ MINERALS TAB 9MG-400MCG
1 TAB ORAL DAILY
Qty: 100 EACH | Refills: 3 | Status: SHIPPED | OUTPATIENT
Start: 2020-08-14 | End: 2021-10-20

## 2020-08-14 RX ORDER — MULTIVITAMIN WITH IRON
2 TABLET ORAL DAILY
Qty: 60 TABLET | Refills: 3 | Status: SHIPPED | OUTPATIENT
Start: 2020-08-14 | End: 2021-02-18

## 2020-08-14 RX ORDER — ACETAMINOPHEN 325 MG/1
650 TABLET ORAL EVERY 4 HOURS PRN
Qty: 100 TABLET | Refills: 11 | Status: SHIPPED | OUTPATIENT
Start: 2020-08-14 | End: 2021-10-20

## 2020-08-14 RX ORDER — IBUPROFEN 400 MG/1
400 TABLET, FILM COATED ORAL EVERY 4 HOURS PRN
Qty: 120 TABLET | Refills: 3 | Status: SHIPPED | OUTPATIENT
Start: 2020-08-14 | End: 2021-02-08

## 2020-08-14 RX ORDER — AMLODIPINE BESYLATE 5 MG/1
5 TABLET ORAL DAILY
Qty: 90 TABLET | Refills: 3 | Status: SHIPPED | OUTPATIENT
Start: 2020-08-14 | End: 2021-09-23

## 2020-08-14 RX ORDER — LANOLIN ALCOHOL/MO/W.PET/CERES
CREAM (GRAM) TOPICAL
Qty: 30 TABLET | Refills: 3 | Status: SHIPPED | OUTPATIENT
Start: 2020-08-14 | End: 2021-04-02

## 2020-08-14 RX ORDER — RISPERIDONE 2 MG/1
2 TABLET ORAL AT BEDTIME
Qty: 90 TABLET | Refills: 3 | Status: SHIPPED | OUTPATIENT
Start: 2020-08-14 | End: 2020-09-02

## 2020-08-14 RX ORDER — CHOLECALCIFEROL (VITAMIN D3) 50 MCG
1 TABLET ORAL DAILY
Qty: 90 TABLET | Refills: 3 | Status: SHIPPED | OUTPATIENT
Start: 2020-08-14 | End: 2021-09-23

## 2020-08-14 RX ORDER — FLUTICASONE PROPIONATE 50 MCG
2 SPRAY, SUSPENSION (ML) NASAL DAILY
Qty: 15.8 ML | Refills: 1 | Status: SHIPPED | OUTPATIENT
Start: 2020-08-14 | End: 2021-03-30

## 2020-08-14 RX ORDER — CETIRIZINE HYDROCHLORIDE 10 MG/1
10 TABLET ORAL DAILY
Qty: 30 TABLET | Refills: 11 | Status: SHIPPED | OUTPATIENT
Start: 2020-08-14 | End: 2021-10-20

## 2020-08-14 RX ORDER — FAMOTIDINE 20 MG/1
20 TABLET, FILM COATED ORAL 2 TIMES DAILY
Qty: 90 TABLET | Refills: 3 | Status: SHIPPED | OUTPATIENT
Start: 2020-08-14 | End: 2021-04-07

## 2020-08-14 RX ORDER — PROPRANOLOL HCL 60 MG
60 CAPSULE, EXTENDED RELEASE 24HR ORAL DAILY
Qty: 90 CAPSULE | Refills: 3 | Status: SHIPPED | OUTPATIENT
Start: 2020-08-14 | End: 2021-10-20

## 2020-08-14 RX ORDER — VENLAFAXINE HYDROCHLORIDE 150 MG/1
150 CAPSULE, EXTENDED RELEASE ORAL DAILY
Qty: 30 CAPSULE | Refills: 3 | Status: SHIPPED | OUTPATIENT
Start: 2020-08-14 | End: 2020-09-02

## 2020-08-14 RX ORDER — DIPHENHYDRAMINE HCL 25 MG
25-50 TABLET ORAL
Qty: 30 TABLET | Refills: 0 | Status: SHIPPED | OUTPATIENT
Start: 2020-08-14 | End: 2021-10-20

## 2020-08-14 ASSESSMENT — ENCOUNTER SYMPTOMS
SLEEP DISTURBANCE: 1
NAUSEA: 0
DIARRHEA: 0
WEAKNESS: 0
ARTHRALGIAS: 1
LIGHT-HEADEDNESS: 0
DECREASED CONCENTRATION: 0
CONSTIPATION: 0
FATIGUE: 0
SHORTNESS OF BREATH: 0
BACK PAIN: 1
PALPITATIONS: 0
TREMORS: 0
APPETITE CHANGE: 0
UNEXPECTED WEIGHT CHANGE: 0
HEADACHES: 0
NERVOUS/ANXIOUS: 0
COUGH: 0
VOMITING: 0
HYPERACTIVE: 0
ACTIVITY CHANGE: 0
CHEST TIGHTNESS: 0

## 2020-08-14 ASSESSMENT — MIFFLIN-ST. JEOR: SCORE: 1522.94

## 2020-08-14 NOTE — PATIENT INSTRUCTIONS
1. Try therapy on knees for at least 8 weeks. If your pain is too bad to do this therapy call and let us know we can try and get you injection.   - call back with the name of the therapist you want to go to. Ibuprofen for when pain is bad is okay. 400mg ibuprofen twice a day for 1 week. Also can use tylenol as necessary. Don't take the ibuprofen every day for more than a week.     2. Every time you pray drink 4 oz of cold water. For your sleep take melatonin 2 hours prior to going to sleep.         Thank you for coming to [facility] today.  Lab Testing:  **If you had lab testing today and your results are reassuring or normal they will be mailed to you or sent through [employer] within 7 days.   **If the lab tests need quick action we will call you with the results.  The phone number we will call with results is # [phone] (home) . If this is not the best number please call our clinic and change the number.  Medication Refills:  If you need any refills please call your pharmacy and they will contact us.   If you need to  your refill at a new pharmacy, please contact the new pharmacy directly. The new pharmacy will help you get your medications transferred faster.   Scheduling:  If you have any concerns about today's visit or wish to schedule another appointment please call our office during normal business hours [phone] (8-5:00 M-F)  If a referral was made to a [facility] and you don't get a call from central scheduling please call [phone].  If a Mammogram was ordered for you at [facility] call [phone] to schedule or change your appointment.  If you had an XRay/CT/Ultrasound/MRI ordered the number is [phone] to schedule or change your radiology appointment.   Medical Concerns:  If you have urgent medical concerns please call [phone] at any time of the day.    Figueroa Kellogg MD

## 2020-08-14 NOTE — NURSING NOTE
Due to patient being non-English speaking/uses sign language, an  was used for this visit. Only for face-to-face interpretation by an external agency, date and length of interpretation can be found on the scanned worksheet.     name: Mahesh Agarwal  Agency: [employer]  Language: [place] Islander   Telephone number: [phone]  Type of interpretation: Telephone, spoken    Cass Jacinto MA

## 2020-08-14 NOTE — PROGRESS NOTES
"       HPI       Carmen Connolly is a 40 year old  who presents for   Chief Complaint   Patient presents with     Pain     x 2 weeks, Both knees and lower back pain, feels dull pain, takes tylenol and ibuprofen last taken yesterday morning     Follow Up     for sleeping and migraines, still comes and goes and no changes since her last appt           Bilateral knee pain  - Hurts worse when going from sitting to standing or after sitting for long time. Has had similar aches in past. Has tried tylenol/ibuprofen and didn't help much this time. Dull ache diffuse worse around patella.   - R leg is mildly worse than left. No injury or falls.  Dx w pattelafemoral syndrome in past and 2 weeks of therapy.     Migraine /Insomnia   Has been on controller propranolol for 3 months now. Past 2 weeks 2-3 hours of headaches morning into afternoon.  Hasn't missed doses. Tries tylenol with mild relief. Sleeping worse the past couple of weeks. Doesn't drink much water.     A [facility]ricks  was used for  this visit.        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     Priority: Medium     Extra digit on L hand hanging next to 5th digit on a stalk, appears healthy (she considers it \"davon\" and likes it, usually holding it in her palm)       Arthritis 02/21/2017     Priority: Medium     Schizoaffective disorder, bipolar type (H) 01/17/2017     Priority: Medium     See Psychological Evaluation from [facility] (3/2016) in Media Tab; reports occasional visual hallucinations (especially when alone); recommend ongoing PCA services, case management and possible [facility] or [facility] services       Neurocognitive deficits 01/17/2017     Priority: Medium     See [facility] evaluation (3/2016) in Media tab; determined to be unable to make legal and financial decisions; recommended that she obtain guardianship or conservatorship       Vitamin D deficiency " 04/15/2016     Priority: Medium     Posttraumatic stress disorder 04/15/2016     Priority: Medium     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     Priority: Medium     Chronic tension-type headache, not intractable 04/15/2016     Priority: Medium     Shortened ND interval 04/06/2016     Priority: Medium     Gastroesophageal reflux disease without esophagitis 02/24/2016     Priority: Medium     Essential hypertension 02/23/2016     Priority: Medium     Recurrent major depression (H) 05/07/2012     Priority: Medium     Pulmonary tuberculosis confirmed by sputum microscopy 08/18/2011     Priority: Medium     Immigrant with language difficulty 06/14/2011     Priority: Medium   ,     Current Outpatient Medications   Medication Sig Dispense Refill     acetaminophen (TYLENOL) 325 MG tablet Take 2 tablets (650 mg) by mouth every 4 hours as needed for mild pain 100 tablet 11     amLODIPine (NORVASC) 5 MG tablet Take 1 tablet (5 mg) by mouth daily 90 tablet 3     cetirizine (ZYRTEC) 10 MG tablet Take 1 tablet (10 mg) by mouth daily 30 tablet 11     diclofenac (VOLTAREN) 1 % topical gel Place 2 g onto the skin 3 times daily as needed for moderate pain 100 g 1     diphenhydrAMINE (BENADRYL) 25 MG tablet Take 1-2 tablets (25-50 mg) by mouth nightly as needed for itching or allergies 30 tablet 0     famotidine (PEPCID) 20 MG tablet Take 1 tablet (20 mg) by mouth 2 times daily 90 tablet 3     fluticasone (FLONASE) 50 MCG/ACT nasal spray Spray 2 sprays into both nostrils daily 15.8 mL 1     ibuprofen (ADVIL/MOTRIN) 400 MG tablet Take 1-2 tablets (400-800 mg) by mouth every 8 hours as needed for moderate pain 120 tablet 3     Incontinence Supply Disposable (DEPEND UNDERGARMENTS) MISC Use daily as needed 200 each 11     magnesium 250 MG tablet Take 2 tablets (500 mg) by mouth daily 60 tablet 3     multivitamin w/minerals (THERA-VIT-M) tablet Take 1 tablet by mouth daily 100 each 3     Omega-3 Fatty Acids (FISH OIL OMEGA-3) 1000 MG CAPS Take  "2,000 mg by mouth daily 180 capsule 3     propranolol ER (INDERAL LA) 60 MG 24 hr capsule Take 1 capsule (60 mg) by mouth daily 90 capsule 3     risperiDONE (RISPERDAL) 2 MG tablet Take 1 tablet (2 mg) by mouth At Bedtime 90 tablet 3     temazepam (RESTORIL) 15 MG capsule Take 1 capsule (15 mg) by mouth nightly as needed 30 capsule 5     venlafaxine (EFFEXOR-XR) 150 MG 24 hr capsule Take 1 capsule (150 mg) by mouth daily 30 capsule 3     vitamin D3 (CHOLECALCIFEROL) 2000 units (50 mcg) tablet Take 1 tablet (2,000 Units) by mouth daily 90 tablet 3   ,   No Known Allergies.    Patient is an established patient of this clinic..         Review of Systems:   Review of Systems   Constitutional: Negative for activity change, appetite change, fatigue and unexpected weight change.   Respiratory: Negative for cough, chest tightness and shortness of breath.    Cardiovascular: Negative for chest pain and palpitations.   Gastrointestinal: Negative for constipation, diarrhea, nausea and vomiting.   Endocrine: Negative for cold intolerance and heat intolerance.   Musculoskeletal: Positive for arthralgias and back pain.   Neurological: Negative for tremors, weakness, light-headedness and headaches.   Psychiatric/Behavioral: Positive for sleep disturbance. Negative for decreased concentration. The patient is not nervous/anxious and is not hyperactive.             Physical Exam:     Vitals:    08/14/20 1108   BP: 120/85   Pulse: 101   Resp: 16   Temp: 98.2  F (36.8  C)   TempSrc: Oral   SpO2: 97%   Weight: 90.2 kg (198 lb 12.8 oz)   Height: 1.588 m (5' 2.5\")     Body mass index is 35.78 kg/m .  Vitals were reviewed and were normal     Physical Exam  Constitutional:       General: She is not in acute distress.     Appearance: She is well-developed.   HENT:      Head: Normocephalic and atraumatic.   Eyes:      Conjunctiva/sclera: Conjunctivae normal.   Cardiovascular:      Rate and Rhythm: Normal rate and regular rhythm.      Heart " sounds: Normal heart sounds.   Pulmonary:      Effort: Pulmonary effort is normal. No respiratory distress.   Abdominal:      General: Bowel sounds are normal. There is no distension.      Palpations: Abdomen is soft.      Tenderness: There is no abdominal tenderness.   Musculoskeletal: Normal range of motion.   Skin:     General: Skin is warm.      Capillary Refill: Capillary refill takes less than 2 seconds.      Findings: No rash.   Neurological:      Mental Status: She is alert and oriented to person, place, and time.   Psychiatric:         Mood and Affect: Mood normal.         Behavior: Behavior normal.      Comments: Better eye contact today, occasional smile            Results:   No testing ordered today    Assessment and Plan        Knee pain returning consistent with patellofemoral syndrome that was inadequately treated with only 2 weeks of PT.  Will return to PT and discussed peace therapy.  Will try melatonin for insomnia, refilled amlodipine for hypertension.  Migraines previously controlled on propranolol but returned when patient had 2-1/2 weeks of worsened sleep that has been a trigger for migraines before.  Will work on sleep before stepping up therapy as well as hydration as patient does not drink much fluids.  If melatonin hydration did not bring down frequency of migraines would consider increasing propranolol and retrying another abortive agent other than Tylenol or NSAIDs.  Patient previously did not tolerate Imitrex orally.    1. Patellofemoral arthralgia of both knees      2. Insomnia, unspecified type    - melatonin 3 MG tablet; Take 2 hours before bedtime  Dispense: 30 tablet; Refill: 3    3. Vitamin deficiency    - multivitamin w/minerals (THERA-VIT-M) tablet; Take 1 tablet by mouth daily  Dispense: 100 each; Refill: 3    4. Essential hypertension with goal blood pressure less than 130/85    - multivitamin w/minerals (THERA-VIT-M) tablet; Take 1 tablet by mouth daily  Dispense: 100 each;  Refill: 3  - amLODIPine (NORVASC) 5 MG tablet; Take 1 tablet (5 mg) by mouth daily  Dispense: 90 tablet; Refill: 3    5. Gastroesophageal reflux disease, esophagitis presence not specified    - multivitamin w/minerals (THERA-VIT-M) tablet; Take 1 tablet by mouth daily  Dispense: 100 each; Refill: 3  - famotidine (PEPCID) 20 MG tablet; Take 1 tablet (20 mg) by mouth 2 times daily  Dispense: 90 tablet; Refill: 3    6. Major depressive disorder, recurrent episode, moderate (H)    - multivitamin w/minerals (THERA-VIT-M) tablet; Take 1 tablet by mouth daily  Dispense: 100 each; Refill: 3  - venlafaxine (EFFEXOR-XR) 150 MG 24 hr capsule; Take 1 capsule (150 mg) by mouth daily  Dispense: 30 capsule; Refill: 3    7. Acute bilateral low back pain without sciatica    - vitamin D3 (CHOLECALCIFEROL) 50 mcg (2000 units) tablet; Take 1 tablet (50 mcg) by mouth daily  Dispense: 90 tablet; Refill: 3  - ibuprofen (ADVIL/MOTRIN) 400 MG tablet; Take 1 tablet (400 mg) by mouth every 4 hours as needed for moderate pain  Dispense: 120 tablet; Refill: 3  - diclofenac (VOLTAREN) 1 % topical gel; Place 2 g onto the skin 3 times daily as needed for moderate pain  Dispense: 100 g; Refill: 1  - acetaminophen (TYLENOL) 325 MG tablet; Take 2 tablets (650 mg) by mouth every 4 hours as needed for mild pain  Dispense: 100 tablet; Refill: 11    8. Schizoaffective disorder, bipolar type (H)    - venlafaxine (EFFEXOR-XR) 150 MG 24 hr capsule; Take 1 capsule (150 mg) by mouth daily  Dispense: 30 capsule; Refill: 3    9. Other migraine without status migrainosus, not intractable           There are no discontinued medications.    Options for treatment and follow-up care were reviewed with the patient. Carmen Connolly  engaged in the decision making process and verbalized understanding of the options discussed and agreed with the final plan.    Figueroa Kellogg MD    Preceptor Attestation:  Patient seen, evaluated and discussed with the resident. I have  verified the content of the note, which accurately reflects my assessment of the patient and the plan of care.   Supervising Physician:  Trupti Liu MD

## 2020-08-27 ENCOUNTER — TELEPHONE (OUTPATIENT)
Dept: FAMILY MEDICINE | Facility: CLINIC | Age: 40
End: 2020-08-27

## 2020-08-27 NOTE — TELEPHONE ENCOUNTER
[facility] phone call message - order or referral request from patient:     Order or referral being requested: Requested order     Additional Details: needs Skil nursing one time a week  For one weeks every ather weeks for  8 weeks 2PRN    Referral only -Specialty Location     OK to leave a message on voice mail? Yes    Primary language: Trinidadian      needed? Yes    Call taken on August 27, 2020 at 12:49 PM by Jose Pisano    Order request route to [facility] TRIAGE   Referrals Route to [facility] ([facility] CARE COORDINATOR

## 2020-08-27 NOTE — TELEPHONE ENCOUNTER
Returned call to home care nurse Rose to give verbal orders per protocol as requested. Nurse verbalized understanding.    Elisabeth Rosenberg RN

## 2020-08-29 ENCOUNTER — MEDICAL CORRESPONDENCE (OUTPATIENT)
Dept: HEALTH INFORMATION MANAGEMENT | Facility: CLINIC | Age: 40
End: 2020-08-29
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
## 2020-09-02 ENCOUNTER — TELEPHONE (OUTPATIENT)
Dept: PSYCHIATRY | Facility: CLINIC | Age: 40
End: 2020-09-02

## 2020-09-02 ENCOUNTER — VIRTUAL VISIT (OUTPATIENT)
Dept: PSYCHIATRY | Facility: CLINIC | Age: 40
End: 2020-09-02
Attending: PSYCHIATRY & NEUROLOGY
Payer: COMMERCIAL

## 2020-09-02 DIAGNOSIS — F33.1 MODERATE EPISODE OF RECURRENT MAJOR DEPRESSIVE DISORDER (H): ICD-10-CM

## 2020-09-02 DIAGNOSIS — F29 PSYCHOSIS, UNSPECIFIED PSYCHOSIS TYPE (H): Primary | ICD-10-CM

## 2020-09-02 RX ORDER — RISPERIDONE 2 MG/1
4 TABLET ORAL AT BEDTIME
Qty: 60 TABLET | Refills: 0 | Status: SHIPPED | OUTPATIENT
Start: 2020-09-02 | End: 2020-09-28

## 2020-09-02 RX ORDER — VENLAFAXINE HYDROCHLORIDE 75 MG/1
75 CAPSULE, EXTENDED RELEASE ORAL DAILY
Qty: 30 CAPSULE | Refills: 0 | Status: SHIPPED | OUTPATIENT
Start: 2020-09-02 | End: 2020-12-03

## 2020-09-02 NOTE — TELEPHONE ENCOUNTER
On 9/2/2020, at 1:45, writer called patient at [phone] to confirm Virtual Visit. Writer unable to make contact with patient. Writer left detailed voice message for call back. [phone] left as call back number. Carmelita Fine, [facility]

## 2020-09-09 ENCOUNTER — DOCUMENTATION ONLY (OUTPATIENT)
Dept: FAMILY MEDICINE | Facility: CLINIC | Age: 40
End: 2020-09-09

## 2020-09-09 NOTE — PROGRESS NOTES
"When opening a documentation only encounter, be sure to enter in \"Chief Complaint\" Forms and in \" Comments\" Title of form, description if needed.    Carmen is a 40 year old  female  Form received via: Fax  Form now resides in: Provider ELISABET Perez 4:49 PM September 9, 2020                    "

## 2020-09-26 DIAGNOSIS — F29 PSYCHOSIS, UNSPECIFIED PSYCHOSIS TYPE (H): ICD-10-CM

## 2020-09-28 RX ORDER — RISPERIDONE 2 MG/1
4 TABLET ORAL AT BEDTIME
Qty: 60 TABLET | Refills: 0 | Status: SHIPPED | OUTPATIENT
Start: 2020-09-28 | End: 2020-11-02

## 2020-09-28 NOTE — TELEPHONE ENCOUNTER
Medication requested: risperiDONE (RISPERDAL) 2 MG tablet   Last refilled: 9/2/20  Qty: 60      Last seen: 9/2/20  RTC: 4-6 weeks  Cancel: 0  No-show: 0  Next appt: 0    Refill decision:   30 day handy refill sent to the pharmacy - including instructions for patient to call the clinic and schedule an appointment.  Scheduling has been notified to contact the pt for appointment.

## 2020-09-29 NOTE — PROGRESS NOTES
Form has been completed by provider.     Form sent out via: Fax to : [employer] at Fax Number: [phone]  Patient informed: n/a  Output date: September 29, 2020    Cass Jacinto MA      **Please close the encounter**

## 2020-10-27 ENCOUNTER — TELEPHONE (OUTPATIENT)
Dept: FAMILY MEDICINE | Facility: CLINIC | Age: 40
End: 2020-10-27

## 2020-10-27 NOTE — TELEPHONE ENCOUNTER
[facility] phone call message - order or referral request from patient:     Order or referral being requested: Requested order: skilled nursing every other week for 9 weeks and 3 PRN for med set up.    Additional Details: none    OK to leave a message on voice mail? Yes    Primary language: Ug[place]an      needed? Yes    Call taken on October 27, 2020 at 1:20 PM by Nidhi Medel    Order request route to P [facility] TRIAGE   Referrals Route to P [facility] (Green/[place]/Purple) CARE COORDINATOR

## 2020-10-27 NOTE — TELEPHONE ENCOUNTER
Returned call to home care nurse to give verbal orders for skilled nursing per protocol as requested. Nurse verbalized understanding.    Elisabeth Rosenberg RN

## 2020-10-28 ENCOUNTER — MEDICAL CORRESPONDENCE (OUTPATIENT)
Dept: HEALTH INFORMATION MANAGEMENT | Facility: CLINIC | Age: 40
End: 2020-10-28

## 2020-10-30 DIAGNOSIS — F29 PSYCHOSIS, UNSPECIFIED PSYCHOSIS TYPE (H): ICD-10-CM

## 2020-11-02 RX ORDER — RISPERIDONE 2 MG/1
4 TABLET ORAL AT BEDTIME
Qty: 60 TABLET | Refills: 0 | Status: SHIPPED | OUTPATIENT
Start: 2020-11-02 | End: 2020-12-03

## 2020-11-03 NOTE — TELEPHONE ENCOUNTER
Medication requested: risperiDONE (RISPERDAL) 2 MG tablet   Last refilled: 9/28/20  Qty: 60      Last seen: 9/2/20  RTC: 4-6 weeks  Cancel: 0  No-show: 0  Next appt: 0    Refill decision:   30 day handy refill sent to the pharmacy - including instructions for patient to call the clinic and schedule an appointment.  Scheduling has been notified to contact the pt for appointment.

## 2020-11-04 ENCOUNTER — OFFICE VISIT (OUTPATIENT)
Dept: FAMILY MEDICINE | Facility: CLINIC | Age: 40
End: 2020-11-04
Payer: COMMERCIAL

## 2020-11-04 VITALS
SYSTOLIC BLOOD PRESSURE: 113 MMHG | TEMPERATURE: 98.6 F | WEIGHT: 199.8 LBS | DIASTOLIC BLOOD PRESSURE: 77 MMHG | HEIGHT: 62 IN | BODY MASS INDEX: 36.77 KG/M2 | RESPIRATION RATE: 18 BRPM | HEART RATE: 117 BPM | OXYGEN SATURATION: 96 %

## 2020-11-04 DIAGNOSIS — M22.2X1 PATELLOFEMORAL ARTHRALGIA OF BOTH KNEES: Primary | ICD-10-CM

## 2020-11-04 DIAGNOSIS — M54.50 ACUTE BILATERAL LOW BACK PAIN WITHOUT SCIATICA: ICD-10-CM

## 2020-11-04 DIAGNOSIS — F25.0 SCHIZOAFFECTIVE DISORDER, BIPOLAR TYPE (H): ICD-10-CM

## 2020-11-04 DIAGNOSIS — E66.01 MORBID OBESITY (H): ICD-10-CM

## 2020-11-04 DIAGNOSIS — M22.2X2 PATELLOFEMORAL ARTHRALGIA OF BOTH KNEES: Primary | ICD-10-CM

## 2020-11-04 PROCEDURE — 99214 OFFICE O/P EST MOD 30 MIN: CPT | Performed by: FAMILY MEDICINE

## 2020-11-04 ASSESSMENT — MIFFLIN-ST. JEOR: SCORE: 1522.79

## 2020-11-04 NOTE — PROGRESS NOTES
"       HPI       Carmen Connolly is a 40 year old  who presents for   Chief Complaint   Patient presents with     Musculoskeletal Problem     Patient states she has pain in bilateral knees and back, present for 2 months. Patient states she is okay sitting, but when she gets up it starts hurting.      Sleep Problem     Patient states she has not been sleeping well the past two weeks. Patient states she cannot sleep without taking medication, but even then she wakes up throughout the night.        Back to discuss bilat knee pain, low back  - Would like to go to [facility]   - ibuprofen 1 pill 4 times/day twice per week  - 2 tylenol 4 times/day  - using voltaren gel    Sleep concern  - goes to sleep at 11:00  - has melatonin  - on risperdal (recently increased to 4mg nightly, stopped effexor, has f/u on 12/2)  - hard to fall asleep  - gets up a few times at night    Has follow up with psychiatrist soon - some adjustments have recently been made in medications, they are trying to help her get better sleep    A Yellow Pages  was used for  this visit.    +++++++    Problem, Medication and Allergy Lists were reviewed and updated if needed..    Patient is an established patient of this clinic..         Review of Systems:   Review of Systems   Constitutional: Positive for fatigue. Negative for activity change, appetite change and fever.   Respiratory: Negative.    Cardiovascular: Negative.    Musculoskeletal: Positive for arthralgias and back pain.   Neurological: Negative for dizziness.   Psychiatric/Behavioral: Positive for confusion, dysphoric mood and sleep disturbance.            Physical Exam:     Vitals:    11/04/20 1028   BP: 113/77   BP Location: Left arm   Patient Position: Sitting   Cuff Size: Adult Large   Pulse: 117   Resp: 18   Temp: 98.6  F (37  C)   TempSrc: Oral   SpO2: 96%   Weight: 90.6 kg (199 lb 12.8 oz)   Height: 1.58 m (5' 2.21\")     Body mass index is 36.3 kg/m .  Vitals were reviewed and were " normal     Physical Exam  Constitutional:       General: She is not in acute distress.     Appearance: Normal appearance. She is obese.   Pulmonary:      Effort: Pulmonary effort is normal.   Musculoskeletal:      Right knee: She exhibits normal range of motion, normal alignment, no LCL laxity, normal patellar mobility, normal meniscus and no MCL laxity. Tenderness found. Medial joint line and patellar tendon tenderness noted.      Left knee: She exhibits abnormal meniscus. She exhibits normal range of motion, no swelling, no LCL laxity, normal patellar mobility and no MCL laxity. Tenderness found. Medial joint line and patellar tendon tenderness noted.      Lumbar back: She exhibits tenderness (mainly across lumbar muscles) and spasm. She exhibits normal range of motion, no swelling, no edema and no deformity.   Neurological:      General: No focal deficit present.      Mental Status: She is alert and oriented to person, place, and time. Mental status is at baseline.   Psychiatric:         Mood and Affect: Mood normal.         Behavior: Behavior normal.      Comments: Relies on her cousin for support and to answer questions sometimes           Results:   No testing ordered today    Assessment and Plan        Patient Instructions   Here is the plan from today's visit    1. Patellofemoral arthralgia of both knees  2. Acute bilateral low back pain without sciatica  [facility] in [facility] [place]  Continue with topical voltaren gel to the knees and with as needed tylenol and ibuprofen  - PHYSICAL THERAPY REFERRAL - EXTERNAL; Future    3. Obesity  Known issue that I take into account for their medical decisions, no current exacerbations or new concerns.      Discuss sleep issues with psychiatrist at next appointment on 12/2      Please call or return to clinic if your symptoms don't go away.    Follow up plan - 2 months           There are no discontinued medications.    Options for treatment and follow-up care were  reviewed with the patient. Carmen Connolly  engaged in the decision making process and verbalized understanding of the options discussed and agreed with the final plan.    Khushi Cadena MD

## 2020-11-04 NOTE — NURSING NOTE
Due to patient being non-English speaking/uses sign language, an  was used for this visit. Only for face-to-face interpretation by an external agency, date and length of interpretation can be found on the scanned worksheet.     name: Zander Julio Cesar  Language: [place]   Agency: [employer]   Phone number: [phone]  Type of interpretation: Telephone, spoken

## 2020-11-04 NOTE — PATIENT INSTRUCTIONS
Here is the plan from today's visit    1. Patellofemoral arthralgia of both knees  2. Acute bilateral low back pain without sciatica  Follow up in 2 months at [facility]  Continue with topical voltaren gel to the knees and with as needed tylenol and ibuprofen  - PHYSICAL THERAPY REFERRAL - EXTERNAL; Future    Discuss sleep issues with psychiatrist at next appointment on 12/2      Please call or return to clinic if your symptoms don't go away.    Follow up plan - 2 months    Thank you for coming to [facility] today.  Lab Testing:  **If you had lab testing today and your results are reassuring or normal they will be mailed to you or sent through [employer] within 7 days.   **If the lab tests need quick action we will call you with the results.  The phone number we will call with results is # [phone] (home) . If this is not the best number please call our clinic and change the number.  Medication Refills:  If you need any refills please call your pharmacy and they will contact us.   If you need to  your refill at a new pharmacy, please contact the new pharmacy directly. The new pharmacy will help you get your medications transferred faster.   Scheduling:  If you have any concerns about today's visit or wish to schedule another appointment please call our office during normal business hours [phone] (8-5:00 M-F)  If a referral was made to a [facility] and you don't get a call from central scheduling please call [phone].  If a Mammogram was ordered for you at [facility] call [phone] to schedule or change your appointment.  If you had an XRay/CT/Ultrasound/MRI ordered the number is [phone] to schedule or change your radiology appointment.   Medical Concerns:  If you have urgent medical concerns please call [phone] at any time of the day.  If you have a medical emergency please call 232.

## 2020-11-06 ENCOUNTER — TRANSFERRED RECORDS (OUTPATIENT)
Dept: HEALTH INFORMATION MANAGEMENT | Facility: CLINIC | Age: 40
End: 2020-11-06

## 2020-11-10 ENCOUNTER — DOCUMENTATION ONLY (OUTPATIENT)
Dept: FAMILY MEDICINE | Facility: CLINIC | Age: 40
End: 2020-11-10

## 2020-11-10 NOTE — PROGRESS NOTES
"When opening a documentation only encounter, be sure to enter in \"Chief Complaint\" Forms and in \" Comments\" Title of form, description if needed.    Carmen is a 40 year old  female  Form received via: Fax  Form now resides in: Provider Ready    Cass Jacinto MA                  "

## 2020-11-13 NOTE — PROGRESS NOTES
Form has been completed by provider.     Form sent out via: Fax to [facility] at Fax Number: [phone]  Patient informed: na  Output date: November 13, 2020    Nona Angulo CMA      **Please close the encounter**

## 2020-11-15 PROBLEM — E66.01 MORBID OBESITY (H): Status: ACTIVE | Noted: 2020-11-15

## 2020-11-15 ASSESSMENT — ENCOUNTER SYMPTOMS
FEVER: 0
ARTHRALGIAS: 1
ACTIVITY CHANGE: 0
RESPIRATORY NEGATIVE: 1
CARDIOVASCULAR NEGATIVE: 1
APPETITE CHANGE: 0
SLEEP DISTURBANCE: 1
FATIGUE: 1
DIZZINESS: 0
CONFUSION: 1
DYSPHORIC MOOD: 1
BACK PAIN: 1

## 2020-12-01 ENCOUNTER — APPOINTMENT (OUTPATIENT)
Dept: INTERPRETER SERVICES | Facility: CLINIC | Age: 40
End: 2020-12-01
Payer: COMMERCIAL

## 2020-12-02 ENCOUNTER — VIRTUAL VISIT (OUTPATIENT)
Dept: PSYCHIATRY | Facility: CLINIC | Age: 40
End: 2020-12-02
Attending: PSYCHIATRY & NEUROLOGY
Payer: COMMERCIAL

## 2020-12-02 DIAGNOSIS — F51.05 INSOMNIA DUE TO MENTAL DISORDER: ICD-10-CM

## 2020-12-02 DIAGNOSIS — F29 PSYCHOSIS, UNSPECIFIED PSYCHOSIS TYPE (H): Primary | ICD-10-CM

## 2020-12-02 DIAGNOSIS — F33.1 MODERATE EPISODE OF RECURRENT MAJOR DEPRESSIVE DISORDER (H): ICD-10-CM

## 2020-12-02 PROCEDURE — 99215 OFFICE O/P EST HI 40 MIN: CPT | Mod: GC | Performed by: STUDENT IN AN ORGANIZED HEALTH CARE EDUCATION/TRAINING PROGRAM

## 2020-12-02 ASSESSMENT — PAIN SCALES - GENERAL: PAINLEVEL: NO PAIN (0)

## 2020-12-02 NOTE — PATIENT INSTRUCTIONS
Thank you for coming to the [facility] [place] [facility].    Lab Testing:  If you had lab testing today and your results are reassuring or normal they will be mailed to you or sent through [employer] within 7 days. If the lab tests need quick action we will call you with the results. The phone number we will call with results is # [phone] (home) . If this is not the best number please call our clinic and change the number.    Medication Refills:  If you need any refills please call your pharmacy and they will contact us. Our fax number for refills is [phone]. Please allow three business for refill processing. If you need to  your refill at a new pharmacy, please contact the new pharmacy directly. The new pharmacy will help you get your medications transferred.     Scheduling:  If you have any concerns about today's visit or wish to schedule another appointment please call our office during normal business hours [phone] (8-5:00 M-F)    Contact Us:  Please call [phone] during business hours (8-5:00 M-F).  If after clinic hours, or on the weekend, please call  [phone].    Financial Assistance [phone]  [facility] Billing [phone]  Central Billing Office, [facility]: [phone]  [facility] Billing [phone]  Medical Records [phone]  [facility] Patient Bill of Rights [email or web address]       MENTAL HEALTH CRISIS NUMBERS:  For a medical emergency please call  911 or go to the nearest ER.     [facility]:   [facility] -[phone]   Crisis Residence [facility] -[phone]   Walk-In Counseling Center [facility] -[phone]   COPE 24/7 [place] Mobile Team -[phone] (adults)/[phone] (child)  CHILD: [facility] needs assessment team - [phone]      [facility]:   [facility] - [phone]   Walk-in counseling [facility]  [facility] - [phone]   Walk-in counseling  - [phone]   Crisis Residence [facility] - [phone]  Urgent Care Adult Mental Zkdzjl-[phone] mobile unit/ 24/7 crisis line    National Crisis Numbers:   National Suicide Prevention Lifeline: 0-539-685-TALK (773-588-3774)  Poison Control Center - 1-283.267.8481  [employer]/resources for a list of additional resources (SOS)  Trans Lifeline a hotline for transgender people 1-856-193-2601  The [place] Project a hotline for LGBT youth 1-412.550.9490  Crisis Text Line: For any crisis 24/7   To: 119672  see www.crisistextline.org  - IF MAKING A CALL FEELS TOO HARD, send a text!         Again thank you for choosing [facility] and please let us know how we can best partner with you to improve you and your family's health.    You may be receiving a survey regarding this appointment. We would love to have your feedback, both positive and negative. The survey is done by an external company, so your answers are anonymous.

## 2020-12-02 NOTE — PROGRESS NOTES
"VIDEO VISIT  Carmen Connolly is a 40 year old patient who is being evaluated via a billable video visit.      The patient has been notified of following:   \"This video visit will be conducted via a call between you and your physician/provider. We have found that certain health care needs can be provided without the need for an in-person physical exam. This service lets us provide the care you need with a video conversation. If a prescription is necessary we can send it directly to your pharmacy. If lab work is needed we can place an order for that and you can then stop by our lab to have the test done at a later time. Insurers are generally covering virtual visits as they would in-office visits so billing should not be different than normal.  If for some reason you do get billed incorrectly, you should contact the billing office to correct it and that number is in the AVS .    Video Conference to be completed via:  [email or web address]    Patient has given verbal consent for video visit?:  Yes    Patient would prefer that any video invitations be sent by: Send to e-mail at: [email or web address]      How would patient like to obtain AVS?:  Mail a copy    AVS SmartPhrase [PsychAVS] has been placed in 'Patient Instructions':  Yes    "

## 2020-12-02 NOTE — PROGRESS NOTES
"Video- Visit Details  Type of service:  video visit for medication management  Time of service:    Date:  12/02/2020    Video Start Time:  03:00PM        Video End Time:  3:40 PM    Reason for video visit:  Services only offered telehealth  Originating Site (patient location):  [facility]   Location- Patient's home  Distant Site (provider location):  Remote location  Mode of Communication:  Video Conference via [email or web address]  Consent:  Patient has given verbal consent for video visit?: Yes          [facility]  Psychiatry Clinic  PSYCHIATRIC PROGRESS NOTE     CARE TEAM:    PCP- Khushi Cadena at [facility]  PCA - Marcelo (micheal)  Has mental health specialist who comes to house twice weekly.    Carmen Connolly is a 40 year old patient who prefers the name Carmen and uses  pronouns she, her, hers.     PERTINENT BACKGROUND        [most recent eval 09/01/20]     Previous diagnoses include Schizoaffective disorder, depression, generalized anxiety disorder, and PTSD. Symptoms began after the passing of her  while they were refugees living in [place]. She did not receive any psychiatric care until after she immigrated to the  in 2011.    Psych critical item history includes psychosis [sxs include paranoia, responding to internal stimuli, negative symptoms] and trauma hx     INTERIM HISTORY     History was provided by patient and family (micheal Pierre & pt's PCA) who was a good and fair historian.    Since Last Visit:  - Carmen reports that things are better. \"I'm not seeing magic something, black someone's.\"   - Visions are decreasing. She has been seeing black figures \"that talk to me\".  - PCA Ignacio also reported that she has appeared to be improved since increasing her risperidone.  - He notes that Carmen still requires constant supervision, needs one of her daughters to accompany her to use to toilet or shower, and is unable to cook because she will forget about what she is doing. " "  - Remains oriented to self only. Stated location as [place] (in [place]), year as 2019, month as March. Was unaware why she and others are wearing masks.  - He also notes Carmen does not appear to be overtly responding to internal stimuli.  - He says Carmen can appear more \"normal\" when family members visit the house and speak with her. She is able to converse and laugh. She also is social in adult , which she goes to 4x per week. Does PT 2x per week.  - As soon as she is left alone, she appears in a state of melancholy. Will report feeling depressed.  - Appetite remains poor, but will eat when told.       - Her primary concern today is that she is not sleeping well. Waking up through the middle of the night.  - Sleep has been poor last 3 weeks. Improved temporarily with risperidone increase.  - Asked if there was a medication she could take for sleep. Discussed that increasing risperidone can have a side effect of being sedating. Rather than add an additional sleep medication, recommended making one medication change at a time.    - No reported side effects from medications. No increase in appetite, EPS, or daytime sedation.  - No acute concerns for safety. Has PCA with her during the daytime. Other family members are always around to take care of Carmen. She reported no SI/SIB or HI.      RECENT PSYCH ROS:   Depression:  depressed mood, insomnia and appetite changes  Elevated:  none  Psychosis:  auditory hallucinations, visual hallucinations, disorganized behavior and negative symptoms (avolition, affective flattening, anhedonia, alogia, apathy, .)  Anxiety:  feeling fearful  Trauma Related:  none reported  Dysregulation:  none  Eating Disorder: no    Adverse Effects:  None reported  Pertinent Negative Symptoms: No self-injurious behavior/urges, suicidal and violent ideation, shine and hypomania    RECENT SUBSTANCE USE:     None reported     SOCIAL HISTORY   [1ea, 1ea]           [per patient report]     "        Financial/ Work- social security disability       Partner/ -  since ~2003  Children- yes, has 6 children (age 12 to 23). 3 are not living at home with her. 5 were from same partner, one from another partner.  Living situation- Lives at home with 3 of her youngest children.      Social/ Spiritual Support- family     Legal- none reported  FEELS SAFE AT HOME- yes     Trauma History (self-report)- yes  Early History/Education- Refugee from [place] during [place] war. Was a refugee in [place] for several years before immigrating to the [place] in 2011      PSYCH and SUBSTANCE USE Critical Summary Points since July 2020 9/2/2020: Increased risperidone to 4 mg to target active psychosis, decreased venlafaxine to 75 mg daily.  12/2/2020: Increased risperidone to 6 mg. Had improvement with psychosis symptoms at 4 mg.    MEDICAL HISTORY  and ALLERGY     ALLERGIES: Patient has no known allergies.     Patient Active Problem List   Diagnosis     Essential hypertension     Gastroesophageal reflux disease without esophagitis     Shortened MD interval     Vitamin D deficiency     Posttraumatic stress disorder     Female circumcision     Chronic tension-type headache, not intractable     Schizoaffective disorder, bipolar type (H)     Neurocognitive deficits     Arthritis     Extra digits     Immigrant with language difficulty     Pulmonary tuberculosis confirmed by sputum microscopy     Recurrent major depression (H)     Morbid obesity (H)         MEDICAL REVIEW OF SYSTEMS    [2, 10]   Pregnant or breastfeeding- no      Contraception- not discussed    A comprehensive review of systems was performed and is negative other than noted in the HPI.    MEDICATIONS          Current Outpatient Medications   Medication Sig Dispense Refill     acetaminophen (TYLENOL) 325 MG tablet Take 2 tablets (650 mg) by mouth every 4 hours as needed for mild pain 100 tablet 11     amLODIPine (NORVASC) 5 MG tablet Take 1 tablet  (5 mg) by mouth daily 90 tablet 3     cetirizine (ZYRTEC) 10 MG tablet Take 1 tablet (10 mg) by mouth daily 30 tablet 11     diclofenac (VOLTAREN) 1 % topical gel Place 2 g onto the skin 3 times daily as needed for moderate pain 100 g 1     diphenhydrAMINE (BENADRYL) 25 MG tablet Take 1-2 tablets (25-50 mg) by mouth nightly as needed for itching or allergies 30 tablet 0     famotidine (PEPCID) 20 MG tablet Take 1 tablet (20 mg) by mouth 2 times daily 90 tablet 3     fluticasone (FLONASE) 50 MCG/ACT nasal spray Spray 2 sprays into both nostrils daily 15.8 mL 1     ibuprofen (ADVIL/MOTRIN) 400 MG tablet Take 1 tablet (400 mg) by mouth every 4 hours as needed for moderate pain 120 tablet 3     Incontinence Supply Disposable (DEPEND UNDERGARMENTS) MISC Use daily as needed 200 each 11     magnesium 250 MG tablet Take 2 tablets (500 mg) by mouth daily 60 tablet 3     melatonin 3 MG tablet Take 2 hours before bedtime 30 tablet 3     multivitamin w/minerals (THERA-VIT-M) tablet Take 1 tablet by mouth daily 100 each 3     Omega-3 Fatty Acids (FISH OIL OMEGA-3) 1000 MG CAPS Take 2,000 mg by mouth daily 180 capsule 3     propranolol ER (INDERAL LA) 60 MG 24 hr capsule Take 1 capsule (60 mg) by mouth daily 90 capsule 3     risperiDONE (RISPERDAL) 2 MG tablet Take 2 tablets (4 mg) by mouth At Bedtime For more refills,schedule an appointment at [phone] 60 tablet 0     venlafaxine (EFFEXOR-XR) 75 MG 24 hr capsule Take 1 capsule (75 mg) by mouth daily 30 capsule 0     vitamin D3 (CHOLECALCIFEROL) 50 mcg (2000 units) tablet Take 1 tablet (50 mcg) by mouth daily 90 tablet 3     VITALS                     [3, 3]   There were no vitals taken for this visit.     MENTAL STATUS EXAM     [9, 14 cog gs]     Alertness: alert  and groggy  Appearance: well groomed  Behavior/Demeanor: cooperative, calm and passive, with good  eye contact   Speech: normal and regular rate and rhythm  Language: intact and fluent in [facility]  Psychomotor:  normal or unremarkable  Mood: depressed  Affect: flat; congruent to: mood- yes, content- yes  Thought Process/Associations: unremarkable  Thought Content:  Reports none;  Denies suicidal & violent ideation and delusions  Perception:  Reports none;  Denies auditory hallucinations and visual hallucinations  Insight: limited  Judgment: adequate for safety  Cognition: (6) oriented: person only. Stated it is 2019, March, and location as [place] (in [place])  attention span: fair  concentration: fair  recent memory: limited  remote memory: limited  fund of knowledge: not fully assessed.  Gait and Station: N/A (telehealth)    LABS and DATA     PHQ9 TODAY = not done today    PHQ 12/3/2018 9/3/2019 4/28/2020   PHQ-9 Total Score 10 6 11   Q9: Thoughts of better off dead/self-harm past 2 weeks Not at all Not at all Not at all     ANTIPSYCHOTIC LABS  [glu, A1C, lipids (tiburcio LDL), liver enzymes, WBC, ANEU, Hgb, plts]  q12 mo  Recent Labs   Lab Test 06/26/20  1042 08/31/17  1634 02/14/17  1513 04/11/16  1507 02/24/16  1653   .5*  --  115.0* 108* 145.4*   A1C 5.8* 5.4  --   --   --      Recent Labs   Lab Test 06/26/20  1042 08/31/17  1634   CHOL 221.8* 221.0*   TRIG 186.9* 82.3   * 153*   HDL 43.1 51.3     Recent Labs   Lab Test 06/26/20  1042 01/31/16  1401   AST 23.8 31   ALT 28.8 36   ALKPHOS 88.5 119     Recent Labs   Lab Test 12/03/18  1116 02/14/17  1513 04/11/16  1507 03/31/16  1534 01/31/16  1401   WBC  --   --  7.0  --  6.4   ANEU  --   --  3.0  --  2.4   HGB 11.8 12.5 12.1 11.9 14.0   PLT  --   --  282  --  301         Recent Labs   Lab Test 06/26/20  1042 02/14/17  1513 04/11/16  1507   CR 0.6 1.0 0.83   GFRESTIMATED >90 65.6 77     PSYCHOTROPIC DRUG INTERACTIONS     None    MANAGEMENT:  N/A    RISK STATEMENT for SAFETY     Carmen Mccoy [place] did not appear to be an imminent safety risk to self or others.    DIAGNOSES                            [m2, h3]      Unspecified Psychosis   - Rule-out  Schizoaffective Disorder, depressive type   - Rule-out MDD with psychotic features   - Rule-out PTSD with psychotic features  Major Depressive Disorder, recurrent, severe     ASSESSMENT                         [m2, h3]          Carmen Connolly presents to the [facility] for follow-up and medication management of MDD and psychosis. By her PCA's report, Carmen has reportedly shown improvement with regard to her psychosis symptoms, seeing less visual hallucinations. AH and VH are still present, and she still requires constant supervision, unable to care for self. She did appear more responsive in conversation today despite her limited orientation. There was question of whether this was catatonia at last visit due to her level of stupor, but with Carmen's response to an increase in her neuroleptic, this suggests a primary thought disorder. The mood component also remains diagnostically challenging. Depression symptoms are reported, though it is not easy to differentiate those features from negative symptoms of psychosis. PTSD is also a likely significant component to this, but it has also been difficult to get a clear history of her experiences and trauma she sustained as a refugee.    To address her sleep, will see if the increase in risperidone provides some benefit to her sleep. Will have closer follow-up where we can re-evaluate need for an additional sleep agent. In addition, can plan on cross-titrating venlafaxine to another antidepressant since at the last visit, it was reported to provide no benefit since she has been on it.    Future considerations:  - Cross-taper to another antidepressant if depression persists (fluoxetine, sertraline)  - Can titrate risperidone further or switch to another agent such as olanzapine if psychosis symptoms persist.  - Investigate PTSD symptoms and determine diagnosis of PTSD    MNPMP review was not needed today.    PLAN                                                    [m2, h3]                                               1) Medications  - Increase risperidone to 5 mg at bedtime for 3 days, then 6 mg at bedtime thereafter  - Continue venlafaxine to 75 mg daily    2) Therapy  - Continue with therapist who visits home twice weekly    3) Next Due   Labs- [facility] labs due 06/2021  EKG- PRN  Rating scales- PHQ9    4) Referrals  No Referrals needed     5) RTC: 4-6 weeks    6) Crisis Numbers:   Provided routinely in AVS     After hours:  [phone]      TREATMENT RISK STATEMENT:  The risks, benefits, alternatives and potential adverse effects have been discussed and are understood by the pt. The pt understands the risks of using street drugs or alcohol. There are no medical contraindications, the pt agrees to treatment with the ability to do so. The pt knows to call the clinic for any problems or to access emergency care if needed.  Medical and substance use concerns are documented above.  Psychotropic drug interaction check was done, including changes made today.    PROVIDER: Jon Ugarte MD    Patient staffed in clinic with Dr. Ritchie who will sign the note.  Supervisor is Dr. Zamora.  TELEHEALTH ATTENDING ATTESTATION  Following the ACGME guidelines on telemedicine and direct supervision due to COVID-19, I was concurrently participating in and/or monitoring the patient care through appropriate telecommunication technology.  I discussed the key portions of the service with the resident, including the mental status examination and developing the plan of care. I reviewed key portions of the history with the resident. I agree with the findings and plan as documented in this note.   Dnia Ritchie MD

## 2020-12-03 RX ORDER — VENLAFAXINE HYDROCHLORIDE 75 MG/1
75 CAPSULE, EXTENDED RELEASE ORAL DAILY
Qty: 30 CAPSULE | Refills: 0 | Status: SHIPPED | OUTPATIENT
Start: 2020-12-03 | End: 2020-12-03

## 2020-12-03 RX ORDER — RISPERIDONE 1 MG/1
TABLET ORAL
Qty: 3 TABLET | Refills: 0 | Status: SHIPPED | OUTPATIENT
Start: 2020-12-03 | End: 2020-12-03

## 2020-12-03 RX ORDER — RISPERIDONE 2 MG/1
TABLET ORAL
Qty: 87 TABLET | Refills: 0 | Status: SHIPPED | OUTPATIENT
Start: 2020-12-03 | End: 2020-12-16

## 2020-12-03 RX ORDER — VENLAFAXINE HYDROCHLORIDE 75 MG/1
75 CAPSULE, EXTENDED RELEASE ORAL DAILY
Qty: 30 CAPSULE | Refills: 0 | Status: SHIPPED | OUTPATIENT
Start: 2020-12-03 | End: 2020-12-16

## 2020-12-03 RX ORDER — RISPERIDONE 2 MG/1
TABLET ORAL
Qty: 87 TABLET | Refills: 0 | Status: SHIPPED | OUTPATIENT
Start: 2020-12-03 | End: 2020-12-03

## 2020-12-03 RX ORDER — RISPERIDONE 1 MG/1
TABLET ORAL
Qty: 3 TABLET | Refills: 0 | Status: SHIPPED | OUTPATIENT
Start: 2020-12-03 | End: 2020-12-16 | Stop reason: DRUGHIGH

## 2020-12-04 DIAGNOSIS — F33.1 MODERATE EPISODE OF RECURRENT MAJOR DEPRESSIVE DISORDER (H): ICD-10-CM

## 2020-12-08 RX ORDER — VENLAFAXINE HYDROCHLORIDE 75 MG/1
CAPSULE, EXTENDED RELEASE ORAL
Qty: 90 CAPSULE | Refills: 3 | OUTPATIENT
Start: 2020-12-08

## 2020-12-16 ENCOUNTER — VIRTUAL VISIT (OUTPATIENT)
Dept: PSYCHIATRY | Facility: CLINIC | Age: 40
End: 2020-12-16
Attending: PSYCHIATRY & NEUROLOGY
Payer: COMMERCIAL

## 2020-12-16 DIAGNOSIS — F33.1 MODERATE EPISODE OF RECURRENT MAJOR DEPRESSIVE DISORDER (H): ICD-10-CM

## 2020-12-16 DIAGNOSIS — F29 PSYCHOSIS, UNSPECIFIED PSYCHOSIS TYPE (H): ICD-10-CM

## 2020-12-16 PROCEDURE — 99214 OFFICE O/P EST MOD 30 MIN: CPT | Mod: GC | Performed by: STUDENT IN AN ORGANIZED HEALTH CARE EDUCATION/TRAINING PROGRAM

## 2020-12-16 RX ORDER — RISPERIDONE 2 MG/1
6 TABLET ORAL AT BEDTIME
Qty: 90 TABLET | Refills: 0 | Status: SHIPPED | OUTPATIENT
Start: 2021-01-14 | End: 2021-01-27

## 2020-12-16 RX ORDER — VENLAFAXINE HYDROCHLORIDE 37.5 MG/1
37.5 CAPSULE, EXTENDED RELEASE ORAL EVERY MORNING
Qty: 30 CAPSULE | Refills: 1 | Status: SHIPPED | OUTPATIENT
Start: 2020-12-16 | End: 2021-01-27

## 2020-12-16 ASSESSMENT — PAIN SCALES - GENERAL: PAINLEVEL: NO PAIN (0)

## 2020-12-16 NOTE — PROGRESS NOTES
"Video- Visit Details  Type of service:  video visit for medication management  Time of service:    Date:  12/16/2020    Video Start Time:  2:00 PM        Video End Time:  2:30 PM    Reason for video visit:  Services only offered telehealth  Originating Site (patient location):  [facility]   Location- Patient's home  Distant Site (provider location):  Remote location  Mode of Communication:  Video Conference via [email or web address]  Consent:  Patient has given verbal consent for video visit?: Yes          [facility]  Psychiatry Clinic  PSYCHIATRIC PROGRESS NOTE     CARE TEAM:    PCP- Khushi Cadena at [facility]  PCA - Marcelo (micheal)  Has mental health specialist who comes to house twice weekly.    Carmen Connolly is a 40 year old patient who prefers the name Carmen and uses  pronouns she, her, hers.     PERTINENT BACKGROUND        [most recent eval 09/01/20]     Previous diagnoses include Schizoaffective disorder, depression, generalized anxiety disorder, and PTSD. Symptoms began after the passing of her  while they were refugees living in [facility]. She did not receive any psychiatric care until after she immigrated to the [place] in [place].    Psych critical item history includes psychosis [sxs include paranoia, responding to internal stimuli, negative symptoms] and trauma hx     INTERIM HISTORY     History was provided by patient and family (micheal Pierre & pt's PCA) who was a good and fair historian.    Since Last Visit:  - Carmen continues to improve, and things have been \"okay\" since the last increase in risperidone to 6 mg.  - Behavior is better and Carmen reports she is not seeing things or hearing voices.   - She can't remember the last time she had seen or heard anything.  - Continues to attend adult  4x per week.  - Sleep was also initially improved, but was only temporary. His primary concern for her remains sleep, and he asked if there are any medications she can take.  - Per " Ignacio, Carmen will wake frequently in the middle of the night.   - Sleeps at 10 PM, wakes at midnight. Can take 1 hour to fall back asleep. Does not bother others in the house when she wakes at night.   - Is not sleeping or napping at all during the day.    - When risperidone was initially increased to 5 mg, she slept 8 hours for the first night. Then slowly decreased to 5-6 hrs.  - With 6 mg risperidone, slept 6 hours, and this has remained consistent.  - Regarding venlafaxine, Ignacio administers Carmen's medications at night, including venlafaxine.  - At last visit, we had discussed tapering off venlafaxine, and they were agreeable to decrease dose to 37.5 mg daily.  - In addition, we discussed how it may have been activating at night, which could be causing her nighttime waking. Suggested she take her dose in the morning instead.  - They agreed to try this decrease and change of timing before adding another medication to help with sleep.  - No other reported side effects to medications. No abnormal muscle movements, GI disturbance, dizziness, daytime sedation.    - No acute concerns for safety. Has PCA with her during the daytime. Other family members are always around to take care of Carmen. She reported no SI/SIB or HI.    RECENT PSYCH ROS:   Depression:  insomnia and appetite changes  Elevated:  none  Psychosis:  disorganized behavior and negative symptoms (avolition, affective flattening, anhedonia, alogia, apathy, .)  Anxiety:  feeling fearful  Trauma Related:  none reported  Dysregulation:  none  Eating Disorder: no    Adverse Effects:  None reported. Possible night time activation from venlafaxine.  Pertinent Negative Symptoms: No self-injurious behavior/urges, suicidal and violent ideation, shine and hypomania    RECENT SUBSTANCE USE:     None reported     SOCIAL HISTORY   [1ea, 1ea]           [per patient report]            Financial/ Work- social security disability       Partner/ -   since ~2003  Children- yes, has 6 children (age 12 to 23). 3 are not living at home with her. 5 were from same partner, one from another partner.  Living situation- Lives at home with 3 of her youngest children.      Social/ Spiritual Support- family     Legal- none reported  FEELS SAFE AT HOME- yes     Trauma History (self-report)- yes  Early History/Education- Refugee from [place] during [place] war. Was a refugee in [facility] for several years before immigrating to the [place] in 2011    PSYCH and SUBSTANCE USE Critical Summary Points since July 2020 9/2/2020: Increased risperidone to 4 mg to target active psychosis, decreased venlafaxine to 75 mg daily.  12/2/2020: Increased risperidone to 6 mg. Had improvement with psychosis symptoms at 4 mg.  12/16/2020: Decreased venlafaxine to 37.5 mg daily. Changed dosing time from bedtime to morning. Kept risperidone 6 mg at bedtime.    MEDICAL HISTORY  and ALLERGY     ALLERGIES: Patient has no known allergies.     Patient Active Problem List   Diagnosis     Essential hypertension     Gastroesophageal reflux disease without esophagitis     Shortened MD interval     Vitamin D deficiency     Posttraumatic stress disorder     Female circumcision     Chronic tension-type headache, not intractable     Schizoaffective disorder, bipolar type (H)     Neurocognitive deficits     Arthritis     Extra digits     Immigrant with language difficulty     Pulmonary tuberculosis confirmed by sputum microscopy     Recurrent major depression (H)     Morbid obesity (H)         MEDICAL REVIEW OF SYSTEMS    [2, 10]   Pregnant or breastfeeding- no      Contraception- not discussed    Targeted ROS:  General - No reported sedation or fatigue  GI - no constipation, diarrhea, nausea or GI disturbance  Neuro - No dizziness or abnormal muscle movements.    MEDICATIONS          Current Outpatient Medications   Medication Sig Dispense Refill     acetaminophen (TYLENOL) 325 MG tablet Take 2 tablets  (650 mg) by mouth every 4 hours as needed for mild pain 100 tablet 11     amLODIPine (NORVASC) 5 MG tablet Take 1 tablet (5 mg) by mouth daily 90 tablet 3     cetirizine (ZYRTEC) 10 MG tablet Take 1 tablet (10 mg) by mouth daily 30 tablet 11     diclofenac (VOLTAREN) 1 % topical gel Place 2 g onto the skin 3 times daily as needed for moderate pain 100 g 1     diphenhydrAMINE (BENADRYL) 25 MG tablet Take 1-2 tablets (25-50 mg) by mouth nightly as needed for itching or allergies 30 tablet 0     famotidine (PEPCID) 20 MG tablet Take 1 tablet (20 mg) by mouth 2 times daily 90 tablet 3     fluticasone (FLONASE) 50 MCG/ACT nasal spray Spray 2 sprays into both nostrils daily 15.8 mL 1     ibuprofen (ADVIL/MOTRIN) 400 MG tablet Take 1 tablet (400 mg) by mouth every 4 hours as needed for moderate pain 120 tablet 3     Incontinence Supply Disposable (DEPEND UNDERGARMENTS) MISC Use daily as needed 200 each 11     magnesium 250 MG tablet Take 2 tablets (500 mg) by mouth daily 60 tablet 3     melatonin 3 MG tablet Take 2 hours before bedtime 30 tablet 3     multivitamin w/minerals (THERA-VIT-M) tablet Take 1 tablet by mouth daily 100 each 3     Omega-3 Fatty Acids (FISH OIL OMEGA-3) 1000 MG CAPS Take 2,000 mg by mouth daily 180 capsule 3     propranolol ER (INDERAL LA) 60 MG 24 hr capsule Take 1 capsule (60 mg) by mouth daily 90 capsule 3     risperiDONE (RISPERDAL) 1 MG tablet Take 5 mg (2x 2 mg tablets + 1x 1 mg tablet) by mouth at bedtime for 3 days. 3 tablet 0     risperiDONE (RISPERDAL) 2 MG tablet Take 5 mg (2x 2 mg tablets + 1x 1 mg tablet) by mouth at bedtime for 3 days. Then take 6 mg (3x 2 mg tablets) after. 87 tablet 0     venlafaxine (EFFEXOR-XR) 75 MG 24 hr capsule Take 1 capsule (75 mg) by mouth daily 30 capsule 0     vitamin D3 (CHOLECALCIFEROL) 50 mcg (2000 units) tablet Take 1 tablet (50 mcg) by mouth daily 90 tablet 3     VITALS                     [3, 3]   There were no vitals taken for this visit.     MENTAL  STATUS EXAM     [9, 14 cog gs]     Alertness: alert  and awake  Appearance: well groomed, in hijab  Behavior/Demeanor: cooperative, calm and passive, with good  eye contact   Speech: normal and regular rate and rhythm  Language: intact and fluent in [place] Islander  Psychomotor: normal or unremarkable  Mood: description consistent with euthymia  Affect: flat; congruent to: mood- yes, content- yes  Thought Process/Associations: unremarkable  Thought Content:  Reports none;  Denies suicidal & violent ideation and delusions  Perception:  Reports none;  Denies auditory hallucinations and visual hallucinations  Insight: limited  Judgment: fair and adequate for safety  Cognition: (6) oriented: person only. Stated it is 2019, March, and location as [place] (in [place])  attention span: fair  concentration: fair  recent memory: limited  remote memory: limited  fund of knowledge: not fully assessed.  Gait and Station: N/A (telehealth)    LABS and DATA     PHQ9 TODAY = not done today    PHQ 12/3/2018 9/3/2019 4/28/2020   PHQ-9 Total Score 10 6 11   Q9: Thoughts of better off dead/self-harm past 2 weeks Not at all Not at all Not at all     ANTIPSYCHOTIC LABS  [glu, A1C, lipids (tiburcio LDL), liver enzymes, WBC, ANEU, Hgb, plts]  q12 mo  Recent Labs   Lab Test 06/26/20  1042 08/31/17  1634 02/14/17  1513 04/11/16  1507 02/24/16  1653   .5*  --  115.0* 108* 145.4*   A1C 5.8* 5.4  --   --   --      Recent Labs   Lab Test 06/26/20  1042 08/31/17  1634   CHOL 221.8* 221.0*   TRIG 186.9* 82.3   * 153*   HDL 43.1 51.3     Recent Labs   Lab Test 06/26/20  1042 01/31/16  1401   AST 23.8 31   ALT 28.8 36   ALKPHOS 88.5 119     Recent Labs   Lab Test 12/03/18  1116 02/14/17  1513 04/11/16  1507 03/31/16  1534 01/31/16  1401   WBC  --   --  7.0  --  6.4   ANEU  --   --  3.0  --  2.4   HGB 11.8 12.5 12.1 11.9 14.0   PLT  --   --  282  --  301         Recent Labs   Lab Test 06/26/20  1042 02/14/17  1513 04/11/16  1507   CR 0.6 1.0  0.83   GFRESTIMATED >90 65.6 77     PSYCHOTROPIC DRUG INTERACTIONS     None    MANAGEMENT:  N/A    RISK STATEMENT for SAFETY     Carmen Connolly did not appear to be an imminent safety risk to self or others.    DIAGNOSES                            [m2, h3]      Unspecified Psychosis   - Rule-out Schizoaffective Disorder, depressive type   - Rule-out MDD with psychotic features   - Rule-out PTSD with psychotic features  Major Depressive Disorder, recurrent, severe     ASSESSMENT                         [m2, h3]          Carmen Connolly presents to the [facility] for follow-up and medication management of MDD and psychosis. Carmen continues to demonstrate improvement with regard to her positive symptoms. She reports no longer seeing or hearing things, and Ignacio confirms she is not responding to internal stimuli. She still is unable to care for herself, and given that she has been in her state of psychosis for what is likely many years, further medication changes may be unlikely to completely resolve her symptoms. This may be her baseline, but she is better able to engage in conversation. She also has not reported feeling depressed at this visit.    Again, the primary concern Carmen and Ignacio would like to have addressed today is for her to have consistent sleep. Ignacio revealed that he has been giving her medications all at night, including venlafaxine which may be activating and causing nighttime waking. It is unclear if it is providing any benefit for her mood, so she and Ignacio were agreeable to work towards tapering it off completely. Also advised Ignacio to give the lower dose in the morning rather than at night. Will see if these changes can improve her sleep before we try adding an additional medication. Will plan to call next week to follow-up if sleep issues persist after this change. If so, we can try a sleep aid such as trazodone, hydroxyzine, or suvorexant.    No acute concerns for  safety today.    Future considerations:  - Cross-taper to another antidepressant if depression persists (fluoxetine, sertraline)  - Can titrate risperidone further or switch to another agent such as olanzapine if psychosis symptoms persist.  - Investigate PTSD symptoms and determine diagnosis of PTSD    MNPMP review was not needed today.    PLAN                                                   [m2, h3]                                               1) Medications  - Continue risperidone 6 mg at bedtime  - Decrease venlafaxine to 37.5 mg, and change dosing time to QAM instead of bedtime    2) Therapy  - Continue with therapist who visits home twice weekly    3) Next Due   Labs- SGA labs due 06/2021  EKG- PRN  Rating scales- PHQ9    4) Referrals  No Referrals needed     5) RTC: 4-6 weeks    6) Crisis Numbers:   Provided routinely in AVS     After hours:  [phone]      TREATMENT RISK STATEMENT:  The risks, benefits, alternatives and potential adverse effects have been discussed and are understood by the pt. The pt understands the risks of using street drugs or alcohol. There are no medical contraindications, the pt agrees to treatment with the ability to do so. The pt knows to call the clinic for any problems or to access emergency care if needed.  Medical and substance use concerns are documented above.  Psychotropic drug interaction check was done, including changes made today.    PROVIDER: Jon Ugarte MD    Patient staffed via teleconference with Dr. Herring who will sign the note.  Supervisor is Dr. Zamora.    TELEHEALTH ATTENDING ATTESTATION  Following the ACGME guidelines on telemedicine and direct supervision due to COVID-19, I was concurrently participating in and/or monitoring the patient care through appropriate telecommunication technology.  I discussed the key portions of the service with the resident, including the mental status examination and developing the plan of care. I reviewed key  portions of the history with the resident. I agree with the findings and plan as documented in this note.   Jim Herring MD

## 2020-12-16 NOTE — PATIENT INSTRUCTIONS
Thank you for coming to the [facility] [place] [facility].    Lab Testing:  If you had lab testing today and your results are reassuring or normal they will be mailed to you or sent through Hithru within 7 days. If the lab tests need quick action we will call you with the results. The phone number we will call with results is # [phone] (home) . If this is not the best number please call our clinic and change the number.    Medication Refills:  If you need any refills please call your pharmacy and they will contact us. Our fax number for refills is [phone]. Please allow three business for refill processing. If you need to  your refill at a new pharmacy, please contact the new pharmacy directly. The new pharmacy will help you get your medications transferred.     Scheduling:  If you have any concerns about today's visit or wish to schedule another appointment please call our office during normal business hours [phone] (8-5:00 M-F)    Contact Us:  Please call [phone] during business hours (8-5:00 M-F).  If after clinic hours, or on the weekend, please call  [phone].    Financial Assistance [phone]  [facility] Billing [phone]  Central Billing Office, [facility]: [phone]  [facility] Billing [phone]  Medical Records [phone]  [facility] Patient Bill of Rights [email or web address]       MENTAL HEALTH CRISIS NUMBERS:  For a medical emergency please call  911 or go to the nearest ER.     [facility]:   [facility] -[phone]   Crisis Residence [facility] -[phone]   Walk-In Counseling Center \[facility] [place] [facility]\"" -[phone]   COPE 24/7 [facility] Mobile Team -[phone] (adults)/[phone] (child)  CHILD: [facility] needs assessment team - [phone]      [facility]:   [facility] - [phone]   Walk-in counseling [facility]  [facility] - [phone]   Walk-in counseling [facility] - [phone]   Crisis Residence [facility] - [phone]  Urgent Care Adult Mental [facility]-[phone] mobile unit/ 24/7 crisis line    National Crisis Numbers:   National Suicide Prevention Lifeline: 4-747-628-TALK (252-044-1653)  Poison Control Center - 1-141.100.4565  [employer]/resources for a list of additional resources (SOS)  Trans Lifeline a hotline for transgender people 1-552-613-6024  The [employer] a hotline for LGBT youth 1-212.485.5404  Crisis Text Line: For any crisis 24/7   To: 867105  see www.crisistextline.org  - IF MAKING A CALL FEELS TOO HARD, send a text!         Again thank you for choosing [facility] and please let us know how we can best partner with you to improve you and your family's health.    You may be receiving a survey regarding this appointment. We would love to have your feedback, both positive and negative. The survey is done by an external company, so your answers are anonymous.

## 2020-12-16 NOTE — PROGRESS NOTES
"VIDEO VISIT  Carmen Connolly is a 40 year old patient who is being evaluated via a billable video visit.      The patient has been notified of following:   \"This video visit will be conducted via a call between you and your physician/provider. We have found that certain health care needs can be provided without the need for an in-person physical exam. This service lets us provide the care you need with a video conversation. If a prescription is necessary we can send it directly to your pharmacy. If lab work is needed we can place an order for that and you can then stop by our lab to have the test done at a later time. Insurers are generally covering virtual visits as they would in-office visits so billing should not be different than normal.  If for some reason you do get billed incorrectly, you should contact the billing office to correct it and that number is in the AVS .    Video Conference to be completed via:  [email or web address]    Patient has given verbal consent for video visit?:  Yes    Patient would prefer that any video invitations be sent by: Send to e-mail at: [email or web address]      How would patient like to obtain AVS?:  Mail a copy    AVS SmartPhrase [PsychAVS] has been placed in 'Patient Instructions':  Yes    "

## 2020-12-22 ENCOUNTER — TELEPHONE (OUTPATIENT)
Dept: PSYCHIATRY | Facility: CLINIC | Age: 40
End: 2020-12-22

## 2020-12-22 NOTE — TELEPHONE ENCOUNTER
Called pt's home and was able to speak with Carmen's PCA, Ignacio. He reports that Carmen's sleep has improved with the medication changes. She is sleeping better through the night since the Effexor was decreased and moved to the morning.    Discussed how we can look to discontinue the Effexor at her next visit, but for now will continue to see if her sleep remains improved over the next couple of weeks.    Jon Ugarte MD  PGY-3 Psychiatry Resident

## 2020-12-23 ENCOUNTER — TELEPHONE (OUTPATIENT)
Dept: FAMILY MEDICINE | Facility: CLINIC | Age: 40
End: 2020-12-23

## 2020-12-23 NOTE — TELEPHONE ENCOUNTER
[facility] Medicine phone call message - order or referral request from patient:     Order or referral being requested: Requested order     Additional Details: Rose, with [facility]  calling to request the following skilled nursing orders: 1x/week every other week for 9 weeks for medication set up.     Referral only -Specialty N/A Location N/A    OK to leave a message on voice mail? Yes    Primary language: Chinese      needed? Yes    Call taken on December 23, 2020 at 2:42 PM by Colette Recinos    Order request route to [facility] TRIAGE   Referrals Route to [facility] (Green/Saline/Purple) CARE COORDINATOR

## 2020-12-23 NOTE — TELEPHONE ENCOUNTER
Spoke with Rose and provided verbal orders per standing order. She verbalized understanding and had no further questions.    Rebecca Moralez RN

## 2020-12-27 ENCOUNTER — MEDICAL CORRESPONDENCE (OUTPATIENT)
Dept: HEALTH INFORMATION MANAGEMENT | Facility: CLINIC | Age: 40
End: 2020-12-27

## 2020-12-29 ENCOUNTER — DOCUMENTATION ONLY (OUTPATIENT)
Dept: FAMILY MEDICINE | Facility: CLINIC | Age: 40
End: 2020-12-29

## 2020-12-29 NOTE — PROGRESS NOTES
"When opening a documentation only encounter, be sure to enter in \"Chief Complaint\" Forms and in \" Comments\" Title of form, description if needed.    Carmen is a 40 year old  female  Form received via: Fax  Form now resides in: Provider Ready    Concetta Wick MA    Scanned into patient's chart 1/19/2021. Closing note.   2/8/2021 Concetta Wick MA                  "

## 2021-01-26 ENCOUNTER — APPOINTMENT (OUTPATIENT)
Dept: INTERPRETER SERVICES | Facility: CLINIC | Age: 41
End: 2021-01-26
Payer: COMMERCIAL

## 2021-01-27 ENCOUNTER — VIRTUAL VISIT (OUTPATIENT)
Dept: PSYCHIATRY | Facility: CLINIC | Age: 41
End: 2021-01-27
Attending: PSYCHIATRY & NEUROLOGY
Payer: COMMERCIAL

## 2021-01-27 DIAGNOSIS — F29 PSYCHOSIS, UNSPECIFIED PSYCHOSIS TYPE (H): ICD-10-CM

## 2021-01-27 DIAGNOSIS — F33.1 MODERATE EPISODE OF RECURRENT MAJOR DEPRESSIVE DISORDER (H): ICD-10-CM

## 2021-01-27 PROCEDURE — 99214 OFFICE O/P EST MOD 30 MIN: CPT | Mod: GC | Performed by: STUDENT IN AN ORGANIZED HEALTH CARE EDUCATION/TRAINING PROGRAM

## 2021-01-27 RX ORDER — RISPERIDONE 2 MG/1
6 TABLET ORAL AT BEDTIME
Qty: 90 TABLET | Refills: 1 | Status: SHIPPED | OUTPATIENT
Start: 2021-01-27 | End: 2021-03-05

## 2021-01-27 RX ORDER — VENLAFAXINE HYDROCHLORIDE 37.5 MG/1
37.5 CAPSULE, EXTENDED RELEASE ORAL EVERY MORNING
Qty: 30 CAPSULE | Refills: 1 | Status: SHIPPED | OUTPATIENT
Start: 2021-01-27 | End: 2021-03-05

## 2021-01-27 ASSESSMENT — PAIN SCALES - GENERAL: PAINLEVEL: NO PAIN (0)

## 2021-01-27 NOTE — PROGRESS NOTES
"Video- Visit Details  Type of service:  video visit for medication management  Time of service:    Date:  01/27/2021    Video Start Time:  3:00 PM        Video End Time:  3:30 PM    Reason for video visit:  Services only offered telehealth  Originating Site (patient location):  [facility]   Location- Patient's home  Distant Site (provider location):  Remote location  Mode of Communication:  Video Conference via [email or web address]  Consent:  Patient has given verbal consent for video visit?: Yes          [facility]  Psychiatry Clinic  PSYCHIATRIC PROGRESS NOTE     CARE TEAM:    PCP- Khushi Cadena at [facility]  PCA - Marcelo (micheal)  Has mental health specialist who comes to house twice weekly.    Carmen Connolly is a 41 year old patient who prefers the name Carmen and uses  pronouns she, her, hers.     PERTINENT BACKGROUND        [most recent eval 09/01/20]     Previous diagnoses include Schizoaffective disorder, depression, generalized anxiety disorder, and PTSD. Symptoms began after the passing of her  while they were refugees living in [facility]. She did not receive any psychiatric care until after she immigrated to the [place] in [place].    Psych critical item history includes psychosis [sxs include paranoia, responding to internal stimuli, negative symptoms] and trauma hx     INTERIM HISTORY     History was provided by patient and family (micheal Pierre & pt's PCA) who was a good and fair historian.    Since Last Visit:  - Carmen reports she is good today.  - Her mood is \"normal\". Not reporting to be down or depressed.  - Ignacio feels that she is doing much better.  - Per Ignacio, Hillarys sleep is better than before.  - Last night, she slept around 1 AM, woke at 10 AM. Slept without interruption.  - She still sees shadowy figures, but he reports that they are much less frequent and \"smaller, not like before.\"  - Also reported that she is not feeling fearful.  - She is still going to " adult day care 4 days a week. Interacts with others when there.  - Able to interact with family more.  - She still requires care, help feeding self, using the toilet.  - Was more oriented today, she knew day of week and location (city). Said the year is 2020, though has said it was 2018 or '19 at previous visits.    - She is still on venlafaxine 37.5 mg every morning and risperidone 6 mg at bedtime. - No reported side effects.  - On discussion of discontinuing venlafaxine, Ignacio preferred to make no changes today.   - He believes it is helpful for her mood and helps her stay awake during the day.  - Was willing to re-evaluate either discontinuation or increasing venlafaxine if needed at next visit.    - No acute concerns for safety. Has PCA with her during the daytime. Other family members are always around to take care of Carmen. No SI/SIB or HI elicited on interview.    RECENT PSYCH ROS:   Depression:  none reported  Elevated:  none  Psychosis:  visual hallucinations [details in Interim History] and negative symptoms (avolition, affective flattening, anhedonia, alogia, apathy, .)  Anxiety:  none reported  Trauma Related:  none reported  Dysregulation:  none  Eating Disorder: no    Adverse Effects:  None reported.  Pertinent Negative Symptoms: No self-injurious behavior/urges, suicidal and violent ideation, shine and hypomania    RECENT SUBSTANCE USE:     None reported     SOCIAL HISTORY   [1ea, 1ea]           [per patient report]            Financial/ Work- social security disability       Partner/ -  since ~2003  Children- yes, has 6 children (age 12 to 23). 3 are not living at home with her. 5 were from same partner, one from another partner.  Living situation- Lives at home with 3 of her youngest children.      Social/ Spiritual Support- family     Legal- none reported  FEELS SAFE AT HOME- yes     Trauma History (self-report)- yes  Early History/Education- Refugee from [place] during [place]. Was a refugee in [facility] for several years before immigrating to the [place] in 2011    PSYCH and SUBSTANCE USE Critical Summary Points since July 2020 9/2/2020: Increased risperidone to 4 mg to target active psychosis, decreased venlafaxine to 75 mg daily.  12/2/2020: Increased risperidone to 6 mg. Had improvement with psychosis symptoms at 4 mg.  12/16/2020: Decreased venlafaxine to 37.5 mg daily. Changed dosing time from bedtime to morning. Kept risperidone 6 mg at bedtime.  1/27/2021: No medication changes.    MEDICAL HISTORY  and ALLERGY     ALLERGIES: Patient has no known allergies.     Patient Active Problem List   Diagnosis     Essential hypertension     Gastroesophageal reflux disease without esophagitis     Shortened OR interval     Vitamin D deficiency     Posttraumatic stress disorder     Female circumcision     Chronic tension-type headache, not intractable     Schizoaffective disorder, bipolar type (H)     Neurocognitive deficits     Arthritis     Extra digits     Immigrant with language difficulty     Pulmonary tuberculosis confirmed by sputum microscopy     Recurrent major depression (H)     Morbid obesity (H)         MEDICAL REVIEW OF SYSTEMS    [2, 10]   Pregnant or breastfeeding- no      Contraception- not discussed    Targeted ROS:  General - No reported sedation or fatigue  GI - no constipation, diarrhea, nausea or GI disturbance  Neuro - No dizziness or abnormal muscle movements.    MEDICATIONS          Current Outpatient Medications   Medication Sig Dispense Refill     acetaminophen (TYLENOL) 325 MG tablet Take 2 tablets (650 mg) by mouth every 4 hours as needed for mild pain 100 tablet 11     amLODIPine (NORVASC) 5 MG tablet Take 1 tablet (5 mg) by mouth daily 90 tablet 3     cetirizine (ZYRTEC) 10 MG tablet Take 1 tablet (10 mg) by mouth daily 30 tablet 11     diclofenac (VOLTAREN) 1 % topical gel Place 2 g onto the skin 3 times daily as needed for moderate pain 100 g 1      diphenhydrAMINE (BENADRYL) 25 MG tablet Take 1-2 tablets (25-50 mg) by mouth nightly as needed for itching or allergies 30 tablet 0     famotidine (PEPCID) 20 MG tablet Take 1 tablet (20 mg) by mouth 2 times daily 90 tablet 3     fluticasone (FLONASE) 50 MCG/ACT nasal spray Spray 2 sprays into both nostrils daily 15.8 mL 1     ibuprofen (ADVIL/MOTRIN) 400 MG tablet Take 1 tablet (400 mg) by mouth every 4 hours as needed for moderate pain 120 tablet 3     Incontinence Supply Disposable (DEPEND UNDERGARMENTS) MISC Use daily as needed 200 each 11     magnesium 250 MG tablet Take 2 tablets (500 mg) by mouth daily 60 tablet 3     melatonin 3 MG tablet Take 2 hours before bedtime 30 tablet 3     multivitamin w/minerals (THERA-VIT-M) tablet Take 1 tablet by mouth daily 100 each 3     Omega-3 Fatty Acids (FISH OIL OMEGA-3) 1000 MG CAPS Take 2,000 mg by mouth daily 180 capsule 3     propranolol ER (INDERAL LA) 60 MG 24 hr capsule Take 1 capsule (60 mg) by mouth daily 90 capsule 3     risperiDONE (RISPERDAL) 2 MG tablet Take 3 tablets (6 mg) by mouth At Bedtime Take 5 mg (2x 2 mg tablets + 1x 1 mg tablet) by mouth at bedtime for 3 days. Then take 6 mg (3x 2 mg tablets) after. 90 tablet 0     venlafaxine (EFFEXOR-XR) 37.5 MG 24 hr capsule Take 1 capsule (37.5 mg) by mouth every morning 30 capsule 1     vitamin D3 (CHOLECALCIFEROL) 50 mcg (2000 units) tablet Take 1 tablet (50 mcg) by mouth daily 90 tablet 3     VITALS                     [3, 3]   There were no vitals taken for this visit.     MENTAL STATUS EXAM     [9, 14 cog gs]     Alertness: alert  and awake  Appearance: well groomed, in hijab  Behavior/Demeanor: cooperative, calm and passive, with good  eye contact   Speech: normal and regular rate and rhythm  Language: intact and fluent in Mozambican  Psychomotor: normal or unremarkable  Mood: description consistent with euthymia  Affect: flat, though occasionally smiling; congruent to: mood- yes, content- yes  Thought  Process/Associations: unremarkable  Thought Content:  Reports none;  Denies suicidal & violent ideation and delusions  Perception:  Reports none;  Denies auditory hallucinations and visual hallucinations  Insight: limited  Judgment: fair and adequate for safety  Cognition: (6) oriented: time, person, and place (time was reported as 2020, but knew day of week).  attention span: not fully assessed, grossly fair  concentration: not fully assessed  recent memory: limited  remote memory: limited  fund of knowledge: not fully assessed.  Gait and Station: N/A (telehealth)    LABS and DATA     PHQ9 TODAY = not done today    PHQ 12/3/2018 9/3/2019 4/28/2020   PHQ-9 Total Score 10 6 11   Q9: Thoughts of better off dead/self-harm past 2 weeks Not at all Not at all Not at all     ANTIPSYCHOTIC LABS  [glu, A1C, lipids (tiburcio LDL), liver enzymes, WBC, ANEU, Hgb, plts]  q12 mo  Recent Labs   Lab Test 06/26/20  1042 08/31/17  1634 02/14/17  1513 04/11/16  1507 02/24/16  1653   .5*  --  115.0* 108* 145.4*   A1C 5.8* 5.4  --   --   --      Recent Labs   Lab Test 06/26/20  1042 08/31/17  1634   CHOL 221.8* 221.0*   TRIG 186.9* 82.3   * 153*   HDL 43.1 51.3     Recent Labs   Lab Test 06/26/20  1042 01/31/16  1401   AST 23.8 31   ALT 28.8 36   ALKPHOS 88.5 119     Recent Labs   Lab Test 12/03/18  1116 02/14/17  1513 04/11/16  1507 03/31/16  1534 01/31/16  1401   WBC  --   --  7.0  --  6.4   ANEU  --   --  3.0  --  2.4   HGB 11.8 12.5 12.1 11.9 14.0   PLT  --   --  282  --  301         Recent Labs   Lab Test 06/26/20  1042 02/14/17  1513 04/11/16  1507   CR 0.6 1.0 0.83   GFRESTIMATED >90 65.6 77     PSYCHOTROPIC DRUG INTERACTIONS     Concurrent use of Venlafaxine, diclofenac, and ibuprofen can increase risk of GI bleeding.    MANAGEMENT:  Monitoring for adverse effects    RISK STATEMENT for SAFETY     Carmen Awammon Mohsen did not appear to be an imminent safety risk to self or others.    DIAGNOSES                            [m2,  h3]      Unspecified Psychosis   - Rule-out Schizoaffective Disorder, depressive type   - Rule-out MDD with psychotic features   - Rule-out PTSD with psychotic features  Major Depressive Disorder, recurrent, severe     ASSESSMENT                         [m2, h3]          Carmen Connolly presents to the [facility] for follow-up and medication management of MDD and psychosis. Carmen presents with Ignacio her PCA, who reports noticeable improvement with her sleep, mood, and psychosis symptoms. She reports no longer seeing or hearing things, and Ignacio reports she only sees things intermittently now. Could consider further titration of Risperdal, though would weigh risks of side effects and metabolic syndrome.    Shifting the administration time of her venlafaxine (from nighttime to morning) appears to have had a significant benefit for her sleep. She is reportedly sleeping through the night and is also getting an adequate amount. No need for any PRN sleep aids.    She still is unable to care for herself, and given that she has been in her state of psychosis for what is likely many years, further medication changes may be unlikely to completely resolve her symptoms. This may be her baseline, but she is better able to engage in conversation. She also has not reported feeling depressed at this visit. No acute concerns for safety today.    Future considerations:  - Discontinue venlafaxine if no concerns for depressive symptoms.  - Cross-taper to another antidepressant if depression persists (fluoxetine, sertraline)  - Can titrate risperidone further or switch to another agent such as olanzapine if psychosis symptoms persist.  - Investigate PTSD symptoms and determine diagnosis of PTSD    MNPMP review was not needed today.    PLAN                                                   [m2, h3]                                               1) Medications  - Continue risperidone 6 mg at bedtime  - Continue venlafaxine  37.5 mg QAM    2) Therapy  - Continue with therapist who visits home twice weekly    3) Next Due   Labs- SGA labs due 06/2021  EKG- PRN  Rating scales- PHQ9    4) Referrals  No Referrals needed     5) RTC: 4 weeks    6) Crisis Numbers:   Provided routinely in AVS     After hours:  [phone]      TREATMENT RISK STATEMENT:  The risks, benefits, alternatives and potential adverse effects have been discussed and are understood by the pt. The pt understands the risks of using street drugs or alcohol. There are no medical contraindications, the pt agrees to treatment with the ability to do so. The pt knows to call the clinic for any problems or to access emergency care if needed.  Medical and substance use concerns are documented above.  Psychotropic drug interaction check was done, including changes made today.    PROVIDER: Jon Ugarte MD    Patient staffed via teleconference ([email or web address]) with Dr. Ritchie who will sign the note.  Supervisor is Dr. Zamora.  TELEHEALTH ATTENDING ATTESTATION  Following the ACGME guidelines on telemedicine and direct supervision due to COVID-19, I was concurrently participating in and/or monitoring the patient care through appropriate telecommunication technology.  I discussed the key portions of the service with the resident, including the mental status examination and developing the plan of care. I reviewed key portions of the history with the resident. I agree with the findings and plan as documented in this note.   Dina Ritchie MD

## 2021-01-27 NOTE — PROGRESS NOTES
"VIDEO VISIT  Carmen Connolly is a 41 year old patient who is being evaluated via a billable video visit.      The patient has been notified of following:   \"This video visit will be conducted via a call between you and your physician/provider. We have found that certain health care needs can be provided without the need for an in-person physical exam. This service lets us provide the care you need with a video conversation. If a prescription is necessary we can send it directly to your pharmacy. If lab work is needed we can place an order for that and you can then stop by our lab to have the test done at a later time. Insurers are generally covering virtual visits as they would in-office visits so billing should not be different than normal.  If for some reason you do get billed incorrectly, you should contact the billing office to correct it and that number is in the AVS .    Video Conference to be completed via:  [email or web address]    Patient has given verbal consent for video visit?:  Yes    Patient would prefer that any video invitations be sent by: Send to e-mail at: [email or web address]      How would patient like to obtain AVS?:  Patient declined    AVS SmartPhrase [PsychAVS] has been placed in 'Patient Instructions':  Yes  "

## 2021-01-27 NOTE — PATIENT INSTRUCTIONS
**For crisis resources, please see the information at the end of this document**     Patient Education      Thank you for coming to the [facility] MENTAL HEALTH & ADDICTION [place] CLINIC.    Lab Testing:  If you had lab testing today and your results are reassuring or normal they will be mailed to you or sent through [employer] within 7 days. If the lab tests need quick action we will call you with the results. The phone number we will call with results is # [phone] (home) . If this is not the best number please call our clinic and change the number.    Medication Refills:  If you need any refills please call your pharmacy and they will contact us. Our fax number for refills is [phone]. Please allow three business for refill processing. If you need to  your refill at a new pharmacy, please contact the new pharmacy directly. The new pharmacy will help you get your medications transferred.     Scheduling:  If you have any concerns about today's visit or wish to schedule another appointment please call our office during normal business hours [phone] (8-5:00 M-F)    Contact Us:  Please call [phone] during business hours (8-5:00 M-F).  If after clinic hours, or on the weekend, please call  [phone].    Financial Assistance [phone]  [facility] Billing [phone]  Central Billing Office, [facility]: [phone]  [place] Billing [phone]  Medical Records [phone]  [place] Patient Bill of Rights [email or web address]       MENTAL HEALTH CRISIS NUMBERS:  For a medical emergency please call  911 or go to the nearest ER.     [facility]:   [facility] -[phone]   Crisis Residence [facility] Residence -[phone]   Walk-In Counseling Center [facility] -[phone]   COPE 24/7 [place] Mobile Team -[phone] (adults)/[phone] (child)  CHILD: [facility] needs assessment  team - [phone]      [facility]:   [facility] - [phone]   Walk-in counseling [facility] - [phone]   Walk-in counseling [facility] - [phone]   Crisis Residence [facility] Residence - [phone]  Urgent Care Adult Mental Mqkqlt-[phone] mobile unit/ 24/7 crisis line    National Crisis Numbers:   National Suicide Prevention Lifeline: 0-324-543-TALK (879-287-5311)  Poison Control Center - 0-465-920-0860  Glance/resources for a list of additional resources (SOS)  Trans Lifeline a hotline for transgender people 1-192.648.9248  The Randy Project a hotline for LGBT youth 7-174-316-1685  Crisis Text Line: For any crisis 24/7   To: 680935  see www.crisistextline.org  - IF MAKING A CALL FEELS TOO HARD, send a text!         Again thank you for choosing [facility] MENTAL HEALTH & ADDICTION [facility] and please let us know how we can best partner with you to improve you and your family's health.    You may be receiving a survey regarding this appointment. We would love to have your feedback, both positive and negative. The survey is done by an external company, so your answers are anonymous.

## 2021-02-02 DIAGNOSIS — M54.50 ACUTE BILATERAL LOW BACK PAIN WITHOUT SCIATICA: ICD-10-CM

## 2021-02-08 RX ORDER — IBUPROFEN 400 MG/1
400 TABLET, FILM COATED ORAL EVERY 4 HOURS PRN
Qty: 120 TABLET | Refills: 3 | Status: SHIPPED | OUTPATIENT
Start: 2021-02-08 | End: 2021-07-10

## 2021-02-08 NOTE — TELEPHONE ENCOUNTER
"Request for medication refill:  ibuprofen (ADVIL/MOTRIN) 400 MG tablet    Providers if patient needs an appointment and you are willing to give a one month supply please refill for one month and  send a letter/MyChart using \".SMILLIMITEDREFILL\" .smillimited and route chart to \"P [facility] \" (Giving one month refill in non controlled medications is strongly recommended before denial)    If refill has been denied, meaning absolutely no refills without visit, please complete the smart phrase \".smirxrefuse\" and route it to the \"P [facility] MED REFILLS\"  pool to inform the patient and the pharmacy.    Concetta Wick MA        "

## 2021-02-17 DIAGNOSIS — G44.229 CHRONIC TENSION-TYPE HEADACHE, NOT INTRACTABLE: ICD-10-CM

## 2021-02-18 RX ORDER — CARBOXYMETHYLCELLULOSE SODIUM 0.5 %
DROPPERETTE, SINGLE-USE DROP DISPENSER OPHTHALMIC (EYE)
Qty: 60 TABLET | Refills: 11 | Status: SHIPPED | OUTPATIENT
Start: 2021-02-18 | End: 2022-02-23

## 2021-02-23 ENCOUNTER — TELEPHONE (OUTPATIENT)
Dept: FAMILY MEDICINE | Facility: CLINIC | Age: 41
End: 2021-02-23

## 2021-02-23 NOTE — TELEPHONE ENCOUNTER
[facility] Family Medicine phone call message - order or referral request from patient:     Order or referral being requested: Requested order: Skilled nursing every other week for 9 weeks and 2 PRN    Additional Details: none    OK to leave a message on voice mail? Yes    Primary language: Sudanese      needed? Yes    Call taken on February 23, 2021 at 1:23 PM by Nidhi Medel    Order request route to P [facility] TRIAGE   Referrals Route to [facility] (Green/[place]/Purple) CARE COORDINATOR

## 2021-03-05 ENCOUNTER — TELEPHONE (OUTPATIENT)
Dept: PSYCHIATRY | Facility: CLINIC | Age: 41
End: 2021-03-05

## 2021-03-05 ENCOUNTER — APPOINTMENT (OUTPATIENT)
Dept: INTERPRETER SERVICES | Facility: CLINIC | Age: 41
End: 2021-03-05
Payer: COMMERCIAL

## 2021-03-05 ENCOUNTER — VIRTUAL VISIT (OUTPATIENT)
Dept: PSYCHIATRY | Facility: CLINIC | Age: 41
End: 2021-03-05
Attending: PSYCHIATRY & NEUROLOGY
Payer: COMMERCIAL

## 2021-03-05 DIAGNOSIS — F29 PSYCHOSIS, UNSPECIFIED PSYCHOSIS TYPE (H): ICD-10-CM

## 2021-03-05 DIAGNOSIS — F25.1 SCHIZOAFFECTIVE DISORDER, DEPRESSIVE TYPE (H): Primary | ICD-10-CM

## 2021-03-05 DIAGNOSIS — Z86.59 HISTORY OF POSTTRAUMATIC STRESS DISORDER (PTSD): ICD-10-CM

## 2021-03-05 PROCEDURE — 99214 OFFICE O/P EST MOD 30 MIN: CPT | Mod: GC | Performed by: STUDENT IN AN ORGANIZED HEALTH CARE EDUCATION/TRAINING PROGRAM

## 2021-03-05 RX ORDER — RISPERIDONE 2 MG/1
6 TABLET ORAL AT BEDTIME
Qty: 90 TABLET | Refills: 1 | Status: SHIPPED | OUTPATIENT
Start: 2021-03-05 | End: 2021-04-02

## 2021-03-05 RX ORDER — RISPERIDONE 1 MG/1
1 TABLET ORAL AT BEDTIME
Qty: 30 TABLET | Refills: 1 | Status: SHIPPED | OUTPATIENT
Start: 2021-03-05 | End: 2021-04-02

## 2021-03-05 ASSESSMENT — PAIN SCALES - GENERAL: PAINLEVEL: NO PAIN (0)

## 2021-03-05 NOTE — TELEPHONE ENCOUNTER
On 3/5/2021, at 1258, writer called patient at mobile to confirm Virtual Visit. Writer unable to make contact with patient. Writer left detailed voice message for callback. [phone], left as call back number. WON Rogers, EMT

## 2021-03-05 NOTE — PATIENT INSTRUCTIONS
**For crisis resources, please see the information at the end of this document**     Patient Education      Thank you for coming to the [facility] MENTAL HEALTH & ADDICTION [place] CLIN[facility].    Lab Testing:  If you had lab testing today and your results are reassuring or normal they will be mailed to you or sent through [employer] within 7 days. If the lab tests need quick action we will call you with the results. The phone number we will call with results is # [phone] (home) . If this is not the best number please call our clinic and change the number.    Medication Refills:  If you need any refills please call your pharmacy and they will contact us. Our fax number for refills is [phone]. Please allow three business for refill processing. If you need to  your refill at a new pharmacy, please contact the new pharmacy directly. The new pharmacy will help you get your medications transferred.     Scheduling:  If you have any concerns about today's visit or wish to schedule another appointment please call our office during normal business hours [phone] (8-5:00 M-F)    Contact Us:  Please call [phone] during business hours (8-5:00 M-F).  If after clinic hours, or on the weekend, please call  [phone].    Financial Assistance [phone]  [facility] Billing [phone]  Central Billing Office, [facility]: [phone]  [place] Billing [phone]  Medical Records [phone]  [place] Patient Bill of Rights [email or web address]       MENTAL HEALTH CRISIS NUMBERS:  For a medical emergency please call  911 or go to the nearest ER.     [facility]:   [facility] -[phone]   Crisis Residence [facility] Residence -[phone]   Walk-In Counseling Center [facility] -[phone]   COPE 24/7 [place] Mobile Team -[phone] (adults)/[phone] (child)  CHILD: [facility] needs assessment  team - [phone]      [facility]:   [facility] - [phone]   Walk-in counseling [facility] House - [phone]   Walk-in counseling [facility] - [phone]   Crisis Residence [facility] Residence - [phone]  Urgent Care Adult Mental Mlcgvl-[phone] mobile unit/ 24/7 crisis line    National Crisis Numbers:   National Suicide Prevention Lifeline: 9-450-448-TALK (866-791-4175)  Poison Control Center - 8-922-512-0806  MOVL/resources for a list of additional resources (SOS)  Trans Lifeline a hotline for transgender people 1-666.871.8425  The Randy Project a hotline for LGBT youth 0-531-100-3740  Crisis Text Line: For any crisis 24/7   To: 115134  see www.crisistextline.org  - IF MAKING A CALL FEELS TOO HARD, send a text!         Again thank you for choosing [facility] MENTAL HEALTH & ADDICTION [facility] and please let us know how we can best partner with you to improve you and your family's health.    You may be receiving a survey regarding this appointment. We would love to have your feedback, both positive and negative. The survey is done by an external company, so your answers are anonymous.

## 2021-03-05 NOTE — PROGRESS NOTES
"Video- Visit Details  Type of service:  video visit for medication management  Time of service:    Date:  03/05/2021    Video Start Time:  1:15 PM        Video End Time:  1:45 PM    Reason for video visit:  Services only offered telehealth  Originating Site (patient location):  [facility]   Location- Patient's home  Distant Site (provider location):  Remote location  Mode of Communication:  Video Conference via [email or web address]  Consent:  Patient has given verbal consent for video visit?: Yes           [facility]  Psychiatry Clinic  PSYCHIATRIC PROGRESS NOTE     CARE TEAM:    PCP- Khushi Cadena at [facility]  PCA - Marcelo (micheal)  Has mental health specialist who comes to house twice weekly.    Carmen Connolly is a 41 year old patient who prefers the name Carmen and uses  pronouns she, her, hers.     PERTINENT BACKGROUND        [most recent eval 09/01/20]     Previous diagnoses include Schizoaffective disorder, depression, generalized anxiety disorder, and PTSD. Symptoms began after the passing of her  while they were refugees living in [facility]. She did not receive any psychiatric care until after she immigrated to the [place] in [place].    Psych critical item history includes psychosis [sxs include paranoia, responding to internal stimuli, negative symptoms] and trauma hx     INTERIM HISTORY     History was provided by patient and family (micheal Pierre & pt's PCA) who was a good and fair historian.    Since Last Visit:  - Carmen reports she is \"good\".  - She reported her mood as good, not feeling depressed or low.  - She was oriented to self and place. Knew season was wintertime, and guessed year was 2020.    - Per Ignacio, she is still better, though sleep is becoming more disrupted for the past 2 weeks.  - For the past week, Carmen has been waking frequently in the middle of the night.  - Sleeps in 1-2 hour blocks. Able to fall back asleep after 10-15 mins.  - Has been less interactive " with others. Still interacts with friends at , appears happy and social when doing so.  - She still requires care, help feeding self, using the toilet.  - Has been eating less, has also seemed to have more hallucinations, seeing shadowy figures as before, though not at the same severity as before.  - No clear inciting factor.  - No reported issues or side effects from medications.    - Suggested discontinuation of venlafaxine as it has not been clear what benefit it has provided. Sleep had been improved with past reductions in dose and shifting to daytime dose.  - In addition, recommended increase in risperidone, to 7 mg. Ignacio and Carmen were agreeable to these changes.    - No acute concerns for safety. Has PCA with her during the daytime. Other family members are always around to take care of Carmen. No SI/SIB or HI elicited on interview.    RECENT PSYCH ROS:   Depression:  none reported  Elevated:  none  Psychosis:  visual hallucinations [details in Interim History] and negative symptoms (avolition, affective flattening, anhedonia, alogia, apathy, .)  Anxiety:  none reported  Trauma Related:  none reported  Dysregulation:  none  Eating Disorder: no    Adverse Effects:  None reported.  Pertinent Negative Symptoms: No self-injurious behavior/urges, suicidal and violent ideation, shine and hypomania    RECENT SUBSTANCE USE:     None reported     SOCIAL HISTORY   [1ea, 1ea]           [per patient report]            Financial/ Work- social security disability       Partner/ -  since ~2003  Children- yes, has 6 children (age 12 to 23). 3 are not living at home with her. 5 were from same partner, one from another partner.  Living situation- Lives at home with 3 of her youngest children.      Social/ Spiritual Support- family     Legal- none reported  FEELS SAFE AT HOME- yes     Trauma History (self-report)- yes  Early History/Education- Refugee from [place] during [place] war. Was a refugee  in [facility] for several years before immigrating to the [place] in 2011    PSYCH and SUBSTANCE USE Critical Summary Points since July 2020 9/2/2020: Increased risperidone to 4 mg to target active psychosis, decreased venlafaxine to 75 mg daily.  12/2/2020: Increased risperidone to 6 mg. Had improvement with psychosis symptoms at 4 mg.  12/16/2020: Decreased venlafaxine to 37.5 mg daily. Changed dosing time from bedtime to morning. Kept risperidone 6 mg at bedtime.  1/27/2021: No medication changes.  3/5/2021: Discontinued venlafaxine. Increased risperidone to 7 mg at bedtime due to sleep disruption and worsening psychosis (VH, negative symptoms).    MEDICAL HISTORY  and ALLERGY     ALLERGIES: Patient has no known allergies.     Patient Active Problem List   Diagnosis     Essential hypertension     Gastroesophageal reflux disease without esophagitis     Shortened HI interval     Vitamin D deficiency     Posttraumatic stress disorder     Female circumcision     Chronic tension-type headache, not intractable     Schizoaffective disorder, bipolar type (H)     Neurocognitive deficits     Arthritis     Extra digits     Immigrant with language difficulty     Pulmonary tuberculosis confirmed by sputum microscopy     Recurrent major depression (H)     Morbid obesity (H)         MEDICAL REVIEW OF SYSTEMS    [2, 10]   Pregnant or breastfeeding- no      Contraception- not discussed    Targeted ROS:  General - No reported sedation or fatigue  GI - no constipation, diarrhea, nausea or GI disturbance  Neuro - No dizziness or abnormal muscle movements.    MEDICATIONS          Current Outpatient Medications   Medication Sig Dispense Refill     acetaminophen (TYLENOL) 325 MG tablet Take 2 tablets (650 mg) by mouth every 4 hours as needed for mild pain 100 tablet 11     amLODIPine (NORVASC) 5 MG tablet Take 1 tablet (5 mg) by mouth daily 90 tablet 3     cetirizine (ZYRTEC) 10 MG tablet Take 1 tablet (10 mg) by mouth daily 30 tablet  11     [employer] MAGNESIUM OXIDE 250 MG tablet TAKE 2 TABLETS (500 MG) BY MOUTH DAILY 60 tablet 11     diclofenac (VOLTAREN) 1 % topical gel Place 2 g onto the skin 3 times daily as needed for moderate pain 100 g 1     diphenhydrAMINE (BENADRYL) 25 MG tablet Take 1-2 tablets (25-50 mg) by mouth nightly as needed for itching or allergies 30 tablet 0     famotidine (PEPCID) 20 MG tablet Take 1 tablet (20 mg) by mouth 2 times daily 90 tablet 3     fluticasone (FLONASE) 50 MCG/ACT nasal spray Spray 2 sprays into both nostrils daily 15.8 mL 1     ibuprofen (ADVIL/MOTRIN) 400 MG tablet TAKE 1 TABLET (400 MG) BY MOUTH EVERY 4 HOURS AS NEEDED FOR MODERATE PAIN 120 tablet 3     Incontinence Supply Disposable (DEPEND UNDERGARMENTS) MISC Use daily as needed 200 each 11     melatonin 3 MG tablet Take 2 hours before bedtime 30 tablet 3     multivitamin w/minerals (THERA-VIT-M) tablet Take 1 tablet by mouth daily 100 each 3     Omega-3 Fatty Acids (FISH OIL OMEGA-3) 1000 MG CAPS Take 2,000 mg by mouth daily 180 capsule 3     propranolol ER (INDERAL LA) 60 MG 24 hr capsule Take 1 capsule (60 mg) by mouth daily 90 capsule 3     risperiDONE (RISPERDAL) 2 MG tablet Take 3 tablets (6 mg) by mouth At Bedtime 90 tablet 1     venlafaxine (EFFEXOR-XR) 37.5 MG 24 hr capsule Take 1 capsule (37.5 mg) by mouth every morning 30 capsule 1     vitamin D3 (CHOLECALCIFEROL) 50 mcg (2000 units) tablet Take 1 tablet (50 mcg) by mouth daily 90 tablet 3     VITALS                     [3, 3]   There were no vitals taken for this visit.     MENTAL STATUS EXAM     [9, 14 cog gs]     Alertness: alert  and awake  Appearance: well groomed, in hijab  Behavior/Demeanor: cooperative, calm and passive, with good  eye contact   Speech: normal and regular rate and rhythm  Language: intact and fluent in Taiwanese  Psychomotor: normal or unremarkable  Mood: description consistent with euthymia  Affect: flat, though occasionally smiling; congruent to: mood- yes, content-  yes  Thought Process/Associations: unremarkable  Thought Content:  Reports none;  Denies suicidal & violent ideation and delusions  Perception:  Reports none;  Denies auditory hallucinations and visual hallucinations  Insight: limited  Judgment: fair and adequate for safety  Cognition: (6) oriented: time, person, and place (time was reported as 2020, but knew day of week).  attention span: not fully assessed, grossly fair  concentration: not fully assessed  recent memory: limited  remote memory: limited  fund of knowledge: not fully assessed.  Gait and Station: N/A (telehealth)    LABS and DATA     PHQ9 TODAY = not done today    PHQ 12/3/2018 9/3/2019 4/28/2020   PHQ-9 Total Score 10 6 11   Q9: Thoughts of better off dead/self-harm past 2 weeks Not at all Not at all Not at all     ANTIPSYCHOTIC LABS  [glu, A1C, lipids (tiburcio LDL), liver enzymes, WBC, ANEU, Hgb, plts]  q12 mo  Recent Labs   Lab Test 06/26/20  1042 08/31/17  1634 02/14/17  1513 04/11/16  1507 02/24/16  1653   .5*  --  115.0* 108* 145.4*   A1C 5.8* 5.4  --   --   --      Recent Labs   Lab Test 06/26/20  1042 08/31/17  1634   CHOL 221.8* 221.0*   TRIG 186.9* 82.3   * 153*   HDL 43.1 51.3     Recent Labs   Lab Test 06/26/20  1042 01/31/16  1401   AST 23.8 31   ALT 28.8 36   ALKPHOS 88.5 119     Recent Labs   Lab Test 12/03/18  1116 02/14/17  1513 04/11/16  1507 03/31/16  1534 01/31/16  1401   WBC  --   --  7.0  --  6.4   ANEU  --   --  3.0  --  2.4   HGB 11.8 12.5 12.1 11.9 14.0   PLT  --   --  282  --  301         Recent Labs   Lab Test 06/26/20  1042 02/14/17  1513 04/11/16  1507   CR 0.6 1.0 0.83   GFRESTIMATED >90 65.6 77     PSYCHOTROPIC DRUG INTERACTIONS     Concurrent use of Venlafaxine, diclofenac, and ibuprofen can increase risk of GI bleeding.    MANAGEMENT:  Monitoring for adverse effects    RISK STATEMENT for SAFETY     Carmen Awes Mohsen did not appear to be an imminent safety risk to self or others.    DIAGNOSES                             [m2, h3]      Schizoaffective Disorder, Depressive type, in partial remission    ASSESSMENT                         [m2, h3]          Carmen Connolly presents to the [facility] for follow-up and medication management of MDD and psychosis. Carmen presents with Ignacio her PCA, who reports increased disruption with her sleep and possible return of some psychosis symptoms. On interview, she appears no different from previous visits. It is unclear what has caused the return of her symptoms, and she may require further titration of Risperdal, which is what we will do today. Plan is to titrate to 7 mg at bedtime.     In addition, it has not been fully clear what benefit venlafaxine has been providing. Marcelo has previously reported that it is helpful in giving Carmen energy during the day, though she has been improving with our gradual decrease in her venlafaxine dose. This was also done while titrating Risperidone, which is likely the main reason for the improvement. At the same time, since it is not clear what benefit it is providing, can see how she does by discontinuing it. If she reports a worsening of mood, it can be restarted.     She still is unable to care for herself, and given that she has been in her state of psychosis for what is likely many years, further medication changes may be unlikely to completely resolve her symptoms. This may be around her baseline. No acute concerns for safety today.    Future considerations:  - Restart venlafaxine if concerns for depressive symptoms.  - Cross-taper to another antidepressant if depression persists (fluoxetine, sertraline)  - Can titrate risperidone further or switch to another agent such as olanzapine if psychosis symptoms persist.  - Investigate PTSD symptoms and determine diagnosis of PTSD    [facility]    PLAN                                                   [m2, h3]                                               1)  Medications  - Increase risperidone to 7 mg at bedtime  - Discontinue venlafaxine 37.5 mg QAM    2) Therapy  - Continue with therapist who visits home twice weekly    3) Next Due   Labs- [facility] labs due 06/2021  EKG- PRN  Rating scales- PHQ9    4) Referrals  No Referrals needed     5) RTC: 4 weeks    6) Crisis Numbers:   Provided routinely in AVS     After hours:  [phone]      TREATMENT RISK STATEMENT:  The risks, benefits, alternatives and potential adverse effects have been discussed and are understood by the pt. The pt understands the risks of using street drugs or alcohol. There are no medical contraindications, the pt agrees to treatment with the ability to do so. The pt knows to call the clinic for any problems or to access emergency care if needed.  Medical and substance use concerns are documented above.  Psychotropic drug interaction check was done, including changes made today.    PROVIDER: Jon Ugarte MD    Patient staffed via teleconference ([email or web address]) with Dr. Franco who will sign the note.  Supervisor is Dr. Zamora.    TELEHEALTH ATTENDING ATTESTATION  Following the ACGME guidelines on telemedicine and direct supervision due to COVID-19, I was concurrently participating in and/or monitoring the patient care through appropriate telecommunication technology.  I discussed the key portions of the service with the resident, including the mental status examination and developing the plan of care. I reviewed key portions of the history with the resident. I agree with the findings and plan as documented in this note.  Carmen and Norris report there may be some increase in Carmen's psychotic symptoms.  We will increase her risperidone to 7 mg to target these.  She has tolerated a gradual downward titration of venlafaxine well and we will discontinue this today.  We can consider reinstituting it or starting another antidepressant if symptoms of depression emerge.  They are agreeable with our treatment  plan.  Carmen will return for follow-up in 4 weeks.  Del Franco MD

## 2021-03-05 NOTE — PROGRESS NOTES
"VIDEO VISIT  Carmen Connolly is a 41 year old patient who is being evaluated via a billable video visit.      The patient has been notified of following:   \"This video visit will be conducted via a call between you and your physician/provider. We have found that certain health care needs can be provided without the need for an in-person physical exam. This service lets us provide the care you need with a video conversation. If a prescription is necessary we can send it directly to your pharmacy. If lab work is needed we can place an order for that and you can then stop by our lab to have the test done at a later time. Insurers are generally covering virtual visits as they would in-office visits so billing should not be different than normal.  If for some reason you do get billed incorrectly, you should contact the billing office to correct it and that number is in the AVS .    Video Conference to be completed via:  [email or web address]    Patient has given verbal consent for video visit?:  Yes    Patient would prefer that any video invitations be sent by: Send to e-mail at: [email or web address]      How would patient like to obtain AVS?:  Mail a copy    AVS SmartPhrase [PsychAVS] has been placed in 'Patient Instructions':  Yes    "

## 2021-03-11 ENCOUNTER — DOCUMENTATION ONLY (OUTPATIENT)
Dept: FAMILY MEDICINE | Facility: CLINIC | Age: 41
End: 2021-03-11

## 2021-03-11 DIAGNOSIS — Z53.9 DIAGNOSIS NOT YET DEFINED: Primary | ICD-10-CM

## 2021-03-11 NOTE — PROGRESS NOTES
"When opening a documentation only encounter, be sure to enter in \"Chief Complaint\" Forms and in \" Comments\" Title of form, description if needed.    Carmen is a 41 year old  female  Form received via: Fax  Form now resides in: Provider Ready    Cass Jacinto MA                "

## 2021-03-15 NOTE — PROGRESS NOTES
Form has been completed by provider.     Form sent out via: Fax to [employer] at Fax Number: [phone]  Patient informed: n/a  Output date: March 15, 2021    Cass Jacinto MA      **Please close the encounter**

## 2021-03-22 DIAGNOSIS — K21.9 GASTROESOPHAGEAL REFLUX DISEASE: ICD-10-CM

## 2021-03-22 DIAGNOSIS — K21.00 GASTROESOPHAGEAL REFLUX DISEASE WITH ESOPHAGITIS, UNSPECIFIED WHETHER HEMORRHAGE: Primary | ICD-10-CM

## 2021-03-26 DIAGNOSIS — J30.2 SEASONAL ALLERGIC RHINITIS, UNSPECIFIED TRIGGER: Primary | ICD-10-CM

## 2021-03-26 NOTE — TELEPHONE ENCOUNTER
"Request for medication refill:  fluticasone (FLONASE) 50 MCG/ACT nasal spray    Providers if patient needs an appointment and you are willing to give a one month supply please refill for one month and  send a letter/MyChart using \".SMILLIMITEDREFILL\" .smillimited and route chart to \"P SMI \" (Giving one month refill in non controlled medications is strongly recommended before denial)    If refill has been denied, meaning absolutely no refills without visit, please complete the smart phrase \".smirxrefuse\" and route it to the \"P SMI MED REFILLS\"  pool to inform the patient and the pharmacy.    Concetta Wick MA        "

## 2021-03-30 RX ORDER — FLUTICASONE PROPIONATE 50 MCG
2 SPRAY, SUSPENSION (ML) NASAL DAILY
Qty: 15.8 ML | Refills: 1 | Status: SHIPPED | OUTPATIENT
Start: 2021-03-30 | End: 2021-05-05

## 2021-04-01 DIAGNOSIS — F25.1 SCHIZOAFFECTIVE DISORDER, DEPRESSIVE TYPE (H): ICD-10-CM

## 2021-04-02 ENCOUNTER — VIRTUAL VISIT (OUTPATIENT)
Dept: PSYCHIATRY | Facility: CLINIC | Age: 41
End: 2021-04-02
Attending: PSYCHIATRY & NEUROLOGY
Payer: COMMERCIAL

## 2021-04-02 DIAGNOSIS — G47.00 INSOMNIA, UNSPECIFIED TYPE: ICD-10-CM

## 2021-04-02 DIAGNOSIS — F25.1 SCHIZOAFFECTIVE DISORDER, DEPRESSIVE TYPE (H): ICD-10-CM

## 2021-04-02 DIAGNOSIS — F29 PSYCHOSIS, UNSPECIFIED PSYCHOSIS TYPE (H): ICD-10-CM

## 2021-04-02 PROCEDURE — 99214 OFFICE O/P EST MOD 30 MIN: CPT | Mod: GC | Performed by: STUDENT IN AN ORGANIZED HEALTH CARE EDUCATION/TRAINING PROGRAM

## 2021-04-02 RX ORDER — RISPERIDONE 2 MG/1
6 TABLET ORAL AT BEDTIME
Qty: 90 TABLET | Refills: 3 | Status: SHIPPED | OUTPATIENT
Start: 2021-04-02 | End: 2021-05-14

## 2021-04-02 RX ORDER — LANOLIN ALCOHOL/MO/W.PET/CERES
CREAM (GRAM) TOPICAL
Qty: 30 TABLET | Refills: 3 | Status: SHIPPED | OUTPATIENT
Start: 2021-04-02 | End: 2021-05-14

## 2021-04-02 RX ORDER — RISPERIDONE 1 MG/1
1 TABLET ORAL AT BEDTIME
Qty: 30 TABLET | Refills: 3 | Status: SHIPPED | OUTPATIENT
Start: 2021-04-02 | End: 2021-05-14

## 2021-04-02 RX ORDER — RISPERIDONE 1 MG/1
1 TABLET ORAL AT BEDTIME
Qty: 30 TABLET | Refills: 1 | OUTPATIENT
Start: 2021-04-02

## 2021-04-02 ASSESSMENT — PAIN SCALES - GENERAL: PAINLEVEL: NO PAIN (0)

## 2021-04-02 NOTE — PATIENT INSTRUCTIONS
**For crisis resources, please see the information at the end of this document**     Patient Education      Thank you for coming to the [facility] [place] [facility].    Lab Testing:  If you had lab testing today and your results are reassuring or normal they will be mailed to you or sent through [employer] within 7 days. If the lab tests need quick action we will call you with the results. The phone number we will call with results is # [phone] (home) . If this is not the best number please call our clinic and change the number.    Medication Refills:  If you need any refills please call your pharmacy and they will contact us. Our fax number for refills is [phone]. Please allow three business for refill processing. If you need to  your refill at a new pharmacy, please contact the new pharmacy directly. The new pharmacy will help you get your medications transferred.     Scheduling:  If you have any concerns about today's visit or wish to schedule another appointment please call our office during normal business hours [phone] (8-5:00 M-F)    Contact Us:  Please call [phone] during business hours (8-5:00 M-F).  If after clinic hours, or on the weekend, please call  [phone].    Financial Assistance [phone]  [facility] Billing [phone]  Central Billing Office, [facility]: [phone]  [facility] Billing [phone]  Medical Records [phone]  [facility] Patient Bill of Rights [email or web address]       MENTAL HEALTH CRISIS NUMBERS:  For a medical emergency please call  911 or go to the nearest ER.     [facility]:   [facility] -[phone]   Crisis Residence [facility] Residence -[phone]   Walk-In Counseling Center [facility] -[phone]   COPE 24/7 [place] Mobile Team -[phone] (adults)/[phone] (child)  CHILD: [facility] needs assessment  team - [phone]      [facility]:   [facility] - [phone]   Walk-in counseling [facility] - [phone]   Walk-in counseling [facility] - [phone]   Crisis Residence [facility] - [phone]  Urgent Care Adult Mental [place]-[phone] mobile unit/ 24/7 crisis line    National Crisis Numbers:   National Suicide Prevention Lifeline: 3-241-610-TALK (769-618-9712)  Poison Control Center - 4-394-293-7125  ALICE App/resources for a list of additional resources (SOS)  Trans Lifeline a hotline for transgender people 1-272.583.4797  The Randy Project a hotline for LGBT youth 5-127-223-9171  Crisis Text Line: For any crisis 24/7   To: 723464  see www.crisistextline.org  - IF MAKING A CALL FEELS TOO HARD, send a text!         Again thank you for choosing [facility] and please let us know how we can best partner with you to improve you and your family's health.    You may be receiving a survey regarding this appointment. We would love to have your feedback, both positive and negative. The survey is done by an external company, so your answers are anonymous.

## 2021-04-02 NOTE — PROGRESS NOTES
"VIDEO VISIT  Carmen Connolly is a 41 year old patient who is being evaluated via a billable video visit.      The patient has been notified of following:   \"This video visit will be conducted via a call between you and your physician/provider. We have found that certain health care needs can be provided without the need for an in-person physical exam. This service lets us provide the care you need with a video conversation. If a prescription is necessary we can send it directly to your pharmacy. If lab work is needed we can place an order for that and you can then stop by our lab to have the test done at a later time. Insurers are generally covering virtual visits as they would in-office visits so billing should not be different than normal.  If for some reason you do get billed incorrectly, you should contact the billing office to correct it and that number is in the AVS .    Video Conference to be completed via:  [email or web address]    Patient has given verbal consent for video visit?:  Yes    Patient would prefer that any video invitations be sent by: Send to e-mail at: [email or web address]      How would patient like to obtain AVS?:  Mail a copy    AVS SmartPhrase [PsychAVS] has been placed in 'Patient Instructions':  Yes  "

## 2021-04-02 NOTE — PROGRESS NOTES
"Video- Visit Details  Type of service:  video visit for medication management  Time of service:    Date:  04/02/2021    Video Start Time:  1:45 PM        Video End Time:  2:15 PM    Reason for video visit:  Services only offered telehealth  Originating Site (patient location):  [facility]   Location- Patient's home  Distant Site (provider location):  Remote location  Mode of Communication:  Video Conference via [email or web address]  Consent:  Patient has given verbal consent for video visit?: Yes           [facility]  Psychiatry Clinic  PSYCHIATRIC PROGRESS NOTE     CARE TEAM:    PCP- Khushi Cadena at [facility]  PCA - Marcelo (micheal)  Has mental health specialist who comes to house twice weekly.    Carmen Connolly is a 41 year old patient who prefers the name Carmen and uses  pronouns she, her, hers.     PERTINENT BACKGROUND        [most recent eval 09/01/20]     Previous diagnoses include Schizoaffective disorder, depression, generalized anxiety disorder, and PTSD. Symptoms began after the passing of her  while they were refugees living in [facility]. She did not receive any psychiatric care until after she immigrated to the [place] in [place].    Psych critical item history includes psychosis [sxs include paranoia, responding to internal stimuli, negative symptoms] and trauma hx     INTERIM HISTORY     History was provided by patient and family (micheal Pierre & pt's PCA) who was a good and fair historian.    Since Last Visit:  - Carmen reports she is \"very good\", not feeling depressed or low.  - She was oriented to self and place. Guessed year was 2020 or 2022.    - Per Ignacio, \"she is much better than before\".  - Still has some minimal hallucinations. Will say \"hi\" out loud when no one else is present in the room.  - Sleep is improved. Will sleep through the night now and may wake only once.  - Continues to be interactive with others at , appears happy and social when doing so.  - She " "still requires constant care, help feeding self, using the toilet, wears diapers.  - Appetite remains low, but caregivers ensure that she eats.  - Asked Carmen if sadness/mood has been affecting her low appetite, she replied \"no.\"  - She also reported no issues or side effects from medications.    - Ignacio and Carmen did not feel the need to make changes to medications today, just requested a refill of melatonin.    - No acute concerns for safety. Has PCA with her during the daytime. Other family members are always around to take care of Carmen. No SI/SIB or HI elicited on interview.    RECENT PSYCH ROS:   Depression:  none reported  Elevated:  none  Psychosis:  visual hallucinations [details in Interim History] and negative symptoms (avolition, affective flattening, anhedonia, alogia, apathy, .)  Anxiety:  none reported  Trauma Related:  none reported  Dysregulation:  none  Eating Disorder: no    Adverse Effects:  None reported. Occasional HA.  Pertinent Negative Symptoms: No self-injurious behavior/urges, suicidal and violent ideation, shine and hypomania    RECENT SUBSTANCE USE:     None reported     SOCIAL HISTORY   [1ea, 1ea]           [per patient report]            Financial/ Work- social security disability       Partner/ -  since ~2003  Children- yes, has 6 children (age 12 to 23). 3 are not living at home with her. 5 were from same partner, one from another partner.  Living situation- Lives at home with 3 of her youngest children.      Social/ Spiritual Support- family     Legal- none reported  FEELS SAFE AT HOME- yes     Trauma History (self-report)- yes  Early History/Education- Refugee from [place] during [place]. Was a refugee in [facility] for several years before immigrating to the [place] in [place]    PSYCH and SUBSTANCE USE Critical Summary Points since July 2020 9/2/2020: Increased risperidone to 4 mg to target active psychosis, decreased venlafaxine to 75 mg daily.  12/2/2020: " Increased risperidone to 6 mg. Had improvement with psychosis symptoms at 4 mg.  12/16/2020: Decreased venlafaxine to 37.5 mg daily. Changed dosing time from bedtime to morning. Kept risperidone 6 mg at bedtime.  1/27/2021: No medication changes.  3/5/2021: Discontinued venlafaxine. Increased risperidone to 7 mg at bedtime due to sleep disruption and worsening psychosis (VH, negative symptoms).  4/2/2021: No changes to medications.    MEDICAL HISTORY  and ALLERGY     ALLERGIES: Patient has no known allergies.     Patient Active Problem List   Diagnosis     Essential hypertension     Gastroesophageal reflux disease without esophagitis     Shortened ME interval     Vitamin D deficiency     Posttraumatic stress disorder     Female circumcision     Chronic tension-type headache, not intractable     Schizoaffective disorder, bipolar type (H)     Neurocognitive deficits     Arthritis     Extra digits     Immigrant with language difficulty     Pulmonary tuberculosis confirmed by sputum microscopy     Recurrent major depression (H)     Morbid obesity (H)         MEDICAL REVIEW OF SYSTEMS    [2, 10]   Pregnant or breastfeeding- no      Contraception- not discussed    Targeted ROS:  General - No reported sedation or fatigue  GI - no constipation, diarrhea, nausea or GI disturbance  Neuro - No dizziness or abnormal muscle movements. Occasional HA.    MEDICATIONS          Current Outpatient Medications   Medication Sig Dispense Refill     acetaminophen (TYLENOL) 325 MG tablet Take 2 tablets (650 mg) by mouth every 4 hours as needed for mild pain 100 tablet 11     amLODIPine (NORVASC) 5 MG tablet Take 1 tablet (5 mg) by mouth daily 90 tablet 3     cetirizine (ZYRTEC) 10 MG tablet Take 1 tablet (10 mg) by mouth daily 30 tablet 11     [employer] MAGNESIUM OXIDE 250 MG tablet TAKE 2 TABLETS (500 MG) BY MOUTH DAILY 60 tablet 11     diclofenac (VOLTAREN) 1 % topical gel Place 2 g onto the skin 3 times daily as needed for moderate pain 100 g  1     diphenhydrAMINE (BENADRYL) 25 MG tablet Take 1-2 tablets (25-50 mg) by mouth nightly as needed for itching or allergies 30 tablet 0     famotidine (PEPCID) 20 MG tablet Take 1 tablet (20 mg) by mouth 2 times daily 90 tablet 3     fluticasone (FLONASE) 50 MCG/ACT nasal spray Spray 2 sprays into both nostrils daily 15.8 mL 1     ibuprofen (ADVIL/MOTRIN) 400 MG tablet TAKE 1 TABLET (400 MG) BY MOUTH EVERY 4 HOURS AS NEEDED FOR MODERATE PAIN 120 tablet 3     Incontinence Supply Disposable (DEPEND UNDERGARMENTS) MISC Use daily as needed 200 each 11     melatonin 3 MG tablet Take 2 hours before bedtime 30 tablet 3     multivitamin w/minerals (THERA-VIT-M) tablet Take 1 tablet by mouth daily 100 each 3     Omega-3 Fatty Acids (FISH OIL OMEGA-3) 1000 MG CAPS Take 2,000 mg by mouth daily 180 capsule 3     propranolol ER (INDERAL LA) 60 MG 24 hr capsule Take 1 capsule (60 mg) by mouth daily 90 capsule 3     risperiDONE (RISPERDAL) 1 MG tablet Take 1 tablet (1 mg) by mouth At Bedtime Take with THREE (2 mg) tablets for a total of 7 mg at bedtime. 30 tablet 1     risperiDONE (RISPERDAL) 2 MG tablet Take 3 tablets (6 mg) by mouth At Bedtime Take with ONE 1 mg tablet for a total of 7 mg at bedtime. 90 tablet 1     vitamin D3 (CHOLECALCIFEROL) 50 mcg (2000 units) tablet Take 1 tablet (50 mcg) by mouth daily 90 tablet 3     VITALS                     [3, 3]   There were no vitals taken for this visit.     MENTAL STATUS EXAM     [9, 14 cog gs]     Alertness: alert  and awake  Appearance: well groomed  Behavior/Demeanor: cooperative, calm and passive, with good  eye contact   Speech: normal and regular rate and rhythm  Language: intact and fluent in Belarusian  Psychomotor: normal or unremarkable  Mood: description consistent with euthymia  Affect: flat, though occasionally smiling; congruent to: mood- yes, content- yes  Thought Process/Associations: unremarkable  Thought Content:  Reports none;  Denies suicidal & violent ideation  and delusions  Perception:  Reports none;  Denies auditory hallucinations and visual hallucinations  Insight: limited  Judgment: fair and adequate for safety  Cognition: (6) oriented: time, person, and place (time was reported as 2020, but knew day of week).  attention span: not fully assessed, grossly fair  concentration: not fully assessed, grossly fair  recent memory: poor  remote memory: poor  fund of knowledge: not fully assessed.  Gait and Station: N/A (telehealth)    LABS and DATA     PHQ9 TODAY = not done today    PHQ 12/3/2018 9/3/2019 4/28/2020   PHQ-9 Total Score 10 6 11   Q9: Thoughts of better off dead/self-harm past 2 weeks Not at all Not at all Not at all     ANTIPSYCHOTIC LABS  [glu, A1C, lipids (tiburcio LDL), liver enzymes, WBC, ANEU, Hgb, plts]  q12 mo  Recent Labs   Lab Test 06/26/20  1042 08/31/17  1634 02/14/17  1513 04/11/16  1507 02/24/16  1653   .5*  --  115.0* 108* 145.4*   A1C 5.8* 5.4  --   --   --      Recent Labs   Lab Test 06/26/20  1042 08/31/17  1634   CHOL 221.8* 221.0*   TRIG 186.9* 82.3   * 153*   HDL 43.1 51.3     Recent Labs   Lab Test 06/26/20  1042 01/31/16  1401   AST 23.8 31   ALT 28.8 36   ALKPHOS 88.5 119     Recent Labs   Lab Test 12/03/18  1116 02/14/17  1513 04/11/16  1507 03/31/16  1534 01/31/16  1401   WBC  --   --  7.0  --  6.4   ANEU  --   --  3.0  --  2.4   HGB 11.8 12.5 12.1 11.9 14.0   PLT  --   --  282  --  301         Recent Labs   Lab Test 06/26/20  1042 02/14/17  1513 04/11/16  1507   CR 0.6 1.0 0.83   GFRESTIMATED >90 65.6 77     PSYCHOTROPIC DRUG INTERACTIONS     CETIRIZINE and DIPHENHYDRAMINE may result in increased risk of CNS depression.     MANAGEMENT:  Monitoring for adverse effects    RISK STATEMENT for SAFETY     Carmen Connolly did not appear to be an imminent safety risk to self or others.    DIAGNOSES                            [m2, h3]      Schizoaffective Disorder, Depressive type, in partial remission    ASSESSMENT                          [m2, h3]          Carmen Connolly presents to the [facility] for follow-up and medication management of MDD and psychosis. Carmen presents with Ignacio her PCA, who reports notable improvement with her hallucinations and sleep with the increase in risperidone and discontinuation of venlafaxine. On interview, she appears at her baseline and is able to converse in Uzbek. Hallucinations also appear to be at baseline, with no overt psychosis or response to internal stimuli noted today.    At baseline, she appears to be unable to care for herself, and given that she has been in her state of psychosis for what is likely many years, further medication changes may be unlikely to completely resolve her symptoms. Her current state may be her baseline. No changes to medications today and no acute concerns for safety.    Future considerations:  - Restart venlafaxine or can have trial of another antidepressant (fluoxetine, sertraline) if depressive symptoms re-emerge.  - Can titrate risperidone further (though dose is near recommended daily max of 8 mg) or switch to another agent such as olanzapine or clozapine if psychosis symptoms persist.  - Investigate for presence of PTSD symptoms and determine diagnosis of PTSD, though this has not been reported as an issue from Ignacio or Carmen since Carmen's time in this clinic.    [place] review was not needed today.    PLAN                                                   [m2, h3]                                               1) Medications  - Continue risperidone 7 mg at bedtime  - Continue melatonin 3 mg, taken 2 hours before bedtime.    2) Therapy  - Continue with therapist who visits home twice weekly    3) Next Due   Labs- SGA labs due 06/2021  EKG- PRN  Rating scales- PHQ9    4) Referrals  No Referrals needed     5) RTC: 4 weeks    6) Crisis Numbers:   Provided routinely in AVS     After hours:  [phone]      TREATMENT RISK STATEMENT:  The risks, benefits,  alternatives and potential adverse effects have been discussed and are understood by the pt. The pt understands the risks of using street drugs or alcohol. There are no medical contraindications, the pt agrees to treatment with the ability to do so. The pt knows to call the clinic for any problems or to access emergency care if needed.  Medical and substance use concerns are documented above.  Psychotropic drug interaction check was done, including changes made today.    PROVIDER: Jon Ugarte MD    Patient staffed via teleconference ([email or web address]) with Dr. Thrasher who will sign the note.  Supervisor is Dr. Zamora.

## 2021-04-02 NOTE — TELEPHONE ENCOUNTER
Medication requested: risperiDONE (RISPERDAL) 1 MG tablet Take 1 tablet (1 mg) by mouth At Bedtime Take with THREE (2 mg) tablets for a total of 7 mg at bedtime  Last refilled: 3/5/2021  Qty: 30/1  [facility]Y #[id number] - [place], [place] - [address]    Last seen: 3/5/2021  RTC: 4 weeks  Cancel: 0  No-show: 0  Next appt: 4/2/2021    Refill decision:   Denied/ 3/5/21 risperidone 1 MG #30/01 Rf to [facility]96

## 2021-04-07 RX ORDER — FAMOTIDINE 20 MG/1
TABLET, FILM COATED ORAL
Qty: 180 TABLET | Refills: 1 | Status: SHIPPED | OUTPATIENT
Start: 2021-04-07 | End: 2021-12-15

## 2021-04-08 NOTE — PROGRESS NOTES
"When opening a documentation only encounter, be sure to enter in \"Chief Complaint\" Forms and in \" Comments\" Title of form, description if needed.    Carmen is a 40 year old  female  Form received via: Fax  Form now resides in: Provider ELISABET Perez 8:27 AM July 15, 2020                    " [facility]:  I discussed with the patient the risks of imiquimod including but not limited to erythema, scaling, itching, weeping, crusting, and pain.  Patient understands that the inflammatory response to imiquimod is variable from person to person and was educated regarded proper titration schedule.  If flu-like symptoms develop, patient knows to discontinue the medication and contact us.

## 2021-04-21 ENCOUNTER — TELEPHONE (OUTPATIENT)
Dept: FAMILY MEDICINE | Facility: CLINIC | Age: 41
End: 2021-04-21

## 2021-04-21 NOTE — CONFIDENTIAL NOTE
[facility] phone call message- general phone call:    Reason for call: Rose with home care calling to let provider know that patient's home care services are ending today. Home care will no longer be managing patient's meds.    Action Desired: none - FYI to PCP    Return call needed: No    OK to leave a message on voice mail? Yes    Advised patient response may take up to 2 business days: n/a    Primary language: Icelandic      needed? Yes    Call taken on April 21, 2021 at 4:22 PM by Nidhi Medel    [facility] to [facility] TRIAGE

## 2021-05-04 DIAGNOSIS — J30.2 SEASONAL ALLERGIC RHINITIS, UNSPECIFIED TRIGGER: ICD-10-CM

## 2021-05-04 NOTE — TELEPHONE ENCOUNTER
"Request for medication refill:  fluticasone (FLONASE) 50 MCG/ACT nasal spray  Providers if patient needs an appointment and you are willing to give a one month supply please refill for one month and  send a letter/MyChart using \".SMILLIMITEDREFILL\" .smillimited and route chart to \"P SMI \" (Giving one month refill in non controlled medications is strongly recommended before denial)    If refill has been denied, meaning absolutely no refills without visit, please complete the smart phrase \".smirxrefuse\" and route it to the \"P SMI MED REFILLS\"  pool to inform the patient and the pharmacy.    Anisha Willingham        "

## 2021-05-05 RX ORDER — FLUTICASONE PROPIONATE 50 MCG
2 SPRAY, SUSPENSION (ML) NASAL DAILY
Qty: 15.8 ML | Refills: 5 | Status: SHIPPED | OUTPATIENT
Start: 2021-05-05 | End: 2021-10-20

## 2021-05-05 NOTE — TELEPHONE ENCOUNTER
"5/5/21 Reached out to Rose PECK to discuss the change. Per Rose, \"due to the new company [employer], things are changing\". \"Under the new platform, we have no Behavioral Health RN's on staff, you will be discharged from this service in 30 days\". \"We will help find a new agency for you\". Per Rose, \"a list of agencies are given to the patients to call and set up services, if patient already has order\". \"There is a lot of emotion around this change\".    Lorene Garcia  Care Coordinator    "

## 2021-05-05 NOTE — TELEPHONE ENCOUNTER
Do we know why she will no longer have HHN assisting with meds?  Did she run out of benefits? I'm just trying to understand next steps. She's not always on top of her meds by herself.    Khushi

## 2021-05-14 ENCOUNTER — VIRTUAL VISIT (OUTPATIENT)
Dept: PSYCHIATRY | Facility: CLINIC | Age: 41
End: 2021-05-14
Attending: PSYCHIATRY & NEUROLOGY
Payer: COMMERCIAL

## 2021-05-14 DIAGNOSIS — F25.1 SCHIZOAFFECTIVE DISORDER, DEPRESSIVE TYPE (H): Primary | ICD-10-CM

## 2021-05-14 DIAGNOSIS — G47.00 INSOMNIA, UNSPECIFIED TYPE: ICD-10-CM

## 2021-05-14 PROCEDURE — 99214 OFFICE O/P EST MOD 30 MIN: CPT | Mod: 95 | Performed by: STUDENT IN AN ORGANIZED HEALTH CARE EDUCATION/TRAINING PROGRAM

## 2021-05-14 RX ORDER — RISPERIDONE 2 MG/1
6 TABLET ORAL AT BEDTIME
Qty: 90 TABLET | Refills: 3 | Status: SHIPPED | OUTPATIENT
Start: 2021-05-14 | End: 2021-07-26

## 2021-05-14 RX ORDER — RISPERIDONE 1 MG/1
1 TABLET ORAL AT BEDTIME
Qty: 30 TABLET | Refills: 3 | Status: SHIPPED | OUTPATIENT
Start: 2021-05-14 | End: 2021-07-26 | Stop reason: DRUGHIGH

## 2021-05-14 RX ORDER — LANOLIN ALCOHOL/MO/W.PET/CERES
CREAM (GRAM) TOPICAL
Qty: 30 TABLET | Refills: 3 | Status: SHIPPED | OUTPATIENT
Start: 2021-05-14 | End: 2021-07-26 | Stop reason: DRUGHIGH

## 2021-05-14 ASSESSMENT — PAIN SCALES - GENERAL: PAINLEVEL: NO PAIN (0)

## 2021-05-14 NOTE — PATIENT INSTRUCTIONS
**For crisis resources, please see the information at the end of this document**     Patient Education      Thank you for coming to the [facility] [place] [facility].    Lab Testing:  If you had lab testing today and your results are reassuring or normal they will be mailed to you or sent through [email or web address] within 7 days. If the lab tests need quick action we will call you with the results. The phone number we will call with results is # [phone] (home) . If this is not the best number please call our clinic and change the number.    Medication Refills:  If you need any refills please call your pharmacy and they will contact us. Our fax number for refills is [phone]. Please allow three business for refill processing. If you need to  your refill at a new pharmacy, please contact the new pharmacy directly. The new pharmacy will help you get your medications transferred.     Scheduling:  If you have any concerns about today's visit or wish to schedule another appointment please call our office during normal business hours [phone] (8-5:00 M-F)    Contact Us:  Please call [phone] during business hours (8-5:00 M-F).  If after clinic hours, or on the weekend, please call  [phone].    Financial Assistance [phone]  [facility] Billing [phone]  Central Billing Office, [facility]: [phone]  [facility] Billing [phone]  Medical Records [phone]  [facility] Patient Bill of Rights [email or web address]       MENTAL HEALTH CRISIS NUMBERS:  For a medical emergency please call  911 or go to the nearest ER.     [facility]:   [facility] -[phone]   Crisis Residence [facility]idence -[phone]   Walk-In Counseling Center [facility] -[phone]   COPE 24/7 [facility] Mobile Team -[phone] (adults)/[phone] (child)  CHILD: [facility] needs assessment  team - [phone]      [facility]:   [facility] - [phone]   Walk-in counseling [facility] - [phone]   Walk-in counseling [facility] - [phone]   Crisis Residence [facility] - [phone]  Urgent Care Adult Mental [facility]-[phone] mobile unit/ 24/7 crisis line    National Crisis Numbers:   National Suicide Prevention Lifeline: 6-345-671-TALK (997-109-0175)  Poison Control Center - 3-137-409-8627  Shuropody/resources for a list of additional resources (SOS)  Trans Lifeline a hotline for transgender people 1-770.959.1529  The Randy Project a hotline for LGBT youth 9-960-749-1626  Crisis Text Line: For any crisis 24/7   To: 369599  see www.crisistextline.org  - IF MAKING A CALL FEELS TOO HARD, send a text!         Again thank you for choosing [facility] MENTAL HEALTH & ADDICTION [facility] and please let us know how we can best partner with you to improve you and your family's health.    You may be receiving a survey regarding this appointment. We would love to have your feedback, both positive and negative. The survey is done by an external company, so your answers are anonymous.

## 2021-05-14 NOTE — PROGRESS NOTES
"Video- Visit Details  Type of service:  video visit for medication management  Time of service:    Date:  05/14/2021    Video Start Time:  1:45 PM        Video End Time:  2:15 PM    Reason for video visit:  Services only offered telehealth  Originating Site (patient location):  [facility]   Location- Patient's home  Distant Site (provider location):  Remote location  Mode of Communication:  Video Conference via [email or web address]  Consent:  Patient has given verbal consent for video visit?: Yes           [facility]  Psychiatry Clinic  PSYCHIATRIC PROGRESS NOTE     CARE TEAM:    PCP- Khushi Cadena at [facility]  PCA - Marcelo (micheal)  Has mental health specialist who comes to house twice weekly.    Carmen Connolly is a 41 year old patient who prefers the name Carmen and uses  pronouns she, her, hers.     PERTINENT BACKGROUND        [most recent eval 09/01/20]     Previous diagnoses include Schizoaffective disorder, depression, generalized anxiety disorder, and PTSD. Symptoms began after the passing of her  while they were refugees living in [facility]. She did not receive any psychiatric care until after she immigrated to the [place] in 2011.    Psych critical item history includes psychosis [sxs include paranoia, responding to internal stimuli, negative symptoms] and trauma hx     INTERIM HISTORY     History was provided by patient and family (micheal Pierre & pt's PCA) who was a good and fair historian.    Since Last Visit:  - Per Ignacio, Carmen is \"still the same but better than before\".  - Got to go out and celebrate for [employer] yesterday. She was interactive with others, greeting people.  - Ignacio also reports her sleep is \"normal\" with infrequent waking in the middle of the night.  - Psychosis symptoms can fluctuate: \"some days she looks like the best she ever has been and some days she will talk to herself\".  - Continues to be interactive with others at , appears happy and social " "when doing so.  - She still requires constant care, help feeding self, using the toilet, wears diapers.    - Carmen reports no issues with her appetite. Appetite remains low, but caregivers ensure that she eats.  - Asked Carmen if sadness/mood has been affecting her low appetite, she replied \"no.\"  - She also reported no issues or side effects from medications.     - Ignacio and Carmen did not feel the need to make changes to medications today, just requested for refills.    - No acute concerns for safety. Has PCA with her during the daytime. Other family members are always around to take care of Carmen. No SI/SIB or HI elicited on interview.    RECENT PSYCH ROS:   Depression:  none reported  Elevated:  none  Psychosis:  visual hallucinations [details in Interim History] and negative symptoms (avolition, affective flattening, anhedonia, alogia, apathy, .)  Anxiety:  none reported  Trauma Related:  none reported  Dysregulation:  none  Eating Disorder: no    Adverse Effects:  None reported. Occasional HA.  Pertinent Negative Symptoms: No self-injurious behavior/urges, suicidal and violent ideation, shine and hypomania    RECENT SUBSTANCE USE:     None reported     SOCIAL HISTORY   [1ea, 1ea]           [per patient report]            Financial/ Work- social security disability       Partner/ -  since ~2003  Children- yes, has 6 children (age 12 to 23). 3 are not living at home with her. 5 were from same partner, one from another partner.  Living situation- Lives at home with 3 of her youngest children.      Social/ Spiritual Support- family     Legal- none reported  FEELS SAFE AT HOME- yes     Trauma History (self-report)- yes  Early History/Education- Refugee from [place] during [place] civil war. Was a refugee in [facility] for several years before immig[place] to the [place] in 2011    PSYCH and SUBSTANCE USE Critical Summary Points since July 2020 9/2/2020: Increased risperidone to 4 mg to target active " psychosis, decreased venlafaxine to 75 mg daily.  12/2/2020: Increased risperidone to 6 mg. Had improvement with psychosis symptoms at 4 mg.  12/16/2020: Decreased venlafaxine to 37.5 mg daily. Changed dosing time from bedtime to morning. Kept risperidone 6 mg at bedtime.  1/27/2021: No medication changes.  3/5/2021: Discontinued venlafaxine. Increased risperidone to 7 mg at bedtime due to sleep disruption and worsening psychosis (VH, negative symptoms).  4/2/2021: No changes to medications.  5/14/2021: No changes to medications.    MEDICAL HISTORY  and ALLERGY     ALLERGIES: Patient has no known allergies.     Patient Active Problem List   Diagnosis     Essential hypertension     Gastroesophageal reflux disease without esophagitis     Shortened GA interval     Vitamin D deficiency     Posttraumatic stress disorder     Female circumcision     Chronic tension-type headache, not intractable     Schizoaffective disorder, bipolar type (H)     Neurocognitive deficits     Arthritis     Extra digits     Immigrant with language difficulty     Pulmonary tuberculosis confirmed by sputum microscopy     Recurrent major depression (H)     Morbid obesity (H)         MEDICAL REVIEW OF SYSTEMS    [2, 10]   Pregnant or breastfeeding- no      Contraception- not discussed    Targeted ROS:  General - No reported sedation or fatigue  GI - no constipation, diarrhea, nausea or GI disturbance  Neuro - No dizziness or abnormal muscle movements. Occasional HA.    MEDICATIONS          Current Outpatient Medications   Medication Sig Dispense Refill     acetaminophen (TYLENOL) 325 MG tablet Take 2 tablets (650 mg) by mouth every 4 hours as needed for mild pain 100 tablet 11     amLODIPine (NORVASC) 5 MG tablet Take 1 tablet (5 mg) by mouth daily 90 tablet 3     cetirizine (ZYRTEC) 10 MG tablet Take 1 tablet (10 mg) by mouth daily 30 tablet 11     [employer] MAGNESIUM OXIDE 250 MG tablet TAKE 2 TABLETS (500 MG) BY MOUTH DAILY 60 tablet 11      diclofenac (VOLTAREN) 1 % topical gel Place 2 g onto the skin 3 times daily as needed for moderate pain 100 g 1     diphenhydrAMINE (BENADRYL) 25 MG tablet Take 1-2 tablets (25-50 mg) by mouth nightly as needed for itching or allergies 30 tablet 0     famotidine (PEPCID) 20 MG tablet TAKE 1 TABLET BY MOUTH TWICE A  tablet 1     fluticasone (FLONASE) 50 MCG/ACT nasal spray Spray 2 sprays into both nostrils daily 15.8 mL 5     ibuprofen (ADVIL/MOTRIN) 400 MG tablet TAKE 1 TABLET (400 MG) BY MOUTH EVERY 4 HOURS AS NEEDED FOR MODERATE PAIN 120 tablet 3     Incontinence Supply Disposable (DEPEND UNDERGARMENTS) MISC Use daily as needed 200 each 11     melatonin 3 MG tablet Take 2 hours before bedtime 30 tablet 3     multivitamin w/minerals (THERA-VIT-M) tablet Take 1 tablet by mouth daily 100 each 3     Omega-3 Fatty Acids (FISH OIL OMEGA-3) 1000 MG CAPS Take 2,000 mg by mouth daily 180 capsule 3     propranolol ER (INDERAL LA) 60 MG 24 hr capsule Take 1 capsule (60 mg) by mouth daily 90 capsule 3     risperiDONE (RISPERDAL) 1 MG tablet Take 1 tablet (1 mg) by mouth At Bedtime Take with THREE (2 mg) tablets for a total of 7 mg at bedtime. 30 tablet 3     risperiDONE (RISPERDAL) 2 MG tablet Take 3 tablets (6 mg) by mouth At Bedtime Take with ONE 1 mg tablet for a total of 7 mg at bedtime. 90 tablet 3     vitamin D3 (CHOLECALCIFEROL) 50 mcg (2000 units) tablet Take 1 tablet (50 mcg) by mouth daily 90 tablet 3     VITALS                     [3, 3]   There were no vitals taken for this visit.     MENTAL STATUS EXAM     [9, 14 cog gs]     Alertness: alert  and awake  Appearance: well groomed  Behavior/Demeanor: cooperative, calm and passive, with good  eye contact   Speech: normal and regular rate and rhythm  Language: intact and fluent in [place] citizen  Psychomotor: normal or unremarkable  Mood: description consistent with euthymia  Affect: flat, though occasionally smiling; congruent to: mood- yes, content- yes  Thought  Process/Associations: unremarkable  Thought Content:  Reports none;  Denies suicidal & violent ideation and delusions  Perception:  Reports none;  Denies auditory hallucinations and visual hallucinations  Insight: limited  Judgment: fair and adequate for safety  Cognition: (6) oriented: time, person, and place (time was reported as 2020, but knew day of week).  attention span: not fully assessed, grossly fair  concentration: not fully assessed, grossly fair  recent memory: poor  remote memory: poor  fund of knowledge: not fully assessed.  Gait and Station: N/A (telehealth)    LABS and DATA     PHQ9 TODAY = not done today    PHQ 12/3/2018 9/3/2019 4/28/2020   PHQ-9 Total Score 10 6 11   Q9: Thoughts of better off dead/self-harm past 2 weeks Not at all Not at all Not at all     ANTIPSYCHOTIC LABS  [glu, A1C, lipids (tiburcio LDL), liver enzymes, WBC, ANEU, Hgb, plts]  q12 mo  Recent Labs   Lab Test 06/26/20  1042 08/31/17  1634 02/14/17  1513 04/11/16  1507 02/24/16  1653   .5*  --  115.0* 108* 145.4*   A1C 5.8* 5.4  --   --   --      Recent Labs   Lab Test 06/26/20  1042 08/31/17  1634   CHOL 221.8* 221.0*   TRIG 186.9* 82.3   * 153*   HDL 43.1 51.3     Recent Labs   Lab Test 06/26/20  1042 01/31/16  1401   AST 23.8 31   ALT 28.8 36   ALKPHOS 88.5 119     Recent Labs   Lab Test 12/03/18  1116 02/14/17  1513 04/11/16  1507 03/31/16  1534 01/31/16  1401   WBC  --   --  7.0  --  6.4   ANEU  --   --  3.0  --  2.4   HGB 11.8 12.5 12.1 11.9 14.0   PLT  --   --  282  --  301         Recent Labs   Lab Test 06/26/20  1042 02/14/17  1513 04/11/16  1507   CR 0.6 1.0 0.83   GFRESTIMATED >90 65.6 77     PSYCHOTROPIC DRUG INTERACTIONS     CETIRIZINE and DIPHENHYDRAMINE may result in increased risk of CNS depression.     MANAGEMENT:  Monitoring for adverse effects    RISK STATEMENT for SAFETY     Carmen Awammon [place] did not appear to be an imminent safety risk to self or others.    DIAGNOSES                            [m2, h3]       Schizoaffective Disorder, Depressive type, in partial remission with residual psychosis at baseline (negative symptoms, AH & VH).    ASSESSMENT                         [m2, h3]          Carmen Connolly presents to the [facility] for follow-up and medication management of MDD and psychosis. Carmen presents with Ignacio her PCA, who reports continued stability with regard to her hallucinations and sleep. Current regimen of risperidone 7 mg daily is reportedly helpful. On interview, she appears at her baseline and is able to converse in Citizen of [place]. Hallucinations also appear to be at baseline, with no overt psychosis or response to internal stimuli noted today. She was interactive greeting us over video today.    At baseline, she appears to be unable to care for herself, and given that she has been in her state of psychosis for what is likely many years, further medication changes may be unlikely to lead to complete resolution/remission of her symptoms. Her current state is likely her baseline. No changes to medications today and no acute concerns for safety.    Future considerations:  - Restart venlafaxine or can have trial of another antidepressant (fluoxetine, sertraline) if depressive symptoms re-emerge.  - Can titrate risperidone further (though dose is near recommended daily max of 8 mg) or switch to another agent such as olanzapine or clozapine if psychosis symptoms persist.  - Investigate for presence of PTSD symptoms and determine diagnosis of PTSD, though this has not been reported as an issue from Ignacio or Carmen since Carmen's time in this clinic.    MNPMP review was not needed today.    PLAN                                                   [m2, h3]                                               1) Medications  - Continue risperidone 7 mg at bedtime  - Continue melatonin 3 mg, taken 2 hours before bedtime.    2) Therapy  - Continue with therapist who visits home twice weekly    3) Next Due    Labs- SGA labs due 06/2021  EKG- PRN  Rating scales- PHQ9    4) Referrals  No Referrals needed     5) RTC: 4 weeks    6) Crisis Numbers:   Provided routinely in AVS     After hours:  [phone]      TREATMENT RISK STATEMENT:  The risks, benefits, alternatives and potential adverse effects have been discussed and are understood by the pt. The pt understands the risks of using street drugs or alcohol. There are no medical contraindications, the pt agrees to treatment with the ability to do so. The pt knows to call the clinic for any problems or to access emergency care if needed.  Medical and substance use concerns are documented above.  Psychotropic drug interaction check was done, including changes made today.    PROVIDER: Jon Ugarte MD    Patient staffed via teleconference ([email or web address]) with Dr. Zamora who will sign the note.  Supervisor is Dr. Zamora.    TELEHEALTH ATTENDING ATTESTATION  Following the ACGME guidelines on telemedicine and direct supervision due to COVID-19, I was concurrently participating in and/or monitoring the patient care through appropriate telecommunication technology.  I discussed the key portions of the service with the resident, including the mental status examination and developing the plan of care. I reviewed key portions of the history with the resident. I agree with the findings and plan as documented in this note.   Michael Zamora MD

## 2021-05-14 NOTE — TELEPHONE ENCOUNTER
You know - now that I've reviewed her chart again, it might be best for me to have an appointment with her to detemine if home health is still needed. She has a PCA and she has family that check in on her. They could be helping set up her meds.    Khushi Cadena

## 2021-05-14 NOTE — PROGRESS NOTES
"VIDEO VISIT  Carmen Connolly is a 41 year old patient who is being evaluated via a billable video visit.      The patient has been notified of following:   \"This video visit will be conducted via a call between you and your physician/provider. We have found that certain health care needs can be provided without the need for an in-person physical exam. This service lets us provide the care you need with a video conversation. If a prescription is necessary we can send it directly to your pharmacy. If lab work is needed we can place an order for that and you can then stop by our lab to have the test done at a later time. Insurers are generally covering virtual visits as they would in-office visits so billing should not be different than normal.  If for some reason you do get billed incorrectly, you should contact the billing office to correct it and that number is in the AVS .    Video Conference to be completed via:  [email or web address]    Patient has given verbal consent for video visit?:  Yes    Patient would prefer that any video invitations be sent by: Send to e-mail at: [email or web address]      How would patient like to obtain AVS?:  Patient declined    AVS SmartPhrase [PsychAVS] has been placed in 'Patient Instructions':  Yes  "

## 2021-05-19 NOTE — TELEPHONE ENCOUNTER
Attempted to reach patient via Language Line  ID# [id number] with appointment information (6/7/21@11:20a.m) for patient to see  to readdress [facility].  called and line went dead, tried again and it went right to voicemail.  did leave a detailed message with appointment information/reason for visit on patient voicemail.    Lorene Garcia  Care Coordinator

## 2021-05-20 DIAGNOSIS — N39.3 FEMALE STRESS INCONTINENCE: ICD-10-CM

## 2021-05-20 RX ORDER — INCONTINENCE PAD,LINER,DISP
EACH MISCELLANEOUS
Qty: 200 EACH | Refills: 11 | Status: SHIPPED | OUTPATIENT
Start: 2021-05-20 | End: 2021-05-20

## 2021-05-20 RX ORDER — INCONTINENCE PAD,LINER,DISP
EACH MISCELLANEOUS
Qty: 200 EACH | Refills: 11 | Status: SHIPPED | OUTPATIENT
Start: 2021-05-20

## 2021-05-20 NOTE — TELEPHONE ENCOUNTER
Verify that the refill encounter hasn't been started Yes    [facility] phone call message- patient requesting a refill:    Full Medication Name: Incontinence Supply Disposable (DEPEND UNDERGARMENTS) MISC    Dose: see chart     Pharmacy confirmed as   DME supplier; did not know name - [facility] would like to  printed order today.  : Yes    Medication tab checked to see if medication has been sent  Yes    Additional Comments: Patient is out of this item.     OK to leave a message on voice mail? Yes    Advised patient refill may take up to 2 business days? No    Primary language: Lithuanian      needed? Yes    Call taken on May 20, 2021 at 11:46 AM by Nidhi Medel    Route to P SMI MED REFILL

## 2021-05-20 NOTE — TELEPHONE ENCOUNTER
RN received request for incontinence supplies. Patient last seen in clinic on Nov 4 2020 with Dr. Cadena. RN reordered incontinence supplies and left copy at  for .    RN called patient using language line (ID [id number]) to leave message for patient family member to  scrip  Family member verbalized understanding.   Juan Dumont RN

## 2021-06-07 ENCOUNTER — TELEPHONE (OUTPATIENT)
Dept: FAMILY MEDICINE | Facility: CLINIC | Age: 41
End: 2021-06-07

## 2021-06-07 ENCOUNTER — OFFICE VISIT (OUTPATIENT)
Dept: FAMILY MEDICINE | Facility: CLINIC | Age: 41
End: 2021-06-07
Payer: COMMERCIAL

## 2021-06-07 VITALS
HEART RATE: 100 BPM | BODY MASS INDEX: 36.85 KG/M2 | SYSTOLIC BLOOD PRESSURE: 134 MMHG | TEMPERATURE: 98.3 F | DIASTOLIC BLOOD PRESSURE: 87 MMHG | OXYGEN SATURATION: 95 % | WEIGHT: 202.8 LBS

## 2021-06-07 DIAGNOSIS — F25.0 SCHIZOAFFECTIVE DISORDER, BIPOLAR TYPE (H): ICD-10-CM

## 2021-06-07 DIAGNOSIS — E66.01 MORBID OBESITY (H): ICD-10-CM

## 2021-06-07 DIAGNOSIS — F43.10 POSTTRAUMATIC STRESS DISORDER: ICD-10-CM

## 2021-06-07 DIAGNOSIS — R29.818 NEUROCOGNITIVE DEFICITS: Primary | ICD-10-CM

## 2021-06-07 DIAGNOSIS — I10 ESSENTIAL HYPERTENSION: ICD-10-CM

## 2021-06-07 DIAGNOSIS — R41.89 NEUROCOGNITIVE DEFICITS: Primary | ICD-10-CM

## 2021-06-07 PROCEDURE — 99214 OFFICE O/P EST MOD 30 MIN: CPT | Performed by: FAMILY MEDICINE

## 2021-06-07 NOTE — TELEPHONE ENCOUNTER
"When opening a documentation only encounter, be sure to enter in \"Chief Complaint\" Forms and in \" Comments\" Title of form, description if needed.    Carmen is a 41 year old  female  Form received via: Appointment  Form now resides in: Provider Ready    Cass Jacinto MA  Form has been completed by provider.     Form sent out via: Fax to [facility] at Fax Number: [phone]  Patient informed: n/a  Output date: June 7, 2021    Cass Jacinto MA      **Please close the encounter**                    "

## 2021-06-07 NOTE — Clinical Note
"Hi - I forgot to order the new home health order for Carmen, but it's in here now. Did you have some agencies you thought she could use that would cover \"behavioral health nursing??\" Thanks for the help!"

## 2021-06-07 NOTE — TELEPHONE ENCOUNTER
Type of Form:  / School  Patient's PCP: Surinder  Placed in orange    If form is for FMLA, Disabilty, or Medicare please schedule an appointment for patient and route to PCP to decide if appointment is needed.    Appointment Scheduled? Seen in clinic 6/7/21. Had form faxed to clinic; received after appointment completed. Per patient request, please fax to number on form when complete.

## 2021-06-07 NOTE — PATIENT INSTRUCTIONS
Here is the plan from today's visit    Will refer to [facility] - for medication evaluation and set up   Follow up with new psychiatrist as scheduled in July  - address sleep at that visit    Plan to follow up for an annual wellness visit in the next few months with Dr. Cadena (bring meds with you to this visit)      Please call or return to clinic if your symptoms don't go away.    Follow up plan - as above    Thank you for coming to [facility] today.  Lab Testing:  **If you had lab testing today and your results are reassuring or normal they will be mailed to you or sent through [employer] within 7 days.   **If the lab tests need quick action we will call you with the results.  The phone number we will call with results is # [phone] (home) . If this is not the best number please call our clinic and change the number.  Medication Refills:  If you need any refills please call your pharmacy and they will contact us.   If you need to  your refill at a new pharmacy, please contact the new pharmacy directly. The new pharmacy will help you get your medications transferred faster.   Scheduling:  If you have any concerns about today's visit or wish to schedule another appointment please call our office during normal business hours [phone] (8-5:00 M-F)  If a referral was made to a [facility] and you don't get a call from central scheduling please call [phone].  If a Mammogram was ordered for you at [facility] call [phone] to schedule or change your appointment.  If you had an XRay/CT/Ultrasound/MRI ordered the number is [phone] to schedule or change your radiology appointment.   Medical Concerns:  If you have urgent medical concerns please call [phone] at any time of the day.  If you have a medical emergency please call 911.      [facility] referral  [phone]  Referral auto sent to , they will review and contact patient  directly

## 2021-06-07 NOTE — PROGRESS NOTES
"    Assessment & Plan     Morbid obesity (H)  Attending adult  and doing activities  Working on diet    Schizoaffective disorder, bipolar type (H)  Posttraumatic stress disorder  Neurocognitive deficits  Referred to new  agency to assist with medication set up and review as the family does not feel comfortable with being responsible for this  Has benefited from this assistance previously  Scheduled to see new psychiatrist in July  Advised to discuss ongoing sleep issues at that visit (may need medication adjustment)  Overall, doing quite well  - Home Care Nursing Referral (non-[place])    Essential hypertension  Stable  - Home Care Nursing Referral (non-[place])    Due for annual wellness visit  Advised to bring all medications to that visit for a review with PharmD team    Diagnosis or treatment significantly limited by social determinants of health - mental health disorder  25 minutes spent on the date of the encounter doing chart review, history and exam, documentation and further activities per the note       BMI:   Estimated body mass index is 36.85 kg/m  as calculated from the following:    Height as of 11/4/20: 1.58 m (5' 2.21\").    Weight as of this encounter: 92 kg (202 lb 12.8 oz).   Weight management plan: Discussed healthy diet and exercise guidelines Excercise plan at adult         Return in about 2 months (around 8/7/2021) for Physical Exam.    Khushi Cadena MD  [facility] NEHEMIAH Morales is a 41 year old who presents for the following health issues     HPI     Here to discuss home health request  Still helpful to have a visit every 2 weeks with a HHN; has had behavioral health nursing in the past, this is no longer offered by [facility]  Has helped to have someone:  - set up meds  - make sure that ordering is up to date  Carmen's cousin and daughter are sharing the PCA hours, but feel uncomfortable taking care of the medications    Sleep is still an issue  - new " psychiatrist will be caring for her soon (appt coming up in July)  - still having some awakenings, wakes up and has some scary thoughts/visions  - sometimes doesn't go to sleep at all  - remains on current listed medications    BP/chronic issues  - stable          Review of Systems   Constitutional: Positive for fatigue. Negative for activity change and appetite change.   Respiratory: Negative.    Gastrointestinal: Negative.    Genitourinary:        Chronic mild incontinence   Neurological: Negative.    Psychiatric/Behavioral: Positive for hallucinations (visual - rare (typically at night)) and sleep disturbance. Negative for agitation, self-injury and suicidal ideas. The patient is nervous/anxious.             Objective    /87 (BP Location: Left arm, Patient Position: Sitting)   Pulse 100   Temp 98.3  F (36.8  C)   Wt 92 kg (202 lb 12.8 oz)   SpO2 95%   BMI 36.85 kg/m    Body mass index is 36.85 kg/m .  Physical Exam  Constitutional:       Appearance: Normal appearance.   Eyes:      Extraocular Movements: Extraocular movements intact.   Musculoskeletal: Normal range of motion.   Neurological:      General: No focal deficit present.      Mental Status: She is alert and oriented to person, place, and time. Mental status is at baseline.   Psychiatric:         Mood and Affect: Mood normal.         Behavior: Behavior normal.         Thought Content: Thought content normal.         Judgment: Judgment normal.

## 2021-06-14 DIAGNOSIS — I10 ESSENTIAL HYPERTENSION WITH GOAL BLOOD PRESSURE LESS THAN 130/85: ICD-10-CM

## 2021-06-17 NOTE — TELEPHONE ENCOUNTER

## 2021-06-18 RX ORDER — CHLORAL HYDRATE 500 MG
CAPSULE ORAL
Qty: 60 CAPSULE | Refills: 29 | Status: SHIPPED | OUTPATIENT
Start: 2021-06-18 | End: 2022-02-23

## 2021-06-26 ASSESSMENT — ENCOUNTER SYMPTOMS
FATIGUE: 1
HALLUCINATIONS: 1
NERVOUS/ANXIOUS: 1
APPETITE CHANGE: 0
ACTIVITY CHANGE: 0
GASTROINTESTINAL NEGATIVE: 1
AGITATION: 0
SLEEP DISTURBANCE: 1
NEUROLOGICAL NEGATIVE: 1
RESPIRATORY NEGATIVE: 1

## 2021-07-06 ENCOUNTER — TELEPHONE (OUTPATIENT)
Dept: FAMILY MEDICINE | Facility: CLINIC | Age: 41
End: 2021-07-06

## 2021-07-06 NOTE — TELEPHONE ENCOUNTER
Verify that the refill encounter hasn't been started Yes    [facility] phone call message- patient requesting a refill:    Full Medication Name: venlafaxine (EFFEXOR-XR) 150 MG 24 hr capsule [Pharmacy Med Name: VENLAFAXINE HCL  MG CAP]    Dose: TAKE 1 CAPSULE BY MOUTH EVERY DAY     Pharmacy confirmed as     [facility]/pharmacy #[id number] - [facility] [address] [place] [address] [address] [facility] [address]  Phone: [phone] Fax: [phone]  : Yes    Medication tab checked to see if medication has been sent  Yes    Additional Comments: Patient states she is requesting this from Dr. Cadena because her  Psychiatrist has left.      OK to leave a message on voice mail? Yes    Advised patient refill may take up to 2 business days? Yes    Primary language: Bruneian      needed? Yes    Call taken on July 6, 2021 at 2:36 PM by April Bivens    Route to [facility] MED REFILL

## 2021-07-08 NOTE — TELEPHONE ENCOUNTER
Hi - we are also in the process of having a new home health agency get out there to help with medication set up and clarification, hopefully that will help (but I'm not sure where we are with that yet). I called the pharmacy and they did confirm that they have not dispensed the 150mg tablets for awhile. Last dispense was for the 37.5 mg tabs in June. I'd recommend that they follow up with the new psychiatric provider as planned, and stick to the 37.5mg dose for now (I'm assuming that will increase to 75 mg next?). I don't want her bumping up to 150mg too quickly - could have side effects. Could you pass that on to them - and could we check that she has an appointment with the new psychiatrist soon?    I'm cc'ing Lorene here, too, in case she knows anything further about the home health situation.    Khushi

## 2021-07-08 NOTE — TELEPHONE ENCOUNTER
RN attempted to reach patient to discuss, LM via  [phone]. Please transfer to any available RN when she calls back.   Elisabeth Rosenberg RN      Patient does have appointment with new psychiatrist Dr. Soto scheduled for 7/14/21.

## 2021-07-09 NOTE — TELEPHONE ENCOUNTER
7/9/21 Referral faxed to [facility] ([phone]) ok per . Home Care will reach out to patient directly via .    Lorene Garcia  Care Coordinator

## 2021-07-14 ENCOUNTER — TELEPHONE (OUTPATIENT)
Dept: PSYCHIATRY | Facility: CLINIC | Age: 41
End: 2021-07-14

## 2021-07-14 NOTE — TELEPHONE ENCOUNTER
----- Message from Maribell Kerry sent at 7/14/2021 12:30 PM CDT -----  Regarding: Brittany's pt  Contact: [phone]  Caller:  Norris  Relationship to pt: PCA/Cousin  Medication:   venlyfaxine 150 mg   How many days left of med do you have left (if >3 day supply, redirect to pharmacy): 7 days  Pharmacy and location: [facility]/pharmacy #[id number] [address] AT [facility] OF [facility]  Pending appt date:  7/26 @ 2pm  Okay to leave detailed VM:  yes, [phone]              =========================================      Per 5/14/21 virtual visit note:  3/5/2021: Discontinued venlafaxine. Increased risperidone to 7 mg at bedtime due to sleep disruption and worsening psychosis (VH, negative symptoms).    Per 4/2/21 virtual visit note:  Carmen presents with Ignacio her PCA, who reports notable improvement with her hallucinations and sleep with the increase in risperidone and discontinuation of venlafaxine.   - Restart venlafaxine or can have trial of another antidepressant (fluoxetine, sertraline) if depressive symptoms re-emerge.    Per 3/5/21 virtual visit note:  In addition, it has not been fully clear what benefit venlafaxine has been providing. Marcelo has previously reported that it is helpful in giving Carmen energy during the day, though she has been improving with our gradual decrease in her venlafaxine dose. This was also done while titrating Risperidone, which is likely the main reason for the improvement. At the same time, since it is not clear what benefit it is providing, can see how she does by discontinuing it. If she reports a worsening of mood, it can be restarted.   - Restart venlafaxine if concerns for depressive symptoms.      Writer left voice mail message for pt's cousin, Norris, requesting callback about medication refill.    Plan: Find out if the pt has been taking the venlafaxine (Effexor) daily, and what dose, as it was discontinued in March.

## 2021-07-15 NOTE — TELEPHONE ENCOUNTER
Writer received call from pt's cousin, Norris, who reported that the pt has been taking 37.5 mg daily and did not stop the medication. Norris reported that the pt has been doing well, with no current concerns. He confirmed the pt's 7/26 appointment.    Routed to Dr. Soto.          =========================================          Rose Soto MD Snyder, David J RN  Caller: Unspecified (Yesterday,  1:09 PM)  Hi,   It's ok for her to stop it now.     Rose Cai           Previous Messages       ----- Message -----   From: Del Elizondo RN   Sent: 7/15/2021  12:10 PM CDT   To: Del Elizondo RN, Rose Soto MD     ----- Message from Del Elizondo RN sent at 7/15/2021 12:10 PM CDT -----   Dr. Soto:   Pt was to have discontinued Effexor XR 37.5 mg in march, but did not and has continued to take the medication since then.   Can she stop the medication, or should she continue it until her 7/26 appointment with you?   Josué           =========================================      Writer connected with Norris by phone and prompted him to have pt stop taking venlafaxine. He identified understanding.

## 2021-07-23 NOTE — PROGRESS NOTES
[facility]  Psychiatry Clinic  TRANSFER of CARE DIAGNOSTIC ASSESSMENT   CARE TEAM:    PCP- Khushi Cadena at [facility]  PCA - Marcelo (cousin)  Has mental health specialist who comes to house twice weekly.     Carmen Connolly is a 41 year old patient who prefers the name Carmen and uses pronouns she, her, hers.         DIAGNOSIS   Schizoaffective Disorder, Depressive type, in partial remission with residual psychosis at baseline (negative symptoms, AH & VH).        ASSESSMENT   Carmen Connolly is a 41 year old lady seen in the [facility] clinic for medication management of Schizoaffective Disorder, Depressive type, in partial remission with residual psychosis at baseline (negative symptoms, AH & VH).  Her carer reported worsening of her sleep as well as increase in the frequency of auditory and visual hallucinations in the last 3 weeks.  Her appetite continues to be reduced. There was no identifiable precipitant.  In the session, Carmen endorsed some symptoms  which might be suggestive of PTSD.  We will continue to explore these in future sessions.  Today we agreed on an increase in her risperidone to target her psychotic symptoms, the risk of EPSE was discussed.  In addition we increased Melatonin to help with her sleep.    In the light of concern for PTSD future considerations include:  Addition of mirtazapine should be considered, it  Would help to improve her sleep and appetite.    MNPMP review was not needed today.     PLAN                                                                                                                1) Meds-  - Increase risperidone 8 mg at bedtime  - Increase melatonin 6 mg, taken 2 hours before bedtime.       2) Psychotherapy- Continue with therapist who visits home twice weekly    3) Next due-  Labs- SGA yearly labs ordered  EKG- Done today QT/QTc  - 340/413 ms  Rating scales- PHQ9 and AIMS: done today with total score of <2    4)  Referrals-  None    5) Dispo- RTC in 4 weeks       PERTINENT BACKGROUND                           [most recent eval 07/23/21]   Previous diagnoses include Schizoaffective disorder, depression, generalized anxiety disorder, and PTSD. Symptoms began after the passing of her  while they were refugees living in [place]. She did not receive any psychiatric care until after she immigrated to the [place] in 2011.    Psych pertinent item history includes psychosis [sxs include paranoia, responding to internal stimuli, negative symptoms] and trauma hx     SUBJECTIVE     The patient was seen in the [facility] clinic today in the company of her cousin who is her PCA and  her daughter.  Patient speaks little throughout the interview.  Family reported poor sleep in the last 3 weeks.  States she usually will wake up 3-4 times at night.  There is no precipitant identified.  They say this was a concern when she was on Effexor and sleep improved when it was discontinued, but seems to have reverted back. There is concern that sometimes she might be experiencing bad dreams when she wakes up.  They are not sure if this is related to the trauma of living in a refugee camp in [place] as this is not discussed in the family.  He also reports more frequent auditory and visual hallucinations in the last 2 weeks.  Carmen describes her mood a worried.   She is unable to expound further on what worries her.  Her appetite remains poor.  She enjoys time with her kids as well as socializing with her Somalian friends  when she goes to her  4 days a week.  She is compliant with her medications and there is no concern for any side effects    There is no concern for suicidal thoughts or self-harm, and she does not endorse same.      RECENT PSYCH ROS:   Depression:  insomnia and appetite changes  Elevated:  none  Psychosis:  auditory hallucinations and visual hallucinations  Anxiety:  excessive worry  Trauma Related:  nightmares  Sleep: yes  Other:  yes    Adverse Effects: none reported  Pertinent Negative Symptoms: No suicidal and violent ideation  Recent Substance Use:     N/A     FAMILY and SOCIAL HISTORY                                 pt reported     Family Hx:  Unknown    Social Hx:  Financial/ Work- social security disability       Partner/ -  since ~2003  Children- yes, has 6 children (age 12 to 23). 3 are not living at home with her. 5 were from same partner, one from another partner.  Living situation- Lives at home with 3 of her youngest children.      Social/ Spiritual Support- family     Legal- none reported  FEELS SAFE AT HOME- yes      Trauma History (self-report)- yes  Early History/Education- Refugee from [place] during [place]. Was a refugee in [place] for several years before immigrating to the  in 2011     PSYCHIATRIC HISTORY     SIB- no  Suicidal Ideation Hx- no  Suicide Attempt- #- none, most recent- N/A    Violence/Aggression Hx- no  Psychosis Hx- yes  Eating Disorder Hx- no     Psych Hosp- #- no, most recent- N/A   Commitment- no  ECT- no  Outpatient Programs - yes, individual outpatient therapist     PAST MED TRIALS     Medication  Dose   (mg) Effect  Dates of Use   risperidone 2 mg Helps with sleep current   venlafaxine 225 mg Helpful for mood 2014 - current   temazepam 15 mg Helpful for sleep 2016 - 2018   citalopram         nortriptyline 10 mg HS   2016 - 2017   Hydroxyzine 25-50 mg    2016 - 2018           SUBSTANCE USE HISTORY     Past Use-   None reported  Treatment- #, most recent- no  Medical Consequences- no  HIV/Hepatitis- no  Legal Consequences- no     MEDICAL HISTORY and ALLERGY     ALLERGIES: Patient has no known allergies.    Patient Active Problem List   Diagnosis     Essential hypertension     Gastroesophageal reflux disease without esophagitis     Shortened RI interval     Vitamin D deficiency     Posttraumatic stress disorder     Female circumcision     Chronic tension-type headache, not  intractable     Schizoaffective disorder, bipolar type (H)     Neurocognitive deficits     Arthritis     Extra digits     Immigrant with language difficulty     Pulmonary tuberculosis confirmed by sputum microscopy     Recurrent major depression (H)     Morbid obesity (H)        MEDICAL REVIEW OF SYSTEMS   Contraception- unknown    A comprehensive review of systems was performed and is negative other than noted in the HPI.     MEDICATIONS     Current Outpatient Medications   Medication Sig Dispense Refill     acetaminophen (TYLENOL) 325 MG tablet Take 2 tablets (650 mg) by mouth every 4 hours as needed for mild pain 100 tablet 11     amLODIPine (NORVASC) 5 MG tablet Take 1 tablet (5 mg) by mouth daily 90 tablet 3     cetirizine (ZYRTEC) 10 MG tablet Take 1 tablet (10 mg) by mouth daily 30 tablet 11     [employer] MAGNESIUM OXIDE 250 MG tablet TAKE 2 TABLETS (500 MG) BY MOUTH DAILY 60 tablet 11     diclofenac (VOLTAREN) 1 % topical gel Place 2 g onto the skin 3 times daily as needed for moderate pain 100 g 1     diphenhydrAMINE (BENADRYL) 25 MG tablet Take 1-2 tablets (25-50 mg) by mouth nightly as needed for itching or allergies 30 tablet 0     famotidine (PEPCID) 20 MG tablet TAKE 1 TABLET BY MOUTH TWICE A  tablet 1     fish oil-omega-3 fatty acids 1000 MG capsule TAKE 2,000 MG BY MOUTH DAILY 60 capsule 29     fluticasone (FLONASE) 50 MCG/ACT nasal spray Spray 2 sprays into both nostrils daily 15.8 mL 5     ibuprofen (ADVIL/MOTRIN) 400 MG tablet TAKE 1 TABLET (400 MG) BY MOUTH EVERY 4 HOURS AS NEEDED FOR MODERATE PAIN 120 tablet 3     Incontinence Supply Disposable (DEPEND UNDERGARMENTS) MISC Use daily as needed 200 each 11     melatonin 3 MG tablet Take 2 tablets (6 mg) by mouth At Bedtime 60 tablet 1     multivitamin w/minerals (THERA-VIT-M) tablet Take 1 tablet by mouth daily 100 each 3     propranolol ER (INDERAL LA) 60 MG 24 hr capsule Take 1 capsule (60 mg) by mouth daily 90 capsule 3     risperiDONE  (RISPERDAL) 2 MG tablet Take 4 tablets (8 mg) by mouth At Bedtime 120 tablet 1     vitamin D3 (CHOLECALCIFEROL) 50 mcg (2000 units) tablet Take 1 tablet (50 mcg) by mouth daily 90 tablet 3      VITALS   /84   Pulse 101   Wt 93.9 kg (207 lb)   BMI 37.61 kg/m      MENTAL STATUS EXAM     Alertness: alert   Appearance: adequately groomed  Behavior/Demeanor: passive, with fair  eye contact   Speech: sparse  Language: unable to assess  Psychomotor: normal or unremarkable  Mood: worried  Affect: flat; congruent to: mood- yes, content- yes  Thought Process/Associations: difficult to aseess  Thought Content:  Reports none;  Denies suicidal & violent ideation and delusions  Perception:  Reports auditory hallucinations and visual hallucinations;  Denies none  Insight: fair  Judgment: appropriate  Cognition: does  appear grossly intact; formal cognitive testing was not done  Gait and Station: unremarkable     LABS and DATA     PHQ9 TODAY =  17  PHQ 12/3/2018 9/3/2019 4/28/2020   PHQ-9 Total Score 10 6 11   Q9: Thoughts of better off dead/self-harm past 2 weeks Not at all Not at all Not at all       Recent Labs   Lab Test 06/26/20  1042 02/14/17  1513 04/11/16  1507   CR 0.6 1.0 0.83   GFRESTIMATED >90 65.6 77     Recent Labs   Lab Test 07/28/21  1114 06/26/20  1042   * 154.5*   A1C 5.9* 5.8*     Recent Labs   Lab Test 07/28/21  1114 06/26/20  1042   CHOL 212* 221.8*   TRIG 154* 186.9*   * 141*   HDL 46* 43.1     Recent Labs   Lab Test 06/26/20  1042 01/31/16  1401   AST 23.8 31   ALT 28.8 36   ALKPHOS 88.5 119     Recent Labs   Lab Test 07/28/21  1114 12/03/18  1116 04/11/16  1507 01/31/16  1401   WBC 6.3  --  7.0 6.4   ANEU  --   --  3.0 2.4   HGB 12.8 11.8 12.1 14.0     --  282 301            PSYCHOTROPIC DRUG INTERACTIONS     CETIRIZINE and DIPHENHYDRAMINE may result in increased risk of CNS depression.      MANAGEMENT:  Monitoring for adverse effects     RISK STATEMENT for SAFETY     Carmen Mccoy  Mohsen did not appear to be an imminent safety risk to self or others.    TREATMENT RISK STATEMENT: The risks, benefits, alternatives and potential adverse effects have been discussed and are understood by the pt. The pt understands the risks of using street drugs or alcohol. There are no medical contraindications, the pt agrees to treatment with the ability to do so. The pt knows to call the clinic for any problems or to access emergency care if needed.  Medical and substance use concerns are documented above.  Psychotropic drug interaction check was done, including changes made today.    PROVIDER: Rose Soto MD    Patient staffed in clinic with Dr. Zamora who will sign the note.  Supervisor is Dr. Ritchie.      Supervisor Attestation:  I met with Carmen Connolly and family, along with the resident physician, Rose Soto MD. I participated in key portions of the service, including the mental status examination and developing the plan of care. I reviewed key portions of the history with the resident. I agree with the findings and plan as documented in this note.  Michael Zamoar MD

## 2021-07-26 ENCOUNTER — APPOINTMENT (OUTPATIENT)
Dept: PSYCHIATRY | Facility: CLINIC | Age: 41
End: 2021-07-26
Payer: COMMERCIAL

## 2021-07-26 ENCOUNTER — TELEPHONE (OUTPATIENT)
Dept: PSYCHIATRY | Facility: CLINIC | Age: 41
End: 2021-07-26

## 2021-07-26 ENCOUNTER — OFFICE VISIT (OUTPATIENT)
Dept: PSYCHIATRY | Facility: CLINIC | Age: 41
End: 2021-07-26
Attending: PSYCHIATRY & NEUROLOGY
Payer: COMMERCIAL

## 2021-07-26 VITALS
DIASTOLIC BLOOD PRESSURE: 84 MMHG | HEART RATE: 101 BPM | SYSTOLIC BLOOD PRESSURE: 128 MMHG | WEIGHT: 207 LBS | BODY MASS INDEX: 37.61 KG/M2

## 2021-07-26 DIAGNOSIS — G47.00 INSOMNIA, UNSPECIFIED TYPE: ICD-10-CM

## 2021-07-26 DIAGNOSIS — F25.1 SCHIZOAFFECTIVE DISORDER, DEPRESSIVE TYPE (H): Primary | ICD-10-CM

## 2021-07-26 DIAGNOSIS — Z51.81 ENCOUNTER FOR THERAPEUTIC DRUG MONITORING: Primary | ICD-10-CM

## 2021-07-26 PROCEDURE — G0463 HOSPITAL OUTPT CLINIC VISIT: HCPCS

## 2021-07-26 PROCEDURE — 93005 ELECTROCARDIOGRAM TRACING: CPT | Performed by: STUDENT IN AN ORGANIZED HEALTH CARE EDUCATION/TRAINING PROGRAM

## 2021-07-26 PROCEDURE — 93010 ELECTROCARDIOGRAM REPORT: CPT | Performed by: INTERNAL MEDICINE

## 2021-07-26 PROCEDURE — 90792 PSYCH DIAG EVAL W/MED SRVCS: CPT | Mod: GC | Performed by: STUDENT IN AN ORGANIZED HEALTH CARE EDUCATION/TRAINING PROGRAM

## 2021-07-26 RX ORDER — LANOLIN ALCOHOL/MO/W.PET/CERES
6 CREAM (GRAM) TOPICAL AT BEDTIME
Qty: 60 TABLET | Refills: 1 | Status: SHIPPED | OUTPATIENT
Start: 2021-07-26 | End: 2022-10-07

## 2021-07-26 RX ORDER — RISPERIDONE 2 MG/1
8 TABLET ORAL AT BEDTIME
Qty: 120 TABLET | Refills: 1 | Status: SHIPPED | OUTPATIENT
Start: 2021-07-26 | End: 2021-07-26

## 2021-07-26 RX ORDER — RISPERIDONE 2 MG/1
8 TABLET ORAL AT BEDTIME
Qty: 120 TABLET | Refills: 1 | Status: SHIPPED | OUTPATIENT
Start: 2021-07-26 | End: 2021-08-26 | Stop reason: DRUGHIGH

## 2021-07-26 ASSESSMENT — PAIN SCALES - GENERAL: PAINLEVEL: MODERATE PAIN (4)

## 2021-07-27 LAB
ATRIAL RATE - MUSE: 89 BPM
DIASTOLIC BLOOD PRESSURE - MUSE: NORMAL MMHG
INTERPRETATION ECG - MUSE: NORMAL
P AXIS - MUSE: 56 DEGREES
PR INTERVAL - MUSE: 124 MS
QRS DURATION - MUSE: 82 MS
QT - MUSE: 340 MS
QTC - MUSE: 413 MS
R AXIS - MUSE: 12 DEGREES
SYSTOLIC BLOOD PRESSURE - MUSE: NORMAL MMHG
T AXIS - MUSE: 26 DEGREES
VENTRICULAR RATE- MUSE: 89 BPM

## 2021-07-27 NOTE — PATIENT INSTRUCTIONS
Treatment Plan Today:     1) Medications-  Increase Risperidone to 8 mg at bedtime  Increase Melatonin to 6 mg    2) Follow-up appt with Dr Soto in  4weeks    3) Crisis numbers are below and clinic after hours number is [phone]              **For crisis resources, please see the information at the end of this document**     Patient Education      Thank you for coming to the [facility] [place] [facility].    Lab Testing:  If you had lab testing today and your results are reassuring or normal they will be mailed to you or sent through [employer] [facility] within 7 days. If the lab tests need quick action we will call you with the results. The phone number we will call with results is # [phone] (home) . If this is not the best number please call our clinic and change the number.    Medication Refills:  If you need any refills please call your pharmacy and they will contact us. Our fax number for refills is [phone]. Please allow three business for refill processing. If you need to  your refill at a new pharmacy, please contact the new pharmacy directly. The new pharmacy will help you get your medications transferred.     Scheduling:  If you have any concerns about today's visit or wish to schedule another appointment please call our office during normal business hours [phone] (8-5:00 M-F)    Contact Us:  Please call [phone] during business hours (8-5:00 M-F).  If after clinic hours, or on the weekend, please call  [phone].    Financial Assistance [phone]  [facility] Billing [phone]  Central Billing Office, ealth: [phone]  [facility] Billing [phone]  Medical Records [phone]  [facility] Patient Bill of Rights [email or web address]       MENTAL HEALTH CRISIS NUMBERS:  For a medical emergency please call  911 or go to the nearest ER.     [facility]:   [facility]  Center -[phone]   Crisis Residence [facility] -[phone]   Walk-In Counseling Center [facility] -[phone]   COPE 24/7 [facility] Mobile Team -[phone] (adults)/[phone] (child)  CHILD: [facility] needs assessment team - [phone]      [facility]:   [facility] - [phone]   Walk-in counseling [facility] - [phone]   Walk-in counseling [facility] - [phone]   Crisis Residence [facility] Residence - [phone]  Urgent Care Adult Mental [place]-[phone] mobile unit/ 24/7 crisis line    National Crisis Numbers:   National Suicide Prevention Lifeline: 6-783-484-TALK (061-863-9781)  Poison Control Center - 1-725.501.5439  TheySay/resources for a list of additional resources (SOS)  Trans Lifeline a hotline for transgender people 3-284-643-9484  The Randy Project a hotline for LGBT youth 2-919-277-8695  Crisis Text Line: For any crisis 24/7   To: 285166  see www.crisistextline.org  - IF MAKING A CALL FEELS TOO HARD, send a text!         Again thank you for choosing [facility] HEALTH & [facility] [place] [facility]IC and please let us know how we can best partner with you to improve you and your family's health.    You may be receiving a survey regarding this appointment. We would love to have your feedback, both positive and negative. The survey is done by an external company, so your answers are anonymous.

## 2021-07-28 ENCOUNTER — OFFICE VISIT (OUTPATIENT)
Dept: FAMILY MEDICINE | Facility: CLINIC | Age: 41
End: 2021-07-28
Payer: COMMERCIAL

## 2021-07-28 VITALS
SYSTOLIC BLOOD PRESSURE: 127 MMHG | DIASTOLIC BLOOD PRESSURE: 84 MMHG | WEIGHT: 206.2 LBS | BODY MASS INDEX: 37.47 KG/M2 | RESPIRATION RATE: 14 BRPM | TEMPERATURE: 98 F | OXYGEN SATURATION: 100 % | HEART RATE: 101 BPM

## 2021-07-28 DIAGNOSIS — F25.1 SCHIZOAFFECTIVE DISORDER, DEPRESSIVE TYPE (H): ICD-10-CM

## 2021-07-28 DIAGNOSIS — Z71.89 COUNSELING REGARDING ADVANCED DIRECTIVES: ICD-10-CM

## 2021-07-28 DIAGNOSIS — I10 ESSENTIAL HYPERTENSION: ICD-10-CM

## 2021-07-28 DIAGNOSIS — Z11.59 NEED FOR HEPATITIS C SCREENING TEST: ICD-10-CM

## 2021-07-28 DIAGNOSIS — Z00.00 ROUTINE GENERAL MEDICAL EXAMINATION AT A HEALTH CARE FACILITY: Primary | ICD-10-CM

## 2021-07-28 LAB
ANION GAP SERPL CALCULATED.3IONS-SCNC: 8 MMOL/L (ref 3–14)
BASOPHILS # BLD AUTO: 0 10E3/UL (ref 0–0.2)
BASOPHILS NFR BLD AUTO: 1 %
BUN SERPL-MCNC: 14 MG/DL (ref 7–30)
CALCIUM SERPL-MCNC: 9.6 MG/DL (ref 8.5–10.1)
CHLORIDE BLD-SCNC: 107 MMOL/L (ref 94–109)
CHOLEST SERPL-MCNC: 212 MG/DL
CO2 SERPL-SCNC: 21 MMOL/L (ref 20–32)
CREAT SERPL-MCNC: 0.71 MG/DL (ref 0.52–1.04)
EOSINOPHIL # BLD AUTO: 0.4 10E3/UL (ref 0–0.7)
EOSINOPHIL NFR BLD AUTO: 6 %
ERYTHROCYTE [DISTWIDTH] IN BLOOD BY AUTOMATED COUNT: 13.6 % (ref 10–15)
FASTING STATUS PATIENT QL REPORTED: YES
GFR SERPL CREATININE-BSD FRML MDRD: >90 ML/MIN/1.73M2
GLUCOSE BLD-MCNC: 151 MG/DL (ref 70–99)
HBA1C MFR BLD: 5.9 % (ref 0–5.6)
HCT VFR BLD AUTO: 41 % (ref 35–47)
HDLC SERPL-MCNC: 46 MG/DL
HGB BLD-MCNC: 12.8 G/DL (ref 11.7–15.7)
HOLD SPECIMEN: NORMAL
IMM GRANULOCYTES # BLD: 0 10E3/UL
IMM GRANULOCYTES NFR BLD: 0 %
LDLC SERPL CALC-MCNC: 135 MG/DL
LYMPHOCYTES # BLD AUTO: 3.4 10E3/UL (ref 0.8–5.3)
LYMPHOCYTES NFR BLD AUTO: 53 %
MCH RBC QN AUTO: 26.5 PG (ref 26.5–33)
MCHC RBC AUTO-ENTMCNC: 31.2 G/DL (ref 31.5–36.5)
MCV RBC AUTO: 85 FL (ref 78–100)
MONOCYTES # BLD AUTO: 0.5 10E3/UL (ref 0–1.3)
MONOCYTES NFR BLD AUTO: 7 %
NEUTROPHILS # BLD AUTO: 2.1 10E3/UL (ref 1.6–8.3)
NEUTROPHILS NFR BLD AUTO: 33 %
NONHDLC SERPL-MCNC: 166 MG/DL
PLATELET # BLD AUTO: 214 10E3/UL (ref 150–450)
POTASSIUM BLD-SCNC: 4.4 MMOL/L (ref 3.4–5.3)
RBC # BLD AUTO: 4.83 10E6/UL (ref 3.8–5.2)
SODIUM SERPL-SCNC: 136 MMOL/L (ref 133–144)
TRIGL SERPL-MCNC: 154 MG/DL
WBC # BLD AUTO: 6.3 10E3/UL (ref 4–11)

## 2021-07-28 PROCEDURE — 83036 HEMOGLOBIN GLYCOSYLATED A1C: CPT | Performed by: FAMILY MEDICINE

## 2021-07-28 PROCEDURE — 90471 IMMUNIZATION ADMIN: CPT | Performed by: FAMILY MEDICINE

## 2021-07-28 PROCEDURE — 99396 PREV VISIT EST AGE 40-64: CPT | Mod: 25 | Performed by: FAMILY MEDICINE

## 2021-07-28 PROCEDURE — 85025 COMPLETE CBC W/AUTO DIFF WBC: CPT | Performed by: FAMILY MEDICINE

## 2021-07-28 PROCEDURE — 80061 LIPID PANEL: CPT | Performed by: FAMILY MEDICINE

## 2021-07-28 PROCEDURE — 86803 HEPATITIS C AB TEST: CPT | Performed by: FAMILY MEDICINE

## 2021-07-28 PROCEDURE — 80048 BASIC METABOLIC PNL TOTAL CA: CPT | Performed by: FAMILY MEDICINE

## 2021-07-28 PROCEDURE — 36415 COLL VENOUS BLD VENIPUNCTURE: CPT | Performed by: FAMILY MEDICINE

## 2021-07-28 PROCEDURE — 90715 TDAP VACCINE 7 YRS/> IM: CPT | Performed by: FAMILY MEDICINE

## 2021-07-28 ASSESSMENT — ENCOUNTER SYMPTOMS
DIZZINESS: 0
DYSPHORIC MOOD: 1
APPETITE CHANGE: 0
CARDIOVASCULAR NEGATIVE: 1
MUSCULOSKELETAL NEGATIVE: 1
HALLUCINATIONS: 1
FATIGUE: 0
ACTIVITY CHANGE: 0
GASTROINTESTINAL NEGATIVE: 1
TREMORS: 0
ADENOPATHY: 0
SLEEP DISTURBANCE: 1
RESPIRATORY NEGATIVE: 1
FEVER: 0
WEAKNESS: 0

## 2021-07-28 NOTE — TELEPHONE ENCOUNTER
"7/28/21 Contacted [facility] as we have not received call, or fax regarding referral that was sent for Home Care earlier this month. Spoke to intake and explained reason for my call. Per intake \"we are only taking discharges at this time, TCU or hospital\". I spoke to Merna and she stated that \"patient and family member can contact [facility] [phone] for a Home Care Assessment as patient has MA\". I did contact patient sister Queenie Blackburn ([phone]) who is listed as contact, (she also had brother Norris on the line ([phone]). Norris will contact [facility] with patient and get assessment started.I explained to Norris that he will need to mention why Carmen needs Home Care, \"REASON FOR REFERRAL: Assessment & Treatment: RN and medication assistance\". Norris understands. Forwarding to  as JONATHAN.      Lorene Garcia  Care Coordinator    "

## 2021-07-28 NOTE — NURSING NOTE
Due to patient being non-English speaking/uses sign language, an  was used for this visit. Only for face-to-face interpretation by an external agency, date and length of interpretation can be found on the scanned worksheet.     name: Zander Julio Cesar  Language: Gambian   Agency: [employer]   Phone number: [phone]  Type of interpretation: Telephone, spoken

## 2021-07-28 NOTE — PROGRESS NOTES
Assessment & Plan     Routine general medical examination at a health care facility  Updated wellness visit today  Discussed Advanced Directives and gave Healthy Choices form to review with family  Discussed screening - UTD with pap, lipids today, holding off on mammogram for now  Given polypharmacy and occasional confusion with meds, referred to MTM for medication review (advised to bring all meds to that appointment)    Need for hepatitis C screening test  - Hepatitis C antibody; Future  - Hepatitis C antibody    Schizoaffective disorder, depressive type (H)  - Basic Metabolic Panel  - Hemoglobin A1c  - Lipid panel reflex to direct LDL Fasting  - CBC with Platelets Differential ([facility])    Essential hypertension  Stable on current medications      Review of external notes as documented elsewhere in note  Diagnosis or treatment significantly limited by social determinants of health - mental health issues  Ordering of each unique test  Prescription drug management             No follow-ups on file. Follow up as needed and for PharmD visit    Khushi Cadena MD  [facility] NEHEMIAH Morales is a 41 year old who presents for the following health issues     HPI     Physical Exam         HPI         Concerns today: wants to review meds, but forgot to bring them with her today  - recent visit with psychiatry, adjustment in risperdone and melatonin made   - doing well overall  - unable to get refill of bigger number of disposable diapers (only able to get 150), uses for incontinence    [facility] [employer]  was used for  this visit.     Health Maintenance Due   Topic Date Due     ADVANCE CARE PLANNING  Never done     DEPRESSION ACTION PLAN  Never done     HEPATITIS C SCREENING  Never done     PHQ-9  10/28/2020     DTAP/TDAP/TD IMMUNIZATION (2 - Td or Tdap) 06/14/2021       Patient Active Problem List   Diagnosis     Essential hypertension     Gastroesophageal reflux disease without  esophagitis     Shortened WA interval     Vitamin D deficiency     Posttraumatic stress disorder     Female circumcision     Chronic tension-type headache, not intractable     Schizoaffective disorder, bipolar type (H)     Neurocognitive deficits     Arthritis     Extra digits     Immigrant with language difficulty     Pulmonary tuberculosis confirmed by sputum microscopy     Recurrent major depression (H)     Morbid obesity (H)       Past Medical History:   Diagnosis Date     Depressive disorder      Vaginal delivery     6 deliveries        Family History   Problem Relation Age of Onset     Diabetes No family hx of      Coronary Artery Disease No family hx of      Hypertension No family hx of      Other Cancer No family hx of      Breast Cancer No family hx of      Colon Cancer No family hx of               Social History     Social History     Socioeconomic History     Marital status: Single     Spouse name: Not on file     Number of children: Not on file     Years of education: Not on file     Highest education level: Not on file   Occupational History     Not on file   Tobacco Use     Smoking status: Never Smoker     Smokeless tobacco: Never Used   Substance and Sexual Activity     Alcohol use: Never     Drug use: Never     Sexual activity: Yes     Partners: Male   Other Topics Concern     Not on file   Social History Narrative    Lives at home with 6 kids (range in age from 8-20); not working outside the home; has been in the [place] for past 5 years     Social Determinants of Health     Financial Resource Strain:      Difficulty of Paying Living Expenses:    Food Insecurity:      Worried About Running Out of Food in the Last Year:      Ran Out of Food in the Last Year:    Transportation Needs:      Lack of Transportation (Medical):      Lack of Transportation (Non-Medical):    Physical Activity:      Days of Exercise per Week:      Minutes of Exercise per Session:    Stress:      Feeling of Stress :    Social  Connections:      Frequency of Communication with Friends and Family:      Frequency of Social Gatherings with Friends and Family:      Attends Sikh Services:      Active Member of Clubs or Organizations:      Attends Club or Organization Meetings:      Marital Status:    Intimate Partner Violence:      Fear of Current or Ex-Partner:      Emotionally Abused:      Physically Abused:      Sexually Abused:        Marital Status:Single  Who lives in your household? Self, kids visit    Has anyone hurt you physically, for example by pushing, hitting, slapping or kicking you or forcing you to have sex? Denies  Do you feel threatened or controlled by a partner, ex-partner or anyone in your life? Denies    Sexual Health     Sexual concerns: No   STI History: Neg  Pregnancy History:   Last Pap Smear Date:   Lab Results   Component Value Date    PAP NIL 2017     Abnormal Pap History: None    Recommended Screening     Cholesterol Level (>44 yo or at risk): will order today  Pap/HPV cotest every 5 years for women 30-65   UTD   Hep C/HIV screening: Hep C screening today    Breast CA Screening (>39 yo or 10 y before 1st degree relative diagnosis): Testing not indicated  and BRCA1 Risk calculation  done using B-RST (link below) patient is low risk so  WILL NOT be referred for more testing.       Review of Systems   Constitutional: Negative for activity change, appetite change, fatigue and fever.   HENT: Negative.    Respiratory: Negative.    Cardiovascular: Negative.    Gastrointestinal: Negative.    Genitourinary: Positive for enuresis (urge incontinence). Negative for vaginal bleeding.   Musculoskeletal: Negative.    Neurological: Negative for dizziness, tremors and weakness.   Hematological: Negative for adenopathy.   Psychiatric/Behavioral: Positive for dysphoric mood, hallucinations (basically controlled) and sleep disturbance.            Objective    /84 (BP Location: Left arm, Patient Position: Sitting,  Cuff Size: Adult Large)   Pulse 101   Temp 98  F (36.7  C) (Oral)   Resp 14   Wt 93.5 kg (206 lb 3.2 oz)   SpO2 100%   Breastfeeding No   BMI 37.47 kg/m    Body mass index is 37.47 kg/m .  Physical Exam  Vitals reviewed.   Constitutional:       General: She is not in acute distress.     Appearance: Normal appearance. She is obese.   HENT:      Head: Normocephalic and atraumatic.      Right Ear: Tympanic membrane normal.      Left Ear: Tympanic membrane normal.      Nose: Nose normal.      Mouth/Throat:      Mouth: Mucous membranes are moist.      Pharynx: Oropharynx is clear.   Eyes:      Extraocular Movements: Extraocular movements intact.      Pupils: Pupils are equal, round, and reactive to light.   Cardiovascular:      Rate and Rhythm: Normal rate and regular rhythm.      Heart sounds: Normal heart sounds.   Pulmonary:      Effort: Pulmonary effort is normal.      Breath sounds: Normal breath sounds.   Chest:      Breasts:         Right: Normal. No mass or skin change.         Left: Normal. No mass or skin change.   Abdominal:      General: There is no distension.      Palpations: Abdomen is soft.   Musculoskeletal:         General: Normal range of motion.      Cervical back: Normal range of motion and neck supple. No tenderness.   Lymphadenopathy:      Cervical: No cervical adenopathy.   Skin:     General: Skin is warm and dry.   Neurological:      General: No focal deficit present.      Mental Status: She is alert and oriented to person, place, and time.   Psychiatric:         Mood and Affect: Mood normal.         Behavior: Behavior normal.         Thought Content: Thought content normal.         Judgment: Judgment normal.                          Khushi Cadena MD

## 2021-07-28 NOTE — LETTER
November 11, 2021      Carmen Mccoy Mohsen  [address]    [place] [address]        Dear Carmen,    Thank you for getting your care at [facility]. Please see below for your test results. This is normal.    Resulted Orders   Hepatitis C antibody   Result Value Ref Range    Hepatitis C Antibody Nonreactive Nonreactive    Narrative    Assay performance characteristics have not been established for newborns, infants, and children.   Extra Purple Top Tube   Result Value Ref Range    Hold Specimen JI        If you have any concerns about these results please call and leave a message for me or send a [employer] message to the clinic.    Sincerely,    Khushi Cadena MD

## 2021-07-28 NOTE — Clinical Note
Hi again - they are still wondering if Carmen will qualify for home health. Let me know if I need to fill anything else out for her, I know we were looking at a new agency. Thanks!

## 2021-07-29 LAB — HCV AB SERPL QL IA: NONREACTIVE

## 2021-08-06 ENCOUNTER — OFFICE VISIT (OUTPATIENT)
Dept: PHARMACY | Facility: CLINIC | Age: 41
End: 2021-08-06
Attending: FAMILY MEDICINE
Payer: COMMERCIAL

## 2021-08-06 VITALS
OXYGEN SATURATION: 99 % | DIASTOLIC BLOOD PRESSURE: 84 MMHG | RESPIRATION RATE: 16 BRPM | WEIGHT: 206 LBS | HEART RATE: 96 BPM | SYSTOLIC BLOOD PRESSURE: 124 MMHG | BODY MASS INDEX: 37.43 KG/M2 | TEMPERATURE: 98.1 F

## 2021-08-06 DIAGNOSIS — F25.0 SCHIZOAFFECTIVE DISORDER, BIPOLAR TYPE (H): Primary | ICD-10-CM

## 2021-08-06 DIAGNOSIS — I10 ESSENTIAL HYPERTENSION: ICD-10-CM

## 2021-08-06 DIAGNOSIS — E55.9 VITAMIN D DEFICIENCY: ICD-10-CM

## 2021-08-06 DIAGNOSIS — K21.9 GASTROESOPHAGEAL REFLUX DISEASE WITHOUT ESOPHAGITIS: ICD-10-CM

## 2021-08-06 PROCEDURE — 99607 MTMS BY PHARM ADDL 15 MIN: CPT | Performed by: PHARMACIST

## 2021-08-06 PROCEDURE — 99605 MTMS BY PHARM NP 15 MIN: CPT | Performed by: PHARMACIST

## 2021-08-06 NOTE — PATIENT INSTRUCTIONS
Recommendations from today's MT visit:                                                       1) work to call home care nurse agency again this week to re-connect    Follow-up:  With Dr. Cadena     It was great to speak with you today.  I value your experience and would be very thankful for your time with providing feedback on our clinic survey. You may receive a survey via email or text message in the next few days.     To schedule another MT appointment, please call the clinic directly or you may call the MTM scheduling line at [phone] or toll-free at [phone].     My Clinical Pharmacist's contact information:                                                      Please feel free to contact me with any questions or concerns you have.      Cruz Torres, PharmEduardoD.  [facility]  [phone]  [address]    Monday 10-12 noon, Tues: 8-12 noon, Wed 8-5 PM, Friday 8-5 PM  Contact me via [employer]                 Medication List    Accurate as of August 6, 2021  2:11 PM. If you have any questions, ask your nurse or doctor.      CHANGE how you take these medications        Morning Afternoon Evening Bedtime    risperiDONE 2 MG tablet  Also known as: risperDAL  Take 4 tablets (8 mg) by mouth At Bedtime  Doctor's comments: [TDD is 8mg.]  Please discontinue any prior remaining risperidone rx orders.  What changed: additional instructions                 4 + 1 tab      CONTINUE taking these medications        Morning Afternoon Evening Bedtime    acetaminophen 325 MG tablet  Also known as: TYLENOL  Take 2 tablets (650 mg) by mouth every 4 hours as needed for mild pain        As needed             amLODIPine 5 MG tablet  Also known as: NORVASC  Take 1 tablet (5 mg) by mouth daily                 1    cetirizine 10 MG tablet  Also known as: zyrTEC  Take 1 tablet (10 mg) by mouth daily        1             [facility] Magnesium Oxide 250 MG tablet  TAKE 2 TABLETS (500 MG) BY MOUTH DAILY  Generic drug: Magnesium Oxide         1             Depend Undergarments Misc  Use daily as needed                     diclofenac 1 % topical gel  Also known as: VOLTAREN  Place 2 g onto the skin 3 times daily as needed for moderate pain        As needed             diphenhydrAMINE 25 MG tablet  Also known as: BENADRYL  Take 1-2 tablets (25-50 mg) by mouth nightly as needed for itching or allergies        As needed             famotidine 20 MG tablet  Also known as: PEPCID  TAKE 1 TABLET BY MOUTH TWICE A DAY        1 tab         1 tab    fish oil-omega-3 fatty acids 1000 MG capsule  TAKE 2,000 MG BY MOUTH DAILY                 1 tab    fluticasone 50 MCG/ACT nasal spray  Also known as: FLONASE  Spray 2 sprays into both nostrils daily        As needed             ibuprofen 400 MG tablet  Also known as: ADVIL/MOTRIN  TAKE 1 TABLET (400 MG) BY MOUTH EVERY 4 HOURS AS NEEDED FOR MODERATE PAIN        As needed             melatonin 3 MG tablet  Take 2 tablets (6 mg) by mouth At Bedtime                 2 tabs    multivitamin w/minerals tablet  Take 1 tablet by mouth daily                     propranolol ER 60 MG 24 hr capsule  Also known as: INDERAL LA  Take 1 capsule (60 mg) by mouth daily               1 tab      vitamin D3 50 mcg (2000 units) tablet  Also known as: CHOLECALCIFEROL  Take 1 tablet (50 mcg) by mouth daily        1 tab

## 2021-08-06 NOTE — PROGRESS NOTES
Medication Therapy Management (MTM) Encounter    ASSESSMENT:                            Medication Adherence/Access: See below for considerations  Barrier it is establishing with home care nursing.  Family is try to reach out to has been home care, both disconnected.    HTN:   Controlled  Patient's blood pressure is within control today, but its unclear what medications the patient is taking.  Both blood pressure medications were not present in with the medications brought today.  No change recommended today, but I have encouraged the family to look for the medications at home. Furthermore recommend to have the medications kept in the same area.      GERD:   Stable, but medication is missing.  Will pick this up from the pharmacy.      Schizophrenia::   Controlled.  Medication list updated to reflect current dose.      Insomnia:   Uncontrolled but it appears that the son is going to  the melatonin from the pharmacy.  The patient is also taking antihistamines and antipsychotics that are causing sedation at different times of the day.  Its unclear if insomnia is worse independent of the patient missing her melatonin.    Seasonal allergies:   Controlled.    Pain:   Stable.    Patient to continue taking NSAID in topical and oral form, and switching between APAP.  Dose appropriate.      PLAN:                            Follow up with home care nursing to re-establish care and med set up.    Provided patient and family with a medication list and fridge list of when to take medications    Follow-up: with PCP      SUBJECTIVE/OBJECTIVE:                          Carmen Connolly is a 41 year old female coming in for an initial visit. She was referred to me from Khushi Cadena MD . [employer]  [facility] [phone] (ID#  ).      Reason for visit: Medication review.    Allergies/ADRs: Reviewed in chart  Past Medical History: Reviewed in chart  Tobacco: She reports that she has never smoked. She has never used  smokeless tobacco.  Alcohol: none    Carmen is here today with her cousin Ignacio, who is also her PCA.  She has been referred to pharmacy services by her primary care provider to review her medications.  She brings in a bag of her medications.    She has no specific questions to be addressed today       Medication Adherence/Access: Patient takes medications directly from bottles.  Patient takes medications 2 time(s) per day.   Per patient, misses medication 0 times per week.     Carmen use to have home care nursing that was filling medicaitons.  [facility] has not re-establish care.  Lost contact with homecare nurse agency last week.      Norris states there  are some medications missing, that are likely at the pharmacy for pickup.  Specifically states ibuprofen and acetaminophen are left at home.    HTN:   Currently taking amlodipine 5 mg once daily, and propranolol LA 60 mg once daily.  Missing amlodipine - at home?  Missing propranolol - at home?  Norris states that both of these are likely at home.  He states that she is taking them, but unclear if these are taken in the morning or the evening.  Both Carmen and Norris felt that these would be available for refill at the pharmacy, but when presented with the dispensing record that a 3-month supply was given at the pharmacy, they appear unclear if these are at home.    GERD:   Missing H2 blocker.  Norris states that these are at the pharmacy to be filled.    Schizophrenia:  Currently taking risperidone in the evening.  Directions are different than in the EMR.  Patient is currently taking 8 mg of the 2 mg tablets with an additional 1 mg tablet each evening.  Total of 9 mg per night.  She denies any adverse effects related to this medication and is tolerating the current therapy.    Magnesium tabs, taking 500 mg once in the AM.   Missing fish oil - at the pharmacy.  Taken once each night.      Apetite is low.  Has been constant for the past year.       Insomnia :   Picking up med for sleep from the pharmacy.  Sleep has been worse in the past few days.  Melatonin 6 mg at bed, with risperdol dose in the evening.     Diphenhydramine: This is used for itching.  She states that she uses this several times per week.  When administered she takes this during the day.  She does recognize that this causes some drowsiness.    Seasonal allergies:   ceterizine 10 mg daily, fluticasone Nasal spray at home ; taken PRN.    Allergies are controlled.  Patient is tolerating current therapy.    Pain:   Taking diclofenac gel topical medication for Shoulder pain.  Taking this twice a day.  Did not use this AM, but uses this on a consistent basis.    Carmen goes back and forth between ibuprofen and Tylenol use for body pains.  She uses ibuprofen usually 4 days a week.  Alternating with the acetaminophen on the other 3 days.        Current Outpatient Medications   Medication Sig Dispense Refill     acetaminophen (TYLENOL) 325 MG tablet Take 2 tablets (650 mg) by mouth every 4 hours as needed for mild pain 100 tablet 11     amLODIPine (NORVASC) 5 MG tablet Take 1 tablet (5 mg) by mouth daily 90 tablet 3     cetirizine (ZYRTEC) 10 MG tablet Take 1 tablet (10 mg) by mouth daily 30 tablet 11     [facility] MAGNESIUM OXIDE 250 MG tablet TAKE 2 TABLETS (500 MG) BY MOUTH DAILY 60 tablet 11     diclofenac (VOLTAREN) 1 % topical gel Place 2 g onto the skin 3 times daily as needed for moderate pain 100 g 1     diphenhydrAMINE (BENADRYL) 25 MG tablet Take 1-2 tablets (25-50 mg) by mouth nightly as needed for itching or allergies 30 tablet 0     famotidine (PEPCID) 20 MG tablet TAKE 1 TABLET BY MOUTH TWICE A  tablet 1     fish oil-omega-3 fatty acids 1000 MG capsule TAKE 2,000 MG BY MOUTH DAILY 60 capsule 29     fluticasone (FLONASE) 50 MCG/ACT nasal spray Spray 2 sprays into both nostrils daily 15.8 mL 5     ibuprofen (ADVIL/MOTRIN) 400 MG tablet TAKE 1 TABLET (400 MG) BY MOUTH EVERY 4 HOURS AS  NEEDED FOR MODERATE PAIN 120 tablet 3     Incontinence Supply Disposable (DEPEND UNDERGARMENTS) MISC Use daily as needed 200 each 11     melatonin 3 MG tablet Take 2 tablets (6 mg) by mouth At Bedtime 60 tablet 1     multivitamin w/minerals (THERA-VIT-M) tablet Take 1 tablet by mouth daily 100 each 3     propranolol ER (INDERAL LA) 60 MG 24 hr capsule Take 1 capsule (60 mg) by mouth daily 90 capsule 3     risperiDONE (RISPERDAL) 2 MG tablet Take 4 tablets (8 mg) by mouth At Bedtime 120 tablet 1     vitamin D3 (CHOLECALCIFEROL) 50 mcg (2000 units) tablet Take 1 tablet (50 mcg) by mouth daily 90 tablet 3         Today's Vitals: /84   Pulse 96   Temp 98.1  F (36.7  C) (Oral)   Resp 16   Wt 206 lb (93.4 kg)   SpO2 99%   BMI 37.43 kg/m    ----------------      I spent 30 minutes with this patient today. Dr. Khushi Cadena MD  was provided the recommendations above  via routed note and Dr. Tom is the authorizing prescriber for this visit through the pharmacist collaborative practice agreement.. A copy of the visit note was provided to the patient's primary care provider.    The patient was given a summary of these recommendations.     Cruz Torres, Pharm.D.     Medication Therapy Recommendations  No medication therapy recommendations to display

## 2021-08-12 DIAGNOSIS — F33.1 MODERATE EPISODE OF RECURRENT MAJOR DEPRESSIVE DISORDER (H): ICD-10-CM

## 2021-08-13 RX ORDER — VENLAFAXINE HYDROCHLORIDE 37.5 MG/1
37.5 CAPSULE, EXTENDED RELEASE ORAL EVERY MORNING
Qty: 90 CAPSULE | Refills: 0 | Status: SHIPPED | OUTPATIENT
Start: 2021-08-13 | End: 2021-08-13 | Stop reason: SINTOL

## 2021-08-18 ENCOUNTER — TELEPHONE (OUTPATIENT)
Dept: PSYCHIATRY | Facility: CLINIC | Age: 41
End: 2021-08-18

## 2021-08-18 NOTE — CONFIDENTIAL NOTE
A refill request for Venlafaxine was received for the patient , this medication had been discontinued by previous provider in March.    I called patient/carer and they confirmed that she was not taking effexor any more.  We agreed to meet next week for review. She endorsed having enough medications till her next appointment.  We agreed to disregard refill request.    There were no acute concerns

## 2021-08-21 DIAGNOSIS — F25.1 SCHIZOAFFECTIVE DISORDER, DEPRESSIVE TYPE (H): ICD-10-CM

## 2021-08-21 NOTE — PROGRESS NOTES
[facility]  Psychiatry Clinic  TRANSFER of CARE DIAGNOSTIC ASSESSMENT   CARE TEAM:    PCP- Khushi Cadena at [facility]  PCA - Marcelo (cousin) we have discussed at length.  Has mental health specialist who comes to house twice weekly.     Carmen Connolly is a 41 year old patient who prefers the name Carmen and uses  pronouns she, her, hers.         DIAGNOSIS   Schizoaffective Disorder, Depressive type, in partial remission with residual psychosis at baseline (negative symptoms, AH & VH).        ASSESSMENT   Today Carmen reports ongoing auditory and visual hallucinations, as well as poor sleep.  She also endorses low mood, which appears to be secondary to her hallucinations.  We have reviewed all her labs today and there is evidence of metabolic syndrome.  In the light of this we have discussed and agreed to make some changes to her medication today, including commencing switching of her antipsychotic medication from risperidone to aripiprazole which would be more suitable given her recent lab values and it's antidepressant property. We have also added on trazodone as needed for management of her insomnia.   We have advised that she makes an appointment with her PCP for follow up of her abnormal lab values.  There are no safety concerns today.  We will review her again in clinic 4 weeks, they are aware of emergency services and contacts..      MNPMP review was not needed today.     PLAN                                                                                                                1) Meds-  - Decrease  risperidone to 6 mg at bedtime (weaning off)  - Commence Aripiprazole 2 mg daily  - Commence Trazodone 50 mg hs/prn (for insomnia)         2) Psychotherapy- Continue with therapist who visits home twice weekly    3) Next due-  Labs- [facility] yearly labs ordered  EKG- Done today QT/QTc  - 340/413 ms  Rating scales- PHQ9 and AIMS: done today with total score of <2    4)  Referrals-  Medical concerns- PCP to follow-up on abnormal labs    5) Dispo- RTC in 4 weeks       PERTINENT BACKGROUND                           [most recent eval 07/23/21]   Previous diagnoses include Schizoaffective disorder, depression, generalized anxiety disorder, and PTSD. Symptoms began after the passing of her  while they were refugees living in [facility]. She did not receive any psychiatric care until after she immig[place] to the [place] in [place].    Psych pertinent item history includes psychosis [sxs include paranoia, responding to internal stimuli, negative symptoms] and trauma hx     SANDY Mroales was seen in the clinic today in the company of her cousin who is also her PCA.  The interview was conducted with the help of an .  She reports that there has not been any significant changes in her mental status since last appointment and adjustments made.  She continues to have auditory and visual hallucinations, which appear to be getting worse.  It is affecting her mood and also makes it difficult for her to sleep at night.   cousin reports that this time she usually will wake up about 4 times every night.  Her appetite remains poor, she says she does not feel hungry  Energy level is low.   She also complains of headache as well as back pain.  We advise to use analgesics as well as follow up with PCP.  She enjoys time spent in her  center with her [place] friends 4 times a week, she also enjoys having her children around.  In the session we try to explore PTSD symptoms, but at this time she does not endorse any.  She does not endorse suicidal thoughts  She has been compliant with her medications and does not endorse any side effects including EPSE.      RECENT PSYCH ROS:   Depression:  insomnia and appetite changes  Elevated:  none  Psychosis:  auditory hallucinations and visual hallucinations  Anxiety:  excessive worry  Trauma Related:  none  Sleep: yes  Other: yes    Adverse Effects:  none reported  Pertinent Negative Symptoms: No suicidal and violent ideation  Recent Substance Use:     N/A     FAMILY and SOCIAL HISTORY                                 pt reported     Family Hx:  Unknown    Social Hx:  Financial/ Work- social security disability       Partner/ -  since ~2003  Children- yes, has 6 children (age 12 to 23). 3 are not living at home with her. 5 were from same partner, one from another partner.  Living situation- Lives at home with 3 of her youngest children.      Social/ Spiritual Support- family     Legal- none reported  FEELS SAFE AT HOME- yes      Trauma History (self-report)- yes  Early History/Education- Refugee from [place] during [place] civil war. Was a refugee in [facility] for several years before immigrating to the [place] in [place]      PSYCH and SUBSTANCE USE Critical Summary Points since July 2020 7/26/21- Increase in Risperidone  8/26/21 - Commence switching from Risperidone to Aripiprazole, Trazodone added for insomnia       PSYCHIATRIC HISTORY     SIB- no  Suicidal Ideation Hx- no  Suicide Attempt- #- none, most recent- N/A    Violence/Aggression Hx- no  Psychosis Hx- yes  Eating Disorder Hx- no     Psych Hosp- #- no, most recent- N/A   Commitment- no  ECT- no  Outpatient Programs - yes, individual outpatient therapist     PAST MED TRIALS     Medication  Dose   (mg) Effect  Dates of Use   risperidone 2 mg Helps with sleep current   venlafaxine 225 mg Helpful for mood 2014 - current   temazepam 15 mg Helpful for sleep 2016 - 2018   citalopram         nortriptyline 10 mg HS   2016 - 2017   Hydroxyzine 25-50 mg    2016 - 2018           SUBSTANCE USE HISTORY     Past Use-   None reported  Treatment- #, most recent- no  Medical Consequences- no  HIV/Hepatitis- no  Legal Consequences- no     MEDICAL HISTORY and ALLERGY     ALLERGIES: Patient has no known allergies.    Patient Active Problem List   Diagnosis     Essential hypertension     Gastroesophageal reflux  disease without esophagitis     Shortened CA interval     Vitamin D deficiency     Posttraumatic stress disorder     Female circumcision     Chronic tension-type headache, not intractable     Schizoaffective disorder, bipolar type (H)     Neurocognitive deficits     Arthritis     Extra digits     Immigrant with language difficulty     Pulmonary tuberculosis confirmed by sputum microscopy     Recurrent major depression (H)     Morbid obesity (H)     Insomnia due to medical condition     Counseling regarding advanced directives        MEDICAL REVIEW OF SYSTEMS   Contraception- unknown    A comprehensive review of systems was performed and is negative other than noted in the HPI.     MEDICATIONS     Current Outpatient Medications   Medication Sig Dispense Refill     acetaminophen (TYLENOL) 325 MG tablet Take 2 tablets (650 mg) by mouth every 4 hours as needed for mild pain 100 tablet 11     amLODIPine (NORVASC) 5 MG tablet Take 1 tablet (5 mg) by mouth daily 90 tablet 3     cetirizine (ZYRTEC) 10 MG tablet Take 1 tablet (10 mg) by mouth daily 30 tablet 11     [facility]S MAGNESIUM OXIDE 250 MG tablet TAKE 2 TABLETS (500 MG) BY MOUTH DAILY 60 tablet 11     diclofenac (VOLTAREN) 1 % topical gel Place 2 g onto the skin 3 times daily as needed for moderate pain 100 g 1     diphenhydrAMINE (BENADRYL) 25 MG tablet Take 1-2 tablets (25-50 mg) by mouth nightly as needed for itching or allergies 30 tablet 0     famotidine (PEPCID) 20 MG tablet TAKE 1 TABLET BY MOUTH TWICE A  tablet 1     fish oil-omega-3 fatty acids 1000 MG capsule TAKE 2,000 MG BY MOUTH DAILY 60 capsule 29     fluticasone (FLONASE) 50 MCG/ACT nasal spray Spray 2 sprays into both nostrils daily 15.8 mL 5     ibuprofen (ADVIL/MOTRIN) 400 MG tablet TAKE 1 TABLET (400 MG) BY MOUTH EVERY 4 HOURS AS NEEDED FOR MODERATE PAIN 120 tablet 3     Incontinence Supply Disposable (DEPEND UNDERGARMENTS) MISC Use daily as needed 200 each 11     melatonin 3 MG tablet Take 2  tablets (6 mg) by mouth At Bedtime 60 tablet 1     multivitamin w/minerals (THERA-VIT-M) tablet Take 1 tablet by mouth daily 100 each 3     propranolol ER (INDERAL LA) 60 MG 24 hr capsule Take 1 capsule (60 mg) by mouth daily 90 capsule 3     risperiDONE (RISPERDAL) 2 MG tablet Take 4 tablets (8 mg) by mouth At Bedtime (Patient taking differently: Take 8 mg by mouth At Bedtime Takes 9  Mg at night) 120 tablet 1     vitamin D3 (CHOLECALCIFEROL) 50 mcg (2000 units) tablet Take 1 tablet (50 mcg) by mouth daily 90 tablet 3      VITALS   There were no vitals taken for this visit.    MENTAL STATUS EXAM     Alertness: alert   Appearance: adequately groomed  Behavior/Demeanor: more cooperative and engaged, with fair  eye contact   Speech: increased from last visit  Language: intact  Psychomotor: normal or unremarkable  Mood: depressed  Affect: flat; congruent to: mood- yes, content- yes  Thought Process/Associations: difficult to aseess  Thought Content:  Reports none;  Denies suicidal & violent ideation and delusions  Perception:  Reports auditory hallucinations and visual hallucinations;  Denies none  Insight: fair  Judgment: fair  Cognition: does  appear grossly intact; formal cognitive testing was not done  Gait and Station: unremarkable     LABS and DATA     PHQ9 TODAY =  17  PHQ 12/3/2018 9/3/2019 4/28/2020   PHQ-9 Total Score 10 6 11   Q9: Thoughts of better off dead/self-harm past 2 weeks Not at all Not at all Not at all       Recent Labs   Lab Test 07/28/21  1114 06/26/20  1042 02/14/17  1513   CR 0.71 0.6 1.0   GFRESTIMATED >90 >90 65.6     Recent Labs   Lab Test 06/26/20  1042 01/31/16  1401   AST 23.8 31   ALT 28.8 36   ALKPHOS 88.5 119            PSYCHOTROPIC DRUG INTERACTIONS     CETIRIZINE and DIPHENHYDRAMINE may result in increased risk of CNS depression.      MANAGEMENT:  Monitoring for adverse effects     RISK STATEMENT for SAFETY     Carmen Connolly did not appear to be an imminent safety risk to self or  others.    TREATMENT RISK STATEMENT: The risks, benefits, alternatives and potential adverse effects have been discussed and are understood by the pt. The pt understands the risks of using street drugs or alcohol. There are no medical contraindications, the pt agrees to treatment with the ability to do so. The pt knows to call the clinic for any problems or to access emergency care if needed.  Medical and substance use concerns are documented above.  Psychotropic drug interaction check was done, including changes made today.    PROVIDER: Rose Soto MD    Patient staffed in clinic with Dr. Zamora who will sign the note.  Supervisor is Dr. Ritchie.      Supervisor Attestation:  I met with Carmen Connolly along with the resident physician, Rose Soto MD. I participated in key portions of the service, including the mental status examination and developing the plan of care. I reviewed key portions of the history with the resident. I agree with the findings and plan as documented in this note.  Michael Zamora MD

## 2021-08-24 RX ORDER — OMEPRAZOLE MAGNESIUM 20 MG
CAPSULE,DELAYED RELEASE (ENTERIC COATED) ORAL
Qty: 60 TABLET | Refills: 1 | OUTPATIENT
Start: 2021-08-24

## 2021-08-26 ENCOUNTER — OFFICE VISIT (OUTPATIENT)
Dept: PSYCHIATRY | Facility: CLINIC | Age: 41
End: 2021-08-26
Attending: PSYCHIATRY & NEUROLOGY
Payer: COMMERCIAL

## 2021-08-26 VITALS
WEIGHT: 206.6 LBS | DIASTOLIC BLOOD PRESSURE: 85 MMHG | BODY MASS INDEX: 37.54 KG/M2 | SYSTOLIC BLOOD PRESSURE: 128 MMHG | HEART RATE: 102 BPM

## 2021-08-26 DIAGNOSIS — F25.1 SCHIZOAFFECTIVE DISORDER, DEPRESSIVE TYPE (H): Primary | ICD-10-CM

## 2021-08-26 PROCEDURE — G0463 HOSPITAL OUTPT CLINIC VISIT: HCPCS

## 2021-08-26 PROCEDURE — 99214 OFFICE O/P EST MOD 30 MIN: CPT | Mod: GC | Performed by: STUDENT IN AN ORGANIZED HEALTH CARE EDUCATION/TRAINING PROGRAM

## 2021-08-26 RX ORDER — RISPERIDONE 3 MG/1
6 TABLET ORAL AT BEDTIME
Qty: 60 TABLET | Refills: 0 | Status: SHIPPED | OUTPATIENT
Start: 2021-08-26 | End: 2021-10-04 | Stop reason: DRUGHIGH

## 2021-08-26 RX ORDER — ARIPIPRAZOLE 2 MG/1
2 TABLET ORAL DAILY
Qty: 30 TABLET | Refills: 0 | Status: SHIPPED | OUTPATIENT
Start: 2021-08-26 | End: 2021-10-04

## 2021-08-26 RX ORDER — TRAZODONE HYDROCHLORIDE 50 MG/1
50 TABLET, FILM COATED ORAL
Qty: 30 TABLET | Refills: 0 | Status: SHIPPED | OUTPATIENT
Start: 2021-08-26 | End: 2021-10-04

## 2021-08-26 ASSESSMENT — PAIN SCALES - GENERAL: PAINLEVEL: EXTREME PAIN (8)

## 2021-08-26 NOTE — PATIENT INSTRUCTIONS
Treatment Plan Today:     1) Medications-  Decrease Risperidone to 6 mg hs  Commence Abilify 2 mg daily  Commence Trazodone 50 mg HS/PRN for insomnia    2) Follow-up appt with Dr Soto 9/23/21    3) Crisis numbers are below and clinic after hours number is [phone]              **For crisis resources, please see the information at the end of this document**     Patient Education      Thank you for coming to the [facility] [place] [facility].    Lab Testing:  If you had lab testing today and your results are reassuring or normal they will be mailed to you or sent through Pay with a Tweet within 7 days. If the lab tests need quick action we will call you with the results. The phone number we will call with results is # [phone] (home) . If this is not the best number please call our clinic and change the number.    Medication Refills:  If you need any refills please call your pharmacy and they will contact us. Our fax number for refills is [phone]. Please allow three business for refill processing. If you need to  your refill at a new pharmacy, please contact the new pharmacy directly. The new pharmacy will help you get your medications transferred.     Scheduling:  If you have any concerns about today's visit or wish to schedule another appointment please call our office during normal business hours [phone] (8-5:00 M-F)    Contact Us:  Please call [phone] during business hours (8-5:00 M-F).  If after clinic hours, or on the weekend, please call  [phone].    Financial Assistance [phone]  [facility] Billing [phone]  Central Billing Office, [facility]: [phone]  [place] Billing [phone]  Medical Records [phone]  [place] Patient Bill of Rights [email or web address]       MENTAL HEALTH CRISIS NUMBERS:  For a medical emergency please call  911 or go to the nearest ER.     [place]:   [facility] -[phone]   Crisis Residence [facility] -[phone]   Walk-In Counseling Center [facility] -[phone]   COPE 24/7 [place] Mobile Team -[phone] (adults)/[phone] (child)  CHILD: [facility] needs assessment team - [phone]      [facility]:   [facility] - [phone]   Walk-in counseling [facility] - [phone]   Walk-in counseling [facility] - [phone]   Crisis Residence [facility] - [phone]  Urgent Care Adult Mental [place]-[phone] mobile unit/ 24/7 crisis line    National Crisis Numbers:   National Suicide Prevention Lifeline: 7-593-419-TALK (537-223-9069)  Poison Control Center - 1-820.289.6959  [employer]/resources for a list of additional resources (SOS)  Trans Lifeline a hotline for transgender people 0-588-410-3862  The Randy Project a hotline for LGBT youth 1-299.410.3179  Crisis Text Line: For any crisis 24/7   To: 956119  see www.crisistextline.org  - IF MAKING A CALL FEELS TOO HARD, send a text!         Again thank you for choosing [facility] [place] [facility] and please let us know how we can best partner with you to improve you and your family's health.    You may be receiving a survey regarding this appointment. We would love to have your feedback, both positive and negative. The survey is done by an external company, so your answers are anonymous.

## 2021-08-28 DIAGNOSIS — F25.1 SCHIZOAFFECTIVE DISORDER, DEPRESSIVE TYPE (H): ICD-10-CM

## 2021-09-01 RX ORDER — RISPERIDONE 2 MG/1
8 TABLET ORAL AT BEDTIME
Qty: 120 TABLET | Refills: 1 | OUTPATIENT
Start: 2021-09-01

## 2021-09-20 DIAGNOSIS — I10 ESSENTIAL HYPERTENSION WITH GOAL BLOOD PRESSURE LESS THAN 130/85: ICD-10-CM

## 2021-09-20 DIAGNOSIS — F25.1 SCHIZOAFFECTIVE DISORDER, DEPRESSIVE TYPE (H): ICD-10-CM

## 2021-09-20 DIAGNOSIS — M54.50 ACUTE BILATERAL LOW BACK PAIN WITHOUT SCIATICA: ICD-10-CM

## 2021-09-20 RX ORDER — TRAZODONE HYDROCHLORIDE 50 MG/1
50 TABLET, FILM COATED ORAL
Qty: 30 TABLET | Refills: 0 | Status: CANCELLED | OUTPATIENT
Start: 2021-09-20

## 2021-09-20 RX ORDER — AMLODIPINE BESYLATE 5 MG/1
5 TABLET ORAL DAILY
Qty: 90 TABLET | Refills: 3 | Status: CANCELLED | OUTPATIENT
Start: 2021-09-20

## 2021-09-20 NOTE — TELEPHONE ENCOUNTER

## 2021-09-20 NOTE — TELEPHONE ENCOUNTER
"Request for medication refill: [facility] VITAMIN D3 2,000 UNIT SFGL; AMLODIPINE BESYLATE 5MG TAB  risperiDONE (RISPERDAL) 3 MG tablet  traZODone (DESYREL) 50 MG tablet  Providers if patient needs an appointment and you are willing to give a one month supply please refill for one month and  send a letter/MyChart using \".SMILLIMITEDREFILL\" .smillimited and route chart to \"P [facility] \" (Giving one month refill in non controlled medications is strongly recommended before denial)    If refill has been denied, meaning absolutely no refills without visit, please complete the smart phrase \".smirxrefuse\" and route it to the \"P SMI MED REFILLS\"  pool to inform the patient and the pharmacy.    Maritza Brooks        "

## 2021-09-22 DIAGNOSIS — I10 ESSENTIAL HYPERTENSION WITH GOAL BLOOD PRESSURE LESS THAN 130/85: ICD-10-CM

## 2021-09-22 DIAGNOSIS — F25.1 SCHIZOAFFECTIVE DISORDER, DEPRESSIVE TYPE (H): ICD-10-CM

## 2021-09-22 NOTE — TELEPHONE ENCOUNTER
"Request for medication refill:  traZODone (DESYREL) 50 MG tablet    Providers if patient needs an appointment and you are willing to give a one month supply please refill for one month and  send a letter/MyChart using \".SMILLIMITEDREFILL\" .smillimited and route chart to \"P [facility] \" (Giving one month refill in non controlled medications is strongly recommended before denial)    If refill has been denied, meaning absolutely no refills without visit, please complete the smart phrase \".smirxrefuse\" and route it to the \"P SMI MED REFILLS\"  pool to inform the patient and the pharmacy.    Gregoria Hylton        "

## 2021-09-22 NOTE — TELEPHONE ENCOUNTER
"Request for medication refill:  risperiDONE (RISPERDAL) 3 MG tablet    Providers if patient needs an appointment and you are willing to give a one month supply please refill for one month and  send a letter/MyChart using \".SMILLIMITEDREFILL\" .smillimited and route chart to \"P [facility] \" (Giving one month refill in non controlled medications is strongly recommended before denial)    If refill has been denied, meaning absolutely no refills without visit, please complete the smart phrase \".smirxrefuse\" and route it to the \"P [facility] MED REFILLS\"  pool to inform the patient and the pharmacy.    Gregoria Hylton          "

## 2021-09-22 NOTE — TELEPHONE ENCOUNTER
"Request for medication refill:  amLODIPine (NORVASC) 5 MG tablet    Providers if patient needs an appointment and you are willing to give a one month supply please refill for one month and  send a letter/MyChart using \".SMILLIMITEDREFILL\" .smillimited and route chart to \"P [facility] \" (Giving one month refill in non controlled medications is strongly recommended before denial)    If refill has been denied, meaning absolutely no refills without visit, please complete the smart phrase \".smirxrefuse\" and route it to the \"P [facility] MED REFILLS\"  pool to inform the patient and the pharmacy.    Gregoria Hylton        "

## 2021-09-23 RX ORDER — TRAZODONE HYDROCHLORIDE 50 MG/1
50 TABLET, FILM COATED ORAL
Qty: 30 TABLET | Refills: 0 | OUTPATIENT
Start: 2021-09-23

## 2021-09-23 RX ORDER — ARIPIPRAZOLE 2 MG/1
2 TABLET ORAL DAILY
Qty: 30 TABLET | Refills: 0 | OUTPATIENT
Start: 2021-09-23

## 2021-09-23 RX ORDER — RISPERIDONE 3 MG/1
6 TABLET ORAL AT BEDTIME
Qty: 60 TABLET | Refills: 0 | OUTPATIENT
Start: 2021-09-23

## 2021-09-23 RX ORDER — CHOLECALCIFEROL (VITAMIN D3) 50 MCG
1 TABLET ORAL DAILY
Qty: 90 TABLET | Refills: 3 | Status: SHIPPED | OUTPATIENT
Start: 2021-09-23 | End: 2022-11-14

## 2021-09-23 RX ORDER — AMLODIPINE BESYLATE 5 MG/1
5 TABLET ORAL DAILY
Qty: 90 TABLET | Refills: 3 | Status: SHIPPED | OUTPATIENT
Start: 2021-09-23 | End: 2022-02-23

## 2021-09-23 NOTE — TELEPHONE ENCOUNTER
Please contact pharmacy to request refill from originating provider (Carmen's psychiatric provider).

## 2021-09-24 NOTE — TELEPHONE ENCOUNTER
Spoke with pharmacy staff regarding medications needing to be filled by Psych provider. Closing note.

## 2021-09-28 DIAGNOSIS — G44.229 CHRONIC TENSION-TYPE HEADACHE, NOT INTRACTABLE: ICD-10-CM

## 2021-09-28 RX ORDER — PROPRANOLOL HCL 60 MG
CAPSULE, EXTENDED RELEASE 24HR ORAL
Qty: 90 CAPSULE | Refills: 3 | OUTPATIENT
Start: 2021-09-28

## 2021-10-02 NOTE — PROGRESS NOTES
TELEPHONE VISIT  Carmen Connolly is a 41 year old pt. who is being evaluated via a billable telephone visit.      The patient has been notified of the following:    We have found that certain health care needs can be provided without the need for a physical exam. This service lets us provide the care you need with a short phone conversation. If a prescription is necessary we can send it directly to your pharmacy. If lab work is needed we can place an order for that and you can then stop by our lab to have the test done at a later time. Insurers are generally covering virtual visits as they would in-office visits so billing should not be different than normal.  If for some reason you do get billed incorrectly, you should contact the billing office to correct it and that number is in the AVS .    Patient has given verbal consent for a telephone visit?:  Yes   How would the pt like to obtain the AVS?:  [email or web address]  AVS SmartPhrase [PsychAVS] has been placed in 'Patient Instructions':  Yes     Start Time:  8:00 am         End Time:  8:30 am         [facility]  Psychiatry Clinic  PSYCHIATRY PROGRESS NOTE   CARE TEAM:    PCP- Khushi Cadena at [facility]  SHUN - Marcelo (cousin)      Carmen Connolly is a 41 year old patient who prefers the name Carmen and uses  pronouns she, her, hers.         DIAGNOSIS   Schizoaffective Disorder, Depressive type, in partial remission with residual psychosis at baseline (negative symptoms, AH & VH).        ASSESSMENT   Carmen reports no change in her mental status since the last visit.  Still endorses auditory and visual hallucinations and sleep remains an issue.  She continues to attend her day center and enjoys same.  Of note she appeared more interactive in the session today.  She is compliant with her medication and does not endorse any side effects.  We have agreed to continue with the switch from risperidone to Abilify.  This was discussed at the last  visit due to metabolic side effects of risperidone as well as the fact that she was not getting any relief of her psychotic symptoms on that medication.  Drug interactions as well as side effects were extensively discussed.  She is to follow-up with her primary care provider for management of metabolic syndrome - they have an appointment scheduled for 20 October.    No safety concerns today, we discussed emergency services and contacts.                                                                    [place] review was not needed today.     PLAN                                                                                                                1) Meds-  - Decrease  risperidone to 4 mg at bedtime (weaning off)  - Increase Aripiprazole to 5 mg daily  - Continue Trazodone 50 mg hs/prn (for insomnia)         2) Psychotherapy- Continue with therapist who visits home twice weekly    3) Next due-  Labs- as indicated  EKG- as indicated  Rating scales- N/A    4) Referrals-  None    5) Dispo- RTC on 11/4/21       PERTINENT BACKGROUND                           [most recent eval 07/23/21]   Previous diagnoses include Schizoaffective disorder, depression, generalized anxiety disorder, and PTSD. Symptoms began after the passing of her  while they were refugees living in [facility]. She did not receive any psychiatric care until after she immigrated to the [place] in [place].    Psych pertinent item history includes psychosis [sxs include paranoia, responding to internal stimuli, negative symptoms] and trauma hx     SUBJECTIVE   Patient was reviewed by telephone today as they had problems logging into the review.  The patient and her PCA were present for the session which was facilitated by an .    Patient states she is doing well.  States her sleep has not really changed, sometimes she falls asleep sometimes she finds it difficult to sleep.  Appetite still remains poor.  She describes her mood as good.  Continues  to endorse auditory and visual hallucinations.  States she sometimes hears ringing in her ears.  This appears unchanged from the last time.  She does not endorse suicidal or homicidal thoughts.  She has been compliant with her medications and does not have any side effects.    Her PCA reports that things have pretty much been the same, no new concerns, however no changes.  We discussed that patient appears more interactive today and he states that  she does have some good days where she will interact and then other times she will keep to herself.  He has not noticed any side effects from the medications and there are no safety concerns  They have scheduled a follow-up with her primary care provider for 20 October to review metabolic side effects as discussed at her last appointment.          RECENT PSYCH ROS:   Depression:  insomnia and appetite changes  Elevated:  none  Psychosis:  auditory hallucinations and visual hallucinations  Anxiety:  feeling fearful  Trauma Related:  none  Sleep: yes  Other: yes    Adverse Effects: none reported  Pertinent Negative Symptoms: No suicidal and violent ideation  Recent Substance Use:     N/A     FAMILY and SOCIAL HISTORY                                 pt reported     Family Hx:  Unknown    Social Hx:  Financial/ Work- social security disability       Partner/ -  since ~2003  Children- yes, has 6 children (age 12 to 23). 3 are not living at home with her. 5 were from same partner, one from another partner.  Living situation- Lives at home with 3 of her youngest children.      Social/ Spiritual Support- family     Legal- none reported  FEELS SAFE AT HOME- yes          PSYCH and SUBSTANCE USE Critical Summary Points since July 2020 7/26/21- Increase in Risperidone  8/26/21 - Commence switching from Risperidone to Aripiprazole, Trazodone added for insomnia  10/4/21 -  Reduce Risperidone to 4 mg and increase Abilify to 5 mg        PAST MED TRIALS      Medication  Dose   (mg) Effect  Dates of Use   risperidone 2 mg Helps with sleep current   venlafaxine 225 mg Helpful for mood 2014 -we will   temazepam 15 mg Helpful for sleep 2016 - 2018   citalopram         nortriptyline 10 mg HS   2016 - 2017   Hydroxyzine 25-50 mg    2016 - 2018               MEDICAL HISTORY and ALLERGY     ALLERGIES: Patient has no known allergies.    Patient Active Problem List   Diagnosis     Essential hypertension     Gastroesophageal reflux disease without esophagitis     Shortened NH interval     Vitamin D deficiency     Posttraumatic stress disorder     Female circumcision     Chronic tension-type headache, not intractable     Schizoaffective disorder, bipolar type (H)     Neurocognitive deficits     Arthritis     Extra digits     Immigrant with language difficulty     Pulmonary tuberculosis confirmed by sputum microscopy     Recurrent major depression (H)     Morbid obesity (H)     Insomnia due to medical condition     Counseling regarding advanced directives        MEDICAL REVIEW OF SYSTEMS   Contraception- unknown    A comprehensive review of systems was performed and is negative other than noted in the HPI.     MEDICATIONS     Current Outpatient Medications   Medication Sig Dispense Refill     acetaminophen (TYLENOL) 325 MG tablet Take 2 tablets (650 mg) by mouth every 4 hours as needed for mild pain 100 tablet 11     amLODIPine (NORVASC) 5 MG tablet Take 1 tablet (5 mg) by mouth daily 90 tablet 3     ARIPiprazole (ABILIFY) 2 MG tablet Take 1 tablet (2 mg) by mouth daily 30 tablet 0     cetirizine (ZYRTEC) 10 MG tablet Take 1 tablet (10 mg) by mouth daily 30 tablet 11     [employer] MAGNESIUM OXIDE 250 MG tablet TAKE 2 TABLETS (500 MG) BY MOUTH DAILY 60 tablet 11     diclofenac (VOLTAREN) 1 % topical gel Place 2 g onto the skin 3 times daily as needed for moderate pain 100 g 1     diphenhydrAMINE (BENADRYL) 25 MG tablet Take 1-2 tablets (25-50 mg) by mouth nightly as needed for itching  or allergies 30 tablet 0     famotidine (PEPCID) 20 MG tablet TAKE 1 TABLET BY MOUTH TWICE A  tablet 1     fish oil-omega-3 fatty acids 1000 MG capsule TAKE 2,000 MG BY MOUTH DAILY 60 capsule 29     fluticasone (FLONASE) 50 MCG/ACT nasal spray Spray 2 sprays into both nostrils daily 15.8 mL 5     ibuprofen (ADVIL/MOTRIN) 400 MG tablet TAKE 1 TABLET (400 MG) BY MOUTH EVERY 4 HOURS AS NEEDED FOR MODERATE PAIN 120 tablet 3     Incontinence Supply Disposable (DEPEND UNDERGARMENTS) MISC Use daily as needed 200 each 11     melatonin 3 MG tablet Take 2 tablets (6 mg) by mouth At Bedtime 60 tablet 1     multivitamin w/minerals (THERA-VIT-M) tablet Take 1 tablet by mouth daily 100 each 3     propranolol ER (INDERAL LA) 60 MG 24 hr capsule Take 1 capsule (60 mg) by mouth daily 90 capsule 3     risperiDONE (RISPERDAL) 3 MG tablet Take 2 tablets (6 mg) by mouth At Bedtime 60 tablet 0     traZODone (DESYREL) 50 MG tablet Take 1 tablet (50 mg) by mouth nightly as needed for sleep 30 tablet 0     vitamin D3 (CHOLECALCIFEROL) 50 mcg (2000 units) tablet Take 1 tablet (50 mcg) by mouth daily 90 tablet 3      VITALS   There were no vitals taken for this visit.    MENTAL STATUS EXAM     Alertness: alert   Appearance: N/A (phone visit)  Behavior/Demeanor: cooperative, with N/A (phone visit) eye contact   Speech: regular rate and rhythm  Language: intact  Psychomotor: N/A (phone visit)  Mood: 'good'  Affect: blunted; congruent to: mood- yes, content- yes  Thought Process/Associations: unremarkable  Thought Content:  Reports none;  Denies suicidal & violent ideation and delusions  Perception:  Reports auditory hallucinations and visual hallucinations;  Denies none  Insight: fair  Judgment: fair  Cognition: does  appear grossly intact; formal cognitive testing was not done  Gait and Station: N/A (telehealth)     LABS and DATA     PHQ9 TODAY = not done  PHQ 12/3/2018 9/3/2019 4/28/2020   PHQ-9 Total Score 10 6 11   Q9: Thoughts of  better off dead/self-harm past 2 weeks Not at all Not at all Not at all       Recent Labs   Lab Test 07/28/21  1114 06/26/20  1042 02/14/17  1513   CR 0.71 0.6 1.0   GFRESTIMATED >90 >90 65.6     Recent Labs   Lab Test 07/28/21  1114 06/26/20  1042   * 154.5*   A1C 5.9* 5.8*     Recent Labs   Lab Test 07/28/21  1114 06/26/20  1042   CHOL 212* 221.8*   TRIG 154* 186.9*   * 141*   HDL 46* 43.1     Recent Labs   Lab Test 06/26/20  1042 01/31/16  1401   AST 23.8 31   ALT 28.8 36   ALKPHOS 88.5 119     Recent Labs   Lab Test 07/28/21  1114 12/03/18  1116 02/14/17  1513 04/11/16  1507 03/31/16  1534 01/31/16  1401   WBC 6.3  --   --  7.0  --  6.4   ANEU  --   --   --  3.0  --  2.4   HGB 12.8 11.8   < > 12.1   < > 14.0     --   --  282  --  301    < > = values in this interval not displayed.            PSYCHOTROPIC DRUG INTERACTIONS   ARIPIPRAZOLE, RISPERIDONE & AMLODIPINE- Blood Pressure Lowering Agents may enhance the hypotensive effect of Antipsychotic Agents (Second Generation [Atypical]).   ARIPIPRAZOLE, RISPERIDONE & TRAZODONE- Serotonergic Agents (High Risk) may enhance the adverse/toxic effect of Antipsychotic Agents. Specifically, serotonergic agents may enhance dopamine blockade, possibly increasing the risk for neuroleptic malignant syndrome. Antipsychotic Agents may enhance the serotonergic effect of Serotonergic Agents (High Risk). This could result in serotonin syndrome.    ARIPIPRAZOLE, TRAZODONE & RISPERIDONE- CNS Depressants  CETIRIZINE and DIPHENHYDRAMINE may result in increased risk of CNS depression.      MANAGEMENT:  Monitoring for adverse effects     RISK STATEMENT for SAFETY     Carmen Mccoy Mohsen did not appear to be an imminent safety risk to self or others.    TREATMENT RISK STATEMENT: The risks, benefits, alternatives and potential adverse effects have been discussed and are understood by the pt. The pt understands the risks of using street drugs or alcohol. There are no medical  contraindications, the pt agrees to treatment with the ability to do so. The pt knows to call the clinic for any problems or to access emergency care if needed.  Medical and substance use concerns are documented above.  Psychotropic drug interaction check was done, including changes made today.    PROVIDER: Rose Soto MD    Patient staffed with Dr. Zamora who will sign the note.  Supervisor is Dr. Ritchie.      TELEHEALTH ATTENDING ATTESTATION  Following the ACGME guidelines on telemedicine and direct supervision due to COVID-19, I was concurrently participating in and/or monitoring the patient care through appropriate telecommunication technology.  I discussed the key portions of the service with the resident, including the mental status examination and developing the plan of care. I reviewed key portions of the history with the resident. I agree with the findings and plan as documented in this note.   Michael Zamora MD

## 2021-10-04 ENCOUNTER — APPOINTMENT (OUTPATIENT)
Dept: INTERPRETER SERVICES | Facility: CLINIC | Age: 41
End: 2021-10-04
Payer: COMMERCIAL

## 2021-10-04 ENCOUNTER — VIRTUAL VISIT (OUTPATIENT)
Dept: PSYCHIATRY | Facility: CLINIC | Age: 41
End: 2021-10-04
Attending: PSYCHIATRY & NEUROLOGY
Payer: COMMERCIAL

## 2021-10-04 DIAGNOSIS — F25.1 SCHIZOAFFECTIVE DISORDER, DEPRESSIVE TYPE (H): ICD-10-CM

## 2021-10-04 PROCEDURE — 99214 OFFICE O/P EST MOD 30 MIN: CPT | Mod: 95 | Performed by: STUDENT IN AN ORGANIZED HEALTH CARE EDUCATION/TRAINING PROGRAM

## 2021-10-04 RX ORDER — RISPERIDONE 4 MG/1
4 TABLET ORAL AT BEDTIME
Qty: 30 TABLET | Refills: 1 | Status: SHIPPED | OUTPATIENT
Start: 2021-10-04 | End: 2021-11-04

## 2021-10-04 RX ORDER — ARIPIPRAZOLE 5 MG/1
5 TABLET ORAL DAILY
Qty: 30 TABLET | Refills: 1 | Status: SHIPPED | OUTPATIENT
Start: 2021-10-04 | End: 2021-11-04

## 2021-10-04 RX ORDER — TRAZODONE HYDROCHLORIDE 50 MG/1
50 TABLET, FILM COATED ORAL
Qty: 30 TABLET | Refills: 1 | Status: SHIPPED | OUTPATIENT
Start: 2021-10-04 | End: 2021-11-04

## 2021-10-04 NOTE — PATIENT INSTRUCTIONS
Treatment Plan Today:     1) Medications-  -   Decrease  risperidone to 4 mg at bedtime (weaning off)  - Increase Aripiprazole 5 mg daily  - Continue Trazodone 50 mg hs/prn (for insomnia)      2) Follow-up appt with Dr Soto on 11/4/2021    3) Crisis numbers are below and clinic after hours number is [phone]            **For crisis resources, please see the information at the end of this document**     Patient Education      Thank you for coming to the [facility] [place] [facility].    Lab Testing:  If you had lab testing today and your results are reassuring or normal they will be mailed to you or sent through [employer] within 7 days. If the lab tests need quick action we will call you with the results. The phone number we will call with results is # [phone] (home) . If this is not the best number please call our clinic and change the number.    Medication Refills:  If you need any refills please call your pharmacy and they will contact us. Our fax number for refills is [phone]. Please allow three business for refill processing. If you need to  your refill at a new pharmacy, please contact the new pharmacy directly. The new pharmacy will help you get your medications transferred.     Scheduling:  If you have any concerns about today's visit or wish to schedule another appointment please call our office during normal business hours [phone] (8-5:00 M-F)    Contact Us:  Please call [phone] during business hours (8-5:00 M-F).  If after clinic hours, or on the weekend, please call  [phone].    Financial Assistance [phone]  [facility] Billing [phone]  Central Billing Office, [facility]: [phone]  [facility] Billing [phone]  Medical Records [phone]  [facility] Patient Bill of Rights [email or web address]       MENTAL HEALTH CRISIS NUMBERS:  For a medical emergency please call   411 or go to the nearest ER.     [facility]:   [facility] -[phone]   Crisis Residence [facility] -[phone]   Walk-In Counseling Center [facility] -[phone]   COPE 24/7 [place] Mobile Team -[phone] (adults)/[phone] (child)  CHILD: [facility] needs assessment team - [phone]      [facility]:   [facility] - [phone]   Walk-in counseling [facility] - [phone]   Walk-in counseling [facility] - [phone]   Crisis Residence [facility] - [phone]  Urgent Care Adult Mental [facility]-[phone] mobile unit/ 24/7 crisis line    National Crisis Numbers:   National Suicide Prevention Lifeline: 8-430-683-TALK (207-241-2510)  Poison Control Center - 1-338.785.2296  [employer]/resources for a list of additional resources (SOS)  Trans Lifeline a hotline for transgender people 6-703-380-8511  The [employer] a hotline for LGBT youth 1-408.717.3030  Crisis Text Line: For any crisis 24/7   To: 903084  see www.crisistextline.org  - IF MAKING A CALL FEELS TOO HARD, send a text!         Again thank you for choosing [facility] M[facility] HEALTH & [facility] [place] [facility] and please let us know how we can best partner with you to improve you and your family's health.    You may be receiving a survey regarding this appointment. We would love to have your feedback, both positive and negative. The survey is done by an external company, so your answers are anonymous.

## 2021-10-20 ENCOUNTER — OFFICE VISIT (OUTPATIENT)
Dept: FAMILY MEDICINE | Facility: CLINIC | Age: 41
End: 2021-10-20
Payer: COMMERCIAL

## 2021-10-20 VITALS
HEIGHT: 62 IN | HEART RATE: 97 BPM | DIASTOLIC BLOOD PRESSURE: 124 MMHG | RESPIRATION RATE: 16 BRPM | BODY MASS INDEX: 38.09 KG/M2 | TEMPERATURE: 97.7 F | OXYGEN SATURATION: 100 % | SYSTOLIC BLOOD PRESSURE: 173 MMHG | WEIGHT: 207 LBS

## 2021-10-20 DIAGNOSIS — J30.2 SEASONAL ALLERGIC RHINITIS, UNSPECIFIED TRIGGER: ICD-10-CM

## 2021-10-20 DIAGNOSIS — M54.50 ACUTE BILATERAL LOW BACK PAIN WITHOUT SCIATICA: ICD-10-CM

## 2021-10-20 DIAGNOSIS — I10 ESSENTIAL HYPERTENSION: Primary | ICD-10-CM

## 2021-10-20 DIAGNOSIS — F33.1 MAJOR DEPRESSIVE DISORDER, RECURRENT EPISODE, MODERATE (H): ICD-10-CM

## 2021-10-20 DIAGNOSIS — E56.9 VITAMIN DEFICIENCY: ICD-10-CM

## 2021-10-20 DIAGNOSIS — I10 ESSENTIAL HYPERTENSION WITH GOAL BLOOD PRESSURE LESS THAN 130/85: ICD-10-CM

## 2021-10-20 PROCEDURE — 99214 OFFICE O/P EST MOD 30 MIN: CPT | Performed by: FAMILY MEDICINE

## 2021-10-20 RX ORDER — DIPHENHYDRAMINE HCL 25 MG
25-50 TABLET ORAL
Qty: 30 TABLET | Refills: 0 | Status: SHIPPED | OUTPATIENT
Start: 2021-10-20 | End: 2021-12-22

## 2021-10-20 RX ORDER — ACETAMINOPHEN 325 MG/1
650 TABLET ORAL EVERY 4 HOURS PRN
Qty: 200 TABLET | Refills: 11 | Status: SHIPPED | OUTPATIENT
Start: 2021-10-20 | End: 2022-02-23

## 2021-10-20 RX ORDER — MULTIPLE VITAMINS W/ MINERALS TAB 9MG-400MCG
1 TAB ORAL DAILY
Qty: 100 TABLET | Refills: 3 | Status: SHIPPED | OUTPATIENT
Start: 2021-10-20 | End: 2021-11-15

## 2021-10-20 RX ORDER — CETIRIZINE HYDROCHLORIDE 10 MG/1
10 TABLET ORAL DAILY
Qty: 90 TABLET | Refills: 3 | Status: SHIPPED | OUTPATIENT
Start: 2021-10-20 | End: 2022-11-14

## 2021-10-20 RX ORDER — FLUTICASONE PROPIONATE 50 MCG
2 SPRAY, SUSPENSION (ML) NASAL DAILY
Qty: 16 ML | Refills: 11 | Status: SHIPPED | OUTPATIENT
Start: 2021-10-20

## 2021-10-20 RX ORDER — IBUPROFEN 400 MG/1
400 TABLET, FILM COATED ORAL EVERY 4 HOURS PRN
Qty: 120 TABLET | Refills: 4 | Status: SHIPPED | OUTPATIENT
Start: 2021-10-20 | End: 2022-09-28

## 2021-10-20 RX ORDER — PROPRANOLOL HCL 60 MG
60 CAPSULE, EXTENDED RELEASE 24HR ORAL DAILY
Qty: 90 CAPSULE | Refills: 3 | Status: SHIPPED | OUTPATIENT
Start: 2021-10-20 | End: 2022-11-14

## 2021-10-20 ASSESSMENT — MIFFLIN-ST. JEOR: SCORE: 1553.58

## 2021-10-20 NOTE — PATIENT INSTRUCTIONS
Refills today  - voltaren gel (for pain)  - ibuprofen and tylenol (for pain)  - cetirizine and flonase (for allergies)  - multivitamin  - propranolol (for BP)    Return to clinic if still noting BP >140/80 despite renewing the 2nd BP medication    Discussed flu shot - declined today  - can return to clinic for nurse visit (or go to pharmacy)

## 2021-10-20 NOTE — PROGRESS NOTES
"  Assessment & Plan     Essential hypertension  Has amlodipine at home, not propranolol  Refill meds, if not improving in next few days, give us a call  - propranolol ER (INDERAL LA) 60 MG 24 hr capsule; Take 1 capsule (60 mg) by mouth daily    Seasonal allergic rhinitis, unspecified trigger  Refills today  - cetirizine (ZYRTEC) 10 MG tablet; Take 1 tablet (10 mg) by mouth daily  - diphenhydrAMINE (BENADRYL) 25 MG tablet; Take 1-2 tablets (25-50 mg) by mouth nightly as needed for itching or allergies  - fluticasone (FLONASE) 50 MCG/ACT nasal spray; Spray 2 sprays into both nostrils daily    Acute bilateral low back pain without sciatica  Refills today - otherwise stable  - diclofenac (VOLTAREN) 1 % topical gel; Apply 2 g topically 4 times daily  - acetaminophen (TYLENOL) 325 MG tablet; Take 2 tablets (650 mg) by mouth every 4 hours as needed for mild pain  - ibuprofen (ADVIL/MOTRIN) 400 MG tablet; Take 1 tablet (400 mg) by mouth every 4 hours as needed for moderate pain    Vitamin deficiency  - multivitamin w/minerals (THERA-VIT-M) tablet; Take 1 tablet by mouth daily    Major depressive disorder, recurrent episode, moderate (H)  Doing well  Remains in therapy with psychiatry    Discussed flu shot - will get it in the next 1-2 weeks (declines today)    Diagnosis or treatment significantly limited by social determinants of health - language and cultural barriers  Prescription drug management             No follow-ups on file.    MD PATRICIA [facility] NEHEMIAH oMrales is a 41 year old who presents for the following health issues     HPI     BP has been high at home  - 158/107 earlier today (measured by cousin, daughter at home)  - used to have 2 BP meds, but seems to only have one at home now, hasn't had the \"capsule\" for a little while, pharmacy said it still needed to be refilled, no recent notifications of refill  - mild HA, not today  - has felt occasionally dizzy, dry mouth, " "worried about sugar (no fam hx of DM)  - no frequent urination  - no vision changes    Also  - wants to review all meds, make sure she isn't missing other refills    Following up with psychiatry - no new issues      Review of Systems   Constitutional, HEENT, cardiovascular, pulmonary, gi and gu systems are negative, except as otherwise noted.      Objective    BP (!) 173/124   Pulse 97   Temp 97.7  F (36.5  C) (Oral)   Resp 16   Ht 1.585 m (5' 2.4\")   Wt 93.9 kg (207 lb)   LMP  (LMP Unknown)   SpO2 100%   BMI 37.37 kg/m    Body mass index is 37.37 kg/m .  Physical Exam  Constitutional:       Appearance: Normal appearance.   Cardiovascular:      Rate and Rhythm: Normal rate and regular rhythm.      Heart sounds: Normal heart sounds.   Pulmonary:      Effort: Pulmonary effort is normal.   Musculoskeletal:         General: Normal range of motion.   Neurological:      General: No focal deficit present.      Mental Status: She is alert and oriented to person, place, and time.   Psychiatric:         Mood and Affect: Mood normal.         Behavior: Behavior normal.                        "

## 2021-11-01 NOTE — PROGRESS NOTES
[facility]  Psychiatry Clinic  PSYCHIATRY PROGRESS NOTE   CARE TEAM:    PCP- Khushi Cadena at [facility]  PCA - Poornimajeovannytamara (cousin)      Carmen Connolly is a 41 year old patient who prefers the name Carmen and uses  pronouns she, her, hers.         DIAGNOSIS   Schizoaffective Disorder, Depressive type, in partial remission with residual psychosis at baseline (negative symptoms, AH & VH).        ASSESSMENT   Patient reported improvement in her mental state.  Hallucinations have reduced.  Mood is also improved.  Did not endorse any side effects from her medication.  Discussed the plan to continue tapering off risperidone (to reduce polypharmacy, as higher dosages were not more beneficial), and trazodone as needed for sleep at night.  Have asked them to bring in her pill bottles for the next visit to ensure that she is taking them correctly.   There were no safety concerns today, emergency services and contacts were reviewed.                                                          [place]NPMP review was not needed today.     PLAN                                                                                                                1) Meds-  - Decrease risperidone to 4 mg at bedtime (weaning off)  - Continue Aripiprazole 5 mg daily  - Continue Trazodone 50 mg hs/prn (for insomnia)         2) Psychotherapy- Continue with therapist who visits home twice weekly    3) Next due-  Labs- annually   EKG- as indicated  Rating scales- N/A    4) Referrals-  None    5) Dispo- RTC on 12/2/21       PERTINENT BACKGROUND                           [most recent eval 07/23/21]   Previous diagnoses include Schizoaffective disorder, depression, generalized anxiety disorder, and PTSD. Symptoms began after the passing of her  while they were refugees living in [place]. She did not receive any psychiatric care until after she immigrated to the [place] in [place].    Psych pertinent item history includes  "psychosis [sxs include paranoia, responding to internal stimuli, negative symptoms] and trauma hx     SUBJECTIVE   Patient was seen in the clinic today in the company of her brother Marcelo who is her PCA, an  was used to facilitate the visit    Carmen reports that she is doing good.  Her brother says most of the time her \"mood is normal and okay, although she does sometimes get mute\"  Her sleep currently is okay.  She takes Risperdal at night and this helps with that.  Hallucinations have improved  Anxiety is also improved  Does not have suicidal thoughts.  Discussed the plan to wean off the risperidone and have patient on Abilify.  This is due to concern for metabolic side effects as well as the fact that patient symptoms were not getting relief with the risperidone.  We advised that we have included trazodone as needed to help with insomnia.  Since last visit it appears patient did not reduce the dose of risperidone as discussed.  We have encouraged them to do so today for reasons as stated above.  At this time patient is tolerating her medications and does not endorse any side effects.  They will bring in her pill bottles at the next appointment, in order to ascertain what she is taking.          RECENT PSYCH ROS:   Depression:  appetite changes  Elevated:  none  Psychosis:  auditory hallucinations and visual hallucinations  Anxiety:  feeling fearful  Trauma Related:  none  Sleep: yes  Other: yes    Adverse Effects: none reported  Pertinent Negative Symptoms: No suicidal and violent ideation  Recent Substance Use:     N/A     FAMILY and SOCIAL HISTORY                                 pt reported     Family Hx:  Unknown    Social Hx:  Financial/ Work- social security disability       Partner/ -  since ~2003  Children- yes, has 6 children (age 12 to 23). 3 are not living at home with her. 5 were from same partner, one from another partner.  Living situation- Lives at home with 3 of her " youngest children.      Social/ Spiritual Support- family     Legal- none reported  FEELS SAFE AT HOME- yes          PSYCH and SUBSTANCE USE Critical Summary Points since July 2020 7/26/21- Increase in Risperidone  8/26/21 - Commence switching from Risperidone to Aripiprazole, Trazodone added for insomnia  10/4/21 -  Reduce Risperidone to 4 mg and increase Abilify to 5 mg   11/4/21- Reduce Risperidone to 4 mg and continue Abilify to 5 mg      PAST MED TRIALS     Medication  Dose   (mg) Effect  Dates of Use   risperidone 2 mg Helps with sleep current   venlafaxine 225 mg Helpful for mood 2014 -we will   temazepam 15 mg Helpful for sleep 2016 - 2018   citalopram         nortriptyline 10 mg HS   2016 - 2017   Hydroxyzine 25-50 mg    2016 - 2018               MEDICAL HISTORY and ALLERGY     ALLERGIES: Patient has no known allergies.    Patient Active Problem List   Diagnosis     Essential hypertension     Gastroesophageal reflux disease without esophagitis     Shortened MA interval     Vitamin D deficiency     Posttraumatic stress disorder     Female circumcision     Chronic tension-type headache, not intractable     Schizoaffective disorder, bipolar type (H)     Neurocognitive deficits     Arthritis     Extra digits     Immigrant with language difficulty     Pulmonary tuberculosis confirmed by sputum microscopy     Recurrent major depression (H)     Morbid obesity (H)     Insomnia due to medical condition     Counseling regarding advanced directives        MEDICAL REVIEW OF SYSTEMS   Contraception- unknown    A comprehensive review of systems was performed and is negative other than noted in the HPI.     MEDICATIONS     Current Outpatient Medications   Medication Sig Dispense Refill     acetaminophen (TYLENOL) 325 MG tablet Take 2 tablets (650 mg) by mouth every 4 hours as needed for mild pain 200 tablet 11     amLODIPine (NORVASC) 5 MG tablet Take 1 tablet (5 mg) by mouth daily 90 tablet 3     ARIPiprazole  (ABILIFY) 5 MG tablet Take 1 tablet (5 mg) by mouth daily 30 tablet 1     cetirizine (ZYRTEC) 10 MG tablet Take 1 tablet (10 mg) by mouth daily 90 tablet 3     [employer] MAGNESIUM OXIDE 250 MG tablet TAKE 2 TABLETS (500 MG) BY MOUTH DAILY 60 tablet 11     diclofenac (VOLTAREN) 1 % topical gel Apply 2 g topically 4 times daily 150 g 4     diphenhydrAMINE (BENADRYL) 25 MG tablet Take 1-2 tablets (25-50 mg) by mouth nightly as needed for itching or allergies 30 tablet 0     famotidine (PEPCID) 20 MG tablet TAKE 1 TABLET BY MOUTH TWICE A  tablet 1     fish oil-omega-3 fatty acids 1000 MG capsule TAKE 2,000 MG BY MOUTH DAILY 60 capsule 29     fluticasone (FLONASE) 50 MCG/ACT nasal spray Spray 2 sprays into both nostrils daily 16 mL 11     ibuprofen (ADVIL/MOTRIN) 400 MG tablet Take 1 tablet (400 mg) by mouth every 4 hours as needed for moderate pain 120 tablet 4     Incontinence Supply Disposable (DEPEND UNDERGARMENTS) MISC Use daily as needed 200 each 11     melatonin 3 MG tablet Take 2 tablets (6 mg) by mouth At Bedtime 60 tablet 1     multivitamin w/minerals (THERA-VIT-M) tablet Take 1 tablet by mouth daily 100 tablet 3     propranolol ER (INDERAL LA) 60 MG 24 hr capsule Take 1 capsule (60 mg) by mouth daily 90 capsule 3     risperiDONE (RISPERDAL) 4 MG tablet Take 1 tablet (4 mg) by mouth At Bedtime 30 tablet 1     traZODone (DESYREL) 50 MG tablet Take 1 tablet (50 mg) by mouth nightly as needed for sleep 30 tablet 1     vitamin D3 (CHOLECALCIFEROL) 50 mcg (2000 units) tablet Take 1 tablet (50 mcg) by mouth daily 90 tablet 3      VITALS   LMP  (LMP Unknown)     MENTAL STATUS EXAM     Alertness: alert   Appearance: adequately groomed  Behavior/Demeanor: cooperative, with good  eye contact   Speech: regular rate and rhythm  Language: intact  Psychomotor: normal or unremarkable  Mood: description consistent with euthymia  Affect: more reactive; congruent to: mood- yes, content- yes  Thought Process/Associations:  unremarkable  Thought Content:  Reports none;  Denies suicidal & violent ideation and delusions  Perception:  Reports auditory hallucinations and visual hallucinations;  Denies none  Insight: fair  Judgment: fair  Cognition: does  appear grossly intact; formal cognitive testing was not done  Gait and Station: unremarkable     LABS and DATA     PHQ9 TODAY = not done  PHQ 12/3/2018 9/3/2019 4/28/2020   PHQ-9 Total Score 10 6 11   Q9: Thoughts of better off dead/self-harm past 2 weeks Not at all Not at all Not at all       Recent Labs   Lab Test 07/28/21  1114 06/26/20  1042 02/14/17  1513   CR 0.71 0.6 1.0   GFRESTIMATED >90 >90 65.6     Recent Labs   Lab Test 07/28/21  1114 06/26/20  1042   * 154.5*   A1C 5.9* 5.8*     Recent Labs   Lab Test 07/28/21  1114 06/26/20  1042   CHOL 212* 221.8*   TRIG 154* 186.9*   * 141*   HDL 46* 43.1     Recent Labs   Lab Test 06/26/20  1042 01/31/16  1401   AST 23.8 31   ALT 28.8 36   ALKPHOS 88.5 119     Recent Labs   Lab Test 07/28/21  1114 12/03/18  1116 02/14/17  1513 04/11/16  1507 03/31/16  1534 01/31/16  1401   WBC 6.3  --   --  7.0  --  6.4   ANEU  --   --   --  3.0  --  2.4   HGB 12.8 11.8   < > 12.1   < > 14.0     --   --  282  --  301    < > = values in this interval not displayed.            PSYCHOTROPIC DRUG INTERACTIONS   ARIPIPRAZOLE, RISPERIDONE & AMLODIPINE- Blood Pressure Lowering Agents may enhance the hypotensive effect of Antipsychotic Agents (Second Generation [Atypical]).   ARIPIPRAZOLE, RISPERIDONE & TRAZODONE- Serotonergic Agents (High Risk) may enhance the adverse/toxic effect of Antipsychotic Agents. Specifically, serotonergic agents may enhance dopamine blockade, possibly increasing the risk for neuroleptic malignant syndrome. Antipsychotic Agents may enhance the serotonergic effect of Serotonergic Agents (High Risk). This could result in serotonin syndrome.    ARIPIPRAZOLE, TRAZODONE & RISPERIDONE- CNS Depressants  CETIRIZINE and  DIPHENHYDRAMINE may result in increased risk of CNS depression.      MANAGEMENT:  Monitoring for adverse effects     RISK STATEMENT for SAFETY     Carmen Connolly did not appear to be an imminent safety risk to self or others.    TREATMENT RISK STATEMENT: The risks, benefits, alternatives and potential adverse effects have been discussed and are understood by the pt. The pt understands the risks of using street drugs or alcohol. There are no medical contraindications, the pt agrees to treatment with the ability to do so. The pt knows to call the clinic for any problems or to access emergency care if needed.  Medical and substance use concerns are documented above.  Psychotropic drug interaction check was done, including changes made today.    PROVIDER: Rose Soto MD    Patient staffed with Dr. Zamora who will sign the note.  Supervisor is Dr. Ritchie.      Supervisor Attestation:  I met with Carmen Mccoy [place] along with the resident physician, Rose Soto MD. I participated in key portions of the service, including the mental status examination and developing the plan of care. I reviewed key portions of the history with the resident. I agree with the findings and plan as documented in this note.  Michael Zamora MD

## 2021-11-04 ENCOUNTER — OFFICE VISIT (OUTPATIENT)
Dept: PSYCHIATRY | Facility: CLINIC | Age: 41
End: 2021-11-04
Attending: PSYCHIATRY & NEUROLOGY
Payer: COMMERCIAL

## 2021-11-04 DIAGNOSIS — F25.1 SCHIZOAFFECTIVE DISORDER, DEPRESSIVE TYPE (H): ICD-10-CM

## 2021-11-04 PROCEDURE — 99214 OFFICE O/P EST MOD 30 MIN: CPT | Mod: GC | Performed by: STUDENT IN AN ORGANIZED HEALTH CARE EDUCATION/TRAINING PROGRAM

## 2021-11-04 RX ORDER — ARIPIPRAZOLE 5 MG/1
5 TABLET ORAL DAILY
Qty: 30 TABLET | Refills: 1 | Status: SHIPPED | OUTPATIENT
Start: 2021-11-04 | End: 2021-12-02

## 2021-11-04 RX ORDER — RISPERIDONE 4 MG/1
4 TABLET ORAL AT BEDTIME
Qty: 30 TABLET | Refills: 1 | Status: SHIPPED | OUTPATIENT
Start: 2021-11-04 | End: 2021-12-02

## 2021-11-04 RX ORDER — TRAZODONE HYDROCHLORIDE 50 MG/1
50 TABLET, FILM COATED ORAL
Qty: 30 TABLET | Refills: 1 | Status: SHIPPED | OUTPATIENT
Start: 2021-11-04 | End: 2021-12-02

## 2021-11-04 NOTE — PATIENT INSTRUCTIONS
It was nice seeing you today    Treatment Plan Today:     1) Medications-  - Decrease  risperidone to 4 mg at bedtime (weaning off)  - Continue  Aripiprazole to 5 mg daily  - Continue Trazodone 50 mg hs/prn (for insomnia)    2) Follow-up appt with Dr Soto 12/2/21 @ 2:15 PM    3) Crisis numbers are below and clinic after hours number is [phone]              **For crisis resources, please see the information at the end of this document**     Patient Education      Thank you for coming to the [facility] MENTAL HEALTH & ADDICTION [place] CLINIC.    Lab Testing:  If you had lab testing today and your results are reassuring or normal they will be mailed to you or sent through [employer] within 7 days. If the lab tests need quick action we will call you with the results. The phone number we will call with results is # [phone] (home) . If this is not the best number please call our clinic and change the number.    Medication Refills:  If you need any refills please call your pharmacy and they will contact us. Our fax number for refills is [phone]. Please allow three business for refill processing. If you need to  your refill at a new pharmacy, please contact the new pharmacy directly. The new pharmacy will help you get your medications transferred.     Scheduling:  If you have any concerns about today's visit or wish to schedule another appointment please call our office during normal business hours [phone] (8-5:00 M-F)    Contact Us:  Please call [phone] during business hours (8-5:00 M-F).  If after clinic hours, or on the weekend, please call  [phone].    Financial Assistance [phone]  [facility] Billing [phone]  Central Billing Office, [facility]: [phone]  [place] Billing [phone]  Medical Records [phone]  [place] Patient Bill of Rights [email or web address]       MENTAL HEALTH CRISIS  NUMBERS:  For a medical emergency please call  911 or go to the nearest ER.     [facility]:   [facility] -[phone]   Crisis Residence [facility] [place] [facility] -[phone]   Walk-In Counseling Center [facility] -[phone]   COPE 24/7 [place] Mobile Team -[phone] (adults)/[phone] (child)  CHILD: [facility] needs assessment team - [phone]      [facility]:   [facility] - [phone]   Walk-in counseling [facility] - [phone]   Walk-in counseling [facility] - [phone]   Crisis Residence [facility] - [phone]  Urgent Care Adult Mental Qgcbos-[phone] mobile unit/ 24/7 crisis line    National Crisis Numbers:   National Suicide Prevention Lifeline: 7-560-629-TALK (075-192-4393)  Poison Control Center - 1-988.385.9715  [employer] Services/resources for a list of additional resources (SOS)  Trans Lifeline a hotline for transgender people 1-774.277.4574  The Randy Project a hotline for LGBT youth 1-439.706.3071  Crisis Text Line: For any crisis 24/7   To: 511134  see www.crisistextline.org  - IF MAKING A CALL FEELS TOO HARD, send a text!         Again thank you for choosing [facility] MENTAL HEALTH & ADDICTION [facility] and please let us know how we can best partner with you to improve you and your family's health.    You may be receiving a survey regarding this appointment. We would love to have your feedback, both positive and negative. The survey is done by an external company, so your answers are anonymous.

## 2021-11-15 DIAGNOSIS — E56.9 VITAMIN DEFICIENCY: ICD-10-CM

## 2021-11-15 DIAGNOSIS — I10 ESSENTIAL HYPERTENSION WITH GOAL BLOOD PRESSURE LESS THAN 130/85: ICD-10-CM

## 2021-11-15 DIAGNOSIS — F33.1 MAJOR DEPRESSIVE DISORDER, RECURRENT EPISODE, MODERATE (H): ICD-10-CM

## 2021-11-15 RX ORDER — MULTIPLE VITAMINS W/ MINERALS TAB 9MG-400MCG
1 TAB ORAL DAILY
Qty: 100 TABLET | Refills: 3 | Status: SHIPPED | OUTPATIENT
Start: 2021-11-15 | End: 2023-10-16

## 2021-11-15 NOTE — TELEPHONE ENCOUNTER
"Request for medication refill: multivitamin w/minerals (THERA-VIT-M) tablet    Providers if patient needs an appointment and you are willing to give a one month supply please refill for one month and  send a letter/MyChart using \".SMILLIMITEDREFILL\" .smillimited and route chart to \"P [facility] \" (Giving one month refill in non controlled medications is strongly recommended before denial)    If refill has been denied, meaning absolutely no refills without visit, please complete the smart phrase \".smirxrefuse\" and route it to the \"P [facility] MED REFILLS\"  pool to inform the patient and the pharmacy.    Maritza Brooks        "

## 2021-12-02 ENCOUNTER — OFFICE VISIT (OUTPATIENT)
Dept: PSYCHIATRY | Facility: CLINIC | Age: 41
End: 2021-12-02
Attending: PSYCHIATRY & NEUROLOGY
Payer: COMMERCIAL

## 2021-12-02 VITALS
HEART RATE: 94 BPM | SYSTOLIC BLOOD PRESSURE: 157 MMHG | DIASTOLIC BLOOD PRESSURE: 98 MMHG | BODY MASS INDEX: 38.13 KG/M2 | WEIGHT: 211.2 LBS

## 2021-12-02 DIAGNOSIS — F25.1 SCHIZOAFFECTIVE DISORDER, DEPRESSIVE TYPE (H): ICD-10-CM

## 2021-12-02 PROCEDURE — 99214 OFFICE O/P EST MOD 30 MIN: CPT | Mod: GC | Performed by: STUDENT IN AN ORGANIZED HEALTH CARE EDUCATION/TRAINING PROGRAM

## 2021-12-02 PROCEDURE — G0463 HOSPITAL OUTPT CLINIC VISIT: HCPCS

## 2021-12-02 RX ORDER — TRAZODONE HYDROCHLORIDE 50 MG/1
100 TABLET, FILM COATED ORAL
Qty: 60 TABLET | Refills: 1 | Status: SHIPPED | OUTPATIENT
Start: 2021-12-02 | End: 2022-01-20

## 2021-12-02 RX ORDER — RISPERIDONE 4 MG/1
4 TABLET ORAL AT BEDTIME
Qty: 30 TABLET | Refills: 1 | Status: SHIPPED | OUTPATIENT
Start: 2021-12-02 | End: 2022-01-20 | Stop reason: DRUGHIGH

## 2021-12-02 RX ORDER — ARIPIPRAZOLE 5 MG/1
5 TABLET ORAL DAILY
Qty: 30 TABLET | Refills: 1 | Status: SHIPPED | OUTPATIENT
Start: 2021-12-02 | End: 2022-01-20 | Stop reason: DRUGHIGH

## 2021-12-02 ASSESSMENT — PAIN SCALES - GENERAL: PAINLEVEL: NO PAIN (0)

## 2021-12-02 NOTE — PATIENT INSTRUCTIONS
It was nice seeing you today    Treatment Plan Today:     1) Medications-   Increase Trazodone 100 mg hs/prn (for insomnia)  - Continue risperidone to 4 mg at bedtime (weaning off)  - Continue Aripiprazole 5 mg daily    2) Follow-up appt with Dr Soto for 1 month    3) Crisis numbers are below and clinic after hours number is [phone]              **For crisis resources, please see the information at the end of this document**     Patient Education      Thank you for coming to the [facility] MENTAL HEALTH & ADDICTION [place] CLINIC.    Lab Testing:  If you had lab testing today and your results are reassuring or normal they will be mailed to you or sent through [employer] within 7 days. If the lab tests need quick action we will call you with the results. The phone number we will call with results is # [phone] (home) . If this is not the best number please call our clinic and change the number.    Medication Refills:  If you need any refills please call your pharmacy and they will contact us. Our fax number for refills is [phone]. Please allow three business for refill processing. If you need to  your refill at a new pharmacy, please contact the new pharmacy directly. The new pharmacy will help you get your medications transferred.     Scheduling:  If you have any concerns about today's visit or wish to schedule another appointment please call our office during normal business hours [phone] (8-5:00 M-F)    Contact Us:  Please call [phone] during business hours (8-5:00 M-F).  If after clinic hours, or on the weekend, please call  [phone].    Financial Assistance [phone]  [facility] Billing [phone]  Central Billing Office, [facility]: [phone]  [place] Billing [phone]  Medical Records [phone]  [place] Patient Bill of Rights [email or web address]       MENTAL HEALTH CRISIS NUMBERS:  For a  medical emergency please call  911 or go to the nearest ER.     [facility]:   [facility] -[phone]   Crisis Residence [facility] [place] [facility] -[phone]   Walk-In Counseling Center [facility] -[phone]   COPE 24/7 [place] Mobile Team -[phone] (adults)/[phone] (child)  [facility]: [facility] needs [facility] - [phone]      [facility]:   [facility] - [phone]   Walk-in counseling [facility] - [phone]   Walk-in counseling [facility] - [phone]   Crisis Residence [facility] - [phone]  Urgent Care Adult Mental Qyygcy-[phone] mobile unit/ 24/7 crisis line    National Crisis Numbers:   National Suicide Prevention Lifeline: 9-338-716-TALK (919-448-9642)  Poison Control Center - 1-551-662-2433  [employer]/resources for a list of additional resources (SOS)  Trans Lifeline a hotline for transgender people 1-333.401.5245  The Randy Project a hotline for LGBT youth 1-430.532.5963  Crisis Text Line: For any crisis 24/7   To: 931705  see www.crisistextline.org  - IF MAKING A CALL FEELS TOO HARD, send a text!         Again thank you for choosing [facility] MENTAL HEALTH & ADDICTION [facility] and please let us know how we can best partner with you to improve you and your family's health.    You may be receiving a survey regarding this appointment. We would love to have your feedback, both positive and negative. The survey is done by an external company, so your answers are anonymous.

## 2021-12-15 DIAGNOSIS — K21.00 GASTROESOPHAGEAL REFLUX DISEASE WITH ESOPHAGITIS, UNSPECIFIED WHETHER HEMORRHAGE: ICD-10-CM

## 2021-12-15 RX ORDER — FAMOTIDINE 20 MG/1
TABLET, FILM COATED ORAL
Qty: 180 TABLET | Refills: 1 | Status: SHIPPED | OUTPATIENT
Start: 2021-12-15 | End: 2022-02-23

## 2021-12-15 NOTE — TELEPHONE ENCOUNTER
"Request for medication refill:  famotidine (PEPCID) 20 MG tablet    Providers if patient needs an appointment and you are willing to give a one month supply please refill for one month and  send a letter/MyChart using \".SMILLIMITEDREFILL\" .smillimited and route chart to \"P [facility] \" (Giving one month refill in non controlled medications is strongly recommended before denial)    If refill has been denied, meaning absolutely no refills without visit, please complete the smart phrase \".smirxrefuse\" and route it to the \"P [facility] MED REFILLS\"  pool to inform the patient and the pharmacy.    Gregoria Hylton        "

## 2021-12-21 DIAGNOSIS — J30.2 SEASONAL ALLERGIC RHINITIS, UNSPECIFIED TRIGGER: ICD-10-CM

## 2021-12-22 RX ORDER — DIPHENHYDRAMINE HCL 25 MG
25-50 TABLET ORAL
Qty: 30 TABLET | Refills: 0 | Status: SHIPPED | OUTPATIENT
Start: 2021-12-22 | End: 2023-10-16

## 2022-01-06 ENCOUNTER — TELEPHONE (OUTPATIENT)
Dept: PSYCHIATRY | Facility: CLINIC | Age: 42
End: 2022-01-06
Payer: COMMERCIAL

## 2022-01-06 NOTE — TELEPHONE ENCOUNTER
"[facility] Call Center    Phone Message    May a detailed message be left on voicemail: yes     Reason for Call: Medication Refill Request    Has the patient contacted the pharmacy for the refill? Yes   Name of medication being requested: \"all of them\"  Provider who prescribed the medication: Dr. Soto  Pharmacy:  [facility]/PHARMACY #[id number] [place], [place] - [address]  Date medication is needed: ASAP, patient is out after today. Was supposed to have visit today but needed to reschedule         Action Taken: Message routed to:  Other: P [facility] [place] B[facility]    Travel Screening: Not Applicable                                                                        "

## 2022-01-06 NOTE — TELEPHONE ENCOUNTER
Writer called [facility] and confirmed the patient has one refill remaining of all psychiatric meds and they will fill these today.    Called [facility] and relayed the above information.

## 2022-01-20 ENCOUNTER — VIRTUAL VISIT (OUTPATIENT)
Dept: PSYCHIATRY | Facility: CLINIC | Age: 42
End: 2022-01-20
Attending: PSYCHIATRY & NEUROLOGY
Payer: COMMERCIAL

## 2022-01-20 DIAGNOSIS — F25.1 SCHIZOAFFECTIVE DISORDER, DEPRESSIVE TYPE (H): ICD-10-CM

## 2022-01-20 PROCEDURE — 99443 PR PHYSICIAN TELEPHONE EVALUATION 21-30 MIN: CPT | Mod: 95 | Performed by: STUDENT IN AN ORGANIZED HEALTH CARE EDUCATION/TRAINING PROGRAM

## 2022-01-20 RX ORDER — ARIPIPRAZOLE 5 MG/1
10 TABLET ORAL DAILY
Qty: 60 TABLET | Refills: 1 | Status: SHIPPED | OUTPATIENT
Start: 2022-01-20 | End: 2022-02-21 | Stop reason: DRUGHIGH

## 2022-01-20 RX ORDER — RISPERIDONE 2 MG/1
2 TABLET ORAL AT BEDTIME
Qty: 30 TABLET | Refills: 1 | Status: SHIPPED | OUTPATIENT
Start: 2022-01-20 | End: 2022-02-21

## 2022-01-20 RX ORDER — TRAZODONE HYDROCHLORIDE 50 MG/1
50-100 TABLET, FILM COATED ORAL
Qty: 60 TABLET | Refills: 1 | Status: SHIPPED | OUTPATIENT
Start: 2022-01-20 | End: 2022-02-21

## 2022-01-20 ASSESSMENT — PAIN SCALES - GENERAL: PAINLEVEL: NO PAIN (0)

## 2022-01-20 NOTE — PROGRESS NOTES
"VIDEO VISIT  Carmen Connolly is a 42 year old patient that has consented to receive services via billable video visit.      The patient has been notified of following:   \"This video visit will be conducted via a call between you and your physician/provider. We have found that certain health care needs can be provided without the need for an in-person physical exam. This service lets us provide the care you need with a video conversation. If a prescription is necessary we can send it directly to your pharmacy. If lab work is needed we can place an order for that and you can then stop by our lab to have the test done at a later time. Insurers are generally covering virtual visits as they would in-office visits so billing should not be different than normal.  If for some reason you do get billed incorrectly, you should contact the billing office to correct it and that number is in the AVS .    Patient will join video visit via:  text invite was sent (AfterColleget is preferred, but patient is unable to join via Bia)    If patient attempts to join the video via Bia at appointment start time, but is unable to, they would prefer that the provider send them a video invitation via:   Text to phone: [phone]      How would patient like to obtain AVS?:  MyChart    TELEPHONE VISIT  Carmen Connolly is a 42 year old pt. who is being evaluated via a billable telephone visit.      The patient has been notified of the following:    We have found that certain health care needs can be provided without the need for a physical exam. This service lets us provide the care you need with a short phone conversation. If a prescription is necessary we can send it directly to your pharmacy. If lab work is needed we can place an order for that and you can then stop by our lab to have the test done at a later time. Insurers are generally covering virtual visits as they would in-office visits so billing should not be different than normal.  " If for some reason you do get billed incorrectly, you should contact the billing office to correct it and that number is in the AVS .    Patient has given verbal consent for a telephone visit?:  Yes   How would the pt like to obtain the AVS?:  Shemar  AVS SmartPhrase [PsychAVS] has been placed in 'Patient Instructions':  Yes     1/20/22    Start Time:  8:30 am       End Time: 9:00 am                   [facility]  Psychiatry Clinic  PSYCHIATRY PROGRESS NOTE   CARE TEAM:    PCP- Khushi Cadena at [facility]  PCA - Marcelo (cousin)      Carmen Connolly is a 42 year old patient who prefers the name Carmen and uses  pronouns she, her, hers.         DIAGNOSIS   Schizoaffective Disorder, Depressive type, in partial remission with residual psychosis at baseline (negative symptoms, AH & VH).        ASSESSMENT   Carmen reports some improvement in her sleep today.  Her psychotic symptoms are  relatively still the same.  We have discussed a plan to continue switching from risperidone to aripiprazole and will be going up on the dose of aripiprazole today while reducing risperidone.  I have advised her to follow up with her PCP regards to abnormal metabolic labs, as well as ongoing poor appetite.  There were no safety concerns.  We will review her again in the clinic in 1 month.  In the meantime they are aware of emergency services and contacts.          [facility]     PLAN                                                                                                                1) Meds-  - Decrease risperidone 2 mg at bedtime (weaning off)  - Increase Aripiprazole 10 mg daily (likely increase dose in future)  - Continue Trazodone  mg hs/prn (for insomnia)       2) Psychotherapy- Continue with therapist who visits home twice weekly    3) Next due-  Labs- annually   EKG- as indicated  Rating scales- N/A    4) Referrals-  None    5) Dispo- RTC in 4 weeks        PERTINENT BACKGROUND                           [most recent eval 07/23/21]   Previous diagnoses include Schizoaffective disorder, depression, generalized anxiety disorder, and PTSD. Symptoms began after the passing of her  while they were refugees living in [facility]. She did not receive any psychiatric care until after she immigrated to the [place] in [place].    Psych pertinent item history includes psychosis [sxs include paranoia, responding to internal stimuli, negative symptoms] and trauma hx     SUBJECTIVE   Visit was conducted over the telephone (as they had difficulty logging into the video) with patient, her brother Marcelo who is her PCA, and an .    Carmen says she is feeling good.  Her sleep is now better, now wakes up about 2 times in the night and is able to go back to sleep as opposed to waking up 4 times previously.  Her appetite is still poor , no changes.  She still hears the voices, says they are just the same not worse.  She does not endorse any anxiety symptoms.  She does not have suicidal thoughts.  She has been compliant with her medications and is not experiencing any adverse effects.    Ignacio reports no acute concerns today.    RECENT PSYCH ROS:   Depression:  appetite changes  Elevated:  none  Psychosis:  auditory hallucinations and visual hallucinations  Anxiety:  none  Trauma Related:  none  Sleep: yes  Other: yes    Adverse Effects: none reported  Pertinent Negative Symptoms: No suicidal and violent ideation  Recent Substance Use:     N/A     FAMILY and SOCIAL HISTORY                                 pt reported     Family Hx:  Unknown    Social Hx:  Financial/ Work- social security disability       Partner/ -  since ~2003  Children- yes, has 6 children (age 12 to 23). 3 are not living at home with her. 5 were from same partner, one from another partner.  Living situation- Lives at home with 3 of her youngest children.      Social/ Spiritual Support- family      Legal- none reported  FEELS SAFE AT HOME- yes          PSYCH and SUBSTANCE USE Critical Summary Points since July 2020 7/26/21- Increase in Risperidone  8/26/21 - Commence switching from Risperidone to Aripiprazole, Trazodone added for insomnia  10/4/21 -  Reduce Risperidone to 4 mg and increase Abilify to 5 mg   11/4/21- Reduce Risperidone to 4 mg and continue Abilify to 5 mg   12/02/21-  Increase in Trazodone to target insomnia  1/20/22- Increase in Abilify to 10 mg and Reduction of Risperidone to 2 mg         MEDICAL HISTORY and ALLERGY     ALLERGIES: Patient has no known allergies.    Patient Active Problem List   Diagnosis     Essential hypertension     Gastroesophageal reflux disease without esophagitis     Shortened ME interval     Vitamin D deficiency     Posttraumatic stress disorder     Female circumcision     Chronic tension-type headache, not intractable     Schizoaffective disorder, bipolar type (H)     Neurocognitive deficits     Arthritis     Extra digits     Immigrant with language difficulty     Pulmonary tuberculosis confirmed by sputum microscopy     Recurrent major depression (H)     Morbid obesity (H)     Insomnia due to medical condition     Counseling regarding advanced directives        MEDICAL REVIEW OF SYSTEMS   Contraception- unknown    A comprehensive review of systems was performed and is negative other than noted in the HPI.     MEDICATIONS     Current Outpatient Medications   Medication Sig Dispense Refill     acetaminophen (TYLENOL) 325 MG tablet Take 2 tablets (650 mg) by mouth every 4 hours as needed for mild pain 200 tablet 11     amLODIPine (NORVASC) 5 MG tablet Take 1 tablet (5 mg) by mouth daily 90 tablet 3     ARIPiprazole (ABILIFY) 5 MG tablet Take 1 tablet (5 mg) by mouth daily 30 tablet 1     cetirizine (ZYRTEC) 10 MG tablet Take 1 tablet (10 mg) by mouth daily 90 tablet 3     [employer] MAGNESIUM OXIDE 250 MG tablet TAKE 2 TABLETS (500 MG) BY MOUTH DAILY 60 tablet 11      diclofenac (VOLTAREN) 1 % topical gel Apply 2 g topically 4 times daily 150 g 4     diphenhydrAMINE (BENADRYL) 25 MG tablet Take 1-2 tablets (25-50 mg) by mouth nightly as needed for itching or allergies 30 tablet 0     famotidine (PEPCID) 20 MG tablet TAKE 1 TABLET BY MOUTH TWICE A  tablet 1     fish oil-omega-3 fatty acids 1000 MG capsule TAKE 2,000 MG BY MOUTH DAILY 60 capsule 29     fluticasone (FLONASE) 50 MCG/ACT nasal spray Spray 2 sprays into both nostrils daily 16 mL 11     ibuprofen (ADVIL/MOTRIN) 400 MG tablet Take 1 tablet (400 mg) by mouth every 4 hours as needed for moderate pain 120 tablet 4     Incontinence Supply Disposable (DEPEND UNDERGARMENTS) MISC Use daily as needed 200 each 11     melatonin 3 MG tablet Take 2 tablets (6 mg) by mouth At Bedtime 60 tablet 1     multivitamin w/minerals (THERA-VIT-M) tablet Take 1 tablet by mouth daily 100 tablet 3     propranolol ER (INDERAL LA) 60 MG 24 hr capsule Take 1 capsule (60 mg) by mouth daily 90 capsule 3     risperiDONE (RISPERDAL) 4 MG tablet Take 1 tablet (4 mg) by mouth At Bedtime 30 tablet 1     traZODone (DESYREL) 50 MG tablet Take 2 tablets (100 mg) by mouth nightly as needed for sleep 60 tablet 1     vitamin D3 (CHOLECALCIFEROL) 50 mcg (2000 units) tablet Take 1 tablet (50 mcg) by mouth daily 90 tablet 3      VITALS   There were no vitals taken for this visit.    MENTAL STATUS EXAM     Alertness: alert   Appearance: N/A (phone visit)  Behavior/Demeanor: cooperative, with N/A (phone visit) eye contact   Speech: regular rate and rhythm  Language: intact  Psychomotor: N/A (phone visit)  Mood: description consistent with euthymia  Affect: more reactive; congruent to: mood- yes, content- yes  Thought Process/Associations: unremarkable  Thought Content:  Reports none;  Denies suicidal & violent ideation and delusions  Perception:  Reports auditory hallucinations and visual hallucinations;  Denies none  Insight: fair  Judgment: fair  Cognition:  does  appear grossly intact; formal cognitive testing was not done  Gait and Station: N/A (telehealth)     LABS and DATA     PHQ9 TODAY = not done  PHQ 12/3/2018 9/3/2019 4/28/2020   PHQ-9 Total Score 10 6 11   Q9: Thoughts of better off dead/self-harm past 2 weeks Not at all Not at all Not at all       Recent Labs   Lab Test 07/28/21  1114 06/26/20  1042 02/14/17  1513   CR 0.71 0.6 1.0   GFRESTIMATED >90 >90 65.6     Recent Labs   Lab Test 07/28/21  1114 06/26/20  1042   * 154.5*   A1C 5.9* 5.8*     Recent Labs   Lab Test 07/28/21  1114 06/26/20  1042   CHOL 212* 221.8*   TRIG 154* 186.9*   * 141*   HDL 46* 43.1     Recent Labs   Lab Test 06/26/20  1042 01/31/16  1401   AST 23.8 31   ALT 28.8 36   ALKPHOS 88.5 119     Recent Labs   Lab Test 07/28/21  1114 12/03/18  1116 02/14/17  1513 04/11/16  1507 03/31/16  1534 01/31/16  1401   WBC 6.3  --   --  7.0  --  6.4   ANEU  --   --   --  3.0  --  2.4   HGB 12.8 11.8   < > 12.1   < > 14.0     --   --  282  --  301    < > = values in this interval not displayed.            PSYCHOTROPIC DRUG INTERACTIONS   ARIPIPRAZOLE, RISPERIDONE & AMLODIPINE- Blood Pressure Lowering Agents may enhance the hypotensive effect of Antipsychotic Agents (Second Generation [Atypical]).   ARIPIPRAZOLE, RISPERIDONE & TRAZODONE- Serotonergic Agents (High Risk) may enhance the adverse/toxic effect of Antipsychotic Agents. Specifically, serotonergic agents may enhance dopamine blockade, possibly increasing the risk for neuroleptic malignant syndrome. Antipsychotic Agents may enhance the serotonergic effect of Serotonergic Agents (High Risk). This could result in serotonin syndrome.    ARIPIPRAZOLE, TRAZODONE & RISPERIDONE- CNS Depressants  CETIRIZINE and DIPHENHYDRAMINE may result in increased risk of CNS depression.      MANAGEMENT:  Monitoring for adverse effects     RISK STATEMENT for SAFETY     Carmen Mccoy Mohsen did not appear to be an imminent safety risk to self or  others.    TREATMENT RISK STATEMENT: The risks, benefits, alternatives and potential adverse effects have been discussed and are understood by the pt. The pt understands the risks of using street drugs or alcohol. There are no medical contraindications, the pt agrees to treatment with the ability to do so. The pt knows to call the clinic for any problems or to access emergency care if needed.  Medical and substance use concerns are documented above.  Psychotropic drug interaction check was done, including changes made today.    PROVIDER: Rose Soto MD    Patient staffed in the clinic with Dr Zamora who will sign the note. Supervisor is Dr Ritchie

## 2022-01-20 NOTE — PATIENT INSTRUCTIONS
It was nice seeing you today    Treatment Plan Today:     1) Medications-  - Decrease risperidone 2 mg at bedtime (weaning off)  - Increase Aripiprazole 10 mg daily (likely increase dose in future)  - Continue Trazodone  mg hs/prn (for insomnia)    2) Follow-up appt with Dr Soto 2/21/22 @ 2 pm    3) Crisis numbers are below and clinic after hours number is [phone]        **For crisis resources, please see the information at the end of this document**   Patient Education    Thank you for coming to the [facility] [place] [facility].    Lab Testing:  If you had lab testing today and your results are reassuring or normal they will be mailed to you or sent through [place] within 7 days. If the lab tests need quick action we will call you with the results. The phone number we will call with results is # [phone] (home) . If this is not the best number please call our clinic and change the number.    Medication Refills:  If you need any refills please call your pharmacy and they will contact us. Our fax number for refills is [phone]. Please allow three business for refill processing. If you need to  your refill at a new pharmacy, please contact the new pharmacy directly. The new pharmacy will help you get your medications transferred.     Scheduling:  If you have any concerns about today's visit or wish to schedule another appointment please call our office during normal business hours [phone] (8-5:00 M-F)    Contact Us:  Please call [phone] during business hours (8-5:00 M-F).  If after clinic hours, or on the weekend, please call  [phone].    Financial Assistance [phone]  [facility] Billing [phone]  Central Billing Office, [facility]: [phone]  [facility] Billing [phone]  Medical Records [phone]  [facility] Patient Bill of Rights [email or web address]       MENTAL  HEALTH CRISIS NUMBERS:  For a medical emergency please call  911 or go to the nearest ER.     [facility]:   [facility] -[phone]   Crisis Residence [facility] -[phone]   Walk-In Counseling Center [facility] -[phone]   COPE 24/7 [facility] Mobile Team -[phone] (adults)/[phone] (child)  CHILD: [facility] needs assessment team - [phone]      [facility]:   [facility] - [phone]   Walk-in counseling [facility] - [phone]   Walk-in counseling [facility] - [phone]   Crisis Residence [facility] - [phone]  [facility] Adult [facility]-[phone] mobile unit/ 24/7 crisis line    National Crisis Numbers:   National Suicide Prevention Lifeline: 7-631-543-TALK (983-973-9219)  Poison Control Center - 1-646.968.8124  [place] [facility]/resources for a list of additional resources (SOS)  Trans Lifeline a hotline for transgender people 1-601.912.7169  The [employer] Project a hotline for LGBT youth 1-224.378.4496  Crisis Text Line: For any crisis 24/7   To: 279936  see www.crisistextline.org  - IF MAKING A CALL FEELS TOO HARD, send a text!         Again thank you for choosing [facility] M[facility] HEALTH & ADDI[facility] and please let us know how we can best partner with you to improve you and your family's health.    You may be receiving a survey regarding this appointment. We would love to have your feedback, both positive and negative. The survey is done by an external company, so your answers are anonymous.

## 2022-02-21 ENCOUNTER — OFFICE VISIT (OUTPATIENT)
Dept: PSYCHIATRY | Facility: CLINIC | Age: 42
End: 2022-02-21
Attending: PSYCHIATRY & NEUROLOGY
Payer: COMMERCIAL

## 2022-02-21 VITALS
WEIGHT: 205 LBS | HEART RATE: 98 BPM | BODY MASS INDEX: 37.01 KG/M2 | DIASTOLIC BLOOD PRESSURE: 84 MMHG | SYSTOLIC BLOOD PRESSURE: 128 MMHG

## 2022-02-21 DIAGNOSIS — F25.1 SCHIZOAFFECTIVE DISORDER, DEPRESSIVE TYPE (H): Primary | ICD-10-CM

## 2022-02-21 PROCEDURE — G0463 HOSPITAL OUTPT CLINIC VISIT: HCPCS

## 2022-02-21 PROCEDURE — 99214 OFFICE O/P EST MOD 30 MIN: CPT | Mod: GC | Performed by: STUDENT IN AN ORGANIZED HEALTH CARE EDUCATION/TRAINING PROGRAM

## 2022-02-21 RX ORDER — RISPERIDONE 2 MG/1
2 TABLET ORAL AT BEDTIME
Qty: 30 TABLET | Refills: 1 | Status: SHIPPED | OUTPATIENT
Start: 2022-02-21 | End: 2022-04-21

## 2022-02-21 RX ORDER — TRAZODONE HYDROCHLORIDE 50 MG/1
50-100 TABLET, FILM COATED ORAL
Qty: 60 TABLET | Refills: 1 | Status: SHIPPED | OUTPATIENT
Start: 2022-02-21 | End: 2022-04-21

## 2022-02-21 RX ORDER — ARIPIPRAZOLE 15 MG/1
15 TABLET ORAL DAILY
Qty: 30 TABLET | Refills: 1 | Status: SHIPPED | OUTPATIENT
Start: 2022-02-21 | End: 2022-04-21

## 2022-02-21 ASSESSMENT — PAIN SCALES - GENERAL: PAINLEVEL: NO PAIN (0)

## 2022-02-21 NOTE — PROGRESS NOTES
[facility]  Psychiatry Clinic  PSYCHIATRY PROGRESS NOTE   CARE TEAM:    PCP- Khushi Cadena at [facility]  PCA - Marcelo (cousin)      Carmen Connolly is a 42 year old patient who prefers the name Carmen and uses  pronouns she, her, hers.         DIAGNOSIS   Schizoaffective Disorder, Depressive type, in partial remission with residual psychosis at baseline (negative symptoms, AH & VH).        ASSESSMENT   Carmen does report more improvement in her sleep, waking up less frequently and able to go back to sleep more easily. She continues to endorse AH and VH which are not as intense and her PCA says she appears less distressed. They would feel that the current antipsychotic medication has been helpful. She does not have any side effects. We have agreed to increase her dose of Abilify further to target the psychotic symptoms and would plan to continue tapering off Risperidone at subsequent visits.   She attends an adult day care 4 days a week and enjoys it.   She has an appointment scheduled with her PCP for Wednesday and plans to discuss her abnormal labs (lipids and HBA1c).  There were no safety concerns. We will see her again in the clinic in about 6 weeks. Emergency services and contacts were discussed, and numbers were given.    [facility]     PLAN                                                                                                                1) Meds-  - Increase Aripiprazole 15 mg daily   - Continue risperidone 2 mg at bedtime (weaning off)  - Continue Trazodone  mg hs/prn (for insomnia)    2) Psychotherapy- Continue with therapist who visits home twice weekly    3) Next due-  Labs- [facility] 7/22  EKG- as indicated  Rating scales- N/A    4) Referrals-  None    5) Dispo- RTC in 6 weeks       PERTINENT BACKGROUND                           [most recent eval 07/23/21]   Previous diagnoses include Schizoaffective disorder, depression,  "generalized anxiety disorder, and PTSD. Symptoms began after the passing of her  while they were refugees living in [facility]. She did not receive any psychiatric care until after she immigrated to the [place] in [place].    Psych pertinent item history includes psychosis [sxs include paranoia, responding to internal stimuli, negative symptoms] and trauma hx     SUBJECTIVE   Patient was seen in the clinic in the company of her Brother/PCA Ignacio. The interview was facilitated by a Russian  over the phone.    Carmen states she is doing good.  Her sleep has improved, now wakes up about 2 times in the night previously would wake up about 4 times, and when she does wake up she is able to go back to sleep.  In total she is getting about 6 to 8 hours at night.  She says the new medication has been helpful for her sleep.  Her mood varies, pretty much the same would say it is good.  Appetite is still bad, she has an appointment with her primary care provider on Wednesday  and plans to discuss this as well as the abnormal labs.  Energy is pretty much the same, varies, \"ok\"  She says she does not really do much and so cannot really speak about her concentration.  She enjoys spending time with her children when they come around.  She also attends an adult  4 days a week and loves going there.  Continues to endorse hearing voices all the time, however states that they are not clear she is not able to hear what they are saying just more like \"disturbing noises\".  Her PCA would note that previously she seemed to follow the voices whereas now she is able to ignore them.  She also endorses visual hallucinations, things like figures around and this makes her afraid to stay alone at home sometimes.  She does not endorse SI or HI.  She does not endorse any side effects from her medication.      RECENT PSYCH ROS:   Depression:  appetite changes  Elevated:  none  Psychosis:  auditory hallucinations and visual " hallucinations  Anxiety:  none  Trauma Related:  none  Sleep: yes  Other: yes    Adverse Effects: none reported  Pertinent Negative Symptoms: No suicidal and violent ideation  Recent Substance Use:     N/A     FAMILY and SOCIAL HISTORY                                 pt reported     Family Hx:  Unknown    Social Hx:  Financial/ Work- social security disability       Partner/ -  since ~2003  Children- yes, has 6 children (age 12 to 23). 3 are not living at home with her. 5 were from same partner, one from another partner.  Living situation- Lives at home with 3 of her youngest children.      Social/ Spiritual Support- family     Legal- none reported  FEELS SAFE AT HOME- yes          PSYCH and SUBSTANCE USE Critical Summary Points since July 2020 7/26/21- Increase in Risperidone  8/26/21 - Commence switching from Risperidone to Aripiprazole, Trazodone added for insomnia  10/4/21 -  Reduce Risperidone to 4 mg and increase Abilify to 5 mg   11/4/21- Reduce Risperidone to 4 mg and continue Abilify to 5 mg   12/02/21-  Increase in Trazodone to target insomnia  1/20/22- Increase in Abilify to 10 mg and Reduction of Risperidone to 2 mg  2/21/22- Increase in Abilify to 15 mg to target psychotic symptoms     MEDICAL HISTORY and ALLERGY     ALLERGIES: Patient has no known allergies.    Patient Active Problem List   Diagnosis     Essential hypertension     Gastroesophageal reflux disease without esophagitis     Shortened [place] interval     Vitamin D deficiency     Posttraumatic stress disorder     Female circumcision     Chronic tension-type headache, not intractable     Schizoaffective disorder, bipolar type (H)     Neurocognitive deficits     Arthritis     Extra digits     Immigrant with language difficulty     Pulmonary tuberculosis confirmed by sputum microscopy     Recurrent major depression (H)     Morbid obesity (H)     Insomnia due to medical condition     Counseling regarding advanced directives         MEDICAL REVIEW OF SYSTEMS   Contraception- unknown    A comprehensive review of systems was performed and is negative other than noted in the HPI.     MEDICATIONS     Current Outpatient Medications   Medication Sig Dispense Refill     acetaminophen (TYLENOL) 325 MG tablet Take 2 tablets (650 mg) by mouth every 4 hours as needed for mild pain 200 tablet 11     amLODIPine (NORVASC) 5 MG tablet Take 1 tablet (5 mg) by mouth daily 90 tablet 3     ARIPiprazole (ABILIFY) 5 MG tablet Take 2 tablets (10 mg) by mouth daily 60 tablet 1     cetirizine (ZYRTEC) 10 MG tablet Take 1 tablet (10 mg) by mouth daily 90 tablet 3     CVS MAGNESIUM OXIDE 250 MG tablet TAKE 2 TABLETS (500 MG) BY MOUTH DAILY 60 tablet 11     diclofenac (VOLTAREN) 1 % topical gel Apply 2 g topically 4 times daily 150 g 4     diphenhydrAMINE (BENADRYL) 25 MG tablet Take 1-2 tablets (25-50 mg) by mouth nightly as needed for itching or allergies 30 tablet 0     famotidine (PEPCID) 20 MG tablet TAKE 1 TABLET BY MOUTH TWICE A  tablet 1     fish oil-omega-3 fatty acids 1000 MG capsule TAKE 2,000 MG BY MOUTH DAILY 60 capsule 29     fluticasone (FLONASE) 50 MCG/ACT nasal spray Spray 2 sprays into both nostrils daily 16 mL 11     ibuprofen (ADVIL/MOTRIN) 400 MG tablet Take 1 tablet (400 mg) by mouth every 4 hours as needed for moderate pain 120 tablet 4     Incontinence Supply Disposable (DEPEND UNDERGARMENTS) MISC Use daily as needed 200 each 11     melatonin 3 MG tablet Take 2 tablets (6 mg) by mouth At Bedtime 60 tablet 1     multivitamin w/minerals (THERA-VIT-M) tablet Take 1 tablet by mouth daily 100 tablet 3     propranolol ER (INDERAL LA) 60 MG 24 hr capsule Take 1 capsule (60 mg) by mouth daily 90 capsule 3     risperiDONE (RISPERDAL) 2 MG tablet Take 1 tablet (2 mg) by mouth At Bedtime 30 tablet 1     traZODone (DESYREL) 50 MG tablet Take 1-2 tablets ( mg) by mouth nightly as needed for sleep 60 tablet 1     vitamin D3 (CHOLECALCIFEROL)  "50 mcg (2000 units) tablet Take 1 tablet (50 mcg) by mouth daily 90 tablet 3      VITALS   There were no vitals taken for this visit.    MENTAL STATUS EXAM     Alertness: alert   Appearance: casually groomed  Behavior/Demeanor: cooperative and passive, with good  eye contact   Speech: regular rate and rhythm  Language: intact  Psychomotor: normal or unremarkable  Mood: :ok...good\"  Affect: blunted; congruent to: mood- yes, content- yes  Thought Process/Associations: unremarkable  Thought Content:  Reports none;  Denies suicidal & violent ideation and delusions  Perception:  Reports auditory hallucinations and visual hallucinations;  Denies none  Insight: fair  Judgment: fair  Cognition: does  appear grossly intact; formal cognitive testing was not done  Gait and Station: unremarkable     LABS and DATA     PHQ9 TODAY = not done  PHQ 12/3/2018 9/3/2019 4/28/2020   PHQ-9 Total Score 10 6 11   Q9: Thoughts of better off dead/self-harm past 2 weeks Not at all Not at all Not at all       Recent Labs   Lab Test 07/28/21  1114 06/26/20  1042 02/14/17  1513   CR 0.71 0.6 1.0   GFRESTIMATED >90 >90 65.6     Recent Labs   Lab Test 07/28/21  1114 06/26/20  1042   * 154.5*   A1C 5.9* 5.8*     Recent Labs   Lab Test 07/28/21  1114 06/26/20  1042   CHOL 212* 221.8*   TRIG 154* 186.9*   * 141*   HDL 46* 43.1     Recent Labs   Lab Test 06/26/20  1042 01/31/16  1401   AST 23.8 31   ALT 28.8 36   ALKPHOS 88.5 119     Recent Labs   Lab Test 07/28/21  1114 12/03/18  1116 02/14/17  1513 04/11/16  1507 03/31/16  1534 01/31/16  1401   WBC 6.3  --   --  7.0  --  6.4   ANEU  --   --   --  3.0  --  2.4   HGB 12.8 11.8   < > 12.1   < > 14.0     --   --  282  --  301    < > = values in this interval not displayed.            PSYCHOTROPIC DRUG INTERACTIONS   ARIPIPRAZOLE, RISPERIDONE & AMLODIPINE- Blood Pressure Lowering Agents may enhance the hypotensive effect of Antipsychotic Agents (Second Generation [Atypical]). "   ARIPIPRAZOLE, RISPERIDONE & TRAZODONE- Serotonergic Agents (High Risk) may enhance the adverse/toxic effect of Antipsychotic Agents. Specifically, serotonergic agents may enhance dopamine blockade, possibly increasing the risk for neuroleptic malignant syndrome. Antipsychotic Agents may enhance the serotonergic effect of Serotonergic Agents (High Risk). This could result in serotonin syndrome.    ARIPIPRAZOLE, TRAZODONE & RISPERIDONE- CNS Depressants  CETIRIZINE and DIPHENHYDRAMINE may result in increased risk of CNS depression.      MANAGEMENT:  Monitoring for adverse effects     RISK STATEMENT for SAFETY     Carmen Awes Mohsen did not appear to be an imminent safety risk to self or others.    TREATMENT RISK STATEMENT: The risks, benefits, alternatives and potential adverse effects have been discussed and are understood by the pt. The pt understands the risks of using street drugs or alcohol. There are no medical contraindications, the pt agrees to treatment with the ability to do so. The pt knows to call the clinic for any problems or to access emergency care if needed.  Medical and substance use concerns are documented above.  Psychotropic drug interaction check was done, including changes made today.    PROVIDER: Rose Soto MD    Patient staffed in the clinic with Dr Phelan who will sign the note. Supervisor is Dr Ritchie

## 2022-02-21 NOTE — PATIENT INSTRUCTIONS
It was nice seeing you today    Treatment Plan Today:     1) Medications-  - Increase Aripiprazole 15 mg daily   - Continue risperidone 2 mg at bedtime (weaning off)  - Continue Trazodone  mg hs/prn (for insomnia)    2) Follow-up appt with Dr Soto 4/4/2022 @ 2pm    3) Crisis numbers are below and clinic after hours number is [phone]              **For crisis resources, please see the information at the end of this document**     Patient Education      Thank you for coming to the [facility] [place] [facility].    Lab Testing:  If you had lab testing today and your results are reassuring or normal they will be mailed to you or sent through [email or web address] within 7 days. If the lab tests need quick action we will call you with the results. The phone number we will call with results is # [phone] (home) . If this is not the best number please call our clinic and change the number.    Medication Refills:  If you need any refills please call your pharmacy and they will contact us. Our fax number for refills is [phone]. Please allow three business for refill processing. If you need to  your refill at a new pharmacy, please contact the new pharmacy directly. The new pharmacy will help you get your medications transferred.     Scheduling:  If you have any concerns about today's visit or wish to schedule another appointment please call our office during normal business hours [phone] (8-5:00 M-F)    Contact Us:  Please call [phone] during business hours (8-5:00 M-F).  If after clinic hours, or on the weekend, please call  [phone].    Financial Assistance [phone]  [facility] Billing [phone]  Central Billing Office, [facility]: [phone]  [facility] Billing [phone]  Medical Records [phone]  [facility] Patient Bill of Rights [email or web address]       MENTAL HEALTH CRISIS NUMBERS:  For  a medical emergency please call  911 or go to the nearest ER.     [facility] [facility]:   [facility] -[phone]   Crisis Residence [facility] -[phone]   Walk-In Counseling Center [facility] -[phone]   COPE 24/7 [place] Mobile Team -[phone] (adults)/[phone] (child)  CHILD: [facility] needs assessment team - [phone]      [facility]:   [facility] - [phone]   Walk-in counseling [facility] - [phone]   Walk-in counseling [facility] - [phone]   Crisis Residence [facility]idence - [phone]  Urgent Care Adult Mental [facility]-[phone] mobile unit/ 24/7 crisis line    National Crisis Numbers:   National Suicide Prevention Lifeline: 2-476-765-TALK (228-788-6684)  Poison Control Center - 4-235-890-0781  [employer]/resources for a list of additional resources (SOS)  Trans Lifeline a hotline for transgender people 1-272.500.3892  The [employer] Project a hotline for LGBT youth 1-472.229.3951  Crisis Text Line: For any crisis 24/7   To: 793801  see www.crisistextline.org  - IF MAKING A CALL FEELS TOO HARD, send a text!         Again thank you for choosing [facility] MENTAL HEALTH & ADDICTION [facility] and please let us know how we can best partner with you to improve you and your family's health.    You may be receiving a survey regarding this appointment. We would love to have your feedback, both positive and negative. The survey is done by an external company, so your answers are anonymous.

## 2022-02-23 ENCOUNTER — OFFICE VISIT (OUTPATIENT)
Dept: FAMILY MEDICINE | Facility: CLINIC | Age: 42
End: 2022-02-23
Payer: COMMERCIAL

## 2022-02-23 VITALS
SYSTOLIC BLOOD PRESSURE: 135 MMHG | TEMPERATURE: 97.9 F | WEIGHT: 203.3 LBS | HEIGHT: 62 IN | BODY MASS INDEX: 37.41 KG/M2 | DIASTOLIC BLOOD PRESSURE: 93 MMHG | RESPIRATION RATE: 16 BRPM | OXYGEN SATURATION: 97 % | HEART RATE: 100 BPM

## 2022-02-23 DIAGNOSIS — M54.50 ACUTE BILATERAL LOW BACK PAIN WITHOUT SCIATICA: ICD-10-CM

## 2022-02-23 DIAGNOSIS — K21.00 GASTROESOPHAGEAL REFLUX DISEASE WITH ESOPHAGITIS, UNSPECIFIED WHETHER HEMORRHAGE: ICD-10-CM

## 2022-02-23 DIAGNOSIS — I10 ESSENTIAL HYPERTENSION WITH GOAL BLOOD PRESSURE LESS THAN 130/85: Primary | ICD-10-CM

## 2022-02-23 DIAGNOSIS — E66.01 MORBID OBESITY (H): ICD-10-CM

## 2022-02-23 DIAGNOSIS — F25.1 SCHIZOAFFECTIVE DISORDER, DEPRESSIVE TYPE (H): ICD-10-CM

## 2022-02-23 DIAGNOSIS — G44.229 CHRONIC TENSION-TYPE HEADACHE, NOT INTRACTABLE: ICD-10-CM

## 2022-02-23 LAB
ALBUMIN SERPL-MCNC: 3.4 G/DL (ref 3.4–5)
ALP SERPL-CCNC: 104 U/L (ref 40–150)
ALT SERPL W P-5'-P-CCNC: 25 U/L (ref 0–50)
ANION GAP SERPL CALCULATED.3IONS-SCNC: 6 MMOL/L (ref 3–14)
AST SERPL W P-5'-P-CCNC: 26 U/L (ref 0–45)
BILIRUB SERPL-MCNC: 0.2 MG/DL (ref 0.2–1.3)
BUN SERPL-MCNC: 10 MG/DL (ref 7–30)
CALCIUM SERPL-MCNC: 9.7 MG/DL (ref 8.5–10.1)
CHLORIDE BLD-SCNC: 106 MMOL/L (ref 94–109)
CHOLEST SERPL-MCNC: 205 MG/DL
CO2 SERPL-SCNC: 24 MMOL/L (ref 20–32)
CREAT SERPL-MCNC: 0.8 MG/DL (ref 0.52–1.04)
ERYTHROCYTE [DISTWIDTH] IN BLOOD BY AUTOMATED COUNT: 13.6 % (ref 10–15)
FASTING STATUS PATIENT QL REPORTED: ABNORMAL
GFR SERPL CREATININE-BSD FRML MDRD: >90 ML/MIN/1.73M2
GLUCOSE BLD-MCNC: 112 MG/DL (ref 70–99)
HBA1C MFR BLD: 5.8 % (ref 0–5.6)
HCT VFR BLD AUTO: 43.2 % (ref 35–47)
HDLC SERPL-MCNC: 40 MG/DL
HGB BLD-MCNC: 13.7 G/DL (ref 11.7–15.7)
HOLD SPECIMEN: NORMAL
LDLC SERPL CALC-MCNC: 128 MG/DL
MCH RBC QN AUTO: 27.5 PG (ref 26.5–33)
MCHC RBC AUTO-ENTMCNC: 31.7 G/DL (ref 31.5–36.5)
MCV RBC AUTO: 87 FL (ref 78–100)
NONHDLC SERPL-MCNC: 165 MG/DL
PLATELET # BLD AUTO: 225 10E3/UL (ref 150–450)
POTASSIUM BLD-SCNC: 4.6 MMOL/L (ref 3.4–5.3)
PROT SERPL-MCNC: 9.1 G/DL (ref 6.8–8.8)
RBC # BLD AUTO: 4.99 10E6/UL (ref 3.8–5.2)
SODIUM SERPL-SCNC: 136 MMOL/L (ref 133–144)
TRIGL SERPL-MCNC: 184 MG/DL
WBC # BLD AUTO: 5.6 10E3/UL (ref 4–11)

## 2022-02-23 PROCEDURE — 99214 OFFICE O/P EST MOD 30 MIN: CPT | Performed by: FAMILY MEDICINE

## 2022-02-23 PROCEDURE — 80061 LIPID PANEL: CPT | Performed by: FAMILY MEDICINE

## 2022-02-23 PROCEDURE — 36415 COLL VENOUS BLD VENIPUNCTURE: CPT | Performed by: FAMILY MEDICINE

## 2022-02-23 PROCEDURE — 85027 COMPLETE CBC AUTOMATED: CPT | Performed by: FAMILY MEDICINE

## 2022-02-23 PROCEDURE — 80053 COMPREHEN METABOLIC PANEL: CPT | Performed by: FAMILY MEDICINE

## 2022-02-23 PROCEDURE — 83036 HEMOGLOBIN GLYCOSYLATED A1C: CPT | Performed by: FAMILY MEDICINE

## 2022-02-23 RX ORDER — PNV NO.95/FERROUS FUM/FOLIC AC 28MG-0.8MG
500 TABLET ORAL DAILY
Qty: 180 TABLET | Refills: 3 | Status: SHIPPED | OUTPATIENT
Start: 2022-02-23 | End: 2023-10-16

## 2022-02-23 RX ORDER — ACETAMINOPHEN 325 MG/1
650 TABLET ORAL EVERY 4 HOURS PRN
Qty: 200 TABLET | Refills: 11 | Status: SHIPPED | OUTPATIENT
Start: 2022-02-23 | End: 2022-11-14

## 2022-02-23 RX ORDER — AMLODIPINE BESYLATE 10 MG/1
10 TABLET ORAL DAILY
Qty: 90 TABLET | Refills: 3 | Status: SHIPPED | OUTPATIENT
Start: 2022-02-23 | End: 2023-04-05

## 2022-02-23 RX ORDER — FAMOTIDINE 20 MG/1
TABLET, FILM COATED ORAL
Qty: 180 TABLET | Refills: 3 | Status: SHIPPED | OUTPATIENT
Start: 2022-02-23 | End: 2023-04-05

## 2022-02-23 RX ORDER — CHLORAL HYDRATE 500 MG
2 CAPSULE ORAL DAILY
Qty: 180 CAPSULE | Refills: 3 | Status: SHIPPED | OUTPATIENT
Start: 2022-02-23 | End: 2022-09-28

## 2022-02-23 NOTE — NURSING NOTE
Due to patient being non-English speaking/uses sign language, an  was used for this visit. Only for face-to-face interpretation by an external agency, date and length of interpretation can be found on the scanned worksheet.     name: Renuka Shafer  Language: Czech  Agency: [employer]  Phone number: [phone]  Type of interpretation: telephone, spoken

## 2022-02-23 NOTE — PROGRESS NOTES
"  Assessment & Plan     Essential hypertension with goal blood pressure less than 130/85  Will increase amlodipine dosing today   Recommend reassessment in 1-2 months  Monitor salt in diet  - Lipid panel; Future  - Lipid panel  - amLODIPine (NORVASC) 10 MG tablet; Take 1 tablet (10 mg) by mouth daily  - fish oil-omega-3 fatty acids 1000 MG capsule; Take 2 capsules (2 g) by mouth daily    Chronic tension-type headache, not intractable  Refills today  - Magnesium Oxide (CVS MAGNESIUM OXIDE) 250 MG tablet; Take 2 tablets (500 mg) by mouth daily    Schizoaffective disorder, depressive type (H)  Will connect with psychiatrist, especially given how withdrawn she appears right now; seems to be having meds adjusted; does participate in adult , but just seems less engaged overall; denies thoughts of self-harm  - CBC with Platelets and Reflex to Iron Studies; Future  - Comprehensive metabolic panel; Future  - Hemoglobin A1c; Future  - CBC with Platelets and Reflex to Iron Studies  - Comprehensive metabolic panel  - Hemoglobin A1c    Morbid obesity (H)  On medications that can affect weight  Encouraging activity - [facility] the [facility]    Acute bilateral low back pain without sciatica  Stable, refills provided  - acetaminophen (TYLENOL) 325 MG tablet; Take 2 tablets (650 mg) by mouth every 4 hours as needed for mild pain  - diclofenac (VOLTAREN) 1 % topical gel; Apply 2 g topically 4 times daily    Gastroesophageal reflux disease with esophagitis, unspecified whether hemorrhage  Stable, refills provided  - famotidine (PEPCID) 20 MG tablet; TAKE 1 TABLET BY MOUTH TWICE A DAY    Diagnosis or treatment significantly limited by social determinants of health - language barrier  Ordering of each unique test  Prescription drug management         BMI:   Estimated body mass index is 37.18 kg/m  as calculated from the following:    Height as of this encounter: 1.575 m (5' 2\").    Weight as of this encounter: 92.2 kg (203 lb 4.8 " "oz).   Weight management plan: Discussed healthy diet and exercise guidelines   - consider adding metformin for weight mgmt (she does meet criteria for pre-diabetes)        Return in about 1 month (around 3/23/2022) for BP Recheck.    Khushi Cadena MD  [facility] NEHEMIAH Morales is a 42 year old who presents for the following health issues     Here for follow up on a few things:    Concerned about refills  - her cousin thinks she is missing a few things (fish oil, tylenol, maybe something else)  - did not bring bottles today, but he has worried that the pharmacy hasn't filled everything for her    Poor appetite  - eats once/day, no appetite  - not feeling hungry; family will call her to a meal, but she acts as if she is not interested  - still having some auditory hallucinations, cousin wonders if this is part of the issue  - not actively losing weight    Mood changing  - has been up and down  - seems \"disinterested\"  - recent visit with psychiatry (2 days ago) and abilify was increased at that time    BP, seems to be more elevated  Home /92, this is fairly regular  - no CP, SOB, vision changes          Review of Systems   Constitutional, HEENT, cardiovascular, pulmonary, gi and gu systems are negative, except as otherwise noted.      Objective    BP (!) 135/93   Pulse 100   Temp 97.9  F (36.6  C) (Oral)   Resp 16   Ht 1.575 m (5' 2\")   Wt 92.2 kg (203 lb 4.8 oz)   SpO2 97%   BMI 37.18 kg/m    Body mass index is 37.18 kg/m .  Physical Exam  Constitutional:       General: She is not in acute distress.     Appearance: Normal appearance.   HENT:      Head: Normocephalic and atraumatic.   Cardiovascular:      Rate and Rhythm: Normal rate and regular rhythm.      Heart sounds: Normal heart sounds.   Pulmonary:      Effort: Pulmonary effort is normal.      Breath sounds: Normal breath sounds. No wheezing.   Neurological:      General: No focal deficit present.      Mental " Status: She is alert and oriented to person, place, and time.      Gait: Gait normal.   Psychiatric:         Attention and Perception: Attention normal. She perceives auditory hallucinations.         Mood and Affect: Affect is flat.         Speech: Speech is delayed.         Behavior: Behavior is slowed.         Thought Content: Thought content is not delusional. Thought content does not include suicidal ideation.         Cognition and Memory: Cognition is impaired.         Judgment: Judgment normal.

## 2022-02-23 NOTE — LETTER
"March 6, 2022      Carmen Mccoy Mohsen  [address]    [place] [address]        Dear Carmen,    Thank you for getting your care at [facility], it's always boone to see you. Please see below for your test results. In general these look ok, but you do meet the criteria for \"pre-diabetes\" because of your slightly elevated blood sugar and A1c. Your cholesterol numbers are a little high, too (though I can't recall if you were truly fasting), nevertheless your overall cardiovascular risk (risk of stroke or heart attack in the next 10 years) is still quite low (about 5%). When you come in next, we should talk a little about a medicine called metformin which can help you a little with weight loss, while also helping optimize your blood sugars, too.    Resulted Orders   Lipid panel   Result Value Ref Range    Cholesterol 205 (H) <200 mg/dL    Triglycerides 184 (H) <150 mg/dL    Direct Measure HDL 40 (L) >=50 mg/dL    LDL Cholesterol Calculated 128 (H) <=100 mg/dL    Non HDL Cholesterol 165 (H) <130 mg/dL    Patient Fasting > 8hrs? Unknown     Narrative    Cholesterol  Desirable:  <200 mg/dL    Triglycerides  Normal:  Less than 150 mg/dL  Borderline High:  150-199 mg/dL  High:  200-499 mg/dL  Very High:  Greater than or equal to 500 mg/dL    Direct Measure HDL  Female:  Greater than or equal to 50 mg/dL   Male:  Greater than or equal to 40 mg/dL    LDL Cholesterol  Desirable:  <100mg/dL  Above Desirable:  100-129 mg/dL   Borderline High:  130-159 mg/dL   High:  160-189 mg/dL   Very High:  >= 190 mg/dL    Non HDL Cholesterol  Desirable:  130 mg/dL  Above Desirable:  130-159 mg/dL  Borderline High:  160-189 mg/dL  High:  190-219 mg/dL  Very High:  Greater than or equal to 220 mg/dL   Comprehensive metabolic panel   Result Value Ref Range    Sodium 136 133 - 144 mmol/L    Potassium 4.6 3.4 - 5.3 mmol/L    Chloride 106 94 - 109 mmol/L    Carbon Dioxide (CO2) 24 20 - 32 mmol/L    Anion Gap 6 3 - 14 mmol/L    " Urea Nitrogen 10 7 - 30 mg/dL    Creatinine 0.80 0.52 - 1.04 mg/dL    Calcium 9.7 8.5 - 10.1 mg/dL    Glucose 112 (H) 70 - 99 mg/dL    Alkaline Phosphatase 104 40 - 150 U/L    AST 26 0 - 45 U/L    ALT 25 0 - 50 U/L    Protein Total 9.1 (H) 6.8 - 8.8 g/dL    Albumin 3.4 3.4 - 5.0 g/dL    Bilirubin Total 0.2 0.2 - 1.3 mg/dL    GFR Estimate >90 >60 mL/min/1.73m2      Comment:      Effective December 21, 2021 eGFRcr in adults is calculated using the 2021 CKD-EPI creatinine equation which includes age and gender (Rojas et al., NE, DOI: 10.1056/FCALbo1857075)   Hemoglobin A1c   Result Value Ref Range    Hemoglobin A1C 5.8 (H) 0.0 - 5.6 %      Comment:      Normal <5.7%   Prediabetes 5.7-6.4%    Diabetes 6.5% or higher     Note: Adopted from ADA consensus guidelines.   CBC with Platelets and Reflex to Iron Studies   Result Value Ref Range    WBC Count 5.6 4.0 - 11.0 10e3/uL    RBC Count 4.99 3.80 - 5.20 10e6/uL    Hemoglobin 13.7 11.7 - 15.7 g/dL    Hematocrit 43.2 35.0 - 47.0 %    MCV 87 78 - 100 fL    MCH 27.5 26.5 - 33.0 pg    MCHC 31.7 31.5 - 36.5 g/dL    RDW 13.6 10.0 - 15.0 %    Platelet Count 225 150 - 450 10e3/uL   Extra Green Top (Lithium Heparin) Tube   Result Value Ref Range    Hold Specimen JIC        If you have any concerns about these results please call and leave a message for me or send a MyChart message to the clinic.    Sincerely,    Khushi Cadena MD

## 2022-03-08 ENCOUNTER — TELEPHONE (OUTPATIENT)
Dept: PSYCHIATRY | Facility: CLINIC | Age: 42
End: 2022-03-08
Payer: COMMERCIAL

## 2022-03-08 NOTE — TELEPHONE ENCOUNTER
"Called to check on patient as PCP reported she seemed quite flat at the last visit.  She was with her daughter who also interpreted.  She says she is doing well now, daughter also agreed.  Appetite remains poor, \"the same\". They would like to continue on current medications till their next visit in April.  Emergency services and  Contacts were discussed.  "

## 2022-03-22 DIAGNOSIS — F25.1 SCHIZOAFFECTIVE DISORDER, DEPRESSIVE TYPE (H): ICD-10-CM

## 2022-03-23 RX ORDER — ARIPIPRAZOLE 15 MG/1
15 TABLET ORAL DAILY
Qty: 30 TABLET | Refills: 1 | OUTPATIENT
Start: 2022-03-23

## 2022-03-23 RX ORDER — RISPERIDONE 2 MG/1
2 TABLET ORAL AT BEDTIME
Qty: 30 TABLET | Refills: 1 | OUTPATIENT
Start: 2022-03-23

## 2022-04-05 ENCOUNTER — TELEPHONE (OUTPATIENT)
Dept: PSYCHIATRY | Facility: CLINIC | Age: 42
End: 2022-04-05
Payer: COMMERCIAL

## 2022-04-05 NOTE — TELEPHONE ENCOUNTER
Placed a call to pharmacy at [facility]/PHARMACY #[id number] - [place], [place] - [address] and confirmed with staff that patient picked up medications and has refills on file ready for .  Pharmacy has been refillingTrazodone from previous prescription. Requested pharmacy to cancel previous refills and they agreed to do so.    Placed a call to patient and informed her that she should have no issues refilling her prescriptions and to call the clinic if she runs into any. Patient verbalized understanding. No further action required at this time.    CISCO Nuñez

## 2022-04-05 NOTE — TELEPHONE ENCOUNTER
"[facility] Call Center    Phone Message    May a detailed message be left on voicemail: yes     Reason for Call: Medication Refill Request    Has the patient contacted the pharmacy for the refill? Yes   Name of medication being requested: \"all medications\"  Provider who prescribed the medication: rojelio  Pharmacy:      [facility]/PHARMACY #[id number] [place], [place] - [address]    Date medication is needed: asap, pt is out of medication. They say they called the pharmacy and were told none of the medications could be refilled.      Action Taken: Message routed to:  Other: nursing pool    Travel Screening: Not Applicable                                                                        "

## 2022-04-21 ENCOUNTER — VIRTUAL VISIT (OUTPATIENT)
Dept: PSYCHIATRY | Facility: CLINIC | Age: 42
End: 2022-04-21
Attending: PSYCHIATRY & NEUROLOGY
Payer: COMMERCIAL

## 2022-04-21 DIAGNOSIS — F25.1 SCHIZOAFFECTIVE DISORDER, DEPRESSIVE TYPE (H): Primary | ICD-10-CM

## 2022-04-21 PROCEDURE — 99214 OFFICE O/P EST MOD 30 MIN: CPT | Mod: GC | Performed by: STUDENT IN AN ORGANIZED HEALTH CARE EDUCATION/TRAINING PROGRAM

## 2022-04-21 RX ORDER — TRAZODONE HYDROCHLORIDE 50 MG/1
50-100 TABLET, FILM COATED ORAL
Qty: 60 TABLET | Refills: 1 | Status: SHIPPED | OUTPATIENT
Start: 2022-04-21 | End: 2022-06-15

## 2022-04-21 RX ORDER — MIRTAZAPINE 7.5 MG/1
7.5 TABLET, FILM COATED ORAL AT BEDTIME
Qty: 30 TABLET | Refills: 1 | Status: SHIPPED | OUTPATIENT
Start: 2022-04-21 | End: 2022-06-23 | Stop reason: DRUGHIGH

## 2022-04-21 RX ORDER — ARIPIPRAZOLE 15 MG/1
15 TABLET ORAL DAILY
Qty: 30 TABLET | Refills: 1 | Status: SHIPPED | OUTPATIENT
Start: 2022-04-21 | End: 2022-06-23

## 2022-04-21 NOTE — PROGRESS NOTES
[facility]  Psychiatry Clinic  PSYCHIATRY PROGRESS NOTE   CARE TEAM:    PCP- Khushi Cadena at [facility]  PCA - Marcelo (cousin)      Carmen Connolly is a 42 year old patient who prefers the name Carmen and uses  pronouns she, her, hers.         DIAGNOSIS   Schizoaffective Disorder, Depressive type, in partial remission with residual psychosis at baseline (negative symptoms, AH & VH).        ASSESSMENT   Carmen reported some improvement in her sleep as well as less frequency of visual hallucinations.  Continues to have low mood, apathy, poor appetite and some interrupted sleep.  We have agreed today to wean off the risperidone and commenced mirtazapine to help with the mood, appetite and sleep.  We advised them to look out for signs of worsening symptoms in particular psychosis and reviewed side effects in particular serotonin syndrome, they should reach out if they have any concerns.  She continues to attend the day center regularly for support.  She is following up with her primary care provider for management of her metabolic syndrome.  There were no safety concerns.    We will see her again in the clinic in about 4 to 6 weeks.  Emergency services and contacts were discussed.    [facility] review was not needed today.     PLAN                                                                                                                1) Meds-  - Commence Mirtazapine 7.5 mg  - Reduce risperidone 1 mg at bedtime x 1 week then stop  - Continue Aripiprazole 15 mg daily   - Continue Trazodone  mg hs/prn (for insomnia)    2) Psychotherapy- Continue with therapist who visits home twice weekly    3) Next due-  Labs- [facility] 7/22  EKG- as indicated  Rating scales- N/A    4) Referrals-  None    5) Dispo- RTC in 6 weeks       PERTINENT BACKGROUND                           [most recent eval 07/23/21]   Previous diagnoses include Schizoaffective disorder, depression, generalized anxiety  disorder, and PTSD. Symptoms began after the passing of her  while they were refugees living in [facility]. She did not receive any psychiatric care until after she immigrated to the [place] in [place].    Psych pertinent item history includes psychosis [sxs include paranoia, responding to internal stimuli, negative symptoms] and trauma hx     SUBJECTIVE   Patient was seen in the clinic in the company of her Brother/PCA Ignacio. The interview was facilitated by a [place]  over the phone.    Carmen says she feels fine today.  Her mood has been varying over the past few weeks, overall it is better.  Her sleep is improved, but still not great, still waking up every 2-3 hours  Appetite is still very poor  Able to enjoy time with her family and the day center  Anxiety is not an issue  VH are still present, but less frequent  She does not have SI  She is compliant with her medications and does not have side effects.          RECENT PSYCH ROS:   Depression:  appetite changes  Elevated:  none  Psychosis:  auditory hallucinations and visual hallucinations  Anxiety:  none  Trauma Related:  none  Sleep: yes  Other: yes    Adverse Effects: none reported  Pertinent Negative Symptoms: No suicidal and violent ideation  Recent Substance Use:     N/A     FAMILY and SOCIAL HISTORY                                 pt reported     Family Hx:  Unknown    Social Hx:  Financial/ Work- social security disability       Partner/ -  since ~2003  Children- yes, has 6 children (age 12 to 23). 3 are not living at home with her. 5 were from same partner, one from another partner.  Living situation- Lives at home with 3 of her youngest children.      Social/ Spiritual Support- family     Legal- none reported  FEELS SAFE AT HOME- yes          PSYCH and SUBSTANCE USE Critical Summary Points since July 2020 7/26/21- Increase in Risperidone  8/26/21 - Commence switching from Risperidone to Aripiprazole, Trazodone added for  insomnia  10/4/21 -  Reduce Risperidone to 4 mg and increase Abilify to 5 mg   11/4/21- Reduce Risperidone to 4 mg and continue Abilify to 5 mg   12/02/21-  Increase in Trazodone to target insomnia  1/20/22- Increase in Abilify to 10 mg and Reduction of Risperidone to 2 mg  2/21/22- Increase in Abilify to 15 mg to target psychotic symptoms  4/21/22- Commence Mirtazapine 7.5 mg for depression, wean off Risperidone   MEDICAL HISTORY and ALLERGY     ALLERGIES: Patient has no known allergies.    Patient Active Problem List   Diagnosis     Essential hypertension     Gastroesophageal reflux disease without esophagitis     Shortened ME interval     Vitamin D deficiency     Posttraumatic stress disorder     Female circumcision     Chronic tension-type headache, not intractable     Schizoaffective disorder, bipolar type (H)     Neurocognitive deficits     Arthritis     Extra digits     Immigrant with language difficulty     Pulmonary tuberculosis confirmed by sputum microscopy     Recurrent major depression (H)     Morbid obesity (H)     Insomnia due to medical condition     Counseling regarding advanced directives        MEDICAL REVIEW OF SYSTEMS   Contraception- unknown    A comprehensive review of systems was performed and is negative other than noted in the HPI.     MEDICATIONS     Current Outpatient Medications   Medication Sig Dispense Refill     acetaminophen (TYLENOL) 325 MG tablet Take 2 tablets (650 mg) by mouth every 4 hours as needed for mild pain 200 tablet 11     amLODIPine (NORVASC) 10 MG tablet Take 1 tablet (10 mg) by mouth daily 90 tablet 3     ARIPiprazole (ABILIFY) 15 MG tablet Take 1 tablet (15 mg) by mouth daily 30 tablet 1     cetirizine (ZYRTEC) 10 MG tablet Take 1 tablet (10 mg) by mouth daily 90 tablet 3     diclofenac (VOLTAREN) 1 % topical gel Apply 2 g topically 4 times daily 150 g 4     diphenhydrAMINE (BENADRYL) 25 MG tablet Take 1-2 tablets (25-50 mg) by mouth nightly as needed for itching or  "allergies 30 tablet 0     famotidine (PEPCID) 20 MG tablet TAKE 1 TABLET BY MOUTH TWICE A  tablet 3     fish oil-omega-3 fatty acids 1000 MG capsule Take 2 capsules (2 g) by mouth daily 180 capsule 3     fluticasone (FLONASE) 50 MCG/ACT nasal spray Spray 2 sprays into both nostrils daily 16 mL 11     ibuprofen (ADVIL/MOTRIN) 400 MG tablet Take 1 tablet (400 mg) by mouth every 4 hours as needed for moderate pain 120 tablet 4     Incontinence Supply Disposable (DEPEND UNDERGARMENTS) MISC Use daily as needed 200 each 11     Magnesium Oxide ([employer] MAGNESIUM OXIDE) 250 MG tablet Take 2 tablets (500 mg) by mouth daily 180 tablet 3     melatonin 3 MG tablet Take 2 tablets (6 mg) by mouth At Bedtime 60 tablet 1     multivitamin w/minerals (THERA-VIT-M) tablet Take 1 tablet by mouth daily 100 tablet 3     propranolol ER (INDERAL LA) 60 MG 24 hr capsule Take 1 capsule (60 mg) by mouth daily 90 capsule 3     risperiDONE (RISPERDAL) 2 MG tablet Take 1 tablet (2 mg) by mouth At Bedtime 30 tablet 1     traZODone (DESYREL) 50 MG tablet Take 1-2 tablets ( mg) by mouth nightly as needed for sleep 60 tablet 1     vitamin D3 (CHOLECALCIFEROL) 50 mcg (2000 units) tablet Take 1 tablet (50 mcg) by mouth daily 90 tablet 3      VITALS   There were no vitals taken for this visit.    MENTAL STATUS EXAM     Alertness: alert   Appearance: casually groomed  Behavior/Demeanor: cooperative and passive, with good  eye contact   Speech: sparse  Language: intact  Psychomotor: normal or unremarkable  Mood: \"fine\"  Affect: blunted; congruent to: mood- yes, content- yes  Thought Process/Associations: unremarkable  Thought Content:  Reports none;  Denies suicidal & violent ideation and delusions  Perception:  Reports auditory hallucinations and visual hallucinations;  Denies none  Insight: fair  Judgment: fair  Cognition: does  appear grossly intact; formal cognitive testing was not done  Gait and Station: unremarkable     LABS and DATA "     PHQ9 TODAY = not done  PHQ 12/3/2018 9/3/2019 4/28/2020   PHQ-9 Total Score 10 6 11   Q9: Thoughts of better off dead/self-harm past 2 weeks Not at all Not at all Not at all       Recent Labs   Lab Test 02/23/22  1022 07/28/21  1114 06/26/20  1042   CR 0.80 0.71 0.6   GFRESTIMATED >90 >90 >90     Recent Labs   Lab Test 02/23/22  1023 02/23/22  1022 07/28/21  1114   GLC  --  112* 151*   A1C 5.8*  --  5.9*     Recent Labs   Lab Test 02/23/22 1022 07/28/21  1114   CHOL 205* 212*   TRIG 184* 154*   * 135*   HDL 40* 46*     Recent Labs   Lab Test 02/23/22 1022 06/26/20  1042   AST 26 23.8   ALT 25 28.8   ALKPHOS 104 88.5     Recent Labs   Lab Test 02/23/22 1022 07/28/21  1114 02/14/17  1513 04/11/16  1507 03/31/16  1534 01/31/16  1401   WBC 5.6 6.3  --  7.0  --  6.4   ANEU  --   --   --  3.0  --  2.4   HGB 13.7 12.8   < > 12.1   < > 14.0    214  --  282  --  301    < > = values in this interval not displayed.            PSYCHOTROPIC DRUG INTERACTIONS   ARIPIPRAZOLE, RISPERIDONE & AMLODIPINE- Blood Pressure Lowering Agents may enhance the hypotensive effect of Antipsychotic Agents (Second Generation [Atypical]).   ARIPIPRAZOLE, RISPERIDONE & TRAZODONE- Serotonergic Agents (High Risk) may enhance the adverse/toxic effect of Antipsychotic Agents. Specifically, serotonergic agents may enhance dopamine blockade, possibly increasing the risk for neuroleptic malignant syndrome. Antipsychotic Agents may enhance the serotonergic effect of Serotonergic Agents (High Risk). This could result in serotonin syndrome.    ARIPIPRAZOLE, TRAZODONE & RISPERIDONE- CNS Depressants  CETIRIZINE and DIPHENHYDRAMINE may result in increased risk of CNS depression.      MANAGEMENT:  Monitoring for adverse effects     RISK STATEMENT for SAFETY     Carmen Awes Tunas did not appear to be an imminent safety risk to self or others.    TREATMENT RISK STATEMENT: The risks, benefits, alternatives and potential adverse effects have been  discussed and are understood by the pt. The pt understands the risks of using street drugs or alcohol. There are no medical contraindications, the pt agrees to treatment with the ability to do so. The pt knows to call the clinic for any problems or to access emergency care if needed.  Medical and substance use concerns are documented above.  Psychotropic drug interaction check was done, including changes made today.    PROVIDER: Rose Soto MD    Patient staffed remotely with Dr Zamora who will sign the note. Supervisor is Dr Ritchie

## 2022-04-21 NOTE — PATIENT INSTRUCTIONS
It was nice seeing you today    Treatment Plan Today:     1) Medications-  - Commence Mirtazapine 7.5 mg  - Reduce risperidone 1 mg at bedtime x 1 week then stop  - Continue Aripiprazole 15 mg daily   - Continue Trazodone  mg hs/prn (for insomnia)    2) Follow-up appt with Dr Soto 4 weeks    3) Crisis numbers are below and clinic after hours number is [phone]              **For crisis resources, please see the information at the end of this document**     Patient Education      Thank you for coming to the [facility] [place] [facility].    Lab Testing:  If you had lab testing today and your results are reassuring or normal they will be mailed to you or sent through [employer] within 7 days. If the lab tests need quick action we will call you with the results. The phone number we will call with results is # [phone] (home) . If this is not the best number please call our clinic and change the number.    Medication Refills:  If you need any refills please call your pharmacy and they will contact us. Our fax number for refills is [phone]. Please allow three business for refill processing. If you need to  your refill at a new pharmacy, please contact the new pharmacy directly. The new pharmacy will help you get your medications transferred.     Scheduling:  If you have any concerns about today's visit or wish to schedule another appointment please call our office during normal business hours [phone] (8-5:00 M-F)    Contact Us:  Please call [phone] during business hours (8-5:00 M-F).  If after clinic hours, or on the weekend, please call  [phone].    Financial Assistance [phone]  [facility] Billing [phone]  Central Billing Office, [facility]: [phone]  [facility] Billing [phone]  Medical Records [phone]  [facility] Patient Bill of Rights [email or web address]       MENTAL  HEALTH CRISIS NUMBERS:  For a medical emergency please call  911 or go to the nearest ER.     [facility]:   [facility] -[phone]   Crisis Residence [facility] -[phone]   Walk-In Counseling Center [facility] -[phone]   COPE 24/7 [place] Mobile Team -[phone] (adults)/[phone] (child)  CHILD: [facility] needs assessment team - [phone]      [facility]:   [facility] - [phone]   Walk-in counseling [facility] - [phone]   Walk-in counseling [facility] - [phone]   Crisis Residence [facility] Residence - [phone]  Urgent Care Adult Mental [facility]-[phone] mobile unit/ 24/7 crisis line    National Crisis Numbers:   National Suicide Prevention Lifeline: 0-101-348-TALK (524-252-1915)  Poison Control Center - 1-444.616.4890  Charge Payment/resources for a list of additional resources (SOS)  Trans Lifeline a hotline for transgender people 1-227.518.8262  The [employer] Project a hotline for LGBT youth 1-438.979.1904  Crisis Text Line: For any crisis 24/7   To: 752452  see www.crisistextline.org  - IF MAKING A CALL FEELS TOO HARD, send a text!         Again thank you for choosing [facility] M[facility] & [facility]DI[facility] and please let us know how we can best partner with you to improve you and your family's health.    You may be receiving a survey regarding this appointment. We would love to have your feedback, both positive and negative. The survey is done by an external company, so your answers are anonymous.

## 2022-05-13 DIAGNOSIS — F25.1 SCHIZOAFFECTIVE DISORDER, DEPRESSIVE TYPE (H): ICD-10-CM

## 2022-05-13 RX ORDER — MIRTAZAPINE 7.5 MG/1
TABLET, FILM COATED ORAL
Qty: 30 TABLET | Refills: 1 | OUTPATIENT
Start: 2022-05-13

## 2022-05-29 DIAGNOSIS — F25.1 SCHIZOAFFECTIVE DISORDER, DEPRESSIVE TYPE (H): ICD-10-CM

## 2022-05-31 RX ORDER — ARIPIPRAZOLE 15 MG/1
TABLET ORAL
Qty: 30 TABLET | Refills: 1 | OUTPATIENT
Start: 2022-05-31

## 2022-06-09 ENCOUNTER — DOCUMENTATION ONLY (OUTPATIENT)
Dept: FAMILY MEDICINE | Facility: CLINIC | Age: 42
End: 2022-06-09
Payer: COMMERCIAL

## 2022-06-09 NOTE — PROGRESS NOTES
"Form has been completed by provider.     Form sent out via: Fax to [employer] at Fax Number: [phone]  Patient informed: n/a  Output date: June 9, 2022    Cass Jacinto MA      **Please close the encounter**    When opening a documentation only encounter, be sure to enter in \"Chief Complaint\" Forms and in \" Comments\" Title of form, description if needed.    Carmen is a 42 year old  female  Form received via: Fax  Form now resides in: Provider Ready    Cass Jacinto MA                  "

## 2022-06-13 DIAGNOSIS — F25.1 SCHIZOAFFECTIVE DISORDER, DEPRESSIVE TYPE (H): ICD-10-CM

## 2022-06-13 NOTE — TELEPHONE ENCOUNTER
[facility] Call Center    Phone Message    May a detailed message be left on voicemail: yes     Reason for Call: Medication Refill Request    Has the patient contacted the pharmacy for the refill? Yes     Name of medication being requested:   Risperidone 2mg & trazadone 50mg/    Provider who prescribed the medication: Brittany    Pharmacy:      [facility]/PHARMACY #[id number] - [place], [place] - [address]    Date medication is needed: ASAP - patient is out         Action Taken: Other: Nursing Pool    Travel Screening: Not Applicable

## 2022-06-15 RX ORDER — TRAZODONE HYDROCHLORIDE 50 MG/1
50-100 TABLET, FILM COATED ORAL
Qty: 60 TABLET | Refills: 0 | Status: SHIPPED | OUTPATIENT
Start: 2022-06-15 | End: 2022-06-23

## 2022-06-15 NOTE — TELEPHONE ENCOUNTER
Last Seen 4/21    RTC 6 weeks  Cancel 5/26   No-Show None    Next Appt 6/22    Incoming Refill From patient via telephone    Medication Requested   traZODone (DESYREL) 50 MG tablet    Directions   Take 1-2 tablets ( mg) by mouth nightly as needed for sleep     Qty 60    Last Refill Approx. 5/21      Patient also requesting a refill on their Risperidone but per last office visit note:- Reduce risperidone 1 mg at bedtime x 1 week then stop.    Medication pended and routed to provider for approval.

## 2022-06-17 DIAGNOSIS — M54.50 ACUTE BILATERAL LOW BACK PAIN WITHOUT SCIATICA: ICD-10-CM

## 2022-06-23 ENCOUNTER — OFFICE VISIT (OUTPATIENT)
Dept: PSYCHIATRY | Facility: CLINIC | Age: 42
End: 2022-06-23
Attending: PSYCHIATRY & NEUROLOGY
Payer: COMMERCIAL

## 2022-06-23 VITALS — SYSTOLIC BLOOD PRESSURE: 141 MMHG | WEIGHT: 204.2 LBS | BODY MASS INDEX: 37.35 KG/M2 | DIASTOLIC BLOOD PRESSURE: 95 MMHG

## 2022-06-23 DIAGNOSIS — F25.1 SCHIZOAFFECTIVE DISORDER, DEPRESSIVE TYPE (H): Primary | ICD-10-CM

## 2022-06-23 PROCEDURE — 99214 OFFICE O/P EST MOD 30 MIN: CPT | Mod: HN | Performed by: STUDENT IN AN ORGANIZED HEALTH CARE EDUCATION/TRAINING PROGRAM

## 2022-06-23 PROCEDURE — G0463 HOSPITAL OUTPT CLINIC VISIT: HCPCS

## 2022-06-23 RX ORDER — MIRTAZAPINE 15 MG/1
15 TABLET, FILM COATED ORAL AT BEDTIME
Qty: 30 TABLET | Refills: 1 | Status: SHIPPED | OUTPATIENT
Start: 2022-06-23 | End: 2022-08-05

## 2022-06-23 RX ORDER — ARIPIPRAZOLE 15 MG/1
15 TABLET ORAL DAILY
Qty: 30 TABLET | Refills: 1 | Status: SHIPPED | OUTPATIENT
Start: 2022-06-23 | End: 2022-08-05

## 2022-06-23 RX ORDER — TRAZODONE HYDROCHLORIDE 50 MG/1
50-100 TABLET, FILM COATED ORAL
Qty: 60 TABLET | Refills: 0 | Status: SHIPPED | OUTPATIENT
Start: 2022-06-23 | End: 2022-08-05

## 2022-06-23 ASSESSMENT — PAIN SCALES - GENERAL: PAINLEVEL: NO PAIN (0)

## 2022-06-23 NOTE — PROGRESS NOTES
[facility]  Psychiatry Clinic  PSYCHIATRY PROGRESS NOTE   CARE TEAM:    PCP- Khushi Cadena at [facility]  PCA - Marcelo (cousin)      Carmen Connolly is a 42 year old patient who prefers the name Carmen and uses  pronouns she, her, hers.         DIAGNOSIS   Schizoaffective Disorder, Depressive type, in partial remission with residual psychosis at baseline (negative symptoms, AH & VH).        ASSESSMENT   Carmen reported some improvement in her sleep, waking up less frequently. Her appetite however remains poor. She has weaned off the Risperidone without any worsening of psychotic symptoms. We agreed that the poor appetite was still the main concern and so we will be increasing the dose of her Mirtazapine further today. She is tolerating the medications without any side effects.   She continues to follow up with her PCP for management of her metabolic syndrome.  There were no safety concerns. She will make an appointment to meet with the new Resident Dr Camacho in 1 month. Emergency services and contacts were extensively discussed.    Future considerations:  - Could consider further titration in [facility] to target psychotic symptoms    [place] review was not needed today.     PLAN                                                                                                                1) Meds-  - Increase Mirtazapine 15 mg  - Continue Aripiprazole 15 mg daily   - Continue Trazodone  mg hs/prn (for insomnia)    2) Psychotherapy- Continue with therapist who visits home twice weekly    3) Next due-  Labs- [facility] 7/22  EKG- as indicated  Rating scales- N/A    4) Referrals-  None    5) Dispo- RTC in 4 weeks with Dr Camacho       PERTINENT BACKGROUND                           [most recent eval 07/23/21]   Previous diagnoses include Schizoaffective disorder, depression, generalized anxiety disorder, and PTSD. Symptoms began after the passing of her  while they were refugees  living in [facility]. She did not receive any psychiatric care until after she immigrated to the [place] in [place].    Psych pertinent item history includes psychosis [sxs include paranoia, responding to internal stimuli, negative symptoms] and trauma hx     SUBJECTIVE   Patient was seen in the clinic in the company of her Brother/PCA Ignacio. The interview was facilitated by a [facility]  over the phone.    There has been some improvement since the last visit.  Her sleep is better, waking up about 2 times in the night as compared to 3-4 times previously  Mood is ok  Appetite is still very poor  Energy is the same  She still hears the voices, the frequency and intensity is unchanged  She does not have SI or HI  She has weaned off Risperidone, she did not experience any worsening of her symptoms. No side effects from current medications      RECENT PSYCH ROS:   Depression:  appetite changes  Elevated:  none  Psychosis:  auditory hallucinations and visual hallucinations  Anxiety:  none  Trauma Related:  none  Sleep: yes  Other: yes    Adverse Effects: none reported  Pertinent Negative Symptoms: No suicidal and violent ideation  Recent Substance Use:     N/A     FAMILY and SOCIAL HISTORY                                 pt reported     Family Hx:  Unknown    Social Hx:  Financial/ Work- social security disability       Partner/ -  since ~2003  Children- yes, has 6 children (age 12 to 23). 3 are not living at home with her. 5 were from same partner, one from another partner.  Living situation- Lives at home with 3 of her youngest children.      Social/ Spiritual Support- family     Legal- none reported  FEELS SAFE AT HOME- yes          PSYCH and SUBSTANCE USE Critical Summary Points since July 2020 7/26/21- Increase in Risperidone  8/26/21 - Commence switching from Risperidone to Aripiprazole, Trazodone added for insomnia  10/4/21 -  Reduce Risperidone to 4 mg and increase Abilify to 5 mg   11/4/21-  Reduce Risperidone to 4 mg and continue Abilify to 5 mg   12/02/21-  Increase in Trazodone to target insomnia  1/20/22- Increase in Abilify to 10 mg and Reduction of Risperidone to 2 mg  2/21/22- Increase in Abilify to 15 mg to target psychotic symptoms  4/21/22- Commence Mirtazapine 7.5 mg for depression, wean off Risperidone  6/23/22- Increase Mirtazapine to 15 mg qhs   MEDICAL HISTORY and ALLERGY     ALLERGIES: Patient has no known allergies.    Patient Active Problem List   Diagnosis     Essential hypertension     Gastroesophageal reflux disease without esophagitis     Shortened MO interval     Vitamin D deficiency     Posttraumatic stress disorder     Female circumcision     Chronic tension-type headache, not intractable     Schizoaffective disorder, bipolar type (H)     Neurocognitive deficits     Arthritis     Extra digits     Immigrant with language difficulty     Pulmonary tuberculosis confirmed by sputum microscopy     Recurrent major depression (H)     Morbid obesity (H)     Insomnia due to medical condition     Counseling regarding advanced directives        MEDICAL REVIEW OF SYSTEMS   Contraception- unknown    A comprehensive review of systems was performed and is negative other than noted in the HPI.     MEDICATIONS     Current Outpatient Medications   Medication Sig Dispense Refill     acetaminophen (TYLENOL) 325 MG tablet Take 2 tablets (650 mg) by mouth every 4 hours as needed for mild pain 200 tablet 11     amLODIPine (NORVASC) 10 MG tablet Take 1 tablet (10 mg) by mouth daily 90 tablet 3     ARIPiprazole (ABILIFY) 15 MG tablet Take 1 tablet (15 mg) by mouth daily 30 tablet 1     cetirizine (ZYRTEC) 10 MG tablet Take 1 tablet (10 mg) by mouth daily 90 tablet 3     diclofenac (VOLTAREN) 1 % topical gel Apply 2 g topically 4 times daily 150 g 4     diphenhydrAMINE (BENADRYL) 25 MG tablet Take 1-2 tablets (25-50 mg) by mouth nightly as needed for itching or allergies 30 tablet 0     famotidine  "(PEPCID) 20 MG tablet TAKE 1 TABLET BY MOUTH TWICE A  tablet 3     fish oil-omega-3 fatty acids 1000 MG capsule Take 2 capsules (2 g) by mouth daily 180 capsule 3     fluticasone (FLONASE) 50 MCG/ACT nasal spray Spray 2 sprays into both nostrils daily 16 mL 11     ibuprofen (ADVIL/MOTRIN) 400 MG tablet Take 1 tablet (400 mg) by mouth every 4 hours as needed for moderate pain 120 tablet 4     Incontinence Supply Disposable (DEPEND UNDERGARMENTS) MISC Use daily as needed 200 each 11     Magnesium Oxide ([employer] MAGNESIUM OXIDE) 250 MG tablet Take 2 tablets (500 mg) by mouth daily 180 tablet 3     melatonin 3 MG tablet Take 2 tablets (6 mg) by mouth At Bedtime 60 tablet 1     mirtazapine (REMERON) 7.5 MG tablet Take 1 tablet (7.5 mg) by mouth At Bedtime 30 tablet 1     multivitamin w/minerals (THERA-VIT-M) tablet Take 1 tablet by mouth daily 100 tablet 3     propranolol ER (INDERAL LA) 60 MG 24 hr capsule Take 1 capsule (60 mg) by mouth daily 90 capsule 3     traZODone (DESYREL) 50 MG tablet Take 1-2 tablets ( mg) by mouth nightly as needed for sleep 60 tablet 0     vitamin D3 (CHOLECALCIFEROL) 50 mcg (2000 units) tablet Take 1 tablet (50 mcg) by mouth daily 90 tablet 3      VITALS   BP (!) 141/95 (BP Location: Left arm, Patient Position: Sitting, Cuff Size: Adult Regular)   Wt 92.6 kg (204 lb 3.2 oz)   BMI 37.35 kg/m      MENTAL STATUS EXAM     Alertness: alert   Appearance: casually groomed  Behavior/Demeanor: cooperative and passive, with good  eye contact   Speech: sparse  Language: intact  Psychomotor: normal or unremarkable  Mood: \"ok\"  Affect: blunted; congruent to: mood- yes, content- yes  Thought Process/Associations: unremarkable  Thought Content:  Reports none;  Denies suicidal & violent ideation and delusions  Perception:  Reports auditory hallucinations and visual hallucinations;  Denies none  Insight: fair  Judgment: fair  Cognition: does  appear grossly intact; formal cognitive testing was not " done  Gait and Station: unremarkable     LABS and DATA     PHQ9 TODAY = not done  PHQ 12/3/2018 9/3/2019 4/28/2020   PHQ-9 Total Score 10 6 11   Q9: Thoughts of better off dead/self-harm past 2 weeks Not at all Not at all Not at all       Recent Labs   Lab Test 02/23/22  1022 07/28/21  1114 06/26/20  1042   CR 0.80 0.71 0.6   GFRESTIMATED >90 >90 >90     Recent Labs   Lab Test 02/23/22  1023 02/23/22  1022 07/28/21  1114   GLC  --  112* 151*   A1C 5.8*  --  5.9*     Recent Labs   Lab Test 02/23/22  1022 07/28/21  1114   CHOL 205* 212*   TRIG 184* 154*   * 135*   HDL 40* 46*     Recent Labs   Lab Test 02/23/22  1022 06/26/20  1042   AST 26 23.8   ALT 25 28.8   ALKPHOS 104 88.5     Recent Labs   Lab Test 02/23/22  1022 07/28/21  1114 02/14/17  1513 04/11/16  1507 03/31/16  1534 01/31/16  1401   WBC 5.6 6.3  --  7.0  --  6.4   ANEU  --   --   --  3.0  --  2.4   HGB 13.7 12.8   < > 12.1   < > 14.0    214  --  282  --  301    < > = values in this interval not displayed.            PSYCHOTROPIC DRUG INTERACTIONS   ARIPIPRAZOLE, RISPERIDONE & AMLODIPINE- Blood Pressure Lowering Agents may enhance the hypotensive effect of Antipsychotic Agents (Second Generation [Atypical]).   ARIPIPRAZOLE, RISPERIDONE & TRAZODONE- Serotonergic Agents (High Risk) may enhance the adverse/toxic effect of Antipsychotic Agents. Specifically, serotonergic agents may enhance dopamine blockade, possibly increasing the risk for neuroleptic malignant syndrome. Antipsychotic Agents may enhance the serotonergic effect of Serotonergic Agents (High Risk). This could result in serotonin syndrome.    ARIPIPRAZOLE, TRAZODONE & RISPERIDONE- CNS Depressants  CETIRIZINE and DIPHENHYDRAMINE may result in increased risk of CNS depression.      MANAGEMENT:  Monitoring for adverse effects     RISK STATEMENT for SAFETY     Carmen Awes [place] did not appear to be an imminent safety risk to self or others.    TREATMENT RISK STATEMENT: The risks, benefits,  alternatives and potential adverse effects have been discussed and are understood by the pt. The pt understands the risks of using street drugs or alcohol. There are no medical contraindications, the pt agrees to treatment with the ability to do so. The pt knows to call the clinic for any problems or to access emergency care if needed.  Medical and substance use concerns are documented above.  Psychotropic drug interaction check was done, including changes made today.    PROVIDER: Rose Soto MD    Patient not staffed in clinic.  Note will be reviewed and signed by supervisor Dr. Zamora.

## 2022-06-23 NOTE — PATIENT INSTRUCTIONS
It was nice seeing you today    Treatment Plan Today:     1) Medications-  - Increase Mirtazapine 15 mg  - Continue Aripiprazole 15 mg daily   - Continue Trazodone  mg hs/prn (for insomnia)    2) Follow-up appt with Dr Camacho in 1 month    3) Crisis numbers are below and clinic after hours number is [phone]      **For crisis resources, please see the information at the end of this document**     Patient Education      Thank you for coming to the [facility] [place] [facility].    Lab Testing:  If you had lab testing today and your results are reassuring or normal they will be mailed to you or sent through [employer] within 7 days. If the lab tests need quick action we will call you with the results. The phone number we will call with results is # [phone] (home) . If this is not the best number please call our clinic and change the number.    Medication Refills:  If you need any refills please call your pharmacy and they will contact us. Our fax number for refills is [phone]. Please allow three business for refill processing. If you need to  your refill at a new pharmacy, please contact the new pharmacy directly. The new pharmacy will help you get your medications transferred.     Scheduling:  If you have any concerns about today's visit or wish to schedule another appointment please call our office during normal business hours [phone] (8-5:00 M-F)    Contact Us:  Please call [phone] during business hours (8-5:00 M-F).  If after clinic hours, or on the weekend, please call  [phone].    Financial Assistance [phone]  [facility] Billing [phone]  Central Billing Office, [facility]: [phone]  [facility] Billing [phone]  Medical Records [phone]  [facility] Patient Bill of Rights [email or web address]       MENTAL HEALTH CRISIS NUMBERS:  For a medical emergency please call   061 or go to the nearest ER.     [facility]:   [facility] -[phone]   Crisis Residence [facility] -[phone]   Walk-In Counseling Center [facility] -[phone]   COPE 24/7 [place] Mobile Team -[phone] (adults)/[phone] (child)  CHILD: [facility] needs assessment team - [phone]      [facility]:   [facility] - [phone]   Walk-in counseling [facility] - [phone]   Walk-in counseling [facility] - [phone]   Crisis Residence [facility] - [phone]  Urgent Care Adult Mental [facility]-[phone] mobile unit/ 24/7 crisis line    National Crisis Numbers:   National Suicide Prevention Lifeline: 5-018-339-TALK (234-746-1439)  Poison Control Center - 1-358.278.9875  [employer]/resources for a list of additional resources (SOS)  Trans Lifeline a hotline for transgender people 6-277-787-6094  The [employer] Project a hotline for LGBT youth 1-822.439.4946  Crisis Text Line: For any crisis 24/7   To: 104990  see www.crisistextline.org  - IF MAKING A CALL FEELS TOO HARD, send a text!         Again thank you for choosing [facility] [place] [facility] and please let us know how we can best partner with you to improve you and your family's health.    You may be receiving a survey regarding this appointment. We would love to have your feedback, both positive and negative. The survey is done by an external company, so your answers are anonymous.

## 2022-06-26 RX ORDER — CHOLECALCIFEROL (VITAMIN D3) 50 MCG
CAPSULE ORAL
Qty: 30 CAPSULE | Refills: 11 | Status: SHIPPED | OUTPATIENT
Start: 2022-06-26

## 2022-07-17 DIAGNOSIS — F25.1 SCHIZOAFFECTIVE DISORDER, DEPRESSIVE TYPE (H): ICD-10-CM

## 2022-07-19 RX ORDER — ARIPIPRAZOLE 15 MG/1
TABLET ORAL
Qty: 30 TABLET | Refills: 1 | OUTPATIENT
Start: 2022-07-19

## 2022-07-22 ENCOUNTER — TELEPHONE (OUTPATIENT)
Dept: PSYCHIATRY | Facility: CLINIC | Age: 42
End: 2022-07-22

## 2022-07-22 NOTE — TELEPHONE ENCOUNTER
[facility] Call Center    Phone Message    May a detailed message be left on voicemail: yes     Reason for Call: Medication Refill Request      Has the patient contacted the pharmacy for the refill? Yes     Name of medication being requested: all of them    Provider who prescribed the medication: Brittany (tx to Doug)    Pharmacy:      [facility]/PH[facility]ACY #[id number] - [place], [place] - [address]    Date medication is needed: ASAP    Patient called using her cousin Norris Pisano as her .  She asked to cancel her 7/22 appt and she rescheduled for 8/5.  She needs her refills sent to the pharmacy as she will run out before her 8/5 appt.      Action Taken: Other: nursing pool    Travel Screening: Not Applicable

## 2022-07-25 DIAGNOSIS — F25.1 SCHIZOAFFECTIVE DISORDER, DEPRESSIVE TYPE (H): ICD-10-CM

## 2022-07-26 RX ORDER — RISPERIDONE 2 MG/1
8 TABLET ORAL AT BEDTIME
Qty: 120 TABLET | Refills: 1 | OUTPATIENT
Start: 2022-07-26

## 2022-08-03 NOTE — PROGRESS NOTES
[facility]  Psychiatry Clinic  TRANSFER of CARE DIAGNOSTIC ASSESSMENT     Carmen Connolly is a 42 year old patient who prefers the name Carmen and uses  pronouns she, her, hers.          DIAGNOSIS   Schizoaffective Disorder, Depressive type, in partial remission with residual psychosis at baseline (negative symptoms, AH & VH).         ASSESSMENT   Carmen reported worsening of her sleep since risperidone was discontinued.  Upon further discussion, it became clear that she was actually awakening to auditory hallucinations that were telling her to get out of bed and leave the house.  It appears these became worse shortly after risperidone was completely discontinued.    There are some features of PTSD present, but not enough to make a diagnosis.    Cousin today expressed some concern about Carmen having a poor appetite, but it appears her weight has been stable.  While she continues to have negative symptoms likely from her diagnosis of schizoaffective disorder, there is no clear evidence of a depressive episode.  She enjoys going to her day program.  Does not report low mood.     Future considerations:  - Likely will be cross titrating back to risperidone monotherapy. She and cousin were not enthusiastic about switching to a different antipsychotic.  - Hopefully can eliminate mirtazapine once risperidone is back in place to support metabolic health.  -Perhaps a selective serotonin reuptake inhibitor might be helpful with trauma symptom cluster     MNPMP review was not needed today.      PLAN                                                                                                                 1) Meds-  - Continue Mirtazapine 15 mg  - Continue Aripiprazole 15 mg daily   - Continue Trazodone  mg hs/prn (for insomnia)  - resume risperidone 1 mg qhs     2) Psychotherapy- Continue with therapist who visits home twice weekly     3) Next due-  Labs- SGA 2/23  EKG- as  indicated  Rating scales- N/A     4) Referrals-  None     5) Dispo- 2 weeks         PERTINENT BACKGROUND                           [most recent eval 8/5/22]   Previous diagnoses include Schizoaffective disorder, depression, generalized anxiety disorder, and PTSD. Symptoms began after the passing of her  while they were refugees living in [facility]. She did not receive any psychiatric care until after she immig[place] to the [place] in [place].     Psych pertinent item history includes psychosis [sxs include paranoia, responding to internal stimuli, negative symptoms] and trauma hx       SUBJECTIVE     -Since coming off of risperidone, she has had more nighttime awakenings to auditory and visual hallucinations.  -These awakenings interrupted sleep, and happen several times in the middle of the night.  -The auditory hallucinations she does have her of people telling her to get out of the house.  No command auditory hallucinations.  -She does not do much during the day.  She enjoys being around her kids, and at her day program.  She goes to her day program 4 days a week.  -She requires prompting to take care of ADLs, and her cousin worries that she does not eat enough.    Recent Social History: see below    Recent Psych Symptoms:   Depression:  low energy and poor concentration /memory  Elevated:  none  Psychosis:  auditory hallucinations, visual hallucinations, disorganized behavior and negative symptoms (avolition, affective flattening, anhedonia, alogia, apathy, See HPI)  Anxiety:  None  Trauma Related:  fear, nightmares and startle response      Recent Substance Use:     -alcohol: No   -cannabis: No   -tobacco: No  -caffeine:  No   -opioids: No   Narcan Kit currrent: No   -other: none    Pertinent Negatives: No suicidal or violent ideation, self-injury, shine, aggression and harmful substance use  Adverse Effects: None reported       FAMILY and SOCIAL HISTORY                                 pt reported     Family Hx:   Unknown     Social Hx:  Financial/ Work- social security disability       Partner/ -  since ~2003  Children- yes, has 6 children (age 12 to 23). 3 are not living at home with her. 5 were from same partner, one from another partner.  Living situation- Lives at home with 3 of her youngest children.      Social/ Spiritual Support- family     Legal- none reported  FEELS SAFE AT HOME- yes   Trauma History (self-report)- yes  Early History/Education- Refugee from [place] during [place]. Was a refugee in [facility] for several years before immigrating to the [place] in [place]     PAST PSYCHIATRIC HISTORY     SIB- no  Suicidal Ideation Hx- no  Suicide Attempt- #- none, most recent- N/A    Violence/Aggression Hx- no  Psychosis Hx- yes  Eating Disorder Hx- no     Psych Hosp- #- no, most recent- N/A   Commitment- no  ECT- no  Outpatient Programs - yes, individual outpatient therapist     PAST MED TRIALS     Medication  Dose   (mg) Effect  Dates of Use   risperidone 2-7 mg Helped with sleep ?-4/22   venlafaxine 225 mg Helpful for mood 2014 - ?   temazepam 15 mg Helpful for sleep 2016 - 2018   citalopram         nortriptyline 10 mg HS   2016 - 2017   Hydroxyzine 25-50 mg    2016 - 2018   Abilify 15 mg  8/21-present   Mirtazapine 15 mg  4/21-present                 PAST SUBSTANCE USE HISTORY     Past Use-   None reported  Treatment- #, most recent- no  Medical Consequences- no  HIV/Hepatitis- no  Legal Consequences- no     MEDICAL HISTORY and ALLERGY     ALLERGIES: Patient has no known allergies.    Patient Active Problem List   Diagnosis     Essential hypertension     Gastroesophageal reflux disease without esophagitis     Shortened ND interval     Vitamin D deficiency     Posttraumatic stress disorder     Female circumcision     Chronic tension-type headache, not intractable     Schizoaffective disorder, bipolar type (H)     Neurocognitive deficits     Arthritis     Extra digits     Immigrant with language  difficulty     Pulmonary tuberculosis confirmed by sputum microscopy     Recurrent major depression (H)     Morbid obesity (H)     Insomnia due to medical condition     Counseling regarding advanced directives        MEDICAL REVIEW OF SYSTEMS       none in addition to that documented above     MEDICATIONS     Current Outpatient Medications   Medication Sig Dispense Refill     acetaminophen (TYLENOL) 325 MG tablet Take 2 tablets (650 mg) by mouth every 4 hours as needed for mild pain 200 tablet 11     amLODIPine (NORVASC) 10 MG tablet Take 1 tablet (10 mg) by mouth daily 90 tablet 3     ARIPiprazole (ABILIFY) 15 MG tablet Take 1 tablet (15 mg) by mouth daily 30 tablet 1     cetirizine (ZYRTEC) 10 MG tablet Take 1 tablet (10 mg) by mouth daily 90 tablet 3     [employer] D3 50 MCG (2000 UT) CAPS TAKE 1 CAPSULE BY MOUTH EVERY DAY 30 capsule 11     diclofenac (VOLTAREN) 1 % topical gel Apply 2 g topically 4 times daily 150 g 4     diphenhydrAMINE (BENADRYL) 25 MG tablet Take 1-2 tablets (25-50 mg) by mouth nightly as needed for itching or allergies 30 tablet 0     famotidine (PEPCID) 20 MG tablet TAKE 1 TABLET BY MOUTH TWICE A  tablet 3     fish oil-omega-3 fatty acids 1000 MG capsule Take 2 capsules (2 g) by mouth daily 180 capsule 3     fluticasone (FLONASE) 50 MCG/ACT nasal spray Spray 2 sprays into both nostrils daily 16 mL 11     ibuprofen (ADVIL/MOTRIN) 400 MG tablet Take 1 tablet (400 mg) by mouth every 4 hours as needed for moderate pain 120 tablet 4     Incontinence Supply Disposable (DEPEND UNDERGARMENTS) MISC Use daily as needed 200 each 11     Magnesium Oxide ([employer]S MAGNESIUM OXIDE) 250 MG tablet Take 2 tablets (500 mg) by mouth daily 180 tablet 3     melatonin 3 MG tablet Take 2 tablets (6 mg) by mouth At Bedtime 60 tablet 1     mirtazapine (REMERON) 15 MG tablet Take 1 tablet (15 mg) by mouth At Bedtime 30 tablet 1     multivitamin w/minerals (THERA-VIT-M) tablet Take 1 tablet by mouth daily 100 tablet 3      propranolol ER (INDERAL LA) 60 MG 24 hr capsule Take 1 capsule (60 mg) by mouth daily 90 capsule 3     traZODone (DESYREL) 50 MG tablet Take 1-2 tablets ( mg) by mouth nightly as needed for sleep 60 tablet 0     vitamin D3 (CHOLECALCIFEROL) 50 mcg (2000 units) tablet Take 1 tablet (50 mcg) by mouth daily 90 tablet 3      VITALS   There were no vitals taken for this visit.    MENTAL STATUS EXAM     Alertness: alert  and oriented  Appearance: adequately groomed  Behavior/Demeanor: passive, with no eye contact, suspect culturally appropriate  Speech: Poverty of speech  Language: [place] speaking, prefers to let cousin speak for her  Psychomotor: normal or unremarkable  Mood: did not respond  Affect: flat  Thought Process/Associations: unremarkable  Thought Content:  Reports none;  Denies suicidal ideation and violent ideation  Perception:  Reports auditory hallucinations and visual hallucinations;  Denies none  Insight: limited  Judgment: adequate for safety  Cognition: does  appear grossly intact; formal cognitive testing was not done  Gait and Station: unremarkable     LABS and DATA     PHQ 12/3/2018 9/3/2019 4/28/2020   PHQ-9 Total Score 10 6 11   Q9: Thoughts of better off dead/self-harm past 2 weeks Not at all Not at all Not at all       Recent Labs   Lab Test 02/23/22  1022 07/28/21  1114   CR 0.80 0.71   GFRESTIMATED >90 >90     Recent Labs   Lab Test 02/23/22  1022 06/26/20  1042   AST 26 23.8   ALT 25 28.8   ALKPHOS 104 88.5       ECG 7/26/21 QTc = 413ms     PSYCHOTROPIC DRUG INTERACTIONS                                                       PSYCHCLINICDDI   ARIPIPRAZOLE, propranolol & AMLODIPINE- Blood Pressure Lowering Agents may enhance the hypotensive effect of Antipsychotic Agents (Second Generation [Atypical]).   ARIPIPRAZOLE, MIRTAZAPINE, & TRAZODONE- Serotonergic Agents (High Risk) may enhance the adverse/toxic effect of Antipsychotic Agents. Specifically, serotonergic agents may enhance dopamine  blockade, possibly increasing the risk for neuroleptic malignant syndrome. Antipsychotic Agents may enhance the serotonergic effect of Serotonergic Agents (High Risk). This could result in serotonin syndrome.    CETIRIZINE, MIRTAZAPINE, RISPERIDONE and DIPHENHYDRAMINE may result in increased risk of CNS depression.      MANAGEMENT:  Monitoring for adverse effects     RISK STATEMENT for SAFETY     Carmen did not appear to be an imminent safety risk to self or others.    TREATMENT RISK STATEMENT: The risks, benefits, alternatives and potential adverse effects have been discussed and are understood by the pt. The pt understands the risks of using street drugs or alcohol. There are no medical contraindications, the pt agrees to treatment with the ability to do so. The pt knows to call the clinic for any problems or to access emergency care if needed.  Medical and substance use concerns are documented above.  Psychotropic drug interaction check was done, including changes made today.     PROVIDER: Roger Camacho MD    Patient staffed in clinic with Dr. Helton who will sign the note.  Supervisor is Dr. Dangelo.

## 2022-08-05 ENCOUNTER — OFFICE VISIT (OUTPATIENT)
Dept: PSYCHIATRY | Facility: CLINIC | Age: 42
End: 2022-08-05
Attending: PSYCHIATRY & NEUROLOGY
Payer: COMMERCIAL

## 2022-08-05 VITALS
HEART RATE: 96 BPM | DIASTOLIC BLOOD PRESSURE: 95 MMHG | SYSTOLIC BLOOD PRESSURE: 148 MMHG | BODY MASS INDEX: 36.43 KG/M2 | WEIGHT: 199.2 LBS

## 2022-08-05 DIAGNOSIS — F25.1 SCHIZOAFFECTIVE DISORDER, DEPRESSIVE TYPE (H): Primary | ICD-10-CM

## 2022-08-05 DIAGNOSIS — F43.10 POSTTRAUMATIC STRESS DISORDER: ICD-10-CM

## 2022-08-05 PROCEDURE — G0463 HOSPITAL OUTPT CLINIC VISIT: HCPCS

## 2022-08-05 PROCEDURE — 90792 PSYCH DIAG EVAL W/MED SRVCS: CPT | Mod: GC | Performed by: STUDENT IN AN ORGANIZED HEALTH CARE EDUCATION/TRAINING PROGRAM

## 2022-08-05 RX ORDER — RISPERIDONE 1 MG/1
1 TABLET ORAL AT BEDTIME
Qty: 30 TABLET | Refills: 0 | Status: SHIPPED | OUTPATIENT
Start: 2022-08-05 | End: 2022-09-02

## 2022-08-05 RX ORDER — MIRTAZAPINE 15 MG/1
15 TABLET, FILM COATED ORAL AT BEDTIME
Qty: 30 TABLET | Refills: 1 | Status: SHIPPED | OUTPATIENT
Start: 2022-08-05 | End: 2022-09-02

## 2022-08-05 RX ORDER — TRAZODONE HYDROCHLORIDE 50 MG/1
50-100 TABLET, FILM COATED ORAL
Qty: 60 TABLET | Refills: 0 | Status: SHIPPED | OUTPATIENT
Start: 2022-08-05 | End: 2022-09-02

## 2022-08-05 RX ORDER — ARIPIPRAZOLE 15 MG/1
15 TABLET ORAL DAILY
Qty: 30 TABLET | Refills: 0 | Status: SHIPPED | OUTPATIENT
Start: 2022-08-05 | End: 2022-09-02

## 2022-08-05 ASSESSMENT — PAIN SCALES - GENERAL: PAINLEVEL: NO PAIN (0)

## 2022-08-05 NOTE — NURSING NOTE
Chief Complaint   Patient presents with     RECHECK     Schizoaffective disorder, depressive type       
Declined

## 2022-08-05 NOTE — PATIENT INSTRUCTIONS
It was nice meeting you today:  Start taking 1 mg of risperidone at bedtime  Continue taking all your other medications as prescribed  See you in 2 weeks to make further adjustments    **For crisis resources, please see the information at the end of this document**   Patient Education    Thank you for coming to the [facility] [place] [facility].     Lab Testing:  If you had lab testing today and your results are reassuring or normal they will be mailed to you or sent through [employer] within 7 days. If the lab tests need quick action we will call you with the results. The phone number we will call with results is # [phone]. If this is not the best number please call our clinic and change the number.     Medication Refills:  If you need any refills please call your pharmacy and they will contact us. Our fax number for refills is [phone].   Three business days of notice are needed for general medication refill requests.   Five business days of notice are needed for controlled substance refill requests.   If you need to change to a different pharmacy, please contact the new pharmacy directly. The new pharmacy will help you get your medications transferred.     Contact Us:  Please call [phone] during business hours (8-5:00 M-F).   If you have medication related questions after clinic hours, or on the weekend, please call [phone].     Financial Assistance [phone]   Medical Records [phone]       MENTAL HEALTH CRISIS RESOURCES:  For a emergency help, please call 911 or go to the nearest Emergency Department.     Emergency Walk-In Options:   EmPATH Unit @ [facility] ([place]): [phone] - Specialized mental health emergency area designed to be calming  [facility] ([place]): [phone]  [facility] ([place]): [phone]  [facility]): [phone]    [facility] Crisis Information:    [place]: [phone]  [place]: [phone]  [place] ([place]) - Adult: [phone]     Child: [phone]  [place] - Adult: [phone]     Child: [phone]  [place]: [phone]  List of all [facility] resources:   https://[place].gov/dhs/people-we-serve/adults/health-care/mental-health/resources/crisis-contacts.jsp    National Crisis Information:   Crisis Text Line: Text  [place]  to 337141  Suicide & Crisis Lifeline: 988  National Suicide Prevention Lifeline: 2-888-921-TALK (1-274.680.8788)       For online chat options, visit https://suicidepreventionlifeline.org/chat/  Poison Control Center: 3-523-412-7933  Trans Lifeline: 1-684.220.4056 - Hotline for transgender people of all ages  The [employer]: 3-232-570-1960 - Hotline for LGBT youth     For Non-Emergency Support:   Fast Tracker: Mental Health & Substance Use Disorder Resources -   https://www.[employer].org/

## 2022-08-25 DIAGNOSIS — F25.1 SCHIZOAFFECTIVE DISORDER, DEPRESSIVE TYPE (H): ICD-10-CM

## 2022-08-26 ENCOUNTER — TELEPHONE (OUTPATIENT)
Dept: PSYCHIATRY | Facility: CLINIC | Age: 42
End: 2022-08-26

## 2022-08-26 NOTE — TELEPHONE ENCOUNTER
[facility] Call Center    Phone Message    May a detailed message be left on voicemail: yes     Reason for Call: Medication Refill Request    Has the patient contacted the pharmacy for the refill? Yes   Name of medication being requested: All meds  Provider who prescribed the medication:   Pharmacy: [facility] on file  Date medication is needed: ASAP. Pt is out.          Action Taken: Other: [facility] [place] acquired by [employer] pool    Travel Screening: Not Applicable

## 2022-08-26 NOTE — TELEPHONE ENCOUNTER
Writer returned a phone call to patient along with her son.  Pharmacy shows the 4 medications prescribed by Dr. Camacho on 8/5/22 were picked up.  Patient reports she picked up trazodone and mirtazapine, but not the abilify and risperidone.      Per Pharmacy:      Trazodone, risperidone and abilify were picked up on 8/11, 30 day supply with no refill    Mirtazapine 30 day supply was picked up on 8/18    Patient's son will bring her to the pharmacy to determine who picked up the medication.  Writer asked if it is possible that another family member picked up the medication - son states that the only other person who does that is the patient's cousin - but he doesn't think that the cousin picked them up.    Patient / son will call the clinic back after they visit the pharmacy.

## 2022-08-29 RX ORDER — RISPERIDONE 2 MG/1
8 TABLET ORAL AT BEDTIME
Qty: 120 TABLET | Refills: 1 | OUTPATIENT
Start: 2022-08-29

## 2022-08-29 NOTE — TELEPHONE ENCOUNTER
Writer LVPATRICIA for patient asking they call the clinic.  Were they able to solve the medication issue?

## 2022-08-31 NOTE — PROGRESS NOTES
[facility]  Psychiatry Clinic  MEDICAL PROGRESS NOTE     Carmen Connolly is a 42 year old patient who prefers the name Carmen and uses  pronouns she, her, hers.          DIAGNOSIS   Schizoaffective Disorder, Depressive type, in partial remission with residual psychosis at baseline (negative symptoms, AH & VH).         ASSESSMENT   Carmen reported continued poor sleep. This is due to auditory hallucinations of shouting and nightmares about her time in a refugee [place]. It does appear that PTSD symptoms are closely linked to psychotic symptoms. 1mg of risperidone was not helpful, but she and cousin would like to go back to 2 mg.  To better target the PTSD symptoms, we will also start prazosin 1 mg.  Her blood pressure is on the high end today, so less concern for orthostatic hypotension, but reviewed warning signs, and encouraged her to drink lots of water and sit down if she becomes dizzy.  Due to lack of efficacy, we will also decrease aripiprazole to 10 mg today.  Its long half-life means that she will have risperidone on board before Abilify level starts to drop appreciably.     We discussed her concern that risperidone would lead to the same metabolic side effects that have been before, however she was on a dose up to 7 mg daily previously, and it is unclear if she continued to have significant metabolic side effects at a lower dose.  She and cousin were not interested in exploring alternative antipsychotics.    Cousin today expressed some concern about Carmen having a poor appetite, but it appears her weight has slightly increased.  While she continues to have negative symptoms likely from her diagnosis of schizoaffective disorder, there is no clear evidence of a depressive episode.  She enjoys going to her day program.  Does not report low mood.     Future considerations:  - Likely will be cross titrating back to risperidone monotherapy. She and cousin were not enthusiastic about  switching to a different antipsychotic.  - Hopefully can eliminate mirtazapine once risperidone is back in place to support metabolic health.  -Perhaps a selective serotonin reuptake inhibitor might be helpful with trauma symptom cluster. She was on effexor for a long time 4205-6992, but the only other serotonergic medication in her chart is a short period on citalopram before that.     [place]NP[place] review was not needed today.      PLAN                                                                                                                 1) Meds-  - Continue Mirtazapine 15 mg  - Decrease Aripiprazole to 10 mg daily   - Continue Trazodone  mg hs/prn (for insomnia)  - increase risperidone to 2 mg qhs   - start prazosin 1 mg qhs    2) Psychotherapy- Continue with therapist who visits home twice weekly     3) Next due-  Labs- [facility] 2/23  EKG- as indicated  Rating scales- N/A     4) Referrals-  None     5) Dispo- 2 weeks         PERTINENT BACKGROUND                           [most recent eval 8/5/22]   Previous diagnoses include Schizoaffective disorder, depression, generalized anxiety disorder, and PTSD. Symptoms began after the passing of her  while they were refugees living in [facility]. She did not receive any psychiatric care until after she immigrated to the [place] in [place].     Psych pertinent item history includes psychosis [sxs include paranoia, responding to internal stimuli, negative symptoms] and trauma hx       SUBJECTIVE   Carmen and her cousin were interviewed with the aid of a [place]  on the phone.  -Cousin reports doing a has had more difficulties with sleeping and eating.  -She continues to have frequent nighttime awakenings, needs to be redirected back to bed.  -Carmen reports that she feels scared in the night, and hears screaming which awakens her.  She also experiences nightmares of things that happened when she was in the refugee camp.  -She is frequently fearful, and will seek  reassurance when she sees people outside her home whom she assumes are threatening.  Sometimes she has visual hallucinations of people as well.  -Usually gets 6 hours of sleep in an interrupted fashion.  -Continues to go to day program, and seems to do really well there.  -She has no appetite outside of her time at the day program, and family feels that they have to prompt her to eat.    Recent Social History: see below    Recent Psych Symptoms:   Depression:  low energy and poor concentration /memory  Elevated:  none  Psychosis:  auditory hallucinations, visual hallucinations, disorganized behavior and negative symptoms (avolition, affective flattening, anhedonia, alogia, apathy, See HPI)  Anxiety:  None  Trauma Related:  fear, nightmares, avoidance, trauma trigger psychological / physiological response, detached, startle response and hypervigilance      Recent Substance Use:     -alcohol: No   -cannabis: No   -tobacco: No  -caffeine:  No   -opioids: No   Narcan Kit currrent: No   -other: none    Pertinent Negatives: No suicidal or violent ideation, self-injury, shine, aggression and harmful substance use  Adverse Effects: None reported       PAST MED TRIALS     Medication  Dose   (mg) Effect  Dates of Use   risperidone 2-7 mg Helped with sleep ?-4/22   venlafaxine 225 mg Helpful for mood 2014 - ?   temazepam 15 mg Helpful for sleep 2016 - 2018   citalopram         nortriptyline 10 mg HS   2016 - 2017   Hydroxyzine 25-50 mg    2016 - 2018   Abilify 15 mg  8/21-present   Mirtazapine 15 mg  4/21-present                PSYCH and SUBSTANCE USE Critical Summary Points since July 2022 8/5/22: Transfer of care diagnostic assessment.  Increased auditory hallucinations, family strongly wants to return to risperidone, so 1 mg of risperidone was started, Abilify continued.  9/2/22: Continued auditory hallucinations.  Significant PTSD symptoms in the form of hypervigilance, nightmares.  Increased risperidone from 1 mg to 2  mg, decreased Abilify from 15 mg to 10 mg, and started 1 mg of prazosin at bedtime.       MEDICAL HISTORY and ALLERGY     ALLERGIES: Patient has no known allergies.    Patient Active Problem List   Diagnosis     Essential hypertension     Gastroesophageal reflux disease without esophagitis     Shortened MA interval     Vitamin D deficiency     Posttraumatic stress disorder     Female circumcision     Chronic tension-type headache, not intractable     Schizoaffective disorder, bipolar type (H)     Neurocognitive deficits     Arthritis     Extra digits     Immigrant with language difficulty     Pulmonary tuberculosis confirmed by sputum microscopy     Recurrent major depression (H)     Morbid obesity (H)     Insomnia due to medical condition     Counseling regarding advanced directives        MEDICAL REVIEW OF SYSTEMS       none in addition to that documented above     MEDICATIONS     Current Outpatient Medications   Medication Sig Dispense Refill     acetaminophen (TYLENOL) 325 MG tablet Take 2 tablets (650 mg) by mouth every 4 hours as needed for mild pain 200 tablet 11     amLODIPine (NORVASC) 10 MG tablet Take 1 tablet (10 mg) by mouth daily 90 tablet 3     ARIPiprazole (ABILIFY) 15 MG tablet Take 1 tablet (15 mg) by mouth daily 30 tablet 0     cetirizine (ZYRTEC) 10 MG tablet Take 1 tablet (10 mg) by mouth daily 90 tablet 3     [employer] D3 50 MCG (2000 UT) CAPS TAKE 1 CAPSULE BY MOUTH EVERY DAY 30 capsule 11     diclofenac (VOLTAREN) 1 % topical gel Apply 2 g topically 4 times daily 150 g 4     diphenhydrAMINE (BENADRYL) 25 MG tablet Take 1-2 tablets (25-50 mg) by mouth nightly as needed for itching or allergies 30 tablet 0     famotidine (PEPCID) 20 MG tablet TAKE 1 TABLET BY MOUTH TWICE A  tablet 3     fish oil-omega-3 fatty acids 1000 MG capsule Take 2 capsules (2 g) by mouth daily 180 capsule 3     fluticasone (FLONASE) 50 MCG/ACT nasal spray Spray 2 sprays into both nostrils daily 16 mL 11     ibuprofen  (ADVIL/MOTRIN) 400 MG tablet Take 1 tablet (400 mg) by mouth every 4 hours as needed for moderate pain 120 tablet 4     Incontinence Supply Disposable (DEPEND UNDERGARMENTS) MISC Use daily as needed 200 each 11     Magnesium Oxide ([employer] MAGNESIUM OXIDE) 250 MG tablet Take 2 tablets (500 mg) by mouth daily 180 tablet 3     melatonin 3 MG tablet Take 2 tablets (6 mg) by mouth At Bedtime 60 tablet 1     mirtazapine (REMERON) 15 MG tablet Take 1 tablet (15 mg) by mouth At Bedtime 30 tablet 1     multivitamin w/minerals (THERA-VIT-M) tablet Take 1 tablet by mouth daily 100 tablet 3     propranolol ER (INDERAL LA) 60 MG 24 hr capsule Take 1 capsule (60 mg) by mouth daily 90 capsule 3     risperiDONE (RISPERDAL) 1 MG tablet Take 1 tablet (1 mg) by mouth At Bedtime 30 tablet 0     traZODone (DESYREL) 50 MG tablet Take 1-2 tablets ( mg) by mouth nightly as needed for sleep 60 tablet 0     vitamin D3 (CHOLECALCIFEROL) 50 mcg (2000 units) tablet Take 1 tablet (50 mcg) by mouth daily 90 tablet 3      VITALS   There were no vitals taken for this visit.    MENTAL STATUS EXAM     Alertness: alert  and oriented  Appearance: adequately groomed  Behavior/Demeanor: passive, with no eye contact, suspect culturally appropriate  Speech: Poverty of speech  Language: [place] speaking, prefers to let cousin speak for her  Psychomotor: normal or unremarkable  Mood: anxious  Affect: flat  Thought Process/Associations: unremarkable  Thought Content:  Reports none;  Denies suicidal ideation and violent ideation  Perception:  Reports auditory hallucinations and visual hallucinations;  Denies none  Insight: limited  Judgment: adequate for safety  Cognition: does  appear grossly intact; formal cognitive testing was not done  Gait and Station: unremarkable     LABS and DATA     PHQ 12/3/2018 9/3/2019 4/28/2020   PHQ-9 Total Score 10 6 11   Q9: Thoughts of better off dead/self-harm past 2 weeks Not at all Not at all Not at all       Recent Labs    Lab Test 02/23/22  1022 07/28/21  1114   CR 0.80 0.71   GFRESTIMATED >90 >90     Recent Labs   Lab Test 02/23/22  1022 06/26/20  1042   AST 26 23.8   ALT 25 28.8   ALKPHOS 104 88.5       ECG 7/26/21 QTc = 413ms     PSYCHOTROPIC DRUG INTERACTIONS                                                       PSYCHCLINICDDI   ARIPIPRAZOLE, RISPERIDONE, PRAZOSIN, propranolol & AMLODIPINE- Blood Pressure Lowering Agents may enhance the hypotensive effect of Antipsychotic Agents (Second Generation [Atypical]).   ARIPIPRAZOLE, MIRTAZAPINE, & TRAZODONE- Serotonergic Agents (High Risk) may enhance the adverse/toxic effect of Antipsychotic Agents. Specifically, serotonergic agents may enhance dopamine blockade, possibly increasing the risk for neuroleptic malignant syndrome. Antipsychotic Agents may enhance the serotonergic effect of Serotonergic Agents (High Risk). This could result in serotonin syndrome.    CETIRIZINE, MIRTAZAPINE, RISPERIDONE and DIPHENHYDRAMINE may result in increased risk of CNS depression.      MANAGEMENT:  Monitoring for adverse effects     RISK STATEMENT for SAFETY     Carmen did not appear to be an imminent safety risk to self or others.    TREATMENT RISK STATEMENT: The risks, benefits, alternatives and potential adverse effects have been discussed and are understood by the pt. The pt understands the risks of using street drugs or alcohol. There are no medical contraindications, the pt agrees to treatment with the ability to do so. The pt knows to call the clinic for any problems or to access emergency care if needed.  Medical and substance use concerns are documented above.  Psychotropic drug interaction check was done, including changes made today.     PROVIDER: Roger Camacho MD    Patient staffed in clinic with Dr. Helton who will sign the note.  Supervisor is Dr. Dangelo.       MEDICAL DECISION MAKING        (SmartPhrase .[id number])     - At least 2 stable chronic problems    - Engaged in  prescription drug management during visit (discussed any medication benefits, side effects, alternatives, etc.)

## 2022-09-02 ENCOUNTER — OFFICE VISIT (OUTPATIENT)
Dept: PSYCHIATRY | Facility: CLINIC | Age: 42
End: 2022-09-02
Attending: PSYCHIATRY & NEUROLOGY
Payer: COMMERCIAL

## 2022-09-02 VITALS
SYSTOLIC BLOOD PRESSURE: 142 MMHG | DIASTOLIC BLOOD PRESSURE: 96 MMHG | WEIGHT: 203 LBS | HEART RATE: 77 BPM | BODY MASS INDEX: 37.13 KG/M2

## 2022-09-02 DIAGNOSIS — F25.1 SCHIZOAFFECTIVE DISORDER, DEPRESSIVE TYPE (H): ICD-10-CM

## 2022-09-02 DIAGNOSIS — F43.10 POSTTRAUMATIC STRESS DISORDER: Primary | ICD-10-CM

## 2022-09-02 PROCEDURE — 99214 OFFICE O/P EST MOD 30 MIN: CPT | Mod: GC | Performed by: STUDENT IN AN ORGANIZED HEALTH CARE EDUCATION/TRAINING PROGRAM

## 2022-09-02 RX ORDER — TRAZODONE HYDROCHLORIDE 50 MG/1
50-100 TABLET, FILM COATED ORAL
Qty: 60 TABLET | Refills: 0 | Status: SHIPPED | OUTPATIENT
Start: 2022-09-02 | End: 2022-09-18

## 2022-09-02 RX ORDER — ARIPIPRAZOLE 10 MG/1
10 TABLET ORAL DAILY
Qty: 30 TABLET | Refills: 0 | Status: SHIPPED | OUTPATIENT
Start: 2022-09-02 | End: 2022-09-18

## 2022-09-02 RX ORDER — MIRTAZAPINE 15 MG/1
15 TABLET, FILM COATED ORAL AT BEDTIME
Qty: 30 TABLET | Refills: 1 | Status: SHIPPED | OUTPATIENT
Start: 2022-09-02 | End: 2022-09-18

## 2022-09-02 RX ORDER — RISPERIDONE 2 MG/1
2 TABLET ORAL AT BEDTIME
Qty: 30 TABLET | Refills: 0 | Status: SHIPPED | OUTPATIENT
Start: 2022-09-02 | End: 2022-09-18

## 2022-09-02 RX ORDER — PRAZOSIN HYDROCHLORIDE 1 MG/1
1 CAPSULE ORAL AT BEDTIME
Qty: 30 CAPSULE | Refills: 0 | Status: SHIPPED | OUTPATIENT
Start: 2022-09-02 | End: 2022-09-16

## 2022-09-02 ASSESSMENT — PAIN SCALES - GENERAL: PAINLEVEL: NO PAIN (0)

## 2022-09-15 DIAGNOSIS — F25.1 SCHIZOAFFECTIVE DISORDER, DEPRESSIVE TYPE (H): ICD-10-CM

## 2022-09-15 RX ORDER — TRAZODONE HYDROCHLORIDE 50 MG/1
50-100 TABLET, FILM COATED ORAL
Qty: 60 TABLET | Refills: 1 | OUTPATIENT
Start: 2022-09-15

## 2022-09-16 ENCOUNTER — OFFICE VISIT (OUTPATIENT)
Dept: PSYCHIATRY | Facility: CLINIC | Age: 42
End: 2022-09-16
Attending: PSYCHIATRY & NEUROLOGY
Payer: COMMERCIAL

## 2022-09-16 VITALS
BODY MASS INDEX: 37.02 KG/M2 | SYSTOLIC BLOOD PRESSURE: 140 MMHG | DIASTOLIC BLOOD PRESSURE: 89 MMHG | HEART RATE: 101 BPM | WEIGHT: 202.4 LBS

## 2022-09-16 DIAGNOSIS — F25.1 SCHIZOAFFECTIVE DISORDER, DEPRESSIVE TYPE (H): ICD-10-CM

## 2022-09-16 PROCEDURE — 99214 OFFICE O/P EST MOD 30 MIN: CPT | Mod: GC | Performed by: STUDENT IN AN ORGANIZED HEALTH CARE EDUCATION/TRAINING PROGRAM

## 2022-09-16 PROCEDURE — G0463 HOSPITAL OUTPT CLINIC VISIT: HCPCS

## 2022-09-16 ASSESSMENT — PAIN SCALES - GENERAL: PAINLEVEL: NO PAIN (0)

## 2022-09-16 NOTE — PROGRESS NOTES
[facility]  Psychiatry Clinic  MEDICAL PROGRESS NOTE     Carmen Connolly is a 42 year old patient who prefers the name Carmen and uses  pronouns she, her, hers.       DIAGNOSIS   Schizoaffective Disorder, Depressive type, in partial remission       ASSESSMENT   Carmen reported slight improvement in hallucinations and paranoia with the increased risperidone. Cousin reports that she is slightly easier to reassure when she awakens to hallucinations at night. Unfortunately, she has had some dizziness when getting out of bed in the morning. No falls, and the feeling passes if she sits on the edge of her bed. Orthostatic BP in the office today did not demonstrate orthostatic hypotension (Sittin/88 HR-90; Standing 135/90 HR-112) , but still suspect the combination of risperidone increase and prazosin initiation is behind these morning symptoms. Notably, a HR increase of >30 indicates POTS, which she also doesn't meet criteria for. Given that we may need to further increase risperidone as aripiprazole continues to wash-out, we will stop the prazosin, and re-consider it once she reaches a steady dose of risperidone.    We discussed her concern that risperidone would lead to the same metabolic side effects that have been before, however she was on a dose up to 7 mg daily previously, and it is unclear if she continued to have significant metabolic side effects at a lower dose, it appears it was at 6 mg daily when the cross-taper to aripiprazole was started.  She and cousin were not interested in exploring alternative antipsychotics.    Cousin today expressed some concern about Carmen having a poor appetite, but it appears her weight is not decreasing.  While she continues to have negative symptoms likely from her diagnosis of schizoaffective disorder, there is no clear evidence of a depressive episode.  She enjoys going to her day program.  Does not report low mood.     Future  considerations:  - Likely will be cross titrating back to risperidone monotherapy. She and cousin were not enthusiastic about switching to a different antipsychotic.  - Hopefully can eliminate mirtazapine once risperidone is back in place to support metabolic health.  -Perhaps a selective serotonin reuptake inhibitor might be helpful with trauma symptom cluster. She was on effexor for a long time 5061-8191, but the only other serotonergic medication in her chart is a short period on citalopram before that.     MNPMP review was not needed today.      PLAN                                                                                                                 1) Meds-  - Continue mirtazapine 15 mg  - continue aripiprazole to 10 mg daily   - Continue trazodone  mg hs/prn (for insomnia)  - continue risperidone to 2 mg qhs   - Stop prazosin    2) Psychotherapy- Continue with therapist who visits home twice weekly     3) Next due-  Labs- [facility] 2/23  EKG- as indicated  Rating scales- N/A     4) Referrals-  None     5) Dispo- 3 weeks      PERTINENT BACKGROUND                           [most recent eval 8/5/22]   Previous diagnoses include Schizoaffective disorder, depression, generalized anxiety disorder, and PTSD. Symptoms began after the passing of her  while they were refugees living in [facility]. She did not receive any psychiatric care until after she immigrated to the [place] in [place].     Psych pertinent item history includes psychosis [sxs include paranoia, responding to internal stimuli, negative symptoms] and trauma hx     SANDY Morales and her cousin were interviewed with the aid of a Tuval[place]  on the phone.  -Hallucinations are a little better.  -She continues to have frequent nighttime awakenings, needs to be redirected back to bed.  -Carmen reports that she feels scared in the night, and hears screaming which awakens her.  She also experiences nightmares of things that happened when she  was [place]. These have not changed since prazosin was started.  -Since increasing the risperidone and starting prazosin, she has been having lightheadedness in the morning.  She notices it when she gets out of bed, and has to sit on the side of her bed for several minutes before she tries to get up.  No falls, and no symptoms at other times during the day.  -Her cousin reports that he continues to worry about her poor appetite.  Family has to encourage her to eat, but she eats spontaneously during her day program.  -She takes trazodone 100 mg most nights.    Recent Psych Symptoms:   Depression:  low energy and poor concentration /memory  Elevated:  none  Psychosis:  auditory hallucinations, visual hallucinations and negative symptoms (avolition, affective flattening, anhedonia, alogia, apathy, See HPI)  Anxiety:  None  Trauma Related:  fear, nightmares, avoidance, trauma trigger psychological / physiological response, detached, startle response and hypervigilance    Recent Substance Use:     -alcohol: No   -cannabis: No   -tobacco: No  -caffeine:  No   -opioids: No   Narcan Kit currrent: No   -other: none    Pertinent Negatives: No suicidal or violent ideation, self-injury, shine, aggression and harmful substance use  Adverse Effects: None reported     PAST MED TRIALS     Medication  Dose   (mg) Effect  Dates of Use   risperidone 2-7 mg Helped with sleep ?-4/22   venlafaxine 225 mg Helpful for mood 2014 - ?   temazepam 15 mg Helpful for sleep 2016 - 2018   citalopram         nortriptyline 10 mg HS   2016 - 2017   Hydroxyzine 25-50 mg    2016 - 2018   Abilify 15 mg  8/21-present   Mirtazapine 15 mg  4/21-present            PSYCH and SUBSTANCE USE Critical Summary Points since July 2022 8/5/22: Transfer of care diagnostic assessment.  Increased auditory hallucinations, family strongly wants to return to risperidone, so 1 mg of risperidone was started, Abilify continued.  9/2/22: Continued auditory  hallucinations.  Significant PTSD symptoms in the form of hypervigilance, nightmares.  Increased risperidone from 1 mg to 2 mg, decreased Abilify from 15 mg to 10 mg, and started 1 mg of prazosin at bedtime.  9/16/22: Had symptoms of lightheadedness with risperidone increase and prazosin initiation. Slight improvement in psychosis symptoms. Stopping prazosin, could reconsider once on a stable dose of risperidone.     MEDICAL HISTORY and ALLERGY     ALLERGIES: Patient has no known allergies.    Patient Active Problem List   Diagnosis     Essential hypertension     Gastroesophageal reflux disease without esophagitis     Shortened NM interval     Vitamin D deficiency     Posttraumatic stress disorder     Female circumcision     Chronic tension-type headache, not intractable     Schizoaffective disorder, bipolar type (H)     Neurocognitive deficits     Arthritis     Extra digits     Immigrant with language difficulty     Pulmonary tuberculosis confirmed by sputum microscopy     Recurrent major depression (H)     Morbid obesity (H)     Insomnia due to medical condition     Counseling regarding advanced directives      MEDICAL REVIEW OF SYSTEMS     none in addition to that documented above     MEDICATIONS     Current Outpatient Medications   Medication Sig Dispense Refill     acetaminophen (TYLENOL) 325 MG tablet Take 2 tablets (650 mg) by mouth every 4 hours as needed for mild pain 200 tablet 11     amLODIPine (NORVASC) 10 MG tablet Take 1 tablet (10 mg) by mouth daily 90 tablet 3     ARIPiprazole (ABILIFY) 10 MG tablet Take 1 tablet (10 mg) by mouth daily 30 tablet 0     cetirizine (ZYRTEC) 10 MG tablet Take 1 tablet (10 mg) by mouth daily 90 tablet 3     [employer] D3 50 MCG (2000 UT) CAPS TAKE 1 CAPSULE BY MOUTH EVERY DAY 30 capsule 11     diclofenac (VOLTAREN) 1 % topical gel Apply 2 g topically 4 times daily 150 g 4     diphenhydrAMINE (BENADRYL) 25 MG tablet Take 1-2 tablets (25-50 mg) by mouth nightly as needed for  itching or allergies 30 tablet 0     famotidine (PEPCID) 20 MG tablet TAKE 1 TABLET BY MOUTH TWICE A  tablet 3     fish oil-omega-3 fatty acids 1000 MG capsule Take 2 capsules (2 g) by mouth daily 180 capsule 3     fluticasone (FLONASE) 50 MCG/ACT nasal spray Spray 2 sprays into both nostrils daily 16 mL 11     ibuprofen (ADVIL/MOTRIN) 400 MG tablet Take 1 tablet (400 mg) by mouth every 4 hours as needed for moderate pain 120 tablet 4     Incontinence Supply Disposable (DEPEND UNDERGARMENTS) MISC Use daily as needed 200 each 11     Magnesium Oxide ([employer]S MAGNESIUM OXIDE) 250 MG tablet Take 2 tablets (500 mg) by mouth daily 180 tablet 3     melatonin 3 MG tablet Take 2 tablets (6 mg) by mouth At Bedtime 60 tablet 1     mirtazapine (REMERON) 15 MG tablet Take 1 tablet (15 mg) by mouth At Bedtime 30 tablet 1     multivitamin w/minerals (THERA-VIT-M) tablet Take 1 tablet by mouth daily 100 tablet 3     prazosin (MINIPRESS) 1 MG capsule Take 1 capsule (1 mg) by mouth At Bedtime Stop taking and call clinic if you're having dizziness or falls. 30 capsule 0     propranolol ER (INDERAL LA) 60 MG 24 hr capsule Take 1 capsule (60 mg) by mouth daily 90 capsule 3     risperiDONE (RISPERDAL) 2 MG tablet Take 1 tablet (2 mg) by mouth At Bedtime 30 tablet 0     traZODone (DESYREL) 50 MG tablet Take 1-2 tablets ( mg) by mouth nightly as needed for sleep 60 tablet 0     vitamin D3 (CHOLECALCIFEROL) 50 mcg (2000 units) tablet Take 1 tablet (50 mcg) by mouth daily 90 tablet 3      VITALS   BP (!) 140/89   Pulse 101   Wt 91.8 kg (202 lb 6.4 oz)   BMI 37.02 kg/m       Pulse Readings from Last 3 Encounters:   09/16/22 101   09/02/22 77   08/05/22 96     Wt Readings from Last 3 Encounters:   09/16/22 91.8 kg (202 lb 6.4 oz)   09/02/22 92.1 kg (203 lb)   08/05/22 90.4 kg (199 lb 3.2 oz)     BP Readings from Last 3 Encounters:   09/16/22 (!) 140/89   09/02/22 (!) 142/96   08/05/22 (!) 148/95      MENTAL STATUS EXAM      Alertness: alert  and oriented  Appearance: adequately groomed  Behavior/Demeanor: passive, with no eye contact, suspect culturally appropriate  Speech: Poverty of speech  Language: [place] speaking, prefers to let cousin speak for her  Psychomotor: normal or unremarkable  Mood: anxious  Affect: flat  Thought Process/Associations: unremarkable  Thought Content:  Reports none;  Denies suicidal ideation and violent ideation  Perception:  Reports auditory hallucinations and visual hallucinations;  Denies none  Insight: limited  Judgment: adequate for safety  Cognition: does  appear grossly intact; formal cognitive testing was not done  Gait and Station: unremarkable     LABS and DATA     PHQ 12/3/2018 9/3/2019 4/28/2020   PHQ-9 Total Score 10 6 11   Q9: Thoughts of better off dead/self-harm past 2 weeks Not at all Not at all Not at all       Recent Labs   Lab Test 02/23/22  1022 07/28/21  1114   CR 0.80 0.71   GFRESTIMATED >90 >90     Recent Labs   Lab Test 02/23/22  1022 06/26/20  1042   AST 26 23.8   ALT 25 28.8   ALKPHOS 104 88.5       ECG 7/26/21 QTc = 413ms     PSYCHOTROPIC DRUG INTERACTIONS                                                       PSYCHCLINICDDI   ARIPIPRAZOLE, RISPERIDONE, propranolol & AMLODIPINE- Blood Pressure Lowering Agents may enhance the hypotensive effect of Antipsychotic Agents (Second Generation [Atypical]).   ARIPIPRAZOLE, MIRTAZAPINE, & TRAZODONE- Serotonergic Agents (High Risk) may enhance the adverse/toxic effect of Antipsychotic Agents. Specifically, serotonergic agents may enhance dopamine blockade, possibly increasing the risk for neuroleptic malignant syndrome. Antipsychotic Agents may enhance the serotonergic effect of Serotonergic Agents (High Risk). This could result in serotonin syndrome.    CETIRIZINE, MIRTAZAPINE, RISPERIDONE and DIPHENHYDRAMINE may result in increased risk of CNS depression.      MANAGEMENT:  Monitoring for adverse effects     RISK STATEMENT for SAFETY      Carmen did not appear to be an imminent safety risk to self or others.    TREATMENT RISK STATEMENT: The risks, benefits, alternatives and potential adverse effects have been discussed and are understood by the pt. The pt understands the risks of using street drugs or alcohol. There are no medical contraindications, the pt agrees to treatment with the ability to do so. The pt knows to call the clinic for any problems or to access emergency care if needed.  Medical and substance use concerns are documented above.  Psychotropic drug interaction check was done, including changes made today.     PROVIDER: Roger Camacho MD    Patient staffed in clinic with Dr. Thrasher who will sign the note.  Supervisor is Dr. Dangelo.       MEDICAL DECISION MAKING        (SmartPhrase .PSYCHBILLMDM)     - At least 1 chronic problem that is not stable    - Engaged in prescription drug management during visit (discussed any medication benefits, side effects, alternatives, etc.)

## 2022-09-16 NOTE — NURSING NOTE
Chief Complaint   Patient presents with     Recheck Medication     Posttraumatic stress disorder

## 2022-09-16 NOTE — PATIENT INSTRUCTIONS
Please stop prazosin immediately. Contact clinic if dizziness continues    **For crisis resources, please see the information at the end of this document**   Patient Education    Thank you for coming to the [facility].     Lab Testing:  If you had lab testing today and your results are reassuring or normal they will be mailed to you or sent through [employer] within 7 days. If the lab tests need quick action we will call you with the results. The phone number we will call with results is # [phone]. If this is not the best number please call our clinic and change the number.     Medication Refills:  If you need any refills please call your pharmacy and they will contact us. Our fax number for refills is [phone].   Three business days of notice are needed for general medication refill requests.   Five business days of notice are needed for controlled substance refill requests.   If you need to change to a different pharmacy, please contact the new pharmacy directly. The new pharmacy will help you get your medications transferred.     Contact Us:  Please call [phone] during business hours (8-5:00 M-F).   If you have medication related questions after clinic hours, or on the weekend, please call [phone].     Financial Assistance [phone]   Medical Records [phone]       MENTAL HEALTH CRISIS RESOURCES:  For a emergency help, please call 911 or go to the nearest Emergency Department.     Emergency Walk-In Options:   EmPATH Unit @ [facility] ([place]): [phone] - Specialized mental health emergency area designed to be calming  [facility] ([place]): [phone]  [facility] ([place]): [phone]  [facility]): [phone]    [employer] Crisis Information:   [place]: [phone]  [place]: [phone]  [place] ([place]) - Adult: [phone]     Child: [phone]  [place] Wiley  Adult: [phone]     Child: [phone]  [place]: [phone]  List of all [facility] resources:   https://[place].gov/dhs/people-we-serve/adults/health-care/mental-health/resources/crisis-contacts.jsp    National Crisis Information:   Crisis Text Line: Text  [place]  to 787539  Suicide & Crisis Lifeline: 988  National Suicide Prevention Lifeline: 6-744-458-TALK (1-393.875.5096)       For online chat options, visit https://suicidepreventionlifeline.org/chat/  Poison Control Center: 4-289-904-4815  Trans Lifeline: 1-627.856.3081 - Hotline for transgender people of all ages  The Randy Project: 8-115-970-5299 - Hotline for LGBT youth     For Non-Emergency Support:   Fast Tracker: Mental Health & Substance Use Disorder Resources -   https://www.[employer] Schooltrackermn.org/

## 2022-09-18 RX ORDER — MIRTAZAPINE 15 MG/1
15 TABLET, FILM COATED ORAL AT BEDTIME
Qty: 30 TABLET | Refills: 1 | Status: SHIPPED | OUTPATIENT
Start: 2022-09-18 | End: 2022-10-07

## 2022-09-18 RX ORDER — ARIPIPRAZOLE 10 MG/1
10 TABLET ORAL DAILY
Qty: 30 TABLET | Refills: 0 | Status: SHIPPED | OUTPATIENT
Start: 2022-09-18 | End: 2022-10-07

## 2022-09-18 RX ORDER — TRAZODONE HYDROCHLORIDE 50 MG/1
50-100 TABLET, FILM COATED ORAL
Qty: 60 TABLET | Refills: 1 | Status: SHIPPED | OUTPATIENT
Start: 2022-09-18 | End: 2022-10-07

## 2022-09-18 RX ORDER — RISPERIDONE 2 MG/1
2 TABLET ORAL AT BEDTIME
Qty: 30 TABLET | Refills: 0 | Status: SHIPPED | OUTPATIENT
Start: 2022-09-18 | End: 2022-10-07

## 2022-09-28 ENCOUNTER — OFFICE VISIT (OUTPATIENT)
Dept: FAMILY MEDICINE | Facility: CLINIC | Age: 42
End: 2022-09-28
Payer: COMMERCIAL

## 2022-09-28 VITALS
HEART RATE: 90 BPM | BODY MASS INDEX: 36.73 KG/M2 | TEMPERATURE: 97.9 F | SYSTOLIC BLOOD PRESSURE: 129 MMHG | RESPIRATION RATE: 16 BRPM | DIASTOLIC BLOOD PRESSURE: 85 MMHG | OXYGEN SATURATION: 97 % | WEIGHT: 199.6 LBS | HEIGHT: 62 IN

## 2022-09-28 DIAGNOSIS — M54.50 BILATERAL LOW BACK PAIN WITHOUT SCIATICA, UNSPECIFIED CHRONICITY: ICD-10-CM

## 2022-09-28 DIAGNOSIS — R07.1 CHEST PAIN ON BREATHING: ICD-10-CM

## 2022-09-28 DIAGNOSIS — N64.4 BREAST PAIN, RIGHT: Primary | ICD-10-CM

## 2022-09-28 DIAGNOSIS — M54.50 ACUTE BILATERAL LOW BACK PAIN WITHOUT SCIATICA: ICD-10-CM

## 2022-09-28 DIAGNOSIS — I10 ESSENTIAL HYPERTENSION WITH GOAL BLOOD PRESSURE LESS THAN 130/85: ICD-10-CM

## 2022-09-28 PROCEDURE — 93000 ELECTROCARDIOGRAM COMPLETE: CPT | Performed by: FAMILY MEDICINE

## 2022-09-28 PROCEDURE — 99214 OFFICE O/P EST MOD 30 MIN: CPT | Performed by: FAMILY MEDICINE

## 2022-09-28 RX ORDER — CHLORAL HYDRATE 500 MG
2 CAPSULE ORAL DAILY
Qty: 180 CAPSULE | Refills: 3 | Status: SHIPPED | OUTPATIENT
Start: 2022-09-28 | End: 2023-04-05

## 2022-09-28 RX ORDER — IBUPROFEN 400 MG/1
400 TABLET, FILM COATED ORAL EVERY 4 HOURS PRN
Qty: 120 TABLET | Refills: 4 | Status: SHIPPED | OUTPATIENT
Start: 2022-09-28 | End: 2022-11-14

## 2022-09-28 NOTE — PATIENT INSTRUCTIONS
Apply heat (heating pad, shower, bath) to painful areas (back, breast, chest)  Also take ibuprofen for the body pain    Patient Education   Here is the plan from today's visit    1. Breast pain, right  - Diagnostic Mammogram Digital Bilateral; Future  - US Breast Right Limited 1-3 Quadrants; Future  You call to schedule:  [facility] call [phone]      2. Chest pain on breathing  - EKG 12-lead complete w/read - Clinics    3. Bilateral low back pain without sciatica, unspecified chronicity  You call to schedule, all [place]'s to tell us which clinic to send the referral to  - Physical Therapy Referral; Future    Essential hypertension with goal blood pressure less than 130/85  -     fish oil-omega-3 fatty acids 1000 MG capsule; Take 2 capsules (2 g) by mouth daily    Acute bilateral low back pain without sciatica  -     ibuprofen (ADVIL/MOTRIN) 400 MG tablet; Take 1 tablet (400 mg) by mouth every 4 hours as needed for moderate pain       Please call or return to clinic if your symptoms don't go away.    Schedule visit with PCP for follow up these concerns and med refils    Thank you for coming to [facility] today.  Lab Testing:  **If you had lab testing today and your results are reassuring or normal they will be mailed to you or sent through [employer] within 7 days.   **If the lab tests need quick action we will call you with the results.  **If you are having labs done on a different day, please call [phone] to schedule at [facility] or [phone] for other [facility] Outpatient Lab locations. Labs do not offer walk-in appointments.  The phone number we will call with results is # [phone] (home) . If this is not the best number please call our clinic and change the number.  Medication Refills:  If you need any refills please call your pharmacy and they will contact us.   If you need to  your refill at a new pharmacy, please contact the new pharmacy directly. The new pharmacy will  help you get your medications transferred faster.   Scheduling:  If you have any concerns about today's visit or wish to schedule another appointment please call our office during normal business hours [phone] (8-5:00 M-F)  If a referral was made to an [facility] [place] specialty provider and you do not get a call from central scheduling, please refer to directions on your visit summary or call our office during normal business hours for assistance.   If a Mammogram was ordered for you at the Breast Center call [phone] to schedule or change your appointment.  If you had an XRay/CT/Ultrasound/MRI ordered the number is [phone] to schedule or change your radiology appointment.   [facility] has limited ultrasound appointments available on Wednesdays, if you would like your ultrasound at [facility], please call [phone] to schedule.   Medical Concerns:  If you have urgent medical concerns please call [phone] at any time of the day.    Sophia Orozco MD

## 2022-09-28 NOTE — PROGRESS NOTES
Assessment & Plan   Apply heat (heating pad, shower, bath) to painful areas (back, breast, chest)  Also take ibuprofen for the body pain    Patient Education   Here is the plan from today's visit    1. Breast pain, right  No abnormal physical exam findings. If normal mammo/ultrasound, recommend review with patient management of benign mastodynia.  - Diagnostic Mammogram Digital Bilateral; Future  - US Breast Right Limited 1-3 Quadrants; Future  You call to schedule:  [facility] call [phone]    2. Chest pain on breathing  EKG essentially unchanged from previous. Right atrial enlargement and no signs of ischemia. Pain is reproducible on exam, likely costochondritis, encouraged NSAID use  Pain also similar to her reflux symptoms in the past, so if persists recommend reinforcing lifestyle reflux precautions +/- escalating GERD treatment, meanwhile continue H2-blocker  - EKG 12-lead complete w/read - Clinics    3. Bilateral low back pain without sciatica, unspecified chronicity  You call to schedule, all [place]'s to tell us which clinic to send the referral to  - Physical Therapy Referral; Future  -     ibuprofen (ADVIL/MOTRIN) 400 MG tablet; Take 1 tablet (400 mg) by mouth every 4 hours as needed for moderate pain     -     fish oil-omega-3 fatty acids 1000 MG capsule; Take 2 capsules (2 g) by mouth daily      Patient requires an  to complete the visit or speaks English as a second language.  Not speaking the primary language of the healthcare system is a social determinant of health that impacts how they interact with the medical system.      Please call or return to clinic if your symptoms don't go away.    Schedule visit with PCP for follow up these concerns and med refils    I spent a total of 30 minutes on the day of the visit.   Time spent doing chart review, history and exam, documentation and further activities per the note       BMI:   Estimated body mass index is 36.51 kg/m  as calculated  "from the following:    Height as of this encounter: 1.575 m (5' 2\").    Weight as of this encounter: 90.5 kg (199 lb 9.6 oz).     No follow-ups on file.    Sophia Orozco MD  [facility] NEHEMIAH Morales is a 42 year old accompanied by her cousin, presenting for the following health issues:  Breast Pain (Complains of breast pain. Most pain on right side) and Back Pain (Complains of lower back pain. She states she does have some issues going to the restroom. )    FV Interpretor ID# [id number] ([place]er)  FV Interpretor ID# [id number] ([place]er)      HPI     Bilateral low back pain x 1 week  Getting worse  Worse when trying to stand after sitting for a long time.   No identifiable triggers  Takes medication for bone pain - when she took them this week it did \"not really\" help the back pain    Right Breast pain  Started 2 months ago. Previously bad, then improved, but still there  Scratchy pain, no itching  No skin changes  Never had a mammogram  No nipple discharge    Chest pain  Central, started 2 days ago  Pain with breathing  No shortness of breath  No wheezing  Worse with exertion  No acid/sour taste in mouth  Not worse at night/day   Dull pain,not sharp pain  Does not radiate  No pain in arms other than chronic shoulder pain  Feels like her gas/reflux/heartburn pain  Has been taking her BID famotidine, not missing doses    Review of Systems         Objective    /85   Pulse 90   Temp 97.9  F (36.6  C) (Oral)   Resp 16   Ht 1.575 m (5' 2\")   Wt 90.5 kg (199 lb 9.6 oz)   SpO2 97%   BMI 36.51 kg/m    Body mass index is 36.51 kg/m .  Physical Exam  Vitals reviewed.   Constitutional:       General: She is not in acute distress.     Appearance: She is well-developed. She is not diaphoretic.   HENT:      Head: Normocephalic and atraumatic.   Cardiovascular:      Rate and Rhythm: Normal rate and regular rhythm.      Heart sounds: Normal heart sounds. No murmur heard.  Pulmonary:      Effort: " Pulmonary effort is normal. No respiratory distress.      Breath sounds: Normal breath sounds. No wheezing.   Chest:       Musculoskeletal:         General: Normal range of motion.        Legs:    Skin:     General: Skin is warm and dry.      Findings: No erythema or rash.   Neurological:      Mental Status: She is alert.   Psychiatric:         Behavior: Behavior normal.         Thought Content: Thought content normal.         Judgment: Judgment normal.            Results for orders placed or performed in visit on 09/28/22 (from the past 24 hour(s))   EKG 12-lead complete w/read - Clinics    Narrative    EKG Interpretation:  By Sophia Orozco MD    Indication:Chest pain  Symptoms at time of EKG: None   Interpretation: Normal Sinus Rhythm, normal axis, normal intervals, no acute ST/T changes c/w ischemia, no LVH by voltage criteria. JULES by peaked P wave in II. Nonspecific flat T-waves diffusely and inverted T-waves isolated in V4.   Comparison with previous EKGs:No significant change from previous

## 2022-10-07 ENCOUNTER — OFFICE VISIT (OUTPATIENT)
Dept: PSYCHIATRY | Facility: CLINIC | Age: 42
End: 2022-10-07
Attending: PSYCHIATRY & NEUROLOGY
Payer: COMMERCIAL

## 2022-10-07 VITALS
BODY MASS INDEX: 36.76 KG/M2 | DIASTOLIC BLOOD PRESSURE: 87 MMHG | SYSTOLIC BLOOD PRESSURE: 132 MMHG | HEART RATE: 105 BPM | WEIGHT: 201 LBS

## 2022-10-07 DIAGNOSIS — F25.1 SCHIZOAFFECTIVE DISORDER, DEPRESSIVE TYPE (H): ICD-10-CM

## 2022-10-07 PROCEDURE — 99214 OFFICE O/P EST MOD 30 MIN: CPT | Mod: GC | Performed by: STUDENT IN AN ORGANIZED HEALTH CARE EDUCATION/TRAINING PROGRAM

## 2022-10-07 RX ORDER — MIRTAZAPINE 15 MG/1
15 TABLET, FILM COATED ORAL AT BEDTIME
Qty: 30 TABLET | Refills: 1 | Status: SHIPPED | OUTPATIENT
Start: 2022-10-07 | End: 2022-11-28

## 2022-10-07 RX ORDER — ARIPIPRAZOLE 2 MG/1
8 TABLET ORAL DAILY
Qty: 120 TABLET | Refills: 1 | Status: SHIPPED | OUTPATIENT
Start: 2022-10-07 | End: 2022-11-28

## 2022-10-07 RX ORDER — TRAZODONE HYDROCHLORIDE 50 MG/1
50-100 TABLET, FILM COATED ORAL
Qty: 60 TABLET | Refills: 1 | Status: SHIPPED | OUTPATIENT
Start: 2022-10-07 | End: 2022-11-28

## 2022-10-07 RX ORDER — RISPERIDONE 2 MG/1
2 TABLET ORAL AT BEDTIME
Qty: 30 TABLET | Refills: 1 | Status: SHIPPED | OUTPATIENT
Start: 2022-10-07 | End: 2022-11-28

## 2022-10-07 RX ORDER — LANOLIN ALCOHOL/MO/W.PET/CERES
6 CREAM (GRAM) TOPICAL AT BEDTIME
Qty: 60 TABLET | Refills: 1 | Status: SHIPPED | OUTPATIENT
Start: 2022-10-07 | End: 2022-11-28

## 2022-10-07 ASSESSMENT — PAIN SCALES - GENERAL: PAINLEVEL: SEVERE PAIN (6)

## 2022-10-07 NOTE — PROGRESS NOTES
[facility]  Psychiatry Clinic  MEDICAL PROGRESS NOTE     Carmen Connolly is a 42 year old patient who prefers the name Carmen and uses  pronouns she, her, hers.       DIAGNOSIS   Schizoaffective Disorder, Depressive type, in partial remission       ASSESSMENT   Carmen reported continued improvement in hallucinations and paranoia with the current doses of risperidone and aripiprazole.  They feel her symptoms of psychosis have never been better controlled than they are now.  We discussed her concern that risperidone and aripriprazole would lead to the same metabolic side effects that lead to us trying to change from risperidone last year.  She and cousin were not interested in exploring alternative antipsychotics.  We discussed the possibility of trying to minimize aripiprazole dose to reduce the likelihood of metabolic side effects, and they agreed to a decrease of 2 mg.  We are hoping to reduce risk of side effects while not having an increase of symptoms of psychosis, so we are going slowly with the decreases of Abilify.    Orthostatic symptoms completely resolved once prazosin had been stopped.    No safety concerns at this time.     Future considerations:  - Hopefully can eliminate mirtazapine to support metabolic health.  -Perhaps a selective serotonin reuptake inhibitor might be helpful with trauma symptom cluster. She was on effexor for a long time 6220-6414, but the only other serotonergic medication in her chart is a short period on citalopram before that.     [place]NPMP review was not needed today.      PLAN                                                                                                                 1) Meds-  - Continue mirtazapine 15 mg  - Decrease aripiprazole to 8 mg daily   - Continue trazodone  mg hs/prn (for insomnia)  - continue risperidone 2 mg qhs     2) Psychotherapy- Continue with therapist who visits home twice weekly     3) Next due-  Labs- [facility]GA  2/23  EKG- as indicated  Rating scales- N/A     4) Referrals-  None     5) Dispo- 4 weeks      PERTINENT BACKGROUND                           [most recent eval 8/5/22]   Previous diagnoses include Schizoaffective disorder, depression, generalized anxiety disorder, and PTSD. Symptoms began after the passing of her  while they were refugees living in [facility]. She did not receive any psychiatric care until after she immigrated to the [place] in [place].     Psych pertinent item history includes psychosis [sxs include paranoia, responding to internal stimuli, negative symptoms] and trauma hx     SANDY Morales and her cousin were interviewed with the aid of a [facility]  on the phone.    -Having migraine headaches  -1-2 awakenings per night.  -no dizziness.  -Mood is fine, occasional low mood.  -day care is fine.    Recent Psych Symptoms:   Depression:  low energy and poor concentration /memory  Elevated:  none  Psychosis:  auditory hallucinations, visual hallucinations and negative symptoms (avolition, affective flattening, anhedonia, alogia, apathy, See HPI)  Anxiety:  None  Trauma Related:  fear, nightmares, avoidance, trauma trigger psychological / physiological response, detached, startle response and hypervigilance    Recent Substance Use:     -alcohol: No   -cannabis: No   -tobacco: No  -caffeine:  No   -opioids: No   Narcan Kit currrent: No   -other: none    Pertinent Negatives: No suicidal or violent ideation, self-injury, shine, aggression and harmful substance use  Adverse Effects: None reported     PAST MED TRIALS     Medication  Dose   (mg) Effect  Dates of Use   risperidone 2-7 mg Helped with sleep ?-4/22   venlafaxine 225 mg Helpful for mood 2014 - ?   temazepam 15 mg Helpful for sleep 2016 - 2018   citalopram         nortriptyline 10 mg HS   2016 - 2017   Hydroxyzine 25-50 mg    2016 - 2018   Abilify 15 mg  8/21-present   Mirtazapine 15 mg  4/21-present            PSYCH and SUBSTANCE USE Critical  Summary Points since July 2022 8/5/22: Transfer of care diagnostic assessment.  Increased auditory hallucinations, family strongly wants to return to risperidone, so 1 mg of risperidone was started, Abilify continued.  9/2/22: Continued auditory hallucinations.  Significant PTSD symptoms in the form of hypervigilance, nightmares.  Increased risperidone from 1 mg to 2 mg, decreased Abilify from 15 mg to 10 mg, and started 1 mg of prazosin at bedtime.  9/16/22: Had symptoms of lightheadedness with risperidone increase and prazosin initiation. Slight improvement in psychosis symptoms. Stopping prazosin, could reconsider once on a stable dose of risperidone.  10/7/22: Dizziness has resolved. Psychosis is better controlled than it ever has been. Decreased aripiprazole to 8 mg.     MEDICAL HISTORY and ALLERGY     ALLERGIES: Patient has no known allergies.    Patient Active Problem List   Diagnosis     Essential hypertension     Gastroesophageal reflux disease without esophagitis     Shortened DC interval     Vitamin D deficiency     Posttraumatic stress disorder     Female circumcision     Chronic tension-type headache, not intractable     Schizoaffective disorder, bipolar type (H)     Neurocognitive deficits     Arthritis     Extra digits     Immigrant with language difficulty     Pulmonary tuberculosis confirmed by sputum microscopy     Recurrent major depression (H)     Morbid obesity (H)     Insomnia due to medical condition     Counseling regarding advanced directives      MEDICAL REVIEW OF SYSTEMS     none in addition to that documented above     MEDICATIONS     Current Outpatient Medications   Medication Sig Dispense Refill     acetaminophen (TYLENOL) 325 MG tablet Take 2 tablets (650 mg) by mouth every 4 hours as needed for mild pain 200 tablet 11     amLODIPine (NORVASC) 10 MG tablet Take 1 tablet (10 mg) by mouth daily 90 tablet 3     ARIPiprazole (ABILIFY) 10 MG tablet Take 1 tablet (10 mg) by mouth daily  30 tablet 0     cetirizine (ZYRTEC) 10 MG tablet Take 1 tablet (10 mg) by mouth daily 90 tablet 3     [employer] D3 50 MCG (2000 UT) CAPS TAKE 1 CAPSULE BY MOUTH EVERY DAY 30 capsule 11     diclofenac (VOLTAREN) 1 % topical gel Apply 2 g topically 4 times daily 150 g 4     diphenhydrAMINE (BENADRYL) 25 MG tablet Take 1-2 tablets (25-50 mg) by mouth nightly as needed for itching or allergies 30 tablet 0     famotidine (PEPCID) 20 MG tablet TAKE 1 TABLET BY MOUTH TWICE A  tablet 3     fish oil-omega-3 fatty acids 1000 MG capsule Take 2 capsules (2 g) by mouth daily 180 capsule 3     fluticasone (FLONASE) 50 MCG/ACT nasal spray Spray 2 sprays into both nostrils daily 16 mL 11     ibuprofen (ADVIL/MOTRIN) 400 MG tablet Take 1 tablet (400 mg) by mouth every 4 hours as needed for moderate pain 120 tablet 4     Incontinence Supply Disposable (DEPEND UNDERGARMENTS) MISC Use daily as needed 200 each 11     Magnesium Oxide (CVS MAGNESIUM OXIDE) 250 MG tablet Take 2 tablets (500 mg) by mouth daily 180 tablet 3     melatonin 3 MG tablet Take 2 tablets (6 mg) by mouth At Bedtime 60 tablet 1     mirtazapine (REMERON) 15 MG tablet Take 1 tablet (15 mg) by mouth At Bedtime 30 tablet 1     multivitamin w/minerals (THERA-VIT-M) tablet Take 1 tablet by mouth daily 100 tablet 3     propranolol ER (INDERAL LA) 60 MG 24 hr capsule Take 1 capsule (60 mg) by mouth daily 90 capsule 3     risperiDONE (RISPERDAL) 2 MG tablet Take 1 tablet (2 mg) by mouth At Bedtime 30 tablet 0     traZODone (DESYREL) 50 MG tablet Take 1-2 tablets ( mg) by mouth nightly as needed for sleep 60 tablet 1     vitamin D3 (CHOLECALCIFEROL) 50 mcg (2000 units) tablet Take 1 tablet (50 mcg) by mouth daily 90 tablet 3      VITALS   There were no vitals taken for this visit.     Pulse Readings from Last 3 Encounters:   09/28/22 90   09/16/22 101   09/02/22 77     Wt Readings from Last 3 Encounters:   09/28/22 90.5 kg (199 lb 9.6 oz)   09/16/22 91.8 kg (202 lb 6.4  oz)   09/02/22 92.1 kg (203 lb)     BP Readings from Last 3 Encounters:   09/28/22 129/85   09/16/22 (!) 140/89   09/02/22 (!) 142/96      MENTAL STATUS EXAM     Alertness: alert  and oriented  Appearance: adequately groomed  Behavior/Demeanor: passive, with no eye contact, suspect culturally appropriate  Speech: Poverty of speech  Language: [place] speaking, prefers to let cousin speak for her  Psychomotor: normal or unremarkable  Mood: less anxious  Affect: flat  Thought Process/Associations: unremarkable  Thought Content:  Reports none;  Denies suicidal ideation and violent ideation  Perception:  Reports auditory hallucinations and visual hallucinations;  Denies none  Insight: limited  Judgment: adequate for safety  Cognition: does  appear grossly intact; formal cognitive testing was not done  Gait and Station: unremarkable     LABS and DATA     PHQ 12/3/2018 9/3/2019 4/28/2020   PHQ-9 Total Score 10 6 11   Q9: Thoughts of better off dead/self-harm past 2 weeks Not at all Not at all Not at all       Recent Labs   Lab Test 02/23/22  1022 07/28/21  1114   CR 0.80 0.71   GFRESTIMATED >90 >90     Recent Labs   Lab Test 02/23/22  1022 06/26/20  1042   AST 26 23.8   ALT 25 28.8   ALKPHOS 104 88.5       ECG 7/26/21 QTc = 413ms     PSYCHOTROPIC DRUG INTERACTIONS                                                       PSYCHCLINICDDI   ARIPIPRAZOLE, RISPERIDONE, propranolol & AMLODIPINE- Blood Pressure Lowering Agents may enhance the hypotensive effect of Antipsychotic Agents (Second Generation [Atypical]).   ARIPIPRAZOLE, MIRTAZAPINE, & TRAZODONE- Serotonergic Agents (High Risk) may enhance the adverse/toxic effect of Antipsychotic Agents. Specifically, serotonergic agents may enhance dopamine blockade, possibly increasing the risk for neuroleptic malignant syndrome. Antipsychotic Agents may enhance the serotonergic effect of Serotonergic Agents (High Risk). This could result in serotonin syndrome.    CETIRIZINE,  MIRTAZAPINE, RISPERIDONE and DIPHENHYDRAMINE may result in increased risk of CNS depression.      MANAGEMENT:  Monitoring for adverse effects     RISK STATEMENT for SAFETY     Carmen did not appear to be an imminent safety risk to self or others.    TREATMENT RISK STATEMENT: The risks, benefits, alternatives and potential adverse effects have been discussed and are understood by the pt. The pt understands the risks of using street drugs or alcohol. There are no medical contraindications, the pt agrees to treatment with the ability to do so. The pt knows to call the clinic for any problems or to access emergency care if needed.  Medical and substance use concerns are documented above.  Psychotropic drug interaction check was done, including changes made today.     PROVIDER: Roger Camacho MD    Patient staffed in clinic with Dr. Thrasher who will sign the note.  Supervisor is Dr. Dangelo.       MEDICAL DECISION MAKING        (SmartPhrase .PSYCHBILLMDM)     - At least 1 chronic problem that is not stable    - Engaged in prescription drug management during visit (discussed any medication benefits, side effects, alternatives, etc.)

## 2022-10-07 NOTE — PATIENT INSTRUCTIONS
Reduce Abilify to 8 mg.    **For crisis resources, please see the information at the end of this document**   Patient Education    Thank you for coming to the [facility] [place] [facility].     Lab Testing:  If you had lab testing today and your results are reassuring or normal they will be mailed to you or sent through mphoria within 7 days. If the lab tests need quick action we will call you with the results. The phone number we will call with results is # [phone]. If this is not the best number please call our clinic and change the number.     Medication Refills:  If you need any refills please call your pharmacy and they will contact us. Our fax number for refills is [phone].   Three business days of notice are needed for general medication refill requests.   Five business days of notice are needed for controlled substance refill requests.   If you need to change to a different pharmacy, please contact the new pharmacy directly. The new pharmacy will help you get your medications transferred.     Contact Us:  Please call [phone] during business hours (8-5:00 M-F).   If you have medication related questions after clinic hours, or on the weekend, please call [phone].     Financial Assistance [phone]   Medical Records [phone]       MENTAL HEALTH CRISIS RESOURCES:  For a emergency help, please call [phone] or go to the nearest Emergency Department.     Emergency Walk-In Options:   Em[facility] Unit @ [facility] ([place]): [phone] - Specialized mental health emergency area designed to be calming  [facility] ([place]): [phone]  [facility] ([place]): [phone]  [facility]): [phone]    [facility] Crisis Information:   [place]: [phone]  [place]: [phone]  [place] ([place]) - Adult: [phone]     Child: [phone]  [place] - Adult: [phone]     Child:  [phone]  [place]: [phone]  List of all [facility] resources:   https://[place].gov/dhs/people-we-serve/adults/health-care/mental-health/resources/crisis-contacts.jsp    National Crisis Information:   Crisis Text Line: Text  [place]  to 178597  Suicide & Crisis Lifeline: 988  National Suicide Prevention Lifeline: 5-733-143-TALK (1-958.671.8031)       For online chat options, visit https://suicidepreventionlifeline.org/chat/  Poison Control Center: 8-507-921-4723  Trans Lifeline: 9-691-602-2234 - Hotline for transgender people of all ages  The Randy Project: 2-991-631-5178 - Hotline for LGBT youth     For Non-Emergency Support:   Fast Tracker: Mental Health & Substance Use Disorder Resources -   https://www.[place].csn.org/

## 2022-10-10 ENCOUNTER — TELEPHONE (OUTPATIENT)
Dept: PSYCHIATRY | Facility: CLINIC | Age: 42
End: 2022-10-10

## 2022-10-10 RX ORDER — ARIPIPRAZOLE 2 MG/1
8 TABLET ORAL DAILY
Qty: 120 TABLET | Refills: 1 | OUTPATIENT
Start: 2022-10-10

## 2022-10-10 NOTE — TELEPHONE ENCOUNTER
Johnny called from [facility] to confirm that Abilify was decreased from 10 mg to 8 mg. Pharmacy believes that insurance won't cover as it requires (4) 2 mg tablets for each administration. Pharmacy has already initiated a prior authorization.

## 2022-10-12 ENCOUNTER — DOCUMENTATION ONLY (OUTPATIENT)
Dept: FAMILY MEDICINE | Facility: CLINIC | Age: 42
End: 2022-10-12

## 2022-10-12 ENCOUNTER — TELEPHONE (OUTPATIENT)
Dept: PSYCHIATRY | Facility: CLINIC | Age: 42
End: 2022-10-12

## 2022-10-12 NOTE — TELEPHONE ENCOUNTER
Prior Authorization Retail Medication Request    Medication/Dose:Abilify 8 mg  ICD code (if different than what is on RX):  F25.1    Issue:  Per pharmacy, one tablet per day is covered.    Current rx:  Four 2 mg tabs daily    Previously Tried and Failed:    7/26/21- Increase in Risperidone  8/26/21 - Commence switching from Risperidone to Aripiprazole, Trazodone added for insomnia  10/4/21 -  Reduce Risperidone to 4 mg and increase Abilify to 5 mg   11/4/21- Reduce Risperidone to 4 mg and continue Abilify to 5 mg   12/02/21-  Increase in Trazodone to target insomnia  1/20/22- Increase in Abilify to 10 mg and Reduction of Risperidone to 2 mg  2/21/22- Increase in Abilify to 15 mg to target psychotic symptoms  4/21/22- Commence Mirtazapine 7.5 mg for depression, wean off Risperidone  6/23/22- Increase Mirtazapine to 15 mg qhs    Rationale:    Patient is experiencing fewer symptoms with the combination of ariprazole and risperidone than she experienced previously with monotherapy on either drug.  We are also attempting to minimize the metabolic risk of dual antipsychotic treatment by gradually minimizing the dose to avoid rebound psychosis, which is why the unusual dose of 8 mg has been prescribed.    Insurance Name:  [facility]   Insurance ID:  [id number]

## 2022-10-12 NOTE — PROGRESS NOTES
"When opening a documentation only encounter, be sure to enter in \"Chief Complaint\" Forms and in \" Comments\" Title of form, description if needed.    Carmen is a 42 year old  female  Form received via: Fax  Form now resides in: Provider Ready    Estela Cantu RN                  "

## 2022-10-13 DIAGNOSIS — F25.1 SCHIZOAFFECTIVE DISORDER, DEPRESSIVE TYPE (H): ICD-10-CM

## 2022-10-13 RX ORDER — MIRTAZAPINE 15 MG/1
TABLET, FILM COATED ORAL
Qty: 30 TABLET | Refills: 1 | OUTPATIENT
Start: 2022-10-13

## 2022-10-13 RX ORDER — TRAZODONE HYDROCHLORIDE 50 MG/1
50-100 TABLET, FILM COATED ORAL
Qty: 60 TABLET | Refills: 1 | OUTPATIENT
Start: 2022-10-13

## 2022-10-14 NOTE — TELEPHONE ENCOUNTER
Central Prior Authorization Team   Phone: [phone]      Quantity Limit Request Initiation - completed/faxed in Quantity Limit form to insurance. Unable to complete via CoverMyMeds    Medication: ARIPiprazole (ABILIFY) 2 MG tablet (4 tabs/day = 8mg dose)  Insurance Company: [employer] - Phone [phone] Fax [phone]  Pharmacy Filling the Rx: [id number] - [place], [place] - [address]  Filling Pharmacy Phone: [phone]  Filling Pharmacy Fax:    Start Date: 10/14/2022

## 2022-10-15 DIAGNOSIS — F25.1 SCHIZOAFFECTIVE DISORDER, DEPRESSIVE TYPE (H): ICD-10-CM

## 2022-10-17 RX ORDER — ARIPIPRAZOLE 2 MG/1
8 TABLET ORAL DAILY
Qty: 360 TABLET | Refills: 1 | OUTPATIENT
Start: 2022-10-17

## 2022-10-17 NOTE — TELEPHONE ENCOUNTER
Prior Authorization Approval    Authorization Effective Date: 10/13/2022  Authorization Expiration Date: 10/14/2023  Medication: ARIPiprazole (ABILIFY) 2 MG tablet (4 tabs/day = 8mg dose)  Approved Dose/Quantity:   Reference #: [id number]   Insurance Company: [employer] - Phone [phone] Fax [phone]  Expected CoPay:       Which Pharmacy is filling the prescription (Not needed for infusion/clinic administered): [id number] - [place], [place] - [facility] [facility]  Pharmacy Notified: Not yet, we are having phone issues. Will try again.  Patient Notified: No

## 2022-10-18 NOTE — TELEPHONE ENCOUNTER
P/A was approved (documented yesterday), however we had phone issues yesterday so was unable to notify pharmacy.    Today. Pharmacy notified, and asked that they let patient know when rx is ready.

## 2022-10-24 DIAGNOSIS — F25.1 SCHIZOAFFECTIVE DISORDER, DEPRESSIVE TYPE (H): ICD-10-CM

## 2022-10-26 RX ORDER — ARIPIPRAZOLE 10 MG/1
10 TABLET ORAL DAILY
Qty: 30 TABLET | Refills: 0 | OUTPATIENT
Start: 2022-10-26

## 2022-10-26 RX ORDER — RISPERIDONE 2 MG/1
TABLET ORAL
Qty: 30 TABLET | Refills: 1 | OUTPATIENT
Start: 2022-10-26

## 2022-10-29 DIAGNOSIS — F25.1 SCHIZOAFFECTIVE DISORDER, DEPRESSIVE TYPE (H): ICD-10-CM

## 2022-10-31 RX ORDER — OMEPRAZOLE MAGNESIUM 20 MG
CAPSULE,DELAYED RELEASE (ENTERIC COATED) ORAL
Qty: 60 TABLET | Refills: 1 | OUTPATIENT
Start: 2022-10-31

## 2022-10-31 NOTE — PROGRESS NOTES
Form has been completed by provider.     Form sent out via: Fax to [facility] at Fax Number: [phone]  Patient informed: N/A  Output date: October 31, 2022    Erin Brown CMA      **Please close the encounter**

## 2022-11-03 ENCOUNTER — TELEPHONE (OUTPATIENT)
Dept: PSYCHIATRY | Facility: CLINIC | Age: 42
End: 2022-11-03

## 2022-11-03 NOTE — TELEPHONE ENCOUNTER
Health Call Center    Phone Message    May a detailed message be left on voicemail: yes     Reason for Call: Medication Refill Request    Has the patient contacted the pharmacy for the refill? Yes   Name of medication being requested: Mirtazapine, Risperidone, Trazodone, Aripiprazole and Melatonin.   Provider who prescribed the medication:   Pharmacy:  [id number] - [place], [place] - [address]  Date medication is needed: ASAP.           Action Taken: Other: Nurses.     Travel Screening: Not Applicable

## 2022-11-11 DIAGNOSIS — F25.1 SCHIZOAFFECTIVE DISORDER, DEPRESSIVE TYPE (H): ICD-10-CM

## 2022-11-14 ENCOUNTER — OFFICE VISIT (OUTPATIENT)
Dept: FAMILY MEDICINE | Facility: CLINIC | Age: 42
End: 2022-11-14
Payer: COMMERCIAL

## 2022-11-14 VITALS
DIASTOLIC BLOOD PRESSURE: 94 MMHG | RESPIRATION RATE: 18 BRPM | WEIGHT: 200 LBS | HEIGHT: 63 IN | HEART RATE: 103 BPM | SYSTOLIC BLOOD PRESSURE: 148 MMHG | BODY MASS INDEX: 35.44 KG/M2 | TEMPERATURE: 98.7 F | OXYGEN SATURATION: 98 %

## 2022-11-14 DIAGNOSIS — M22.2X1 PATELLOFEMORAL ARTHRALGIA OF BOTH KNEES: ICD-10-CM

## 2022-11-14 DIAGNOSIS — M25.561 PATELLOFEMORAL ARTHRALGIA OF RIGHT KNEE: Primary | ICD-10-CM

## 2022-11-14 DIAGNOSIS — M70.51 PES ANSERINUS BURSITIS OF RIGHT KNEE: ICD-10-CM

## 2022-11-14 DIAGNOSIS — I10 ESSENTIAL HYPERTENSION: ICD-10-CM

## 2022-11-14 DIAGNOSIS — M54.50 ACUTE RIGHT-SIDED LOW BACK PAIN WITHOUT SCIATICA: ICD-10-CM

## 2022-11-14 DIAGNOSIS — J30.2 SEASONAL ALLERGIC RHINITIS, UNSPECIFIED TRIGGER: ICD-10-CM

## 2022-11-14 DIAGNOSIS — M22.2X2 PATELLOFEMORAL ARTHRALGIA OF BOTH KNEES: ICD-10-CM

## 2022-11-14 PROCEDURE — 0064A COVID-19,PF,MODERNA (18+ YRS BOOSTER .25ML): CPT | Performed by: FAMILY MEDICINE

## 2022-11-14 PROCEDURE — 90471 IMMUNIZATION ADMIN: CPT | Performed by: FAMILY MEDICINE

## 2022-11-14 PROCEDURE — 99214 OFFICE O/P EST MOD 30 MIN: CPT | Mod: 25 | Performed by: FAMILY MEDICINE

## 2022-11-14 PROCEDURE — 91306 COVID-19,PF,MODERNA (18+ YRS BOOSTER .25ML): CPT | Performed by: FAMILY MEDICINE

## 2022-11-14 PROCEDURE — 90682 RIV4 VACC RECOMBINANT DNA IM: CPT | Performed by: FAMILY MEDICINE

## 2022-11-14 RX ORDER — PROPRANOLOL HCL 60 MG
60 CAPSULE, EXTENDED RELEASE 24HR ORAL DAILY
Qty: 90 CAPSULE | Refills: 3 | Status: SHIPPED | OUTPATIENT
Start: 2022-11-14 | End: 2023-10-16

## 2022-11-14 RX ORDER — ACETAMINOPHEN 325 MG/1
650 TABLET ORAL EVERY 4 HOURS PRN
Qty: 200 TABLET | Refills: 11 | Status: SHIPPED | OUTPATIENT
Start: 2022-11-14 | End: 2023-10-16

## 2022-11-14 RX ORDER — CETIRIZINE HYDROCHLORIDE 10 MG/1
10 TABLET ORAL DAILY
Qty: 90 TABLET | Refills: 3 | Status: SHIPPED | OUTPATIENT
Start: 2022-11-14 | End: 2023-10-16

## 2022-11-14 RX ORDER — ARIPIPRAZOLE 2 MG/1
8 TABLET ORAL DAILY
Qty: 120 TABLET | Refills: 1 | OUTPATIENT
Start: 2022-11-14

## 2022-11-14 RX ORDER — IBUPROFEN 400 MG/1
400 TABLET, FILM COATED ORAL EVERY 4 HOURS PRN
Qty: 120 TABLET | Refills: 4 | Status: SHIPPED | OUTPATIENT
Start: 2022-11-14 | End: 2023-10-16

## 2022-11-14 NOTE — PROGRESS NOTES
"  Assessment & Plan     Patellofemoral arthralgia of right knee - worse than L, but evidence on both sides  Patellofemoral arthralgia of both knees  - Physical Therapy Referral; Future    Pes anserinus bursitis of right knee  Ace wrap applied to R knee for compression comfort  - take the ibuprofen 2 tabs 3 times a day and tylenol in between for breakthrough pain (can do this for the next 2 weeks)  - ice 20 min twice/day  - Physical Therapy Referral; Future    Acute right-sided low back pain without sciatica  Likely exacerbated by knee pain and antalgic gait throwing off pelvic floor  - Physical Therapy Referral; Future    Seasonal allergic rhinitis, unspecified trigger  - cetirizine (ZYRTEC) 10 MG tablet; Take 1 tablet (10 mg) by mouth daily    Essential hypertension  - propranolol ER (INDERAL LA) 60 MG 24 hr capsule; Take 1 capsule (60 mg) by mouth daily    Diagnosis or treatment significantly limited by social determinants of health - limited English proficiency; lives alone  Ordering of each unique test  Prescription drug management  35 minutes spent on the date of the encounter doing chart review, history and exam, documentation and further activities per the note       BMI:   Estimated body mass index is 35.43 kg/m  as calculated from the following:    Height as of this encounter: 1.6 m (5' 3\").    Weight as of this encounter: 90.7 kg (200 lb).   Weight management plan: Discussed healthy diet and exercise guidelines      Return in about 2 months (around 1/14/2023), or if symptoms worsen or fail to improve, for f/u on symptoms - will be due for annual wellness exam and pap as well.    Khushi Cadena MD  [facility] NEHEMIAH Morales is a 42 year old, presenting for the following health issues:  RECHECK (Cont of Care/ Rt knee)      HPI     Knee pain  - similar to before  - pain increasing on R side  - no swelling, no redness, no heat - just pain  - 2 weeks of worsening symptoms  - no " "injury recently, just started hurting  - always there, sometimes worsens (not affiliated with anything in particular)    Back pain  - comes and goes  - seems a little worse now that the knee is bothering her, too  - no radiation down the legs  - no recent injury/exacerbation  - low back only, no neck/thoracic pain    Using ibuprofen 400mg 1 pill 1-2 times/day (not using every day)  Tylenol 3-4 times    Paperwork for  - will drop off at the clinic to be filled out    Med refills today    Immunizations  - due for flu shot and COVID booster  - open to both    Health Maintenance Due   Topic Date Due     DEPRESSION ACTION PLAN  Never done     HEPATITIS B IMMUNIZATION (2 of 3 - 3-dose series) 07/12/2011     PHQ-9  10/28/2020     COVID-19 Vaccine (3 - Booster for Moderna series) 06/24/2021     YEARLY PREVENTIVE VISIT  07/28/2022     HPV TEST  08/31/2022     PAP  08/31/2022     INFLUENZA VACCINE (1) 09/01/2022               Review of Systems   Constitutional, HEENT, cardiovascular, pulmonary, gi and gu systems are negative, except as otherwise noted.      Objective    BP (!) 148/94   Pulse 103   Temp 98.7  F (37.1  C)   Resp 18   Ht 1.6 m (5' 3\")   Wt 90.7 kg (200 lb)   SpO2 98%   BMI 35.43 kg/m    Body mass index is 35.43 kg/m .  Physical Exam  Constitutional:       General: She is not in acute distress.     Appearance: Normal appearance. She is obese.   HENT:      Head: Normocephalic and atraumatic.   Eyes:      Extraocular Movements: Extraocular movements intact.   Cardiovascular:      Rate and Rhythm: Normal rate and regular rhythm.   Pulmonary:      Effort: Pulmonary effort is normal.      Breath sounds: Normal breath sounds.   Musculoskeletal:      Cervical back: Normal, normal range of motion and neck supple.      Thoracic back: Normal.      Lumbar back: Tenderness present. Normal range of motion. Negative right straight leg raise test and negative left straight leg raise test.        Back:       Right " knee: Swelling (mild over the pes anserine bursa) present. No deformity, effusion, erythema or crepitus. Normal range of motion. Tenderness present over the medial joint line. Normal meniscus and normal patellar mobility.      Left knee: No swelling, deformity, effusion or erythema. Tenderness present over the medial joint line.      Right lower leg: No edema.      Left lower leg: No edema.      Comments: Medial jt line tenderness bilaterally, but some swelling noted on R side only, especially over the bursa; evidence of PFS with patellar exam (tenderness)   Neurological:      General: No focal deficit present.      Mental Status: She is alert and oriented to person, place, and time.   Psychiatric:         Mood and Affect: Mood normal.         Behavior: Behavior normal.         Thought Content: Thought content normal.

## 2022-11-14 NOTE — PATIENT INSTRUCTIONS
Ibuprofen 800 mg (2 of the 400mg tabs) up to 3 times/day for next 2 weeks  Tylenol alternating if needed  Ice 20 min twice daily  Increase activity gradually  Ace wrap for comfort    Referral to PT

## 2022-11-17 ENCOUNTER — DOCUMENTATION ONLY (OUTPATIENT)
Dept: FAMILY MEDICINE | Facility: CLINIC | Age: 42
End: 2022-11-17

## 2022-11-25 DIAGNOSIS — F25.1 SCHIZOAFFECTIVE DISORDER, DEPRESSIVE TYPE (H): ICD-10-CM

## 2022-11-25 RX ORDER — TRAZODONE HYDROCHLORIDE 50 MG/1
50-100 TABLET, FILM COATED ORAL
Qty: 60 TABLET | Refills: 1 | OUTPATIENT
Start: 2022-11-25

## 2022-11-25 RX ORDER — RISPERIDONE 2 MG/1
TABLET ORAL
Qty: 30 TABLET | Refills: 1 | OUTPATIENT
Start: 2022-11-25

## 2022-11-25 RX ORDER — MIRTAZAPINE 15 MG/1
TABLET, FILM COATED ORAL
Qty: 30 TABLET | Refills: 1 | OUTPATIENT
Start: 2022-11-25

## 2022-11-28 ENCOUNTER — VIRTUAL VISIT (OUTPATIENT)
Dept: PSYCHIATRY | Facility: CLINIC | Age: 42
End: 2022-11-28
Attending: PSYCHIATRY & NEUROLOGY
Payer: COMMERCIAL

## 2022-11-28 DIAGNOSIS — F25.1 SCHIZOAFFECTIVE DISORDER, DEPRESSIVE TYPE (H): ICD-10-CM

## 2022-11-28 PROCEDURE — 99443 PR PHYSICIAN TELEPHONE EVALUATION 21-30 MIN: CPT | Mod: 93 | Performed by: STUDENT IN AN ORGANIZED HEALTH CARE EDUCATION/TRAINING PROGRAM

## 2022-11-28 PROCEDURE — G0463 HOSPITAL OUTPT CLINIC VISIT: HCPCS | Mod: PN,TEL | Performed by: STUDENT IN AN ORGANIZED HEALTH CARE EDUCATION/TRAINING PROGRAM

## 2022-11-28 RX ORDER — RISPERIDONE 2 MG/1
2 TABLET ORAL AT BEDTIME
Qty: 30 TABLET | Refills: 1 | Status: SHIPPED | OUTPATIENT
Start: 2022-11-28 | End: 2022-12-22

## 2022-11-28 RX ORDER — TRAZODONE HYDROCHLORIDE 50 MG/1
50-100 TABLET, FILM COATED ORAL
Qty: 60 TABLET | Refills: 1 | Status: SHIPPED | OUTPATIENT
Start: 2022-11-28 | End: 2022-12-22

## 2022-11-28 RX ORDER — ARIPIPRAZOLE 2 MG/1
8 TABLET ORAL DAILY
Qty: 120 TABLET | Refills: 1 | Status: SHIPPED | OUTPATIENT
Start: 2022-11-28 | End: 2022-12-22

## 2022-11-28 RX ORDER — LANOLIN ALCOHOL/MO/W.PET/CERES
6 CREAM (GRAM) TOPICAL AT BEDTIME
Qty: 60 TABLET | Refills: 1 | Status: SHIPPED | OUTPATIENT
Start: 2022-11-28 | End: 2022-12-22

## 2022-11-28 RX ORDER — MIRTAZAPINE 15 MG/1
15 TABLET, FILM COATED ORAL AT BEDTIME
Qty: 30 TABLET | Refills: 1 | Status: SHIPPED | OUTPATIENT
Start: 2022-11-28 | End: 2022-12-22

## 2022-11-28 ASSESSMENT — ANXIETY QUESTIONNAIRES
GAD7 TOTAL SCORE: 10
5. BEING SO RESTLESS THAT IT IS HARD TO SIT STILL: NOT AT ALL
8. IF YOU CHECKED OFF ANY PROBLEMS, HOW DIFFICULT HAVE THESE MADE IT FOR YOU TO DO YOUR WORK, TAKE CARE OF THINGS AT HOME, OR GET ALONG WITH OTHER PEOPLE?: SOMEWHAT DIFFICULT
6. BECOMING EASILY ANNOYED OR IRRITABLE: NOT AT ALL
4. TROUBLE RELAXING: NOT AT ALL
GAD7 TOTAL SCORE: 10
2. NOT BEING ABLE TO STOP OR CONTROL WORRYING: NEARLY EVERY DAY
GAD7 TOTAL SCORE: 10
7. FEELING AFRAID AS IF SOMETHING AWFUL MIGHT HAPPEN: NEARLY EVERY DAY
1. FEELING NERVOUS, ANXIOUS, OR ON EDGE: NEARLY EVERY DAY
3. WORRYING TOO MUCH ABOUT DIFFERENT THINGS: SEVERAL DAYS
IF YOU CHECKED OFF ANY PROBLEMS ON THIS QUESTIONNAIRE, HOW DIFFICULT HAVE THESE PROBLEMS MADE IT FOR YOU TO DO YOUR WORK, TAKE CARE OF THINGS AT HOME, OR GET ALONG WITH OTHER PEOPLE: SOMEWHAT DIFFICULT
7. FEELING AFRAID AS IF SOMETHING AWFUL MIGHT HAPPEN: NEARLY EVERY DAY

## 2022-11-28 ASSESSMENT — PATIENT HEALTH QUESTIONNAIRE - PHQ9
SUM OF ALL RESPONSES TO PHQ QUESTIONS 1-9: 8
10. IF YOU CHECKED OFF ANY PROBLEMS, HOW DIFFICULT HAVE THESE PROBLEMS MADE IT FOR YOU TO DO YOUR WORK, TAKE CARE OF THINGS AT HOME, OR GET ALONG WITH OTHER PEOPLE: NOT DIFFICULT AT ALL
SUM OF ALL RESPONSES TO PHQ QUESTIONS 1-9: 8

## 2022-11-28 NOTE — PATIENT INSTRUCTIONS
**For crisis resources, please see the information at the end of this document**   Patient Education    Thank you for coming to the [facility].     Lab Testing:  If you had lab testing today and your results are reassuring or normal they will be mailed to you or sent through [employer] within 7 days. If the lab tests need quick action we will call you with the results. The phone number we will call with results is # [phone]. If this is not the best number please call our clinic and change the number.     Medication Refills:  If you need any refills please call your pharmacy and they will contact us. Our fax number for refills is [phone].   Three business days of notice are needed for general medication refill requests.   Five business days of notice are needed for controlled substance refill requests.   If you need to change to a different pharmacy, please contact the new pharmacy directly. The new pharmacy will help you get your medications transferred.     Contact Us:  Please call [phone] during business hours (8-5:00 M-F).   If you have medication related questions after clinic hours, or on the weekend, please call [phone].     Financial Assistance [phone]   Medical Records [phone]       MENTAL HEALTH CRISIS RESOURCES:  For a emergency help, please call 911 or go to the nearest Emergency Department.     Emergency Walk-In Options:   Em[facility] Unit @ [facility] ([place]): [phone] - Specialized mental health emergency area designed to be calming  [facility] ([place]): [phone]  [facility] ([place]): [phone]  [facility]): [phone]    [facility] Crisis Information:   [place]: [phone]  [place]: [phone]  [place] ([place]) - Adult: [phone]     Child: [phone]  [place] - Adult: [phone]     Child: [phone]  [place]:  [phone]  List of all [facility] resources:   https://[place].gov/dhs/people-we-serve/adults/health-care/mental-health/resources/crisis-contacts.jsp    National Crisis Information:   Crisis Text Line: Text  [place]  to 759469  Suicide & Crisis Lifeline: 988  National Suicide Prevention Lifeline: 8-441-056-TALK (1-961.633.1482)       For online chat options, visit https://suicidepreventionlifeline.org/chat/  Poison Control Center: 2-728-663-4176  Trans Lifeline: 7-143-868-3389 - Hotline for transgender people of all ages  The Randy Project: 4-823-870-9343 - Hotline for LGBT youth     For Non-Emergency Support:   Fast Tracker: Mental Health & Substance Use Disorder Resources -   https://www.[employer].ckZemantan.org/

## 2022-11-28 NOTE — PROGRESS NOTES
Carmen Connolly is a 42 year old who is being evaluated via a billable telephone visit.      What phone number would you prefer to be contacted at?: Preferred phone number on file: [phone]    Reason for telehealth visit: Patient has requested telehealth visit    Originating location (patient location): Patient's home    Will anyone else be joining the visit? Yes: Cousin.

## 2022-12-08 ENCOUNTER — THERAPY VISIT (OUTPATIENT)
Dept: PHYSICAL THERAPY | Facility: CLINIC | Age: 42
End: 2022-12-08
Attending: FAMILY MEDICINE
Payer: COMMERCIAL

## 2022-12-08 DIAGNOSIS — M22.2X1 PATELLOFEMORAL ARTHRALGIA OF BOTH KNEES: ICD-10-CM

## 2022-12-08 DIAGNOSIS — M22.2X2 PATELLOFEMORAL ARTHRALGIA OF BOTH KNEES: ICD-10-CM

## 2022-12-08 DIAGNOSIS — M54.50 ACUTE RIGHT-SIDED LOW BACK PAIN WITHOUT SCIATICA: ICD-10-CM

## 2022-12-08 DIAGNOSIS — M25.561 ACUTE PAIN OF RIGHT KNEE: ICD-10-CM

## 2022-12-08 DIAGNOSIS — M70.51 PES ANSERINUS BURSITIS OF RIGHT KNEE: ICD-10-CM

## 2022-12-08 PROCEDURE — 97162 PT EVAL MOD COMPLEX 30 MIN: CPT | Mod: GP | Performed by: PHYSICAL THERAPIST

## 2022-12-08 PROCEDURE — 97110 THERAPEUTIC EXERCISES: CPT | Mod: GP | Performed by: PHYSICAL THERAPIST

## 2022-12-08 ASSESSMENT — ACTIVITIES OF DAILY LIVING (ADL)
STIFFNESS: THE SYMPTOM AFFECTS MY ACTIVITY MODERATELY
WALK: ACTIVITY IS VERY DIFFICULT
LIMPING: THE SYMPTOM AFFECTS MY ACTIVITY MODERATELY
SQUAT: ACTIVITY IS FAIRLY DIFFICULT
HOW_WOULD_YOU_RATE_THE_CURRENT_FUNCTION_OF_YOUR_KNEE_DURING_YOUR_USUAL_DAILY_ACTIVITIES_ON_A_SCALE_FROM_0_TO_100_WITH_100_BEING_YOUR_LEVEL_OF_KNEE_FUNCTION_PRIOR_TO_YOUR_INJURY_AND_0_BEING_THE_INABILITY_TO_PERFORM_ANY_OF_YOUR_USUAL_DAILY_ACTIVITIES?: 40
STAND: ACTIVITY IS VERY DIFFICULT
GIVING WAY, BUCKLING OR SHIFTING OF KNEE: THE SYMPTOM AFFECTS MY ACTIVITY SEVERELY
GO UP STAIRS: ACTIVITY IS VERY DIFFICULT
PAIN: THE SYMPTOM AFFECTS MY ACTIVITY SEVERELY
KNEEL ON THE FRONT OF YOUR KNEE: ACTIVITY IS FAIRLY DIFFICULT
KNEE_ACTIVITY_OF_DAILY_LIVING_SUM: 22
SWELLING: THE SYMPTOM AFFECTS MY ACTIVITY MODERATELY
GO DOWN STAIRS: ACTIVITY IS FAIRLY DIFFICULT
RISE FROM A CHAIR: ACTIVITY IS FAIRLY DIFFICULT
SIT WITH YOUR KNEE BENT: ACTIVITY IS FAIRLY DIFFICULT
WEAKNESS: THE SYMPTOM AFFECTS MY ACTIVITY SEVERELY
AS_A_RESULT_OF_YOUR_KNEE_INJURY,_HOW_WOULD_YOU_RATE_YOUR_CURRENT_LEVEL_OF_DAILY_ACTIVITY?: ABNORMAL
RAW_SCORE: 22
HOW_WOULD_YOU_RATE_THE_OVERALL_FUNCTION_OF_YOUR_KNEE_DURING_YOUR_USUAL_DAILY_ACTIVITIES?: ABNORMAL
KNEE_ACTIVITY_OF_DAILY_LIVING_SCORE: 31.43

## 2022-12-08 NOTE — PROGRESS NOTES
[facility] Initial Evaluation     Present:  - Roe present, Micheal Pisano also present and assists with history    Referring doctor: Khushi Cadena MD  Referring diagnosis: R knee pain, R low back pain  Date of referral: 11/14/22      Subjective:  Carmen Connolly is a 42 year old female with complaints of right knee pain for last 2 months. Pt reports through cousin and  that she is typically sedentary and goes to adult  4 days/week. Denies any falls, accidents, or known mechanism of injury.      Symptoms commenced as a result of: insidious onset. Location of symptoms: right anterior and medial knee. Pain level on number scale: 7-8/10. Pain frequency (constant/intermittent): intermittent. Symptoms are exacerbated by: sitting, walking. Symptoms are relieved by: Ibuprofen. Progression of symptoms since onset: same. Imaging: see Epic. Previous treatment/response: no previous treatment.      General health as reported by patient is good. Pertinent medical history includes: See Epic. Medical allergies: see Epic. Other pertinent surgeries: see Epic. Current medications: See Epic.     Occupation: no work; mostly at home, 4 days at adult      Social history: micheal Pierre     Red flags:  None reported by patient.    Objective  Observation:  Gait: typical gait    Knee AROM/PROM: R 0-5-100 deg, L 0-135 deg    Knee Strength (* = pain) Right Left   FL 4/5 5/5   EXT 4/5 5/5   Quad Contraction (Good/Fair/Poor) fair good   Hip Extension NT NT   Hip ABD 4/5 5/5     Special Tests Right Left   Lachman - -   Anterior Drawer - -   Posterior Drawer - -   Valgus Stress - Medial Instability - -   Varus Stress - Lateral Instability - -     Palpation Tenderness:  Tenderness with palpation of medial knee over pes anserine and associated musculature    Swelling/Circumferential Measurements: no swelling observed    Functional Squat: painful from chair, able to perform from  elevated height without pain    Balance: NT this date      Key Findings  Patient presents to physical therapy with reports of right knee and low back pain; decreased lumbar and right knee AROM; painful palpating right medial knee and pes anserine/associated musculature      Assessment/Plan:    Patient is a 42 year old female with lumbar and right side knee complaints.    Patient has the following significant findings with corresponding treatment plan.                Diagnosis 1:  Right knee  Pain -  self management, education and home program  Decreased ROM/flexibility - manual therapy, therapeutic exercise, therapeutic activity and home program  Diagnosis 2:  Right low back   Pain -  self management, education and home program  Decreased ROM/flexibility - manual therapy, therapeutic exercise, therapeutic activity and home program    Therapy Evaluation Codes:     1) History comprised of:   Personal factors that impact the plan of care:      Cognition, Language, Living environment, Overall behavior pattern, Social history/culture, Time since onset of symptoms and Work status.    Comorbidity factors that impact the plan of care are:      Mental illness and Overweight.     Medications impacting care: see Epic.  2) Examination of Body Systems comprised of:   Body structures and functions that impact the plan of care:      Knee and Lumbar spine.   Activity limitations that impact the plan of care are:      Squatting/kneeling, Stairs and Walking.  3) Clinical presentation characteristics are:   Evolving/Changing.  4) Decision-Making    Moderate complexity using standardized patient assessment instrument and/or measureable assessment of functional outcome.    Cumulative Therapy Evaluation is: Moderate complexity.    Previous and current functional limitations:  (See Goal Flow Sheet for this information)    Short term and Long term goals: (See Goal Flow Sheet for this information)     Communication ability:  Patient appears  to be able to clearly communicate and understand verbal and written communication and follow directions correctly.  Treatment Explanation - The following has been discussed with the patient:   RX ordered/plan of care  Anticipated outcomes  Possible risks and side effects  This patient would benefit from PT intervention to resume normal activities.   Rehab potential is good.    Frequency:  1 X week, once daily; decreasing to every other week as symptoms improve and patient becomes more independent with HEP  Duration:  for 12 weeks  Discharge Plan:  Achieve all LTG.  Independent in home treatment program.  Reach maximal therapeutic benefit.    Please refer to the daily flowsheet for treatment today, total treatment time and time spent performing 1:1 timed codes.     Please Contact me with any questions or concerns. Thank you for your time and entrusting me with your care.     Giovanni Ruiz, PT, DPT, OCS, CSMT  Physical Therapist  [facility] [place]  [phone]

## 2022-12-09 NOTE — PROGRESS NOTES
[facility]    OUTPATIENT Physical Therapy ORTHOPEDIC EVALUATION  PLAN OF TREATMENT FOR OUTPATIENT REHABILITATION  (COMPLETE FOR INITIAL CLAIMS ONLY)  Patient's Last Name, First Name, M.I.  YOB: 1980  Carmen Connolly    Provider s Name:  [facility]   Medical Record No.  [id number]   Start of Care Date:  12/08/22   Onset Date:   11/14/22 (PT referral; pain started 2 months ago)   Treatment Diagnosis:  right knee pain Medical Diagnosis:     Pes anserinus bursitis of right knee  Patellofemoral arthralgia of both knees  Acute right-sided low back pain without sciatica  Acute pain of right knee       Goals:     12/08/22 0500   Body Part   Goals listed below are for low back & R knee   Goal #1   Goal #1 ambulation   Previous Functional Level No restrictions;Feet patient could walk   Performance Level 300ft without pain   Current Functional Level Feet patient can walk   Performance Level 100ft , 7/10 pain   STG Target Performance Feet patient will be able to walk   Performance Level 100ft, <4/10 pain   Rationale for safe outdoor household ambulation;to maintain proper body mechanics/posture while ambulating to avoid additional compensatory injury due to improper gait mechanics   Due Date 12/29/22    LTG Target Performance Feet patient will be able to walk   Performance Level 300 ft, without pain   Rationale for safe community ambulation;to promote a healthy and active lifestyle   Due Date 02/02/23       Therapy Frequency:  1x/week  Predicted Duration of Therapy Intervention:  12 weeks    Giovanni Ruiz, PT                 I CERTIFY THE NEED FOR THESE SERVICES FURNISHED UNDER        THIS PLAN OF TREATMENT AND WHILE UNDER MY CARE     (Physician attestation of this document indicates review and certification of the therapy plan).                     Certification Date From:   12/08/22   Certification Date To:  03/02/23    Referring Provider:  Khushi Cadena    Initial Assessment        See Epic Evaluation SOC Date: 12/08/22

## 2022-12-22 ENCOUNTER — VIRTUAL VISIT (OUTPATIENT)
Dept: PSYCHIATRY | Facility: CLINIC | Age: 42
End: 2022-12-22
Attending: PSYCHIATRY & NEUROLOGY
Payer: COMMERCIAL

## 2022-12-22 DIAGNOSIS — F25.1 SCHIZOAFFECTIVE DISORDER, DEPRESSIVE TYPE (H): ICD-10-CM

## 2022-12-22 PROCEDURE — 99442 PR PHYSICIAN TELEPHONE EVALUATION 11-20 MIN: CPT | Mod: 93 | Performed by: STUDENT IN AN ORGANIZED HEALTH CARE EDUCATION/TRAINING PROGRAM

## 2022-12-22 PROCEDURE — G0463 HOSPITAL OUTPT CLINIC VISIT: HCPCS | Mod: PN,TEL | Performed by: STUDENT IN AN ORGANIZED HEALTH CARE EDUCATION/TRAINING PROGRAM

## 2022-12-22 RX ORDER — ARIPIPRAZOLE 2 MG/1
8 TABLET ORAL DAILY
Qty: 120 TABLET | Refills: 1 | Status: SHIPPED | OUTPATIENT
Start: 2022-12-22 | End: 2023-02-02

## 2022-12-22 RX ORDER — MIRTAZAPINE 15 MG/1
15 TABLET, FILM COATED ORAL AT BEDTIME
Qty: 30 TABLET | Refills: 1 | Status: SHIPPED | OUTPATIENT
Start: 2022-12-22 | End: 2023-02-02

## 2022-12-22 RX ORDER — TRAZODONE HYDROCHLORIDE 50 MG/1
50-100 TABLET, FILM COATED ORAL
Qty: 60 TABLET | Refills: 1 | Status: SHIPPED | OUTPATIENT
Start: 2022-12-22 | End: 2023-02-02

## 2022-12-22 RX ORDER — RISPERIDONE 2 MG/1
2 TABLET ORAL AT BEDTIME
Qty: 30 TABLET | Refills: 1 | Status: SHIPPED | OUTPATIENT
Start: 2022-12-22 | End: 2023-02-02

## 2022-12-22 RX ORDER — LANOLIN ALCOHOL/MO/W.PET/CERES
6 CREAM (GRAM) TOPICAL AT BEDTIME
Qty: 60 TABLET | Refills: 1 | Status: SHIPPED | OUTPATIENT
Start: 2022-12-22 | End: 2023-02-02

## 2022-12-22 NOTE — NURSING NOTE
Unable to review medications due to time of check in but states that there are no changes.  Roselyn Grey VF

## 2022-12-22 NOTE — PROGRESS NOTES
Carmen Connolly is a 42 year old who is being evaluated via a billable telephone visit.      What phone number would you prefer to be contacted at?: Mobile phone number on file:   Telephone Information:   Mobile [phone]       Reason for telehealth visit: Patient has requested telehealth visit    Originating location (patient location): Patient's home    Will anyone else be joining the visit?  Cousin Ignacio  Telehealth Details  Type of service:  medication management  Time of service:    Start Time:  3:07 PM    End Time:  3:23 PM    Distant Site (provider location):  On-site  Mode of Communication:  Phone           [facility]  Psychiatry Clinic  MEDICAL PROGRESS NOTE     Carmen Connolly is a 42 year old patient who prefers the name Carmen and uses  pronouns she, her, hers.       DIAGNOSIS   Schizoaffective Disorder, Depressive type, in partial remission re: mood with active psychotic features      ASSESSMENT   Carmen reported continued improvement in hallucinations and paranoia with the current doses of risperidone and aripiprazole.  They feel her symptoms of psychosis have never been better controlled than they are now.  We discussed her concern that risperidone and aripriprazole would lead to the same metabolic side effects that lead to us trying to change from risperidone last year.  She and cousin were not interested in exploring alternative antipsychotics.  We discussed the possibility of trying to minimize aripiprazole dose to reduce the likelihood of metabolic side effects, and they would like to wait till next visit.  We are hoping to reduce risk of side effects while not having an increase of symptoms of psychosis, so we are going slowly with the decreases of Abilify.    No safety concerns at this time.     Future considerations:  -Perhaps a selective serotonin reuptake inhibitor might be helpful with trauma symptom cluster. She was on Effexor for a long time 2800-6928, but  the only other serotonergic medication in her chart is a short period on citalopram before that.     [place]MP review was not needed today.      PLAN                                                                                                                 1) Meds-  - Continue mirtazapine 15 mg  - Continue aripiprazole 8 mg daily   - Continue trazodone  mg hs/prn (for insomnia)  - continue risperidone 2 mg qhs     2) Psychotherapy- Continue with therapist who visits home twice weekly     3) Next due-  Labs- SGA 2/23  EKG- as indicated  Rating scales- N/A     4) Referrals-  None     5) Dispo- 6 weeks      PERTINENT BACKGROUND                           [most recent eval 8/5/22]   Previous diagnoses include Schizoaffective disorder, depression, generalized anxiety disorder, and PTSD. Symptoms began after the passing of her  while they were refugees living in [facility]. She did not receive any psychiatric care until after she immigrated to the [place] in [place].     Psych pertinent item history includes psychosis [sxs include paranoia, responding to internal stimuli, negative symptoms] and trauma hx     SANDY Morales and her cousin were interviewed with the aid of a [place]ian  on the phone.    -She's ok, no difference. Still slowly improving. Same appetite.  -waking up 2-3 times.  -takes 2 trazodone at bedtime every night.  -Continues to have AH occasionally during the day, and more frequently at night.  -Energy level is the same.  -This is the best she's been in several years.      Recent Psych Symptoms:   Depression:  low energy and poor concentration /memory  Elevated:  none  Psychosis:  auditory hallucinations, visual hallucinations and negative symptoms (avolition, affective flattening, anhedonia, alogia, apathy, See HPI)  Anxiety:  None  Trauma Related:  fear, nightmares, avoidance, trauma trigger psychological / physiological response, detached, startle response and hypervigilance    Recent  Substance Use:     -alcohol: No   -cannabis: No   -tobacco: No  -caffeine:  No   -opioids: No   Narcan Kit currrent: No   -other: none    Pertinent Negatives: No suicidal or violent ideation, self-injury, shine, aggression and harmful substance use  Adverse Effects: None reported     PAST MED TRIALS     Medication  Dose   (mg) Effect  Dates of Use   risperidone 2-7 mg Helped with sleep ?- 4/2022; restarted 8/2022 - present   venlafaxine 225 mg Helpful for mood 2014 - ?   temazepam 15 mg Helpful for sleep 2016 - 2018   citalopram         nortriptyline 10 mg HS   2016 - 2017   Hydroxyzine 25-50 mg    2016 - 2018   Abilify 15 mg  8/21-present   Mirtazapine 15 mg  4/21-present            PSYCH and SUBSTANCE USE Critical Summary Points since July 2022 8/5/22: Transfer of care diagnostic assessment.  Increased auditory hallucinations, family strongly wants to return to risperidone, so 1 mg of risperidone was restarted, Abilify continued.  9/2/22: Continued auditory hallucinations.  Significant PTSD symptoms in the form of hypervigilance, nightmares.  Increased risperidone from 1 mg to 2 mg, decreased Abilify from 15 mg to 10 mg, and started 1 mg of prazosin at bedtime.  9/16/22: Had symptoms of lightheadedness with risperidone increase and prazosin initiation. Slight improvement in psychosis symptoms. Stopping prazosin, could reconsider once on a stable dose of risperidone.  10/7/22: Dizziness has resolved. Psychosis is better controlled than it ever has been. Decreased aripiprazole to 8 mg.  11/28/22: No changes.  12/22/22: no changes.     MEDICAL HISTORY and ALLERGY     ALLERGIES: Patient has no known allergies.    Patient Active Problem List   Diagnosis     Essential hypertension     Gastroesophageal reflux disease without esophagitis     Shortened MI interval     Vitamin D deficiency     Posttraumatic stress disorder     Female circumcision     Chronic tension-type headache, not intractable     Schizoaffective  disorder, bipolar type (H)     Neurocognitive deficits     Arthritis     Extra digits     Immigrant with language difficulty     Pulmonary tuberculosis confirmed by sputum microscopy     Recurrent major depression (H)     Acute right-sided low back pain without sciatica     Morbid obesity (H)     Insomnia due to medical condition     Counseling regarding advanced directives     Acute pain of right knee     *Prediabetes       MEDICAL REVIEW OF SYSTEMS     none in addition to that documented above     MEDICATIONS     Current Outpatient Medications   Medication Sig Dispense Refill     acetaminophen (TYLENOL) 325 MG tablet Take 2 tablets (650 mg) by mouth every 4 hours as needed for mild pain 200 tablet 11     amLODIPine (NORVASC) 10 MG tablet Take 1 tablet (10 mg) by mouth daily 90 tablet 3     ARIPiprazole (ABILIFY) 2 MG tablet Take 4 tablets (8 mg) by mouth daily Take with 120 tablet 1     cetirizine (ZYRTEC) 10 MG tablet Take 1 tablet (10 mg) by mouth daily 90 tablet 3     [employer] D3 50 MCG (2000 UT) CAPS TAKE 1 CAPSULE BY MOUTH EVERY DAY 30 capsule 11     diclofenac (VOLTAREN) 1 % topical gel Apply 2 g topically 4 times daily 150 g 4     diphenhydrAMINE (BENADRYL) 25 MG tablet Take 1-2 tablets (25-50 mg) by mouth nightly as needed for itching or allergies 30 tablet 0     famotidine (PEPCID) 20 MG tablet TAKE 1 TABLET BY MOUTH TWICE A  tablet 3     fish oil-omega-3 fatty acids 1000 MG capsule Take 2 capsules (2 g) by mouth daily 180 capsule 3     fluticasone (FLONASE) 50 MCG/ACT nasal spray Spray 2 sprays into both nostrils daily 16 mL 11     ibuprofen (ADVIL/MOTRIN) 400 MG tablet Take 1 tablet (400 mg) by mouth every 4 hours as needed for moderate pain (4-6) 120 tablet 4     Incontinence Supply Disposable (DEPEND UNDERGARMENTS) MISC Use daily as needed 200 each 11     Magnesium Oxide ([employer] MAGNESIUM OXIDE) 250 MG tablet Take 2 tablets (500 mg) by mouth daily 180 tablet 3     melatonin 3 MG tablet Take 2 tablets  (6 mg) by mouth At Bedtime 60 tablet 1     mirtazapine (REMERON) 15 MG tablet Take 1 tablet (15 mg) by mouth At Bedtime 30 tablet 1     multivitamin w/minerals (THERA-VIT-M) tablet Take 1 tablet by mouth daily 100 tablet 3     propranolol ER (INDERAL LA) 60 MG 24 hr capsule Take 1 capsule (60 mg) by mouth daily 90 capsule 3     risperiDONE (RISPERDAL) 2 MG tablet Take 1 tablet (2 mg) by mouth At Bedtime 30 tablet 1     traZODone (DESYREL) 50 MG tablet Take 1-2 tablets ( mg) by mouth nightly as needed for sleep 60 tablet 1      VITALS   There were no vitals taken for this visit.     Pulse Readings from Last 3 Encounters:   11/14/22 103   10/07/22 105   09/28/22 90     Wt Readings from Last 3 Encounters:   11/14/22 90.7 kg (200 lb)   10/07/22 91.2 kg (201 lb)   09/28/22 90.5 kg (199 lb 9.6 oz)     BP Readings from Last 3 Encounters:   11/14/22 (!) 148/94   10/07/22 132/87   09/28/22 129/85      MENTAL STATUS EXAM     Alertness: slow to respond  Appearance: N/A (phone visit)  Behavior/Demeanor: passive, with N/A (phone visit) eye contact, suspect culturally appropriate  Speech: Poverty of speech  Language: [place] speaking, prefers to let cousin speak for her  Psychomotor: normal or unremarkable  Mood: The same  Affect: N/A (phone visit)  Thought Process/Associations: unremarkable  Thought Content:  Reports none;  Denies suicidal ideation and violent ideation  Perception:  Reports auditory hallucinations and visual hallucinations;  Denies none  Insight: limited  Judgment: adequate for safety  Cognition: does  appear grossly intact; formal cognitive testing was not done  Gait and Station: N/A (telehealth)     LABS and DATA     PHQ 9/3/2019 4/28/2020 11/28/2022   PHQ-9 Total Score 6 11 8   Q9: Thoughts of better off dead/self-harm past 2 weeks Not at all Not at all Not at all       Recent Labs   Lab Test 02/23/22  1022 07/28/21  1114   CR 0.80 0.71   GFRESTIMATED >90 >90     Recent Labs   Lab Test 02/23/22  1023  02/23/22  1022 07/28/21  1114   GLC  --  112* 151*   A1C 5.8*  --  5.9*     Recent Labs   Lab Test 02/23/22  1022 07/28/21  1114   CHOL 205* 212*   TRIG 184* 154*   * 135*   HDL 40* 46*     Recent Labs   Lab Test 02/23/22  1022 06/26/20  1042   AST 26 23.8   ALT 25 28.8   ALKPHOS 104 88.5     Recent Labs   Lab Test 02/23/22  1022 07/28/21  1114 02/14/17  1513 04/11/16  1507 03/31/16  1534 01/31/16  1401   WBC 5.6 6.3  --  7.0  --  6.4   ANEU  --   --   --  3.0  --  2.4   HGB 13.7 12.8   < > 12.1   < > 14.0    214  --  282  --  301    < > = values in this interval not displayed.       ECG 7/26/21 QTc = 413ms     PSYCHOTROPIC DRUG INTERACTIONS                                                       PSYCHCLINICDDI   ARIPIPRAZOLE, RISPERIDONE, propranolol & AMLODIPINE- Blood Pressure Lowering Agents may enhance the hypotensive effect of Antipsychotic Agents (Second Generation [Atypical]).   ARIPIPRAZOLE, MIRTAZAPINE, & TRAZODONE- Serotonergic Agents (High Risk) may enhance the adverse/toxic effect of Antipsychotic Agents. Specifically, serotonergic agents may enhance dopamine blockade, possibly increasing the risk for neuroleptic malignant syndrome. Antipsychotic Agents may enhance the serotonergic effect of Serotonergic Agents (High Risk). This could result in serotonin syndrome.    CETIRIZINE, MIRTAZAPINE, RISPERIDONE and DIPHENHYDRAMINE may result in increased risk of CNS depression.      MANAGEMENT:  Monitoring for adverse effects     RISK STATEMENT for SAFETY     Carmen did not appear to be an imminent safety risk to self or others.    TREATMENT RISK STATEMENT: The risks, benefits, alternatives and potential adverse effects have been discussed and are understood by the pt. The pt understands the risks of using street drugs or alcohol. There are no medical contraindications, the pt agrees to treatment with the ability to do so. The pt knows to call the clinic for any problems or to access emergency care  if needed.  Medical and substance use concerns are documented above.  Psychotropic drug interaction check was done, including changes made today.     PROVIDER: Roger Camacho MD    Patient staffed in clinic with Dr. Borden who will sign the note.  Supervisor is Dr. Dangelo.       MEDICAL DECISION MAKING        (SmartPhrase .[id number])     - At least 1 chronic problem that is not stable    - Engaged in prescription drug management during visit (discussed any medication benefits, side effects, alternatives, etc.)       Attestation:  I have personally examined this patient today using videoconferencing due to the COVID-19 pandemic and I participated in key aspects of the clinical encounter. I agree with the content of the resident's note.  Ivania Borden MD

## 2022-12-22 NOTE — PATIENT INSTRUCTIONS
**For crisis resources, please see the information at the end of this document**   Patient Education    Thank you for coming to the [facility] [place] [facility].     Lab Testing:  If you had lab testing today and your results are reassuring or normal they will be mailed to you or sent through CatchMe! within 7 days. If the lab tests need quick action we will call you with the results. The phone number we will call with results is # [phone]. If this is not the best number please call our clinic and change the number.     Medication Refills:  If you need any refills please call your pharmacy and they will contact us. Our fax number for refills is [phone].   Three business days of notice are needed for general medication refill requests.   Five business days of notice are needed for controlled substance refill requests.   If you need to change to a different pharmacy, please contact the new pharmacy directly. The new pharmacy will help you get your medications transferred.     Contact Us:  Please call [phone] during business hours (8-5:00 M-F).   If you have medication related questions after clinic hours, or on the weekend, please call [phone].     Financial Assistance [phone]   Medical Records [phone]       MENTAL HEALTH CRISIS RESOURCES:  For a emergency help, please call 911 or go to the nearest Emergency Department.     Emergency Walk-In Options:   EmPATH Unit @ [facility] ([place]): [phone] - Specialized mental health emergency area designed to be calming  [facility] ([place]): [phone]  [facility] ([place]): [phone]  [facility]): [phone]    [facility] Crisis Information:   [place]: [phone]  [place]: [phone]  [place] ([place]) - Adult: [phone]     Child: [phone]  [place] - Adult: [phone]     Child: [phone]  [place]:  [phone]  List of all [facility] resources:   https://[place].gov/dhs/people-we-serve/adults/health-care/mental-health/resources/crisis-contacts.jsp    National Crisis Information:   Crisis Text Line: Text  [place]  to 053483  Suicide & Crisis Lifeline: 988  National Suicide Prevention Lifeline: 5-372-315-TALK (1-614.496.7988)       For online chat options, visit https://suicidepreventionlifeline.org/chat/  Poison Control Center: 6-228-387-2165  Trans Lifeline: 8-728-001-5733 - Hotline for transgender people of all ages  The Randy Project: 2-721-483-3896 - Hotline for LGBT youth     For Non-Emergency Support:   Fast Tracker: Mental Health & Substance Use Disorder Resources -   https://www.[employer].org/

## 2022-12-23 DIAGNOSIS — F25.1 SCHIZOAFFECTIVE DISORDER, DEPRESSIVE TYPE (H): ICD-10-CM

## 2022-12-23 RX ORDER — ARIPIPRAZOLE 2 MG/1
8 TABLET ORAL DAILY
Qty: 120 TABLET | Refills: 1 | OUTPATIENT
Start: 2022-12-23

## 2023-01-18 DIAGNOSIS — F25.1 SCHIZOAFFECTIVE DISORDER, DEPRESSIVE TYPE (H): ICD-10-CM

## 2023-01-19 RX ORDER — MIRTAZAPINE 15 MG/1
TABLET, FILM COATED ORAL
Qty: 30 TABLET | Refills: 1 | OUTPATIENT
Start: 2023-01-19

## 2023-01-19 RX ORDER — RISPERIDONE 2 MG/1
TABLET ORAL
Qty: 30 TABLET | Refills: 1 | OUTPATIENT
Start: 2023-01-19

## 2023-01-19 RX ORDER — TRAZODONE HYDROCHLORIDE 50 MG/1
TABLET, FILM COATED ORAL
Qty: 60 TABLET | Refills: 1 | OUTPATIENT
Start: 2023-01-19

## 2023-01-19 RX ORDER — OMEPRAZOLE MAGNESIUM 20 MG
CAPSULE,DELAYED RELEASE (ENTERIC COATED) ORAL
Qty: 60 TABLET | Refills: 1 | OUTPATIENT
Start: 2023-01-19

## 2023-02-02 ENCOUNTER — OFFICE VISIT (OUTPATIENT)
Dept: PSYCHIATRY | Facility: CLINIC | Age: 43
End: 2023-02-02
Attending: PSYCHIATRY & NEUROLOGY
Payer: COMMERCIAL

## 2023-02-02 VITALS
SYSTOLIC BLOOD PRESSURE: 137 MMHG | BODY MASS INDEX: 34.93 KG/M2 | DIASTOLIC BLOOD PRESSURE: 87 MMHG | WEIGHT: 197.2 LBS | HEART RATE: 94 BPM

## 2023-02-02 DIAGNOSIS — Z51.81 ENCOUNTER FOR THERAPEUTIC DRUG MONITORING: ICD-10-CM

## 2023-02-02 DIAGNOSIS — F25.1 SCHIZOAFFECTIVE DISORDER, DEPRESSIVE TYPE (H): Primary | ICD-10-CM

## 2023-02-02 PROCEDURE — 99214 OFFICE O/P EST MOD 30 MIN: CPT | Mod: GC | Performed by: STUDENT IN AN ORGANIZED HEALTH CARE EDUCATION/TRAINING PROGRAM

## 2023-02-02 PROCEDURE — G0463 HOSPITAL OUTPT CLINIC VISIT: HCPCS | Performed by: STUDENT IN AN ORGANIZED HEALTH CARE EDUCATION/TRAINING PROGRAM

## 2023-02-02 RX ORDER — LANOLIN ALCOHOL/MO/W.PET/CERES
6 CREAM (GRAM) TOPICAL AT BEDTIME
Qty: 60 TABLET | Refills: 1 | Status: SHIPPED | OUTPATIENT
Start: 2023-02-02 | End: 2023-03-06

## 2023-02-02 RX ORDER — TRAZODONE HYDROCHLORIDE 50 MG/1
50-100 TABLET, FILM COATED ORAL
Qty: 60 TABLET | Refills: 1 | Status: SHIPPED | OUTPATIENT
Start: 2023-02-02 | End: 2023-03-06

## 2023-02-02 RX ORDER — ARIPIPRAZOLE 2 MG/1
8 TABLET ORAL DAILY
Qty: 120 TABLET | Refills: 1 | Status: SHIPPED | OUTPATIENT
Start: 2023-02-02 | End: 2023-03-03

## 2023-02-02 RX ORDER — MIRTAZAPINE 15 MG/1
15 TABLET, FILM COATED ORAL AT BEDTIME
Qty: 30 TABLET | Refills: 1 | Status: SHIPPED | OUTPATIENT
Start: 2023-02-02 | End: 2023-03-06

## 2023-02-02 RX ORDER — RISPERIDONE 2 MG/1
2 TABLET ORAL AT BEDTIME
Qty: 30 TABLET | Refills: 1 | Status: SHIPPED | OUTPATIENT
Start: 2023-02-02 | End: 2023-03-06

## 2023-02-02 ASSESSMENT — PAIN SCALES - GENERAL: PAINLEVEL: NO PAIN (0)

## 2023-02-02 NOTE — NURSING NOTE
Chief Complaint   Patient presents with     RECHECK     Schizoaffective disorder, depressive type

## 2023-02-02 NOTE — PROGRESS NOTES
[facility]  Psychiatry Clinic  MEDICAL PROGRESS NOTE     Carmen Connolly is a 42 year old patient who prefers the name Carmen and uses  pronouns she, her, hers.       DIAGNOSIS   Schizoaffective Disorder, Depressive type, in partial remission re: mood with active psychotic features      ASSESSMENT   Carmen reported continued improvement in hallucinations and paranoia with the current doses of risperidone and aripiprazole.  They feel her symptoms of psychosis have never been better controlled than they are now.  We discussed her concern that risperidone and aripriprazole would lead to the same metabolic side effects that lead to us trying to change from risperidone last year.  She and cousin were not interested in exploring alternative antipsychotics.  We discussed the possibility of trying to minimize aripiprazole dose to reduce the likelihood of metabolic side effects, and they would like to hold off for now. We are hoping to reduce risk of side effects while not having an increase of symptoms of psychosis, so we are going slowly with the decreases of Abilify. She is due for annual AP labs, so these were ordered today.    No safety concerns at this time.     Future considerations:  -Perhaps a selective serotonin reuptake inhibitor might be helpful with trauma symptom cluster. She was on Effexor for a long time 7689-4150, but the only other serotonergic medication in her chart is a short period on citalopram before that.     [place]NPMP review was not needed today.      PLAN                                                                                                                 1) Meds-  - Continue mirtazapine 15 mg  - Continue aripiprazole 8 mg daily   - Continue trazodone  mg hs/prn (for insomnia)  - continue risperidone 2 mg qhs     2) Psychotherapy- Continue with therapist who visits home twice weekly     3) Next due-  Labs- SGA ordered today  EKG- as indicated  Rating  scales- N/A     4) Referrals-  None     5) Dispo- 6 weeks      PERTINENT BACKGROUND                           [most recent eval 8/5/22]   Previous diagnoses include Schizoaffective disorder, depression, generalized anxiety disorder, and PTSD. Symptoms began after the passing of her  while they were refugees living in [facility]. She did not receive any psychiatric care until after she immigrated to the [place] in [place].     Psych pertinent item history includes psychosis [sxs include paranoia, responding to internal stimuli, negative symptoms] and trauma hx     SANDY Morales and her cousin were interviewed with the aid of a [facility]  on the phone.    -She's ok, no difference. Still slowly improving.   -still waking up 2-3 times.  -takes 2 trazodone at bedtime every night.  -Continues to have AH occasionally during the middle of the day, and stillmore frequently at night.  -Energy level is the same.  -Continue to push her to eat. She's never hungry. Weight remains stable.      Recent Psych Symptoms:   Depression:  low energy and poor concentration /memory  Elevated:  none  Psychosis:  auditory hallucinations, visual hallucinations and negative symptoms (avolition, affective flattening, anhedonia, alogia, apathy, See HPI)  Anxiety:  None  Trauma Related:  fear, nightmares, avoidance, trauma trigger psychological / physiological response, detached, startle response and hypervigilance    Recent Substance Use:     -alcohol: No   -cannabis: No   -tobacco: No  -caffeine:  No   -opioids: No   Narcan Kit currrent: No   -other: none       PAST MED TRIALS     Medication  Dose   (mg) Effect  Dates of Use   risperidone 2-7 mg Helped with sleep ?- 4/2022; restarted 8/2022 - present   venlafaxine 225 mg Helpful for mood 2014 - ?   temazepam 15 mg Helpful for sleep 2016 - 2018   citalopram         nortriptyline 10 mg HS   2016 - 2017   Hydroxyzine 25-50 mg    2016 - 2018   Abilify 15 mg  8/21-present   Mirtazapine 15 mg   4/21-present            PSYCH and SUBSTANCE USE Critical Summary Points since July 2022 8/5/22: Transfer of care diagnostic assessment.  Increased auditory hallucinations, family strongly wants to return to risperidone, so 1 mg of risperidone was restarted, Abilify continued.  9/2/22: Continued auditory hallucinations.  Significant PTSD symptoms in the form of hypervigilance, nightmares.  Increased risperidone from 1 mg to 2 mg, decreased Abilify from 15 mg to 10 mg, and started 1 mg of prazosin at bedtime.  9/16/22: Had symptoms of lightheadedness with risperidone increase and prazosin initiation. Slight improvement in psychosis symptoms. Stopping prazosin, could reconsider once on a stable dose of risperidone.  10/7/22: Dizziness has resolved. Psychosis is better controlled than it ever has been. Decreased aripiprazole to 8 mg.  11/28/22: No changes.  12/22/22: no changes.  2/2/23: No changes.     MEDICAL HISTORY and ALLERGY     ALLERGIES: Patient has no known allergies.    Patient Active Problem List   Diagnosis     Essential hypertension     Gastroesophageal reflux disease without esophagitis     Shortened WA interval     Vitamin D deficiency     Posttraumatic stress disorder     Female circumcision     Chronic tension-type headache, not intractable     Schizoaffective disorder, bipolar type (H)     Neurocognitive deficits     Arthritis     Extra digits     Immigrant with language difficulty     Pulmonary tuberculosis confirmed by sputum microscopy     Recurrent major depression (H)     Acute right-sided low back pain without sciatica     Morbid obesity (H)     Insomnia due to medical condition     Counseling regarding advanced directives     Acute pain of right knee     *Prediabetes       MEDICAL REVIEW OF SYSTEMS     none in addition to that documented above     MEDICATIONS     Current Outpatient Medications   Medication Sig Dispense Refill     acetaminophen (TYLENOL) 325 MG tablet Take 2 tablets (650 mg)  by mouth every 4 hours as needed for mild pain 200 tablet 11     amLODIPine (NORVASC) 10 MG tablet Take 1 tablet (10 mg) by mouth daily 90 tablet 3     ARIPiprazole (ABILIFY) 2 MG tablet Take 4 tablets (8 mg) by mouth daily Take with 120 tablet 1     cetirizine (ZYRTEC) 10 MG tablet Take 1 tablet (10 mg) by mouth daily 90 tablet 3     [employer] D3 50 MCG (2000 UT) CAPS TAKE 1 CAPSULE BY MOUTH EVERY DAY 30 capsule 11     diclofenac (VOLTAREN) 1 % topical gel Apply 2 g topically 4 times daily 150 g 4     diphenhydrAMINE (BENADRYL) 25 MG tablet Take 1-2 tablets (25-50 mg) by mouth nightly as needed for itching or allergies 30 tablet 0     famotidine (PEPCID) 20 MG tablet TAKE 1 TABLET BY MOUTH TWICE A  tablet 3     fish oil-omega-3 fatty acids 1000 MG capsule Take 2 capsules (2 g) by mouth daily 180 capsule 3     fluticasone (FLONASE) 50 MCG/ACT nasal spray Spray 2 sprays into both nostrils daily 16 mL 11     ibuprofen (ADVIL/MOTRIN) 400 MG tablet Take 1 tablet (400 mg) by mouth every 4 hours as needed for moderate pain (4-6) 120 tablet 4     Incontinence Supply Disposable (DEPEND UNDERGARMENTS) MISC Use daily as needed 200 each 11     Magnesium Oxide ([employer] MAGNESIUM OXIDE) 250 MG tablet Take 2 tablets (500 mg) by mouth daily 180 tablet 3     melatonin 3 MG tablet Take 2 tablets (6 mg) by mouth At Bedtime 60 tablet 1     mirtazapine (REMERON) 15 MG tablet Take 1 tablet (15 mg) by mouth At Bedtime 30 tablet 1     multivitamin w/minerals (THERA-VIT-M) tablet Take 1 tablet by mouth daily 100 tablet 3     propranolol ER (INDERAL LA) 60 MG 24 hr capsule Take 1 capsule (60 mg) by mouth daily 90 capsule 3     risperiDONE (RISPERDAL) 2 MG tablet Take 1 tablet (2 mg) by mouth At Bedtime 30 tablet 1     traZODone (DESYREL) 50 MG tablet Take 1-2 tablets ( mg) by mouth nightly as needed for sleep 60 tablet 1      VITALS   /87 (BP Location: Right arm, Patient Position: Sitting, Cuff Size: Adult Large)   Pulse 94   Wt  89.4 kg (197 lb 3.2 oz)   BMI 34.93 kg/m       Pulse Readings from Last 3 Encounters:   02/02/23 94   11/14/22 103   10/07/22 105     Wt Readings from Last 3 Encounters:   02/02/23 89.4 kg (197 lb 3.2 oz)   11/14/22 90.7 kg (200 lb)   10/07/22 91.2 kg (201 lb)     BP Readings from Last 3 Encounters:   02/02/23 137/87   11/14/22 (!) 148/94   10/07/22 132/87      MENTAL STATUS EXAM     Alertness: slow to respond  Appearance: well groomed  Behavior/Demeanor: passive, with poor eye contact, suspect culturally appropriate  Speech: Poverty of speech  Language: [place] speaking, prefers to let cousin speak for her  Psychomotor: normal or unremarkable  Mood: The same  Affect: N/A (phone visit) and flat  Thought Process/Associations: unremarkable  Thought Content:  Reports none;  Denies suicidal ideation and violent ideation  Perception:  Reports auditory hallucinations and visual hallucinations;  Denies none  Insight: limited  Judgment: adequate for safety  Cognition: does  appear grossly intact; formal cognitive testing was not done  Gait and Station: unremarkable     LABS and DATA     PHQ 9/3/2019 4/28/2020 11/28/2022   PHQ-9 Total Score 6 11 8   Q9: Thoughts of better off dead/self-harm past 2 weeks Not at all Not at all Not at all       Recent Labs   Lab Test 02/23/22  1022 07/28/21  1114   CR 0.80 0.71   GFRESTIMATED >90 >90     Recent Labs   Lab Test 02/23/22  1023 02/23/22  1022 07/28/21  1114   GLC  --  112* 151*   A1C 5.8*  --  5.9*     Recent Labs   Lab Test 02/23/22  1022 07/28/21  1114   CHOL 205* 212*   TRIG 184* 154*   * 135*   HDL 40* 46*     Recent Labs   Lab Test 02/23/22  1022 06/26/20  1042   AST 26 23.8   ALT 25 28.8   ALKPHOS 104 88.5     Recent Labs   Lab Test 02/23/22  1022 07/28/21  1114 02/14/17  1513 04/11/16  1507 03/31/16  1534 01/31/16  1401   WBC 5.6 6.3  --  7.0  --  6.4   ANEU  --   --   --  3.0  --  2.4   HGB 13.7 12.8   < > 12.1   < > 14.0    214  --  282  --  301    < > =  values in this interval not displayed.       ECG 7/26/21 QTc = 413ms     PSYCHOTROPIC DRUG INTERACTIONS                                                       PSYCHCLINICDDI   ARIPIPRAZOLE, RISPERIDONE, propranolol & AMLODIPINE- Blood Pressure Lowering Agents may enhance the hypotensive effect of Antipsychotic Agents (Second Generation [Atypical]).   ARIPIPRAZOLE, MIRTAZAPINE, & TRAZODONE- Serotonergic Agents (High Risk) may enhance the adverse/toxic effect of Antipsychotic Agents. Specifically, serotonergic agents may enhance dopamine blockade, possibly increasing the risk for neuroleptic malignant syndrome. Antipsychotic Agents may enhance the serotonergic effect of Serotonergic Agents (High Risk). This could result in serotonin syndrome.    CETIRIZINE, MIRTAZAPINE, RISPERIDONE and DIPHENHYDRAMINE may result in increased risk of CNS depression.      MANAGEMENT:  Monitoring for adverse effects     RISK STATEMENT for SAFETY     Carmen did not appear to be an imminent safety risk to self or others.    TREATMENT RISK STATEMENT: The risks, benefits, alternatives and potential adverse effects have been discussed and are understood by the pt. The pt understands the risks of using street drugs or alcohol. There are no medical contraindications, the pt agrees to treatment with the ability to do so. The pt knows to call the clinic for any problems or to access emergency care if needed.  Medical and substance use concerns are documented above.  Psychotropic drug interaction check was done, including changes made today.     PROVIDER: Roger Camacho MD    Patient staffed in clinic with Dr. Zamora who will sign the note.  Supervisor is Dr. Dangelo.       MEDICAL DECISION MAKING        (SmartPhrase .[email or web address])     - At least 1 chronic problem that is not stable    - Engaged in prescription drug management during visit (discussed any medication benefits, side effects, alternatives, etc.)

## 2023-02-02 NOTE — PATIENT INSTRUCTIONS
**For crisis resources, please see the information at the end of this document**   Patient Education    Thank you for coming to the [facility] & [facility] [place] [facility].     Lab Testing:  If you had lab testing today and your results are reassuring or normal they will be mailed to you or sent through [email or web address] within 7 days. If the lab tests need quick action we will call you with the results. The phone number we will call with results is # [phone]. If this is not the best number please call our clinic and change the number.     Medication Refills:  If you need any refills please call your pharmacy and they will contact us. Our fax number for refills is [phone].   Three business days of notice are needed for general medication refill requests.   Five business days of notice are needed for controlled substance refill requests.   If you need to change to a different pharmacy, please contact the new pharmacy directly. The new pharmacy will help you get your medications transferred.     Contact Us:  Please call [phone] during business hours (8-5:00 M-F).   If you have medication related questions after clinic hours, or on the weekend, please call [phone].     Financial Assistance [phone]   Medical Records [phone]       MENTAL HEALTH CRISIS RESOURCES:  For a emergency help, please call 911 or go to the nearest Emergency Department.     Emergency Walk-In Options:   EmPATH Unit @ [facility] ([place]): [phone] - Specialized mental health emergency area designed to be calming  [facility] ([place]): [phone]  [facility] ([place]): [phone]  [facility]): [phone]    [facility] Crisis Information:   [place]: [phone]  [place]: [phone]  [place] (AL[place]) - Adult: [phone]     Child: [phone]  [place] - Adult: [phone]     Child: [phone]  [place]:  [phone]  List of all [facility] resources:   https://[place].gov/dhs/people-we-serve/adults/health-care/mental-health/resources/crisis-contacts.jsp    National Crisis Information:   Crisis Text Line: Text  [place]  to 095305  Suicide & Crisis Lifeline: 988  National Suicide Prevention Lifeline: 0-689-371-TALK (1-721.468.9660)       For online chat options, visit https://suicidepreventionlifeline.org/chat/  Poison Control Center: 4-192-611-7370  Trans Lifeline: 7-230-889-1099 - Hotline for transgender people of all ages  The Randy Project: 3-467-861-4721 - Hotline for LGBT youth     For Non-Emergency Support:   Fast Tracker: Mental Health & Substance Use Disorder Resources -   https://www.[employer].org/

## 2023-03-03 DIAGNOSIS — F25.1 SCHIZOAFFECTIVE DISORDER, DEPRESSIVE TYPE (H): ICD-10-CM

## 2023-03-03 RX ORDER — ARIPIPRAZOLE 2 MG/1
8 TABLET ORAL DAILY
Qty: 120 TABLET | Refills: 0 | Status: SHIPPED | OUTPATIENT
Start: 2023-03-03 | End: 2023-04-07

## 2023-03-03 NOTE — TELEPHONE ENCOUNTER
Medication requested: ARIPIPRAZOLE 2 MG TABLET  Last refilled: 2/2/23  Qty: 120/1      Last seen: 2/2/23  RTC: 6 weeks  Cancel: 0  No-show: 0  Next appt: 4/7/23    Refill decision:   Refilled for 30 days per protocol.

## 2023-03-06 RX ORDER — RISPERIDONE 2 MG/1
TABLET ORAL
Qty: 30 TABLET | Refills: 0 | Status: SHIPPED | OUTPATIENT
Start: 2023-03-06 | End: 2023-04-07

## 2023-03-06 RX ORDER — OMEPRAZOLE MAGNESIUM 20 MG
CAPSULE,DELAYED RELEASE (ENTERIC COATED) ORAL
Qty: 60 TABLET | Refills: 0 | Status: SHIPPED | OUTPATIENT
Start: 2023-03-06

## 2023-03-06 RX ORDER — MIRTAZAPINE 15 MG/1
TABLET, FILM COATED ORAL
Qty: 30 TABLET | Refills: 0 | Status: SHIPPED | OUTPATIENT
Start: 2023-03-06 | End: 2023-04-07

## 2023-03-06 RX ORDER — TRAZODONE HYDROCHLORIDE 50 MG/1
TABLET, FILM COATED ORAL
Qty: 60 TABLET | Refills: 0 | Status: SHIPPED | OUTPATIENT
Start: 2023-03-06 | End: 2023-04-07

## 2023-03-06 NOTE — TELEPHONE ENCOUNTER
mirtazapine (REMERON) 15 MG  risperiDONE (RISPERDAL) 2 MG      Last refilled: 2/2/23  Qty: 30 :1    traZODone (DESYREL) 50 MG  Last refilled: 2/2/23  Qty: 60:1    melatonin 3 MG  Last refilled: 2/2/23  Qty: 60 : 1  Last seen: 2/2/23  RTC: 6 weeks  Cancel: 0  No-show: 0  Next appt: 4/9/23  Routing refill request to provider for review/approval because:  Melatonin Med plan   continue/Refill ?     ( 2 meds) MED OVER RIDE (warning)

## 2023-03-31 DIAGNOSIS — K21.00 GASTROESOPHAGEAL REFLUX DISEASE WITH ESOPHAGITIS, UNSPECIFIED WHETHER HEMORRHAGE: ICD-10-CM

## 2023-03-31 DIAGNOSIS — I10 ESSENTIAL HYPERTENSION WITH GOAL BLOOD PRESSURE LESS THAN 130/85: ICD-10-CM

## 2023-03-31 NOTE — TELEPHONE ENCOUNTER
"Request for medication refill:  amLODIPine (NORVASC) 10 MG tablet  famotidine (PEPCID) 20 MG tablet  fish oil-omega-3 fatty acids 1000 MG capsule    Providers if patient needs an appointment and you are willing to give a one month supply please refill for one month and  send a letter/MyChart using \".SMILLIMITEDREFILL\" .smillimited and route chart to \"P [facility] \" (Giving one month refill in non controlled medications is strongly recommended before denial)    If refill has been denied, meaning absolutely no refills without visit, please complete the smart phrase \".smirxrefuse\" and route it to the \"P SMI MED REFILLS\"  pool to inform the patient and the pharmacy.    Parris Pierre MA        "

## 2023-04-05 RX ORDER — FAMOTIDINE 20 MG/1
TABLET, FILM COATED ORAL
Qty: 180 TABLET | Refills: 3 | Status: SHIPPED | OUTPATIENT
Start: 2023-04-05 | End: 2023-10-16

## 2023-04-05 RX ORDER — AMLODIPINE BESYLATE 10 MG/1
10 TABLET ORAL DAILY
Qty: 90 TABLET | Refills: 3 | Status: SHIPPED | OUTPATIENT
Start: 2023-04-05 | End: 2023-10-16

## 2023-04-05 RX ORDER — CHLORAL HYDRATE 500 MG
2 CAPSULE ORAL DAILY
Qty: 60 CAPSULE | Refills: 11 | Status: SHIPPED | OUTPATIENT
Start: 2023-04-05 | End: 2023-06-08

## 2023-04-06 DIAGNOSIS — F25.1 SCHIZOAFFECTIVE DISORDER, DEPRESSIVE TYPE (H): ICD-10-CM

## 2023-04-06 NOTE — PROGRESS NOTES
[facility]  Psychiatry Clinic  MEDICAL PROGRESS NOTE     Carmen Connolly is a 42 year old patient who prefers the name Carmen and uses  pronouns she, her, hers.       DIAGNOSIS   Schizoaffective Disorder, Depressive type, in partial remission re: mood with active psychotic features  R/o PTSD      ASSESSMENT   Carmen reported worsened hallucinations and fear.  No clear change in routine or stressors.  These symptoms have negatively impacted sleep. We discussed several different options to try to address this, and we settled on increasing the risperidone from 2 mg at bedtime to 2.25 mg we are being more cautious in the context of her ongoing mild orthostasis.  We are also reducing her trazodone to limit orthostatic risk.  No falls, and counseled on behavioral changes to limit dizziness and fainting.    We discussed her concern that risperidone and aripriprazole would lead to the same metabolic side effects that lead to us trying to change from risperidone last year.  She and cousin were not interested in exploring alternative antipsychotics.  She is due for annual AP labs, these have been ordered, and they will get them drawn soon    No safety concerns at this time.     Future considerations:  -Perhaps a selective serotonin reuptake inhibitor might be helpful with trauma symptom cluster. She was on Effexor for a long time 8962-4723, but the only other serotonergic medication in her chart is a short period on citalopram before that.      [place]NPMP review was not needed today.      PLAN                                                                                                                 1) Meds-  - Continue mirtazapine 15 mg  - Continue aripiprazole 8 mg daily   - Decrease trazodone 50 mg hs/prn (for insomnia)  - Increase risperidone 2.25 mg qhs     2) Psychotherapy- Continue with therapist who visits home twice weekly     3) Next due-  Labs- SGA ordered  EKG- as  indicated  Rating scales- N/A     4) Referrals-  None     5) Dispo- 4 weeks      PERTINENT BACKGROUND                           [most recent eval 8/5/22]   Previous diagnoses include Schizoaffective disorder, depression, generalized anxiety disorder, and PTSD. Symptoms began after the passing of her  while they were refugees living in [place]. She did not receive any psychiatric care until after she immigrated to the [place] in [place].     Psych pertinent item history includes psychosis [sxs include paranoia, responding to internal stimuli, negative symptoms] and trauma hx     SANDY Morales and her cousin were interviewed together.    -Things a little bit worse. More waking up (4-5 times), more worried about voices calling to her.  -No changes to stressors.   -appetite is very low  -Has been taking more walks.   -Tires more easily.  -No SI or HI.      Recent Psych Symptoms:   Depression:  low energy and poor concentration /memory  Elevated:  none  Psychosis:  auditory hallucinations, visual hallucinations and negative symptoms (avolition, affective flattening, anhedonia, alogia, apathy, See HPI)  Anxiety:  None  Trauma Related:  fear, nightmares, avoidance, trauma trigger psychological / physiological response, detached, startle response and hypervigilance    Recent Substance Use:     -alcohol: No   -cannabis: No   -tobacco: No  -caffeine:  No   -opioids: No   Narcan Kit currrent: No   -other: none       PAST MED TRIALS     Medication  Dose   (mg) Effect  Dates of Use   risperidone 2-7 mg Helped with sleep ?- 4/2022; restarted 8/2022 - present   venlafaxine 225 mg Helpful for mood 2014 - ?   temazepam 15 mg Helpful for sleep 2016 - 2018   citalopram         nortriptyline 10 mg HS   2016 - 2017   Hydroxyzine 25-50 mg    2016 - 2018   Abilify 15 mg  8/21-present   Mirtazapine 15 mg  4/21-present            PSYCH and SUBSTANCE USE Critical Summary Points since July 2022 8/5/22: Transfer of care diagnostic  assessment.  Increased auditory hallucinations, family strongly wants to return to risperidone, so 1 mg of risperidone was restarted, Abilify continued.  9/2/22: Continued auditory hallucinations.  Significant PTSD symptoms in the form of hypervigilance, nightmares.  Increased risperidone from 1 mg to 2 mg, decreased Abilify from 15 mg to 10 mg, and started 1 mg of prazosin at bedtime.  9/16/22: Had symptoms of lightheadedness with risperidone increase and prazosin initiation. Slight improvement in psychosis symptoms. Stopping prazosin, could reconsider once on a stable dose of risperidone.  10/7/22: Dizziness has resolved. Psychosis is better controlled than it ever has been. Decreased aripiprazole to 8 mg.  11/28/22: No changes.  12/22/22: no changes.  2/2/23: No changes.  4/6/23: Worsening AH, increased risperidone to 2.25 mg and decreased trazodone to 50 mg to limit orthostasis.     MEDICAL HISTORY and ALLERGY     ALLERGIES: Patient has no known allergies.    Patient Active Problem List   Diagnosis     Essential hypertension     Gastroesophageal reflux disease without esophagitis     Shortened MT interval     Vitamin D deficiency     Posttraumatic stress disorder     Female circumcision     Chronic tension-type headache, not intractable     Schizoaffective disorder, bipolar type (H)     Neurocognitive deficits     Arthritis     Extra digits     Immigrant with language difficulty     Pulmonary tuberculosis confirmed by sputum microscopy     Recurrent major depression (H)     Acute right-sided low back pain without sciatica     Morbid obesity (H)     Insomnia due to medical condition     Counseling regarding advanced directives     Acute pain of right knee     *Prediabetes         MEDICATIONS     Current Outpatient Medications   Medication Sig Dispense Refill     acetaminophen (TYLENOL) 325 MG tablet Take 2 tablets (650 mg) by mouth every 4 hours as needed for mild pain 200 tablet 11     amLODIPine (NORVASC) 10 MG  tablet TAKE 1 TABLET (10 MG) BY MOUTH DAILY. 90 tablet 3     ARIPiprazole (ABILIFY) 2 MG tablet Take 4 tablets (8 mg) by mouth daily 120 tablet 1     cetirizine (ZYRTEC) 10 MG tablet Take 1 tablet (10 mg) by mouth daily 90 tablet 3     [employer] D3 50 MCG (2000 UT) CAPS TAKE 1 CAPSULE BY MOUTH EVERY DAY 30 capsule 11     [employer]S MELATONIN 3 MG tablet TAKE 2 TABLETS (6 MG) BY MOUTH AT BEDTIME 60 tablet 0     diclofenac (VOLTAREN) 1 % topical gel Apply 2 g topically 4 times daily 150 g 4     diphenhydrAMINE (BENADRYL) 25 MG tablet Take 1-2 tablets (25-50 mg) by mouth nightly as needed for itching or allergies 30 tablet 0     famotidine (PEPCID) 20 MG tablet TAKE 1 TABLET BY MOUTH TWICE A  tablet 3     fish oil-omega-3 fatty acids 1000 MG capsule TAKE 2 CAPSULES (2 G) BY MOUTH DAILY 60 capsule 11     fluticasone (FLONASE) 50 MCG/ACT nasal spray Spray 2 sprays into both nostrils daily 16 mL 11     ibuprofen (ADVIL/MOTRIN) 400 MG tablet Take 1 tablet (400 mg) by mouth every 4 hours as needed for moderate pain (4-6) 120 tablet 4     Incontinence Supply Disposable (DEPEND UNDERGARMENTS) MISC Use daily as needed 200 each 11     Magnesium Oxide ([employer]S MAGNESIUM OXIDE) 250 MG tablet Take 2 tablets (500 mg) by mouth daily 180 tablet 3     mirtazapine (REMERON) 15 MG tablet Take 1 tablet (15 mg) by mouth At Bedtime 30 tablet 1     multivitamin w/minerals (THERA-VIT-M) tablet Take 1 tablet by mouth daily 100 tablet 3     propranolol ER (INDERAL LA) 60 MG 24 hr capsule Take 1 capsule (60 mg) by mouth daily 90 capsule 3     risperiDONE (RISPERDAL) 0.25 MG tablet Take 1 tablet (0.25 mg) by mouth At Bedtime Take with 2 mg tablet for total daily dose of 2.25 mg 30 tablet 1     risperiDONE (RISPERDAL) 2 MG tablet Take 1 tablet (2 mg) by mouth At Bedtime Take with 0.25 mg tablet for total daily dose of 2.25 mg 30 tablet 0     traZODone (DESYREL) 50 MG tablet Take 1 tablet (50 mg) by mouth At Bedtime 30 tablet 1      VITALS       Pulse  "Readings from Last 3 Encounters:   04/07/23 87   02/02/23 94   11/14/22 103     Wt Readings from Last 3 Encounters:   04/07/23 89 kg (196 lb 3.2 oz)   02/02/23 89.4 kg (197 lb 3.2 oz)   11/14/22 90.7 kg (200 lb)     BP Readings from Last 3 Encounters:   04/07/23 131/84   02/02/23 137/87   11/14/22 (!) 148/94      MENTAL STATUS EXAM     Alertness: slow to respond  Appearance: well groomed  Behavior/Demeanor: passive, with poor eye contact, suspect culturally appropriate  Speech: Poverty of speech  Language: [place] speaking, prefers to let cousin speak for her  Psychomotor: normal or unremarkable  Mood: \"More anxious\"  Affect: flat  Thought Process/Associations: unremarkable  Thought Content:  Reports none;  Denies suicidal ideation and violent ideation  Perception:  Reports auditory hallucinations and visual hallucinations;  Denies none  Insight: limited  Judgment: adequate for safety  Cognition: does  appear grossly intact; formal cognitive testing was not done  Gait and Station: unremarkable     LABS and DATA         4/28/2020     1:04 PM 11/28/2022     2:43 PM 4/7/2023     9:45 AM   PHQ   PHQ-9 Total Score 11 8 9   Q9: Thoughts of better off dead/self-harm past 2 weeks Not at all Not at all Not at all       Recent Labs   Lab Test 02/23/22  1022 07/28/21  1114   CR 0.80 0.71   GFRESTIMATED >90 >90     Recent Labs   Lab Test 02/23/22  1023 02/23/22  1022 07/28/21  1114   GLC  --  112* 151*   A1C 5.8*  --  5.9*     Recent Labs   Lab Test 02/23/22  1022 07/28/21  1114   CHOL 205* 212*   TRIG 184* 154*   * 135*   HDL 40* 46*     Recent Labs   Lab Test 02/23/22  1022 06/26/20  1042   AST 26 23.8   ALT 25 28.8   ALKPHOS 104 88.5     Recent Labs   Lab Test 02/23/22  1022 07/28/21  1114 02/14/17  1513 04/11/16  1507 03/31/16  1534 01/31/16  1401   WBC 5.6 6.3  --  7.0  --  6.4   ANEU  --   --   --  3.0  --  2.4   HGB 13.7 12.8   < > 12.1   < > 14.0    214  --  282  --  301    < > = values in this interval " not displayed.       ECG 7/26/21 QTc = 413ms     PSYCHOTROPIC DRUG INTERACTIONS                                                       PSYCHCLINICDDI   ARIPIPRAZOLE, RISPERIDONE, TRAZODONE propranolol & AMLODIPINE- Blood Pressure Lowering Agents may enhance the hypotensive effect of Antipsychotic Agents (Second Generation [Atypical]).   MIRTAZAPINE, & TRAZODONE- Additive serotonergic  CETIRIZINE, MIRTAZAPINE, RISPERIDONE and DIPHENHYDRAMINE may result in increased risk of CNS depression.      MANAGEMENT:  Monitoring for adverse effects     RISK STATEMENT for SAFETY     Carmen did not appear to be an imminent safety risk to self or others.    TREATMENT RISK STATEMENT: The risks, benefits, alternatives and potential adverse effects have been discussed and are understood by the pt. The pt understands the risks of using street drugs or alcohol. There are no medical contraindications, the pt agrees to treatment with the ability to do so. The pt knows to call the clinic for any problems or to access emergency care if needed.  Medical and substance use concerns are documented above.  Psychotropic drug interaction check was done, including changes made today.     PROVIDER: Roger Camacho MD    Patient staffed in clinic with Dr. Thrasher who will sign the note.  Supervisor is Dr. Dangelo.       MEDICAL DECISION MAKING        (SmartPhrase .PSYCHBILLMDM)     - At least 1 chronic problem that is not stable    - Engaged in prescription drug management during visit (discussed any medication benefits, side effects, alternatives, etc.)

## 2023-04-07 ENCOUNTER — OFFICE VISIT (OUTPATIENT)
Dept: PSYCHIATRY | Facility: CLINIC | Age: 43
End: 2023-04-07
Attending: PSYCHIATRY & NEUROLOGY
Payer: COMMERCIAL

## 2023-04-07 VITALS
WEIGHT: 196.2 LBS | BODY MASS INDEX: 34.76 KG/M2 | SYSTOLIC BLOOD PRESSURE: 131 MMHG | HEART RATE: 87 BPM | DIASTOLIC BLOOD PRESSURE: 84 MMHG

## 2023-04-07 DIAGNOSIS — F25.1 SCHIZOAFFECTIVE DISORDER, DEPRESSIVE TYPE (H): ICD-10-CM

## 2023-04-07 PROCEDURE — 99214 OFFICE O/P EST MOD 30 MIN: CPT | Mod: GC | Performed by: STUDENT IN AN ORGANIZED HEALTH CARE EDUCATION/TRAINING PROGRAM

## 2023-04-07 PROCEDURE — G0463 HOSPITAL OUTPT CLINIC VISIT: HCPCS | Performed by: STUDENT IN AN ORGANIZED HEALTH CARE EDUCATION/TRAINING PROGRAM

## 2023-04-07 RX ORDER — TRAZODONE HYDROCHLORIDE 50 MG/1
50 TABLET, FILM COATED ORAL AT BEDTIME
Qty: 30 TABLET | Refills: 1 | Status: SHIPPED | OUTPATIENT
Start: 2023-04-07 | End: 2023-05-10

## 2023-04-07 RX ORDER — RISPERIDONE 0.25 MG/1
0.25 TABLET ORAL AT BEDTIME
Qty: 30 TABLET | Refills: 1 | Status: SHIPPED | OUTPATIENT
Start: 2023-04-07 | End: 2023-05-26

## 2023-04-07 RX ORDER — ARIPIPRAZOLE 2 MG/1
8 TABLET ORAL DAILY
Qty: 120 TABLET | Refills: 1 | Status: SHIPPED | OUTPATIENT
Start: 2023-04-07 | End: 2023-05-26

## 2023-04-07 RX ORDER — RISPERIDONE 2 MG/1
TABLET ORAL
Qty: 30 TABLET | Refills: 0 | OUTPATIENT
Start: 2023-04-07

## 2023-04-07 RX ORDER — RISPERIDONE 2 MG/1
2 TABLET ORAL AT BEDTIME
Qty: 30 TABLET | Refills: 0 | Status: SHIPPED | OUTPATIENT
Start: 2023-04-07 | End: 2023-05-11

## 2023-04-07 RX ORDER — MIRTAZAPINE 15 MG/1
15 TABLET, FILM COATED ORAL AT BEDTIME
Qty: 30 TABLET | Refills: 1 | Status: SHIPPED | OUTPATIENT
Start: 2023-04-07 | End: 2023-05-26

## 2023-04-07 ASSESSMENT — ANXIETY QUESTIONNAIRES
GAD7 TOTAL SCORE: 14
6. BECOMING EASILY ANNOYED OR IRRITABLE: SEVERAL DAYS
GAD7 TOTAL SCORE: 14
1. FEELING NERVOUS, ANXIOUS, OR ON EDGE: MORE THAN HALF THE DAYS
7. FEELING AFRAID AS IF SOMETHING AWFUL MIGHT HAPPEN: NEARLY EVERY DAY
7. FEELING AFRAID AS IF SOMETHING AWFUL MIGHT HAPPEN: NEARLY EVERY DAY
2. NOT BEING ABLE TO STOP OR CONTROL WORRYING: NEARLY EVERY DAY
GAD7 TOTAL SCORE: 14
IF YOU CHECKED OFF ANY PROBLEMS ON THIS QUESTIONNAIRE, HOW DIFFICULT HAVE THESE PROBLEMS MADE IT FOR YOU TO DO YOUR WORK, TAKE CARE OF THINGS AT HOME, OR GET ALONG WITH OTHER PEOPLE: EXTREMELY DIFFICULT
4. TROUBLE RELAXING: MORE THAN HALF THE DAYS
3. WORRYING TOO MUCH ABOUT DIFFERENT THINGS: NEARLY EVERY DAY
8. IF YOU CHECKED OFF ANY PROBLEMS, HOW DIFFICULT HAVE THESE MADE IT FOR YOU TO DO YOUR WORK, TAKE CARE OF THINGS AT HOME, OR GET ALONG WITH OTHER PEOPLE?: EXTREMELY DIFFICULT
5. BEING SO RESTLESS THAT IT IS HARD TO SIT STILL: NOT AT ALL

## 2023-04-07 ASSESSMENT — PATIENT HEALTH QUESTIONNAIRE - PHQ9
SUM OF ALL RESPONSES TO PHQ QUESTIONS 1-9: 9
SUM OF ALL RESPONSES TO PHQ QUESTIONS 1-9: 9
10. IF YOU CHECKED OFF ANY PROBLEMS, HOW DIFFICULT HAVE THESE PROBLEMS MADE IT FOR YOU TO DO YOUR WORK, TAKE CARE OF THINGS AT HOME, OR GET ALONG WITH OTHER PEOPLE: EXTREMELY DIFFICULT

## 2023-04-07 ASSESSMENT — PAIN SCALES - GENERAL: PAINLEVEL: EXTREME PAIN (8)

## 2023-04-07 NOTE — PATIENT INSTRUCTIONS
**For crisis resources, please see the information at the end of this document**   Patient Education    Thank you for coming to the [facility].     Lab Testing:  If you had lab testing today and your results are reassuring or normal they will be mailed to you or sent through [employer] within 7 days. If the lab tests need quick action we will call you with the results. The phone number we will call with results is # [phone]. If this is not the best number please call our clinic and change the number.     Medication Refills:  If you need any refills please call your pharmacy and they will contact us. Our fax number for refills is [phone].   Three business days of notice are needed for general medication refill requests.   Five business days of notice are needed for controlled substance refill requests.   If you need to change to a different pharmacy, please contact the new pharmacy directly. The new pharmacy will help you get your medications transferred.     Contact Us:  Please call [phone] during business hours (8-5:00 M-F).   If you have medication related questions after clinic hours, or on the weekend, please call [phone].     Financial Assistance [phone]   Medical Records [phone]       MENTAL HEALTH CRISIS RESOURCES:  For a emergency help, please call 911 or go to the nearest Emergency Department.     Emergency Walk-In Options:   [facility] Unit @ [facility] ([place]): [phone] - Specialized mental health emergency area designed to be calming  [facility] ([place]): [phone]  [facility] ([place]): [phone]  [facility]): [phone]    [facility] Crisis Information:   [place]: [phone]  [place]: [phone]  [place] ([place]) - Adult: [phone]     Child: [phone]  [place] - Adult: [phone]     Child: [phone]  [place]:  [phone]  List of all [facility] resources:   https://[place].gov/dhs/people-we-serve/adults/health-care/mental-health/resources/crisis-contacts.jsp    National Crisis Information:   Crisis Text Line: Text  [place]  to 350947  Suicide & Crisis Lifeline: 988  National Suicide Prevention Lifeline: 3-036-887-TALK (1-140.523.4597)       For online chat options, visit https://suicidepreventionlifeline.org/chat/  Poison Control Center: 8-764-777-4707  Trans Lifeline: 3-878-152-8270 - Hotline for transgender people of all ages  The [employer] Project: 5-933-525-4837 - Hotline for LGBT youth     For Non-Emergency Support:   Fast Tracker: Mental Health & Substance Use Disorder Resources -   https://www.Change LaneckThoughtSpotn.org/

## 2023-05-03 ENCOUNTER — TELEPHONE (OUTPATIENT)
Dept: PSYCHIATRY | Facility: CLINIC | Age: 43
End: 2023-05-03
Payer: COMMERCIAL

## 2023-05-03 NOTE — TELEPHONE ENCOUNTER
[facility] Call Center    Phone Message    May a detailed message be left on voicemail: yes     Reason for Call: Medication Refill Request    Has the patient contacted the pharmacy for the refill? Yes   Name of medication being requested: All medications prescribed by Dr. Camacho  Provider who prescribed the medication: Dr. Camacho  Pharmacy: [facility]/pharmacy #[id number] - [place], [place] - [address]  Date medication is needed: Asap, pt is out    Pt's son called to help reschedule mom to a different day/time with Dr. Camacho. Writer obtained verbal authorization from pt who was present. Also requesting all medications refilled prior to appointment on 5/26.      Action Taken: Message routed to:  Other: P PSYCHIATRY NURSE-[facility]    Travel Screening: Not Applicable

## 2023-05-03 NOTE — TELEPHONE ENCOUNTER
Writer spoke with  [facility]/pharmacy #[id number] - [place], [place] - [address]    Patient's prescriptions, except for Abilify, were picked up 4/17.      Abilify was filled today and is ready for .      The remaining prescriptions will be able to be filled 5/14.      No answer at [phone].  Writer will call again tomorrow with yudy interpretor on the line.

## 2023-05-05 NOTE — TELEPHONE ENCOUNTER
Writer LA for patient via [place]  indicating that:    Abilify is ready for  at the pharmacy (as of 5/3/23)    All other prescriptions from the psychiatry clinic were picked up from the pharmacy on 4/17/23.  These prescriptions can be requested to be refilled 5/14/23.    Please call the clinic [phone] with any questions.

## 2023-05-08 NOTE — TELEPHONE ENCOUNTER
Writer called [facility] on [address].  Patient has not yet picked up the Abilify prescription.      Writer called patient at [phone] with Michaela  on the line.  No answer.  LVM via  informing patient that the Abilify prescription is ready for  at [facility] on [address].  Also, her remaining prescriptions from the psychiatry clinic will be available for refill 5/14 - indicated she can call 5/12 to request refill.    This message has been left x 2 on patient's voicemail via .

## 2023-05-09 DIAGNOSIS — F25.1 SCHIZOAFFECTIVE DISORDER, DEPRESSIVE TYPE (H): ICD-10-CM

## 2023-05-11 DIAGNOSIS — F25.1 SCHIZOAFFECTIVE DISORDER, DEPRESSIVE TYPE (H): ICD-10-CM

## 2023-05-11 RX ORDER — RISPERIDONE 0.25 MG/1
0.25 TABLET ORAL AT BEDTIME
Qty: 30 TABLET | Refills: 1 | OUTPATIENT
Start: 2023-05-11

## 2023-05-11 RX ORDER — MIRTAZAPINE 15 MG/1
TABLET, FILM COATED ORAL
Qty: 30 TABLET | Refills: 1 | OUTPATIENT
Start: 2023-05-11

## 2023-05-11 NOTE — TELEPHONE ENCOUNTER
Writer LA for patient via [place]  again - re:  Abilify is at pharmacy and ready for  (confirmed with [employer] [facility]).  Asked her to please call [facility] [address] to request fills of her other prescriptions - they are on file.  (this is the third message left)

## 2023-05-12 RX ORDER — ARIPIPRAZOLE 2 MG/1
8 TABLET ORAL DAILY
Qty: 360 TABLET | Refills: 1 | OUTPATIENT
Start: 2023-05-12

## 2023-05-26 ENCOUNTER — OFFICE VISIT (OUTPATIENT)
Dept: PSYCHIATRY | Facility: CLINIC | Age: 43
End: 2023-05-26
Attending: PSYCHIATRY & NEUROLOGY
Payer: COMMERCIAL

## 2023-05-26 VITALS
BODY MASS INDEX: 35.04 KG/M2 | WEIGHT: 197.8 LBS | HEART RATE: 89 BPM | DIASTOLIC BLOOD PRESSURE: 85 MMHG | SYSTOLIC BLOOD PRESSURE: 139 MMHG

## 2023-05-26 DIAGNOSIS — F43.10 POSTTRAUMATIC STRESS DISORDER: ICD-10-CM

## 2023-05-26 DIAGNOSIS — F25.1 SCHIZOAFFECTIVE DISORDER, DEPRESSIVE TYPE (H): Primary | ICD-10-CM

## 2023-05-26 PROCEDURE — 99214 OFFICE O/P EST MOD 30 MIN: CPT | Mod: GC | Performed by: STUDENT IN AN ORGANIZED HEALTH CARE EDUCATION/TRAINING PROGRAM

## 2023-05-26 PROCEDURE — G0463 HOSPITAL OUTPT CLINIC VISIT: HCPCS | Performed by: STUDENT IN AN ORGANIZED HEALTH CARE EDUCATION/TRAINING PROGRAM

## 2023-05-26 RX ORDER — ARIPIPRAZOLE 2 MG/1
8 TABLET ORAL DAILY
Qty: 120 TABLET | Refills: 1 | Status: SHIPPED | OUTPATIENT
Start: 2023-05-26 | End: 2023-07-03

## 2023-05-26 RX ORDER — RISPERIDONE 2 MG/1
2 TABLET ORAL AT BEDTIME
Qty: 30 TABLET | Refills: 0 | Status: SHIPPED | OUTPATIENT
Start: 2023-05-26 | End: 2023-07-03

## 2023-05-26 RX ORDER — TRAZODONE HYDROCHLORIDE 50 MG/1
100 TABLET, FILM COATED ORAL AT BEDTIME
Qty: 60 TABLET | Refills: 2 | Status: SHIPPED | OUTPATIENT
Start: 2023-05-26 | End: 2023-06-15

## 2023-05-26 RX ORDER — MIRTAZAPINE 15 MG/1
15 TABLET, FILM COATED ORAL AT BEDTIME
Qty: 30 TABLET | Refills: 1 | Status: SHIPPED | OUTPATIENT
Start: 2023-05-26 | End: 2023-07-03

## 2023-05-26 RX ORDER — RISPERIDONE 0.25 MG/1
0.25 TABLET ORAL AT BEDTIME
Qty: 30 TABLET | Refills: 1 | Status: SHIPPED | OUTPATIENT
Start: 2023-05-26 | End: 2023-07-03

## 2023-05-26 ASSESSMENT — PATIENT HEALTH QUESTIONNAIRE - PHQ9
SUM OF ALL RESPONSES TO PHQ QUESTIONS 1-9: 9
10. IF YOU CHECKED OFF ANY PROBLEMS, HOW DIFFICULT HAVE THESE PROBLEMS MADE IT FOR YOU TO DO YOUR WORK, TAKE CARE OF THINGS AT HOME, OR GET ALONG WITH OTHER PEOPLE: VERY DIFFICULT
SUM OF ALL RESPONSES TO PHQ QUESTIONS 1-9: 9

## 2023-05-26 ASSESSMENT — PAIN SCALES - GENERAL: PAINLEVEL: NO PAIN (0)

## 2023-05-26 NOTE — PROGRESS NOTES
[facility]  Psychiatry Clinic  MEDICAL PROGRESS NOTE     Carmen Connolly is a 42 year old patient who prefers the name Carmen and uses  pronouns she, her, hers.       DIAGNOSIS   Schizoaffective Disorder, Depressive type, in partial remission re: mood with active psychotic features  R/o PTSD      ASSESSMENT   Carmen reported continued presence of hallucinations and fear.  No clear change in routine or stressors.  These symptoms have negatively impacted sleep.  Previously decreased trazodone, but this has led to worsening sleep, so we will increase it back to 100 mg today.  No falls, and counseled on behavioral changes to limit dizziness and fainting.    We discussed her concern that risperidone and aripriprazole would lead to the same metabolic side effects that lead to us trying to change from risperidone last year.  She and cousin were not interested in exploring alternative antipsychotics.  She is due for annual AP labs, these have been ordered, and they will get them drawn soon    No safety concerns at this time.     Future considerations:  -Perhaps a selective serotonin reuptake inhibitor might be helpful with trauma symptom cluster. She was on Effexor for a long time 1287-7560, but the only other serotonergic medication in her chart is a short period on citalopram before that.      MNPMP review was not needed today.      PLAN                                                                                                                 1) Meds-  - Continue mirtazapine 15 mg  - Continue aripiprazole 8 mg daily   - increase trazodone 100 mg hs/prn (for insomnia)  - Continue risperidone 2.25 mg qhs     2) Psychotherapy- Continue with therapist who visits home twice weekly     3) Next due-  Labs- SGA ordered  EKG- as indicated  Rating scales- N/A     4) Referrals-  None     5) Dispo- 6 weeks, with next resident      PERTINENT BACKGROUND                           [most recent eval  8/5/22]   Previous diagnoses include Schizoaffective disorder, depression, generalized anxiety disorder, and PTSD. Symptoms began after the passing of her  while they were refugees living in [place]. She did not receive any psychiatric care until after she immigrated to the [place] in [place].     Psych pertinent item history includes psychosis [sxs include paranoia, responding to internal stimuli, negative symptoms] and trauma hx     SANDY Morales and her cousin were interviewed together.    -Continues to have less sleep. Waking up 3-4 times. Hearing voices, and trying to go to them.  -Continues to take a long time to fall asleep, about an hour-90 minutes after taking medication  -Appetite continues to be bad.  -Still doing the day program.  -No lightheadedness or dizziness.  -Occasional VH during the day of people outside. Hypervigilance?  -Able to be re-assured by others when she feeling upset.      Recent Psych Symptoms:   Depression:  low energy, insomnia and poor concentration /memory  Elevated:  none  Psychosis:  auditory hallucinations, visual hallucinations and negative symptoms (avolition, affective flattening, anhedonia, alogia, apathy, See HPI)  Anxiety:  None  Trauma Related:  fear, nightmares, avoidance, trauma trigger psychological / physiological response, detached, startle response and hypervigilance    Recent Substance Use:     -alcohol: No   -cannabis: No   -tobacco: No  -caffeine:  No   -opioids: No   Narcan Kit currrent: No   -other: none       PAST MED TRIALS     Medication  Dose   (mg) Effect  Dates of Use   risperidone 2-7 mg Helped with sleep ?- 4/2022; restarted 8/2022 - present   venlafaxine 225 mg Helpful for mood 2014 - ?   temazepam 15 mg Helpful for sleep 2016 - 2018   citalopram         nortriptyline 10 mg HS   2016 - 2017   Hydroxyzine 25-50 mg    2016 - 2018   Abilify 15 mg  8/21-present   Mirtazapine 15 mg  4/21-present            PSYCH and SUBSTANCE USE Critical Summary Points  since July 2022 8/5/22: Transfer of care diagnostic assessment.  Increased auditory hallucinations, family strongly wants to return to risperidone, so 1 mg of risperidone was restarted, Abilify continued.  9/2/22: Continued auditory hallucinations.  Significant PTSD symptoms in the form of hypervigilance, nightmares.  Increased risperidone from 1 mg to 2 mg, decreased Abilify from 15 mg to 10 mg, and started 1 mg of prazosin at bedtime.  9/16/22: Had symptoms of lightheadedness with risperidone increase and prazosin initiation. Slight improvement in psychosis symptoms. Stopping prazosin, could reconsider once on a stable dose of risperidone.  10/7/22: Dizziness has resolved. Psychosis is better controlled than it ever has been. Decreased aripiprazole to 8 mg.  11/28/22: No changes.  12/22/22: no changes.  2/2/23: No changes.  4/6/23: Worsening AH, increased risperidone to 2.25 mg and decreased trazodone to 50 mg to limit orthostasis.  5/26/23: Worse sleep, re-incresed trazodone to 100mg     MEDICAL HISTORY and ALLERGY     ALLERGIES: Patient has no known allergies.    Patient Active Problem List   Diagnosis     Essential hypertension     Gastroesophageal reflux disease without esophagitis     Shortened GA interval     Vitamin D deficiency     Posttraumatic stress disorder     Female circumcision     Chronic tension-type headache, not intractable     Schizoaffective disorder, bipolar type (H)     Neurocognitive deficits     Arthritis     Extra digits     Immigrant with language difficulty     Pulmonary tuberculosis confirmed by sputum microscopy     Recurrent major depression (H)     Acute right-sided low back pain without sciatica     Morbid obesity (H)     Insomnia due to medical condition     Counseling regarding advanced directives     Acute pain of right knee     *Prediabetes         MEDICATIONS     Current Outpatient Medications   Medication Sig Dispense Refill     acetaminophen (TYLENOL) 325 MG tablet Take  2 tablets (650 mg) by mouth every 4 hours as needed for mild pain 200 tablet 11     amLODIPine (NORVASC) 10 MG tablet TAKE 1 TABLET (10 MG) BY MOUTH DAILY. 90 tablet 3     ARIPiprazole (ABILIFY) 2 MG tablet Take 4 tablets (8 mg) by mouth daily 120 tablet 1     cetirizine (ZYRTEC) 10 MG tablet Take 1 tablet (10 mg) by mouth daily 90 tablet 3     [employer] D3 50 MCG (2000 UT) CAPS TAKE 1 CAPSULE BY MOUTH EVERY DAY 30 capsule 11     CVS MELATONIN 3 MG tablet TAKE 2 TABLETS (6 MG) BY MOUTH AT BEDTIME 60 tablet 0     diclofenac (VOLTAREN) 1 % topical gel Apply 2 g topically 4 times daily 150 g 4     diphenhydrAMINE (BENADRYL) 25 MG tablet Take 1-2 tablets (25-50 mg) by mouth nightly as needed for itching or allergies 30 tablet 0     famotidine (PEPCID) 20 MG tablet TAKE 1 TABLET BY MOUTH TWICE A  tablet 3     fish oil-omega-3 fatty acids 1000 MG capsule TAKE 2 CAPSULES (2 G) BY MOUTH DAILY 60 capsule 11     fluticasone (FLONASE) 50 MCG/ACT nasal spray Spray 2 sprays into both nostrils daily 16 mL 11     ibuprofen (ADVIL/MOTRIN) 400 MG tablet Take 1 tablet (400 mg) by mouth every 4 hours as needed for moderate pain (4-6) 120 tablet 4     Incontinence Supply Disposable (DEPEND UNDERGARMENTS) MISC Use daily as needed 200 each 11     Magnesium Oxide ([employer]S MAGNESIUM OXIDE) 250 MG tablet Take 2 tablets (500 mg) by mouth daily 180 tablet 3     mirtazapine (REMERON) 15 MG tablet Take 1 tablet (15 mg) by mouth At Bedtime 30 tablet 1     multivitamin w/minerals (THERA-VIT-M) tablet Take 1 tablet by mouth daily 100 tablet 3     propranolol ER (INDERAL LA) 60 MG 24 hr capsule Take 1 capsule (60 mg) by mouth daily 90 capsule 3     risperiDONE (RISPERDAL) 0.25 MG tablet Take 1 tablet (0.25 mg) by mouth At Bedtime Take with 2 mg tablet for total daily dose of 2.25 mg 30 tablet 1     risperiDONE (RISPERDAL) 2 MG tablet Take 1 tablet (2 mg) by mouth At Bedtime Take with 0.25 mg tablet for total daily dose of 2.25 mg 30 tablet 0      "traZODone (DESYREL) 50 MG tablet TAKE 1 TABLET BY MOUTH AT BEDTIME 30 tablet 1      VITALS       Pulse Readings from Last 3 Encounters:   05/26/23 89   04/07/23 87   02/02/23 94     Wt Readings from Last 3 Encounters:   05/26/23 89.7 kg (197 lb 12.8 oz)   04/07/23 89 kg (196 lb 3.2 oz)   02/02/23 89.4 kg (197 lb 3.2 oz)     BP Readings from Last 3 Encounters:   05/26/23 139/85   04/07/23 131/84   02/02/23 137/87      MENTAL STATUS EXAM     Alertness: slow to respond  Appearance: well groomed  Behavior/Demeanor: passive, with poor eye contact, suspect culturally appropriate  Speech: Poverty of speech  Language: [place] speaking, prefers to let cousin speak for her  Psychomotor: normal or unremarkable  Mood: \"Anxious\"  Affect: flat  Thought Process/Associations: unremarkable  Thought Content:  Reports none;  Denies suicidal ideation and violent ideation  Perception:  Reports auditory hallucinations and visual hallucinations;  Denies none  Insight: limited  Judgment: adequate for safety  Cognition: does  appear grossly intact; formal cognitive testing was not done  Gait and Station: unremarkable     LABS and DATA         11/28/2022     2:43 PM 4/7/2023     9:45 AM 5/26/2023     9:52 AM   PHQ   PHQ-9 Total Score 8 9 9   Q9: Thoughts of better off dead/self-harm past 2 weeks Not at all Not at all Not at all       Recent Labs   Lab Test 02/23/22  1022 07/28/21  1114   CR 0.80 0.71   GFRESTIMATED >90 >90     Recent Labs   Lab Test 02/23/22  1023 02/23/22  1022 07/28/21  1114   GLC  --  112* 151*   A1C 5.8*  --  5.9*     Recent Labs   Lab Test 02/23/22  1022 07/28/21  1114   CHOL 205* 212*   TRIG 184* 154*   * 135*   HDL 40* 46*     Recent Labs   Lab Test 02/23/22  1022 06/26/20  1042   AST 26 23.8   ALT 25 28.8   ALKPHOS 104 88.5     Recent Labs   Lab Test 02/23/22  1022 07/28/21  1114 02/14/17  1513 04/11/16  1507 03/31/16  1534 01/31/16  1401   WBC 5.6 6.3  --  7.0  --  6.4   ANEU  --   --   --  3.0  --  2.4   HGB " 13.7 12.8   < > 12.1   < > 14.0    214  --  282  --  301    < > = values in this interval not displayed.       ECG 7/26/21 QTc = 413ms     PSYCHOTROPIC DRUG INTERACTIONS                                                       PSYCHCLINICDDI   ARIPIPRAZOLE, RISPERIDONE, TRAZODONE propranolol & AMLODIPINE- Blood Pressure Lowering Agents may enhance the hypotensive effect of Antipsychotic Agents (Second Generation [Atypical]).   MIRTAZAPINE, & TRAZODONE- Additive serotonergic  CETIRIZINE, MIRTAZAPINE, RISPERIDONE, Trazodone, and DIPHENHYDRAMINE may result in increased risk of CNS depression.      MANAGEMENT:  Monitoring for adverse effects     RISK STATEMENT for SAFETY     Carmen did not appear to be an imminent safety risk to self or others.    TREATMENT RISK STATEMENT: The risks, benefits, alternatives and potential adverse effects have been discussed and are understood by the pt. The pt understands the risks of using street drugs or alcohol. There are no medical contraindications, the pt agrees to treatment with the ability to do so. The pt knows to call the clinic for any problems or to access emergency care if needed.  Medical and substance use concerns are documented above.  Psychotropic drug interaction check was done, including changes made today.     PROVIDER: Roger Camacho MD    Patient staffed in clinic with Dr. Thrasher who will sign the note.  Supervisor is Dr. Dangelo.       MEDICAL DECISION MAKING        (SmartPhrase .[email or web address])     - At least 1 chronic problem that is not stable    - Engaged in prescription drug management during visit (discussed any medication benefits, side effects, alternatives, etc.)

## 2023-05-26 NOTE — NURSING NOTE
Chief Complaint   Patient presents with     RECHECK     Schizoaffective disorder, depressive type

## 2023-06-02 DIAGNOSIS — I10 ESSENTIAL HYPERTENSION WITH GOAL BLOOD PRESSURE LESS THAN 130/85: ICD-10-CM

## 2023-06-06 DIAGNOSIS — F25.1 SCHIZOAFFECTIVE DISORDER, DEPRESSIVE TYPE (H): ICD-10-CM

## 2023-06-06 NOTE — TELEPHONE ENCOUNTER
"Last seen 11/14/2022    Request for medication refill:  fish oil-omega-3 fatty acids 1000 MG capsule  Providers if patient needs an appointment and you are willing to give a one month supply please refill for one month and  send a letter/MyChart using \".SMILLIMITEDREFILL\" .smillimited and route chart to \"P SMI \" (Giving one month refill in non controlled medications is strongly recommended before denial)    If refill has been denied, meaning absolutely no refills without visit, please complete the smart phrase \".smirxrefuse\" and route it to the \"P SMI MED REFILLS\"  pool to inform the patient and the pharmacy.    PARESH PARISH MA      "

## 2023-06-07 RX ORDER — MIRTAZAPINE 15 MG/1
TABLET, FILM COATED ORAL
Qty: 30 TABLET | Refills: 1 | OUTPATIENT
Start: 2023-06-07

## 2023-06-07 RX ORDER — TRAZODONE HYDROCHLORIDE 50 MG/1
TABLET, FILM COATED ORAL
Qty: 30 TABLET | Refills: 1 | OUTPATIENT
Start: 2023-06-07

## 2023-06-07 RX ORDER — RISPERIDONE 0.25 MG/1
0.25 TABLET ORAL AT BEDTIME
Qty: 30 TABLET | Refills: 1 | OUTPATIENT
Start: 2023-06-07

## 2023-06-07 RX ORDER — RISPERIDONE 2 MG/1
TABLET ORAL
Qty: 30 TABLET | Refills: 1 | OUTPATIENT
Start: 2023-06-07

## 2023-06-08 RX ORDER — ASCORBIC ACID 500 MG
TABLET ORAL
Qty: 180 CAPSULE | Refills: 4 | Status: SHIPPED | OUTPATIENT
Start: 2023-06-08 | End: 2023-10-16

## 2023-06-15 RX ORDER — TRAZODONE HYDROCHLORIDE 50 MG/1
100 TABLET, FILM COATED ORAL AT BEDTIME
Qty: 60 TABLET | Refills: 2 | Status: SHIPPED | OUTPATIENT
Start: 2023-06-15 | End: 2023-09-14

## 2023-06-15 RX ORDER — TRAZODONE HYDROCHLORIDE 50 MG/1
50 TABLET, FILM COATED ORAL AT BEDTIME
Qty: 30 TABLET | Refills: 2 | Status: SHIPPED | OUTPATIENT
Start: 2023-06-15 | End: 2023-06-15

## 2023-07-01 DIAGNOSIS — F25.1 SCHIZOAFFECTIVE DISORDER, DEPRESSIVE TYPE (H): ICD-10-CM

## 2023-07-03 DIAGNOSIS — F25.1 SCHIZOAFFECTIVE DISORDER, DEPRESSIVE TYPE (H): ICD-10-CM

## 2023-07-03 RX ORDER — RISPERIDONE 2 MG/1
2 TABLET ORAL AT BEDTIME
Qty: 30 TABLET | Refills: 2 | Status: SHIPPED | OUTPATIENT
Start: 2023-07-03 | End: 2023-10-20

## 2023-07-03 RX ORDER — RISPERIDONE 0.25 MG/1
0.25 TABLET ORAL AT BEDTIME
Qty: 30 TABLET | Refills: 0 | Status: CANCELLED | OUTPATIENT
Start: 2023-07-03

## 2023-07-03 RX ORDER — RISPERIDONE 0.25 MG/1
0.25 TABLET ORAL AT BEDTIME
Qty: 30 TABLET | Refills: 2 | Status: SHIPPED | OUTPATIENT
Start: 2023-07-03 | End: 2023-09-14

## 2023-07-03 NOTE — TELEPHONE ENCOUNTER
Medication requested: risperiDONE (RISPERDAL) 0.25 MG tablet  Last refilled: 5/26/23  Qty: 30/1    Medication requested: risperiDONE (RISPERDAL) 2 MG tablet  Last refilled: 5/26/23  Qty: 30    Last seen: 5/26/23  RTC: 6 weeks  Cancel: 0  No-show: 0  Next appt: 9/14/23    Refill decision:   Refilled for 30 days per protocol.

## 2023-07-03 NOTE — TELEPHONE ENCOUNTER
[facility] Call Center    Phone Message    May a detailed message be left on voicemail: yes     Reason for Call: Other: Writer spoke with Ignacio, who was with the patient and interpreted. Scheduled the patient for their follow-up, and Ignacio stated the patient would need refills on all of their listed prescriptions to bridge the time until the scheduled follow-up.      Action Taken: Message routed to:  Other: [facility] psychiatry nurse [place]    Travel Screening: Not Applicable

## 2023-07-03 NOTE — TELEPHONE ENCOUNTER
Last seen: 5/26  RTC: none   Cancel: none   No-show: none   Next appt: 9/14    Incoming refill from patient via phone      Disp Refills Start End MARTHA   mirtazapine (REMERON) 15 MG tablet 30 tablet 1 5/26/2023  No   Sig - Route: Take 1 tablet (15 mg) by mouth At Bedtime - Oral   Sent to pharmacy as: Mirtazapine 15 MG Oral Tablet (REMERON)   Class: E-Prescribe   Order: [id number]   E-Prescribing Status: Receipt confirmed by pharmacy (5/26/2023 10:45 AM CDT)      Disp Refills Start End MARTHA   ARIPiprazole (ABILIFY) 2 MG tablet 120 tablet 1 5/26/2023  No   Sig - Route: Take 4 tablets (8 mg) by mouth daily - Oral   Sent to pharmacy as: ARIPiprazole 2 MG Oral Tablet (ABILIFY)   Class: E-Prescribe   Order: [id number]   E-Prescribing Status: Receipt confirmed by pharmacy (5/26/2023 10:45 AM CDT)      Disp Refills Start End MARTHA   traZODone (DESYREL) 50 MG tablet 60 tablet 2 6/15/2023  No   Sig - Route: Take 2 tablets (100 mg) by mouth At Bedtime - Oral   Sent to pharmacy as: traZODone HCl 50 MG Oral Tablet (DESYREL)   Class: E-Prescribe   Order: [id number]   E-Prescribing Status: Receipt confirmed by pharmacy (6/15/2023  5:48 PM CDT)   - Refills on file at pharmacy    Disp Refills Start End MARTHA   risperiDONE (RISPERDAL) 0.25 MG tablet 30 tablet 1 5/26/2023  No   Sig - Route: Take 1 tablet (0.25 mg) by mouth At Bedtime Take with 2 mg tablet for total daily dose of 2.25 mg - Oral   Sent to pharmacy as: risperiDONE 0.25 MG Oral Tablet (risperDAL)   Class: E-Prescribe   Order: [id number]   E-Prescribing Status: Receipt confirmed by pharmacy (5/26/2023 10:45 AM CDT)      Disp Refills Start End MARTHA   risperiDONE (RISPERDAL) 2 MG tablet 30 tablet 0 5/26/2023  No   Sig - Route: Take 1 tablet (2 mg) by mouth At Bedtime Take with 0.25 mg tablet for total daily dose of 2.25 mg - Oral   Sent to pharmacy as: risperiDONE 2 MG Oral Tablet (risperDAL)   Class: E-Prescribe   Order: [id number]   E-Prescribing Status: Receipt confirmed by pharmacy  (5/26/2023 10:45 AM CDT)     Reached out to patient with [facility]  to confirm if she had enough meds to last till the end of the month. The phone was answered by patient and her daughter. They confirmed having enough meds to last till the end of the month but needs refills to get them through till the next appt. Meds pended and routed to provider for approval.

## 2023-07-04 DIAGNOSIS — F25.1 SCHIZOAFFECTIVE DISORDER, DEPRESSIVE TYPE (H): ICD-10-CM

## 2023-07-04 RX ORDER — ARIPIPRAZOLE 2 MG/1
8 TABLET ORAL DAILY
Qty: 120 TABLET | Refills: 0 | Status: SHIPPED | OUTPATIENT
Start: 2023-07-04 | End: 2023-07-28

## 2023-07-04 RX ORDER — RISPERIDONE 2 MG/1
2 TABLET ORAL AT BEDTIME
Qty: 30 TABLET | Refills: 0 | Status: SHIPPED | OUTPATIENT
Start: 2023-07-04 | End: 2023-09-14

## 2023-07-04 RX ORDER — MIRTAZAPINE 15 MG/1
15 TABLET, FILM COATED ORAL AT BEDTIME
Qty: 30 TABLET | Refills: 0 | Status: SHIPPED | OUTPATIENT
Start: 2023-07-04 | End: 2023-07-28

## 2023-07-06 RX ORDER — RISPERIDONE 2 MG/1
TABLET ORAL
Qty: 90 TABLET | OUTPATIENT
Start: 2023-07-06

## 2023-07-07 ENCOUNTER — OFFICE VISIT (OUTPATIENT)
Dept: FAMILY MEDICINE | Facility: CLINIC | Age: 43
End: 2023-07-07
Payer: COMMERCIAL

## 2023-07-07 VITALS
WEIGHT: 198.8 LBS | BODY MASS INDEX: 35.22 KG/M2 | HEART RATE: 104 BPM | RESPIRATION RATE: 17 BRPM | OXYGEN SATURATION: 100 % | DIASTOLIC BLOOD PRESSURE: 85 MMHG | TEMPERATURE: 97.7 F | SYSTOLIC BLOOD PRESSURE: 124 MMHG

## 2023-07-07 DIAGNOSIS — Z75.8 LANGUAGE BARRIER: ICD-10-CM

## 2023-07-07 DIAGNOSIS — M54.50 ACUTE RIGHT-SIDED LOW BACK PAIN WITHOUT SCIATICA: Primary | ICD-10-CM

## 2023-07-07 DIAGNOSIS — M25.561 ACUTE PAIN OF RIGHT KNEE: ICD-10-CM

## 2023-07-07 DIAGNOSIS — Z60.3 LANGUAGE BARRIER: ICD-10-CM

## 2023-07-07 PROCEDURE — 99214 OFFICE O/P EST MOD 30 MIN: CPT | Mod: GC | Performed by: STUDENT IN AN ORGANIZED HEALTH CARE EDUCATION/TRAINING PROGRAM

## 2023-07-07 RX ORDER — GABAPENTIN 300 MG/1
300 CAPSULE ORAL 3 TIMES DAILY
Qty: 90 CAPSULE | Refills: 0 | Status: SHIPPED | OUTPATIENT
Start: 2023-07-07

## 2023-07-07 SDOH — SOCIAL STABILITY - SOCIAL INSECURITY: ACCULTURATION DIFFICULTY: Z60.3

## 2023-07-07 ASSESSMENT — PATIENT HEALTH QUESTIONNAIRE - PHQ9: SUM OF ALL RESPONSES TO PHQ QUESTIONS 1-9: 7

## 2023-07-07 NOTE — PROGRESS NOTES
"  Assessment & Plan     Acute right-sided low back pain without sciatica  Acute pain of right knee  Patient reporting increased pain related to her chronic back pain. Physical exam and patient description supportive of bilateral sciatica, especially with pinpoint tenderness over the piriformis with shooting pain down to the lateral knees. PT advised on last visit, however patient never followed up when she missed the call to schedule. Asked PCS to help schedule patient with PT before she left, however on later review it does not appear that the appointment was made. Does have a follow up with Dr. Cadena in our clinic, hopefully we will be able to make the connection then. Started low dose gabapentin for symptomatic relief, would re-evaluate and consider titration up if not sufficiently making the patient more comfortable. Could also try a muscle relaxer. Ultimatley best case scenario is patient getting to PT.   - Physical Therapy Referral  - gabapentin (NEURONTIN) 300 MG capsule  Dispense: 90 capsule; Refill: 0    Language Barrier  This patient requires an  to complete the visit or speaks English as a second language.  Not speaking the primary language of the healthcare system is a social determinant of health that impacts how they interact with the medical system.      BMI:   Estimated body mass index is 35.22 kg/m  as calculated from the following:    Height as of 11/14/22: 1.6 m (5' 3\").    Weight as of this encounter: 90.2 kg (198 lb 12.8 oz).   Not discussed this visit.      Return in about 4 weeks (around 8/4/2023) for Low back pain follow up.    DO PATRICIA Quijano [facility] NEHEMAIH Morales is a 43 year old, presenting for the following health issues:  Back Pain (Knee pain, headaches, and sleepless)      HPI     Chronic/Recurring Back Pain Follow Up      Where is your back pain located? (Select all that apply) low back bilateral    How would you describe your back " pain?  squeezes    Where does your back pain spread? the right and left  thigh, the right and left  knee and the right and left foot    Since your last clinic visit for back pain, how has your pain changed? gradually worsening    Does your back pain interfere with your job? Not applicable    Since your last visit, have you tried any new treatment? Ibuprofen, tylenol - The pain feels a little better then comes back     Last time  - Ibuprofen?   - Tylenol?  - Ice?   - Physical therapy (no record of attendance)              Objective    /85   Pulse 104   Temp 97.7  F (36.5  C)   Resp 17   Wt 90.2 kg (198 lb 12.8 oz)   SpO2 100%   BMI 35.22 kg/m    Body mass index is 35.22 kg/m .  Physical Exam   Constitutional: No acute distress, sitting comfortably  Eyes: EOMI, no eye discharge, no icterus, injection or swelling.  Ears, nose, mouth, throat: Normocephalic, no nasal drainage, speaks clearly, no lip cracking.   Neck: Normal range of motion  CV: Extremeties well perfused  Respiratory: No audible wheezing, strido, cough, or other respiratory distress. Speaking in full sentences.  GI: Nondistended abdomen  MSK: Tenderness to palpation bilaterally in the L5-S1 region that reproduces shooting pain down lateral sides of both legs to the level of the knee. Pinpoint tenderness over bilateral piriformis. Gait slow, but symmetric and non-shuffling or antalgic.  Skin: No visible rashes, bruising or other skin lesions.   Neuro: AOx3  Psych: Cooperative, mood, thought and judgement appropriate to situation.

## 2023-07-07 NOTE — PATIENT INSTRUCTIONS
It was great to see you today! Thanks for coming to [facility]!      Here is the plan from today's visit    Physical therapy   Gabapentin      Thank you for coming to [facility] today.  Lab Testing:  **If you had lab testing today and your results are reassuring or normal they will be mailed to you or sent through [employer] within 7 days.   **If the lab tests need quick action we will call you with the results.  **If you are having labs done on a different day, please call [phone] to schedule at [facility] or [phone] for other [facility] locations. Labs do not offer walk-in appointments.  The phone number we will call with results is # [phone] (home) . If this is not the best number please call our clinic and change the number.    Medication Refills:  If you need any refills please call your pharmacy and they will contact us.   If you need to  your refill at a new pharmacy, please contact the new pharmacy directly. The new pharmacy will help you get your medications transferred faster.       Scheduling, Urgent Medical Concerns (24 hours a day!), or ultrasound [phone] (8-5:00 M-F)    Referrals: If a referral was made to an [facility] specialty provider and you do not get a call from central scheduling, please refer to directions on your visit summary or call our office during normal business hours for assistance.     If a Mammogram was ordered for you at the Breast Center call [phone] to schedule or change your appointment.  If you had an XRay/CT/Ultrasound/MRI ordered the number is [phone] to schedule or change your radiology appointment.       Pari Sanon, DO  Pronouns: she/her/hers  Resident Physician  [facility] - [facility]/ [facility]    Department of Family Medicine and Community Health

## 2023-07-12 NOTE — PROGRESS NOTES
Preceptor Attestation:   Patient seen, evaluated and discussed with the resident. I have verified the content of the note, which accurately reflects my assessment of the patient and the plan of care.   Supervising Physician:  Chuy Covarrubias MD

## 2023-07-14 ENCOUNTER — MYC REFILL (OUTPATIENT)
Dept: PSYCHIATRY | Facility: CLINIC | Age: 43
End: 2023-07-14

## 2023-07-14 DIAGNOSIS — F25.1 SCHIZOAFFECTIVE DISORDER, DEPRESSIVE TYPE (H): ICD-10-CM

## 2023-07-17 RX ORDER — MIRTAZAPINE 15 MG/1
15 TABLET, FILM COATED ORAL AT BEDTIME
Qty: 30 TABLET | Refills: 0 | OUTPATIENT
Start: 2023-07-17

## 2023-07-17 NOTE — TELEPHONE ENCOUNTER
Last Seen: 5-25-23  RTC:  6 weeks with next resident   Cancel: yes   No-Show: none   Next Appt: 9-14-23 with Dr. Evans     Incoming Refill From [facility]  via faxed     Chart reviewed. There's refill on file . Refill denied.    Mitra Fierro on 7/17/2023 at 10:51 AM

## 2023-07-17 NOTE — TELEPHONE ENCOUNTER
Writer called pharmacy and confirmed there's refill. Pharmacy will send the refills.    Mitra Fierro on 7/17/2023 at 11:05 AM

## 2023-07-27 ENCOUNTER — APPOINTMENT (OUTPATIENT)
Dept: INTERPRETER SERVICES | Facility: CLINIC | Age: 43
End: 2023-07-27
Payer: COMMERCIAL

## 2023-07-28 DIAGNOSIS — F25.1 SCHIZOAFFECTIVE DISORDER, DEPRESSIVE TYPE (H): ICD-10-CM

## 2023-07-28 RX ORDER — ARIPIPRAZOLE 2 MG/1
8 TABLET ORAL DAILY
Qty: 120 TABLET | Refills: 1 | Status: SHIPPED | OUTPATIENT
Start: 2023-07-28 | End: 2023-09-14

## 2023-07-28 RX ORDER — MIRTAZAPINE 15 MG/1
15 TABLET, FILM COATED ORAL AT BEDTIME
Qty: 30 TABLET | Refills: 1 | Status: SHIPPED | OUTPATIENT
Start: 2023-07-28 | End: 2023-08-01

## 2023-07-28 NOTE — TELEPHONE ENCOUNTER
[facility] Call Center    Phone Message    May a detailed message be left on voicemail: yes     Reason for Call: Other: Writer spoke with Ignacio, who was with the patient. The caller initially needed help transferring to the right department for their appointment. The caller then included a request for a refill on all of the listed prescriptions for the patient.      Action Taken: Message routed to:  Other: [facility] psychiatry nurse [place]    Travel Screening: Not Applicable

## 2023-07-28 NOTE — TELEPHONE ENCOUNTER
Received RF request from patient     Last seen: 5/26/23  RTC: 6 weeks  Cancel: none  No-show: none  Next appt: 9/14/23     Medication requested: risperiDONE (RISPERDAL) 2 MG tablet   Directions: Take 1 tablet (2 mg) by mouth At Bedtime Take with 0.25 mg tablet for total daily dose of 2.25 mg   Qty: 30  Last Rx written 7/4/23 for 30 ds; 7/3/23 for 30 ds with 2 rf     Medication requested: ARIPiprazole (ABILIFY) 2 MG tablet   Directions: Take 4 tablets (8 mg) by mouth daily   Qty: 120  Last Rx written 7/4/23 for 30 ds    Medication requested: mirtazapine (REMERON) 15 MG tablet   Directions: Take 1 tablet (15 mg) by mouth At Bedtime   Qty: 30  Last Rx written 7/4/23 for 30 ds      Plan per 5/26/23 office visit:    - Continue mirtazapine 15 mg  - Continue aripiprazole 8 mg daily   - increase trazodone 100 mg hs/prn (for insomnia)  - Continue risperidone 2.25 mg qhs      Refills should be on file for Risperdal 0.25 and 2 mg tabs (2 refills each) and trazodone 50 mg tabs (should be 1 refill left). *Per outside meds, patient received a 90 ds of each on 7/3/23.    Refills needed for mirtazapine and aripiprazole.    Medication refill approved per refill protocol. Pended 30 ds with 1 refill to bridge to the appointment on 9/14. Routed to Dr. Evans to review and to sign off due to patient has not been seen for transfer appointment.

## 2023-07-28 NOTE — TELEPHONE ENCOUNTER
Tried to call [facility] to let them know that the refills have been sent to the pharmacy. There was no answer and no option to leave VM.

## 2023-08-01 DIAGNOSIS — F25.1 SCHIZOAFFECTIVE DISORDER, DEPRESSIVE TYPE (H): ICD-10-CM

## 2023-08-01 RX ORDER — MIRTAZAPINE 15 MG/1
15 TABLET, FILM COATED ORAL AT BEDTIME
Qty: 90 TABLET | Refills: 0 | Status: SHIPPED | OUTPATIENT
Start: 2023-08-01 | End: 2023-09-14

## 2023-08-01 NOTE — TELEPHONE ENCOUNTER
Dr. Evans,    Received 90 days request for the Mirtazapine 15 mg . Would you like a 90 days or keep at 30 days supply?    If you would like a 90 days please send it back to me and I will pend it for you.    Mitra Fierro on 8/1/2023 at 6:25 AM

## 2023-09-14 ENCOUNTER — OFFICE VISIT (OUTPATIENT)
Dept: PSYCHIATRY | Facility: CLINIC | Age: 43
End: 2023-09-14
Attending: STUDENT IN AN ORGANIZED HEALTH CARE EDUCATION/TRAINING PROGRAM
Payer: COMMERCIAL

## 2023-09-14 ENCOUNTER — APPOINTMENT (OUTPATIENT)
Dept: INTERPRETER SERVICES | Facility: CLINIC | Age: 43
End: 2023-09-14
Payer: COMMERCIAL

## 2023-09-14 VITALS
HEART RATE: 88 BPM | BODY MASS INDEX: 34.68 KG/M2 | WEIGHT: 195.8 LBS | SYSTOLIC BLOOD PRESSURE: 138 MMHG | DIASTOLIC BLOOD PRESSURE: 90 MMHG

## 2023-09-14 DIAGNOSIS — F25.1 SCHIZOAFFECTIVE DISORDER, DEPRESSIVE TYPE (H): ICD-10-CM

## 2023-09-14 DIAGNOSIS — F43.10 PTSD (POST-TRAUMATIC STRESS DISORDER): Primary | ICD-10-CM

## 2023-09-14 PROCEDURE — 99214 OFFICE O/P EST MOD 30 MIN: CPT | Mod: GC

## 2023-09-14 PROCEDURE — G0463 HOSPITAL OUTPT CLINIC VISIT: HCPCS

## 2023-09-14 RX ORDER — PRAZOSIN HYDROCHLORIDE 1 MG/1
CAPSULE ORAL
Qty: 90 CAPSULE | Refills: 0 | Status: SHIPPED | OUTPATIENT
Start: 2023-09-14 | End: 2023-11-05

## 2023-09-14 RX ORDER — RISPERIDONE 0.25 MG/1
0.25 TABLET ORAL AT BEDTIME
Qty: 30 TABLET | Refills: 2 | Status: SHIPPED | OUTPATIENT
Start: 2023-09-14 | End: 2023-11-05

## 2023-09-14 RX ORDER — TRAZODONE HYDROCHLORIDE 50 MG/1
100 TABLET, FILM COATED ORAL AT BEDTIME
Qty: 60 TABLET | Refills: 2 | Status: SHIPPED | OUTPATIENT
Start: 2023-09-14 | End: 2023-11-05

## 2023-09-14 RX ORDER — RISPERIDONE 2 MG/1
2 TABLET ORAL AT BEDTIME
Qty: 30 TABLET | Refills: 0 | Status: SHIPPED | OUTPATIENT
Start: 2023-09-14 | End: 2023-10-20

## 2023-09-14 RX ORDER — ARIPIPRAZOLE 2 MG/1
8 TABLET ORAL DAILY
Qty: 120 TABLET | Refills: 1 | Status: SHIPPED | OUTPATIENT
Start: 2023-09-14 | End: 2023-11-05

## 2023-09-14 RX ORDER — MIRTAZAPINE 15 MG/1
15 TABLET, FILM COATED ORAL AT BEDTIME
Qty: 90 TABLET | Refills: 0 | Status: SHIPPED | OUTPATIENT
Start: 2023-09-14 | End: 2023-11-05

## 2023-09-14 ASSESSMENT — PAIN SCALES - GENERAL: PAINLEVEL: SEVERE PAIN (7)
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
## 2023-09-14 NOTE — PATIENT INSTRUCTIONS
It was very nice meeting you both.   Today we discussed starting a new medication called prazosin to address sleep difficulty and anxiety. Here is how you need to take the medication:  Week 1: 1 mg prazosin at night  Week 2: 2 mg prazosin at night  Week 3: 3 mg prazosin at night  Week 4 and until we meet again continue the 3 mg.    Do not raise the dose in case dizziness and lightheadedness emerge.     **For crisis resources, please see the information at the end of this document**   Patient Education    Thank you for coming to the [facility] [place] [facility].     Lab Testing:  If you had lab testing today and your results are reassuring or normal they will be mailed to you or sent through [email or web address] within 7 days. If the lab tests need quick action we will call you with the results. The phone number we will call with results is # [phone]. If this is not the best number please call our clinic and change the number.     Medication Refills:  If you need any refills please call your pharmacy and they will contact us. Our fax number for refills is [phone].   Three business days of notice are needed for general medication refill requests.   Five business days of notice are needed for controlled substance refill requests.   If you need to change to a different pharmacy, please contact the new pharmacy directly. The new pharmacy will help you get your medications transferred.     Contact Us:  Please call [phone] during business hours (8-5:00 M-F).   If you have medication related questions after clinic hours, or on the weekend, please call [phone].     Financial Assistance [phone]   Medical Records [phone]       MENTAL HEALTH CRISIS RESOURCES:  For a emergency help, please call 911 or go to the nearest Emergency Department.     Emergency Walk-In Options:   [facility] Unit @ [facility] ([place]): [phone] - Specialized mental health emergency area designed  to be calming  [facility] ([place]): [phone]  [facility] ([place]): [phone]  [facility] ([place]): [phone]    County Crisis Information:   [place]: [phone]  [place]: [phone]  [place] ([place] - Adult: [phone]     Child: [phone]  [place] - Adult: [phone]     Child: [phone]  [place]: [phone]  List of all [facility] resources:   https://[place].gov/dhs/people-we-serve/adults/health-care/mental-health/resources/crisis-contacts.jsp    National Crisis Information:   Crisis Text Line: Text  [place]  to 952793  Suicide & Crisis Lifeline: 988  National Suicide Prevention Lifeline: 4-791-234-TALK (1-662.875.1629)       For online chat options, visit https://suicidepreventionlifeline.org/chat/  Poison Control Center: 1-670.511.6994  Trans Lifeline: 1-791.738.8000 - Hotline for transgender people of all ages  The [employer] Project: 1-716.692.2485 - Hotline for LGBT youth     For Non-Emergency Support:   Fast Tracker: Mental Health & Substance Use Disorder Resources -   https://www.[employer] ApptracklinkedÃ¼n.org/

## 2023-09-14 NOTE — NURSING NOTE
Chief Complaint   Patient presents with    Recheck Medication     Schizoaffective disorder, depressive type     - Randolph Alexander, Visit Facilitator

## 2023-09-15 RX ORDER — RISPERIDONE 2 MG/1
2 TABLET ORAL AT BEDTIME
Qty: 90 TABLET | OUTPATIENT
Start: 2023-09-15

## 2023-10-06 NOTE — TELEPHONE ENCOUNTER
"Patient requesting refill of venlafaxine- not on current medication list. Appears it was being prescribed by her psychiatrist but may also have been discontinued in March of this year?     Spoke to patient via  [id number]- patient states she has been taking 150 mg of venlafaxine but \"has run out now\" and she needs it \"for my depression\". RN attempted to clarify since office visit notes from Dr. Ugarte on 5/14 indicate that they had stopped this medication. Patient then put PCA Ignacio on the phone- he confirmed that the bottle said 150 mg and was from Dr. Ugarte but gave the fill date of 8/14/21. He states she has been taking this but \"it was transferred to another doctor\" so that may have been the issue. They are requesting Dr. Cadena fill now.     RN unable to order so will route to PCP high priority to order if appropriate.   Elisabeth Rosenberg RN    " normal (ped)...

## 2023-10-16 ENCOUNTER — OFFICE VISIT (OUTPATIENT)
Dept: FAMILY MEDICINE | Facility: CLINIC | Age: 43
End: 2023-10-16
Payer: COMMERCIAL

## 2023-10-16 VITALS
OXYGEN SATURATION: 100 % | SYSTOLIC BLOOD PRESSURE: 125 MMHG | DIASTOLIC BLOOD PRESSURE: 84 MMHG | HEART RATE: 95 BPM | RESPIRATION RATE: 17 BRPM | BODY MASS INDEX: 34.49 KG/M2 | WEIGHT: 194.7 LBS

## 2023-10-16 DIAGNOSIS — G44.229 CHRONIC TENSION-TYPE HEADACHE, NOT INTRACTABLE: ICD-10-CM

## 2023-10-16 DIAGNOSIS — R52 PAIN: ICD-10-CM

## 2023-10-16 DIAGNOSIS — F33.1 MAJOR DEPRESSIVE DISORDER, RECURRENT EPISODE, MODERATE (H): ICD-10-CM

## 2023-10-16 DIAGNOSIS — K21.00 GASTROESOPHAGEAL REFLUX DISEASE WITH ESOPHAGITIS, UNSPECIFIED WHETHER HEMORRHAGE: ICD-10-CM

## 2023-10-16 DIAGNOSIS — E56.9 VITAMIN DEFICIENCY: ICD-10-CM

## 2023-10-16 DIAGNOSIS — I10 ESSENTIAL HYPERTENSION: ICD-10-CM

## 2023-10-16 DIAGNOSIS — Z12.4 SCREENING FOR CERVICAL CANCER: ICD-10-CM

## 2023-10-16 DIAGNOSIS — Z12.31 ENCOUNTER FOR SCREENING MAMMOGRAM FOR BREAST CANCER: ICD-10-CM

## 2023-10-16 DIAGNOSIS — F25.1 SCHIZOAFFECTIVE DISORDER, DEPRESSIVE TYPE (H): ICD-10-CM

## 2023-10-16 DIAGNOSIS — F43.10 PTSD (POST-TRAUMATIC STRESS DISORDER): ICD-10-CM

## 2023-10-16 DIAGNOSIS — J30.2 SEASONAL ALLERGIC RHINITIS, UNSPECIFIED TRIGGER: ICD-10-CM

## 2023-10-16 DIAGNOSIS — I10 ESSENTIAL HYPERTENSION WITH GOAL BLOOD PRESSURE LESS THAN 130/85: Primary | ICD-10-CM

## 2023-10-16 LAB
ALBUMIN SERPL BCG-MCNC: 4 G/DL (ref 3.5–5.2)
ALP SERPL-CCNC: 95 U/L (ref 35–104)
ALT SERPL W P-5'-P-CCNC: 16 U/L (ref 0–50)
ANION GAP SERPL CALCULATED.3IONS-SCNC: 11 MMOL/L (ref 7–15)
AST SERPL W P-5'-P-CCNC: 22 U/L (ref 0–45)
BILIRUB SERPL-MCNC: 0.2 MG/DL
BUN SERPL-MCNC: 10.7 MG/DL (ref 6–20)
CALCIUM SERPL-MCNC: 9.8 MG/DL (ref 8.6–10)
CHLORIDE SERPL-SCNC: 102 MMOL/L (ref 98–107)
CHOLEST SERPL-MCNC: 204 MG/DL
CREAT SERPL-MCNC: 0.79 MG/DL (ref 0.51–0.95)
DEPRECATED HCO3 PLAS-SCNC: 25 MMOL/L (ref 22–29)
EGFRCR SERPLBLD CKD-EPI 2021: >90 ML/MIN/1.73M2
ERYTHROCYTE [DISTWIDTH] IN BLOOD BY AUTOMATED COUNT: 12.9 % (ref 10–15)
GLUCOSE SERPL-MCNC: 149 MG/DL (ref 70–99)
HBA1C MFR BLD: 5.5 % (ref 0–5.6)
HCT VFR BLD AUTO: 40.7 % (ref 35–47)
HDLC SERPL-MCNC: 43 MG/DL
HGB BLD-MCNC: 13.4 G/DL (ref 11.7–15.7)
LDLC SERPL CALC-MCNC: 140 MG/DL
MCH RBC QN AUTO: 28 PG (ref 26.5–33)
MCHC RBC AUTO-ENTMCNC: 32.9 G/DL (ref 31.5–36.5)
MCV RBC AUTO: 85 FL (ref 78–100)
NONHDLC SERPL-MCNC: 161 MG/DL
PLATELET # BLD AUTO: 264 10E3/UL (ref 150–450)
POTASSIUM SERPL-SCNC: 4.1 MMOL/L (ref 3.4–5.3)
PROT SERPL-MCNC: 8.5 G/DL (ref 6.4–8.3)
RBC # BLD AUTO: 4.79 10E6/UL (ref 3.8–5.2)
SODIUM SERPL-SCNC: 138 MMOL/L (ref 135–145)
TRIGL SERPL-MCNC: 105 MG/DL
WBC # BLD AUTO: 5.5 10E3/UL (ref 4–11)

## 2023-10-16 PROCEDURE — 80053 COMPREHEN METABOLIC PANEL: CPT | Performed by: FAMILY MEDICINE

## 2023-10-16 PROCEDURE — 80061 LIPID PANEL: CPT | Performed by: FAMILY MEDICINE

## 2023-10-16 PROCEDURE — G0145 SCR C/V CYTO,THINLAYER,RESCR: HCPCS | Performed by: FAMILY MEDICINE

## 2023-10-16 PROCEDURE — 83036 HEMOGLOBIN GLYCOSYLATED A1C: CPT | Performed by: FAMILY MEDICINE

## 2023-10-16 PROCEDURE — 87624 HPV HI-RISK TYP POOLED RSLT: CPT | Performed by: FAMILY MEDICINE

## 2023-10-16 PROCEDURE — 99215 OFFICE O/P EST HI 40 MIN: CPT | Performed by: FAMILY MEDICINE

## 2023-10-16 PROCEDURE — 85027 COMPLETE CBC AUTOMATED: CPT | Performed by: FAMILY MEDICINE

## 2023-10-16 PROCEDURE — 36415 COLL VENOUS BLD VENIPUNCTURE: CPT | Performed by: FAMILY MEDICINE

## 2023-10-16 RX ORDER — PNV NO.95/FERROUS FUM/FOLIC AC 28MG-0.8MG
500 TABLET ORAL DAILY
Qty: 180 TABLET | Refills: 3 | Status: SHIPPED | OUTPATIENT
Start: 2023-10-16 | End: 2023-11-08

## 2023-10-16 RX ORDER — CHLORAL HYDRATE 500 MG
2 CAPSULE ORAL DAILY
Qty: 180 CAPSULE | Refills: 3 | Status: SHIPPED | OUTPATIENT
Start: 2023-10-16

## 2023-10-16 RX ORDER — ACETAMINOPHEN 325 MG/1
650 TABLET ORAL EVERY 4 HOURS PRN
Qty: 200 TABLET | Refills: 11 | Status: SHIPPED | OUTPATIENT
Start: 2023-10-16

## 2023-10-16 RX ORDER — MULTIPLE VITAMINS W/ MINERALS TAB 9MG-400MCG
1 TAB ORAL DAILY
Qty: 100 TABLET | Refills: 3 | Status: SHIPPED | OUTPATIENT
Start: 2023-10-16

## 2023-10-16 RX ORDER — FAMOTIDINE 20 MG/1
TABLET, FILM COATED ORAL
Qty: 180 TABLET | Refills: 3 | Status: SHIPPED | OUTPATIENT
Start: 2023-10-16

## 2023-10-16 RX ORDER — MIRTAZAPINE 15 MG/1
15 TABLET, FILM COATED ORAL AT BEDTIME
Qty: 90 TABLET | Refills: 0 | Status: CANCELLED | OUTPATIENT
Start: 2023-10-16

## 2023-10-16 RX ORDER — PROPRANOLOL HCL 60 MG
60 CAPSULE, EXTENDED RELEASE 24HR ORAL DAILY
Qty: 90 CAPSULE | Refills: 3 | Status: SHIPPED | OUTPATIENT
Start: 2023-10-16

## 2023-10-16 RX ORDER — AMLODIPINE BESYLATE 10 MG/1
10 TABLET ORAL DAILY
Qty: 90 TABLET | Refills: 3 | Status: SHIPPED | OUTPATIENT
Start: 2023-10-16

## 2023-10-16 RX ORDER — PRAZOSIN HYDROCHLORIDE 1 MG/1
CAPSULE ORAL
Qty: 90 CAPSULE | Refills: 0 | Status: CANCELLED | OUTPATIENT
Start: 2023-10-16

## 2023-10-16 RX ORDER — LANOLIN ALCOHOL/MO/W.PET/CERES
6 CREAM (GRAM) TOPICAL AT BEDTIME
Qty: 60 TABLET | Refills: 0 | Status: CANCELLED | OUTPATIENT
Start: 2023-10-16

## 2023-10-16 RX ORDER — RISPERIDONE 0.25 MG/1
0.25 TABLET ORAL AT BEDTIME
Qty: 30 TABLET | Refills: 2 | Status: CANCELLED | OUTPATIENT
Start: 2023-10-16

## 2023-10-16 RX ORDER — DIPHENHYDRAMINE HCL 25 MG
25-50 TABLET ORAL
Qty: 30 TABLET | Refills: 0 | Status: SHIPPED | OUTPATIENT
Start: 2023-10-16

## 2023-10-16 RX ORDER — TRAZODONE HYDROCHLORIDE 50 MG/1
100 TABLET, FILM COATED ORAL AT BEDTIME
Qty: 60 TABLET | Refills: 2 | Status: CANCELLED | OUTPATIENT
Start: 2023-10-16

## 2023-10-16 RX ORDER — RISPERIDONE 2 MG/1
2 TABLET ORAL AT BEDTIME
Qty: 30 TABLET | Refills: 0 | Status: CANCELLED | OUTPATIENT
Start: 2023-10-16

## 2023-10-16 RX ORDER — CETIRIZINE HYDROCHLORIDE 10 MG/1
10 TABLET ORAL DAILY
Qty: 90 TABLET | Refills: 3 | Status: SHIPPED | OUTPATIENT
Start: 2023-10-16

## 2023-10-16 RX ORDER — ARIPIPRAZOLE 2 MG/1
8 TABLET ORAL DAILY
Qty: 120 TABLET | Refills: 1 | Status: CANCELLED | OUTPATIENT
Start: 2023-10-16

## 2023-10-16 RX ORDER — RISPERIDONE 2 MG/1
2 TABLET ORAL AT BEDTIME
Qty: 30 TABLET | Refills: 2 | Status: CANCELLED | OUTPATIENT
Start: 2023-10-16

## 2023-10-16 RX ORDER — IBUPROFEN 400 MG/1
400 TABLET, FILM COATED ORAL EVERY 4 HOURS PRN
Qty: 120 TABLET | Refills: 4 | Status: SHIPPED | OUTPATIENT
Start: 2023-10-16

## 2023-10-16 NOTE — PATIENT INSTRUCTIONS
Recommend annual flu shot and updated Covid Booster - can get these at any pharmacy    Patient Education   Here is the plan from today's visit    1. Schizoaffective disorder, depressive type (H)  2. PTSD (post-traumatic stress disorder)  Follow up with psychiatry as scheduled    3. Essential hypertension  Labs today and refills  - propranolol ER (INDERAL LA) 60 MG 24 hr capsule; Take 1 capsule (60 mg) by mouth daily  Dispense: 90 capsule; Refill: 3    4. Essential hypertension with goal blood pressure less than 130/85  - Omega-3 1000 MG capsule; Take 2 capsules (2 g) by mouth daily  Dispense: 180 capsule; Refill: 3  - multivitamin w/minerals (THERA-VIT-M) tablet; Take 1 tablet by mouth daily  Dispense: 100 tablet; Refill: 3  - amLODIPine (NORVASC) 10 MG tablet; Take 1 tablet (10 mg) by mouth daily  Dispense: 90 tablet; Refill: 3  - Hemoglobin A1c; Future  - Comprehensive metabolic panel; Future  - CBC with Platelets and Reflex to Iron Studies; Future  - Lipid panel; Future    5. Vitamin deficiency  - multivitamin w/minerals (THERA-VIT-M) tablet; Take 1 tablet by mouth daily  Dispense: 100 tablet; Refill: 3    6. Major depressive disorder, recurrent episode, moderate (H)  - multivitamin w/minerals (THERA-VIT-M) tablet; Take 1 tablet by mouth daily  Dispense: 100 tablet; Refill: 3  - Magnesium Oxide (CVS MAGNESIUM OXIDE) 250 MG tablet; Take 2 tablets (500 mg) by mouth daily  Dispense: 180 tablet; Refill: 3    7. Gastroesophageal reflux disease with esophagitis, unspecified whether hemorrhage  - famotidine (PEPCID) 20 MG tablet; TAKE 1 TABLET BY MOUTH TWICE A DAY  Dispense: 180 tablet; Refill: 3    8. Seasonal allergic rhinitis, unspecified trigger  - diphenhydrAMINE (BENADRYL) 25 MG tablet; Take 1-2 tablets (25-50 mg) by mouth nightly as needed for itching or allergies  Dispense: 30 tablet; Refill: 0  - cetirizine (ZYRTEC) 10 MG tablet; Take 1 tablet (10 mg) by mouth daily  Dispense: 90 tablet; Refill: 3    10.  Pain  - ibuprofen (ADVIL/MOTRIN) 400 MG tablet; Take 1 tablet (400 mg) by mouth every 4 hours as needed for moderate pain  Dispense: 120 tablet; Refill: 4  - acetaminophen (TYLENOL) 325 MG tablet; Take 2 tablets (650 mg) by mouth every 4 hours as needed for mild pain  Dispense: 200 tablet; Refill: 11    11. Encounter for screening mammogram for breast cancer  - Screening Mammogram Digital Bilateral; Future    12. Screening for cervical cancer  - Pap screen with HPV - recommended age 30 - 65 years    Will order stool testing to mailed home for colon cancer screening      Please call or return to clinic if your symptoms don't go away.    Follow up plan  No follow-ups on file.    Thank you for coming to [facility] today.  Lab Testing:  **If you had lab testing today and your results are reassuring or normal they will be mailed to you or sent through [employer] within 7 days.   **If the lab tests need quick action we will call you with the results.  **If you are having labs done on a different day, please call [phone] to schedule at [facility] or [phone] for other [facility] Outpatient Lab locations. Labs do not offer walk-in appointments.  The phone number we will call with results is # [phone] (home) . If this is not the best number please call our clinic and change the number.  Medication Refills:  If you need any refills please call your pharmacy and they will contact us.   If you need to  your refill at a new pharmacy, please contact the new pharmacy directly. The new pharmacy will help you get your medications transferred faster.   Scheduling:  If you have any concerns about today's visit or wish to schedule another appointment please call our office during normal business hours [phone] (8-5:00 M-F). If you can no longer make a scheduled visit, please cancel via [employer] or call us to cancel.   If a referral was made to an [facility] specialty provider and you do not get a  call from central scheduling, please refer to directions on your visit summary or call our office during normal business hours for assistance.   If a Mammogram was ordered for you at the Breast Center call [phone] to schedule or change your appointment.  If you had an XRay/CT/Ultrasound/MRI ordered the number is [phone] to schedule or change your radiology appointment.   [facility] has limited ultrasound appointments available on Wednesdays, if you would like your ultrasound at [facility], please call [phone] to schedule.   Medical Concerns:  If you have urgent medical concerns please call [phone] at any time of the day.    Khushi Cadena MD

## 2023-10-16 NOTE — LETTER
October 25, 2023      Carmen Mccoy Mohsen  [address]    [place] [address]        Dear Carmen,    Thank you for getting your care at [facility]. Please see below for your test results. These are all reassuring - including your pap smear which was normal/negative. Your next pap will be due in 5 years.    Resulted Orders   Pap screen with HPV - recommended age 30 - 65 years   Result Value Ref Range    Interpretation        Negative for Intraepithelial Lesion or Malignancy (NILM)    Comment         Papanicolaou Test Limitations:  Cervical cytology is a screening test with limited sensitivity, and regular screening is critical for cancer prevention.  Pap tests are primarily effective for the diagnosis/prevention of squamous cell carcinoma, not adenocarcinoma or other cancers.        Specimen Adequacy       Satisfactory for evaluation, endocervical/transformation zone component present    Clinical Information       post-menopausal      Reflex Testing Yes regardless of result     Previous Abnormal?       No      Performing Labs       The technical component of this testing was completed at [facility]     Hemoglobin A1c   Result Value Ref Range    Hemoglobin A1C 5.5 0.0 - 5.6 %      Comment:      Normal <5.7%   Prediabetes 5.7-6.4%    Diabetes 6.5% or higher     Note: Adopted from ADA consensus guidelines.   Comprehensive metabolic panel   Result Value Ref Range    Sodium 138 135 - 145 mmol/L      Comment:      Reference intervals for this test were updated on 09/26/2023 to more accurately reflect our healthy population. There may be differences in the flagging of prior results with similar values performed with this method. Interpretation of those prior results can be made in the context of the updated reference intervals.     Potassium 4.1 3.4 - 5.3 mmol/L    Carbon Dioxide (CO2) 25 22 - 29 mmol/L    Anion Gap 11 7 - 15 mmol/L    Urea  Nitrogen 10.7 6.0 - 20.0 mg/dL    Creatinine 0.79 0.51 - 0.95 mg/dL    GFR Estimate >90 >60 mL/min/1.73m2    Calcium 9.8 8.6 - 10.0 mg/dL    Chloride 102 98 - 107 mmol/L    Glucose 149 (H) 70 - 99 mg/dL    Alkaline Phosphatase 95 35 - 104 U/L    AST 22 0 - 45 U/L      Comment:      Reference intervals for this test were updated on 6/12/2023 to more accurately reflect our healthy population. There may be differences in the flagging of prior results with similar values performed with this method. Interpretation of those prior results can be made in the context of the updated reference intervals.    ALT 16 0 - 50 U/L      Comment:      Reference intervals for this test were updated on 6/12/2023 to more accurately reflect our healthy population. There may be differences in the flagging of prior results with similar values performed with this method. Interpretation of those prior results can be made in the context of the updated reference intervals.      Protein Total 8.5 (H) 6.4 - 8.3 g/dL    Albumin 4.0 3.5 - 5.2 g/dL    Bilirubin Total 0.2 <=1.2 mg/dL   Lipid panel   Result Value Ref Range    Cholesterol 204 (H) <200 mg/dL    Triglycerides 105 <150 mg/dL    Direct Measure HDL 43 (L) >=50 mg/dL    LDL Cholesterol Calculated 140 (H) <=100 mg/dL    Non HDL Cholesterol 161 (H) <130 mg/dL    Narrative    Cholesterol  Desirable:  <200 mg/dL    Triglycerides  Normal:  Less than 150 mg/dL  Borderline High:  150-199 mg/dL  High:  200-499 mg/dL  Very High:  Greater than or equal to 500 mg/dL    Direct Measure HDL  Female:  Greater than or equal to 50 mg/dL   Male:  Greater than or equal to 40 mg/dL    LDL Cholesterol  Desirable:  <100mg/dL  Above Desirable:  100-129 mg/dL   Borderline High:  130-159 mg/dL   High:  160-189 mg/dL   Very High:  >= 190 mg/dL    Non HDL Cholesterol  Desirable:  130 mg/dL  Above Desirable:  130-159 mg/dL  Borderline High:  160-189 mg/dL  High:  190-219 mg/dL  Very High:  Greater than or equal to 220  mg/dL   CBC with Platelets and Reflex to Iron Studies   Result Value Ref Range    WBC Count 5.5 4.0 - 11.0 10e3/uL    RBC Count 4.79 3.80 - 5.20 10e6/uL    Hemoglobin 13.4 11.7 - 15.7 g/dL    Hematocrit 40.7 35.0 - 47.0 %    MCV 85 78 - 100 fL    MCH 28.0 26.5 - 33.0 pg    MCHC 32.9 31.5 - 36.5 g/dL    RDW 12.9 10.0 - 15.0 %    Platelet Count 264 150 - 450 10e3/uL   HPV High Risk Types DNA Cervical   Result Value Ref Range    Other HR HPV Negative Negative    HPV16 DNA Negative Negative    HPV18 DNA Negative Negative    FINAL DIAGNOSIS       This patient's sample is negative for HPV DNA.        This test was developed and its performance characteristics determined by the [facility]. It has not been cleared or approved by the FDA. The laboratory is regulated under CLIA as qualified to perform high-complexity testing. This test is used for clinical purposes. It should not be regarded as investigational or for research.    METHODOLOGY: The Roche Ashley 4800 system uses automated extraction, simultaneous amplification of HPV (L1 region) and beta-globin, followed by real time detection of fluorescent labeled HPV and beta globin using specific oligonucleotide probes. The test specifically identifies types HPV 16 DNA and HPV 18 DNA while concurrently detecting the rest of the high risk types (31, 33, 35, 39, 45, 51, 52, 56, 58, 59, 66 or 68).    COMMENTS: This test is not intended for use as a screening device for woman under age 30 with normal cervical cytology. Results should be correlated with cytologic and histologic findings. Close clinical followup is recommended.           If you have any concerns about these results please call and leave a message for me or send a MyChart message to the clinic.    Sincerely,    Khushi Cadena MD

## 2023-10-16 NOTE — LETTER
December 31, 2023      Carmen Mccoy Mohsen  [address]    [place] [address]        Dear Carmen    My sincere apologies for not getting these out to you sooner! Please see below for your test results from a few months ago. Your pap and HPV testing were normal - will next be due in 5 years. Screening for diabetes and kidney studies were also good. Your cholesterol was a little elevated - next time we should check when you have been fasting for 8 hours since the results can be affected by eating.    Resulted Orders   Pap screen with HPV - recommended age 30 - 65 years   Result Value Ref Range    Interpretation        Negative for Intraepithelial Lesion or Malignancy (NILM)    Comment         Papanicolaou Test Limitations:  Cervical cytology is a screening test with limited sensitivity, and regular screening is critical for cancer prevention.  Pap tests are primarily effective for the diagnosis/prevention of squamous cell carcinoma, not adenocarcinoma or other cancers.        Specimen Adequacy       Satisfactory for evaluation, endocervical/transformation zone component present    Clinical Information       post-menopausal      Reflex Testing Yes regardless of result     Previous Abnormal?       No      Performing Labs       The technical component of this testing was completed at [facility]     Hemoglobin A1c   Result Value Ref Range    Hemoglobin A1C 5.5 0.0 - 5.6 %      Comment:      Normal <5.7%   Prediabetes 5.7-6.4%    Diabetes 6.5% or higher     Note: Adopted from ADA consensus guidelines.   Comprehensive metabolic panel   Result Value Ref Range    Sodium 138 135 - 145 mmol/L      Comment:      Reference intervals for this test were updated on 09/26/2023 to more accurately reflect our healthy population. There may be differences in the flagging of prior results with similar values performed with this method. Interpretation of those prior  results can be made in the context of the updated reference intervals.     Potassium 4.1 3.4 - 5.3 mmol/L    Carbon Dioxide (CO2) 25 22 - 29 mmol/L    Anion Gap 11 7 - 15 mmol/L    Urea Nitrogen 10.7 6.0 - 20.0 mg/dL    Creatinine 0.79 0.51 - 0.95 mg/dL    GFR Estimate >90 >60 mL/min/1.73m2    Calcium 9.8 8.6 - 10.0 mg/dL    Chloride 102 98 - 107 mmol/L    Glucose 149 (H) 70 - 99 mg/dL    Alkaline Phosphatase 95 35 - 104 U/L    AST 22 0 - 45 U/L      Comment:      Reference intervals for this test were updated on 6/12/2023 to more accurately reflect our healthy population. There may be differences in the flagging of prior results with similar values performed with this method. Interpretation of those prior results can be made in the context of the updated reference intervals.    ALT 16 0 - 50 U/L      Comment:      Reference intervals for this test were updated on 6/12/2023 to more accurately reflect our healthy population. There may be differences in the flagging of prior results with similar values performed with this method. Interpretation of those prior results can be made in the context of the updated reference intervals.      Protein Total 8.5 (H) 6.4 - 8.3 g/dL    Albumin 4.0 3.5 - 5.2 g/dL    Bilirubin Total 0.2 <=1.2 mg/dL   Lipid panel   Result Value Ref Range    Cholesterol 204 (H) <200 mg/dL    Triglycerides 105 <150 mg/dL    Direct Measure HDL 43 (L) >=50 mg/dL    LDL Cholesterol Calculated 140 (H) <=100 mg/dL    Non HDL Cholesterol 161 (H) <130 mg/dL    Narrative    Cholesterol  Desirable:  <200 mg/dL    Triglycerides  Normal:  Less than 150 mg/dL  Borderline High:  150-199 mg/dL  High:  200-499 mg/dL  Very High:  Greater than or equal to 500 mg/dL    Direct Measure HDL  Female:  Greater than or equal to 50 mg/dL   Male:  Greater than or equal to 40 mg/dL    LDL Cholesterol  Desirable:  <100mg/dL  Above Desirable:  100-129 mg/dL   Borderline High:  130-159 mg/dL   High:  160-189 mg/dL   Very High:  >=  190 mg/dL    Non HDL Cholesterol  Desirable:  130 mg/dL  Above Desirable:  130-159 mg/dL  Borderline High:  160-189 mg/dL  High:  190-219 mg/dL  Very High:  Greater than or equal to 220 mg/dL   CBC with Platelets and Reflex to Iron Studies   Result Value Ref Range    WBC Count 5.5 4.0 - 11.0 10e3/uL    RBC Count 4.79 3.80 - 5.20 10e6/uL    Hemoglobin 13.4 11.7 - 15.7 g/dL    Hematocrit 40.7 35.0 - 47.0 %    MCV 85 78 - 100 fL    MCH 28.0 26.5 - 33.0 pg    MCHC 32.9 31.5 - 36.5 g/dL    RDW 12.9 10.0 - 15.0 %    Platelet Count 264 150 - 450 10e3/uL   HPV High Risk Types DNA Cervical   Result Value Ref Range    Other HR HPV Negative Negative    HPV16 DNA Negative Negative    HPV18 DNA Negative Negative    FINAL DIAGNOSIS       This patient's sample is negative for HPV DNA.        This test was developed and its performance characteristics determined by the [facility]. It has not been cleared or approved by the FDA. The laboratory is regulated under CLIA as qualified to perform high-complexity testing. This test is used for clinical purposes. It should not be regarded as investigational or for research.    METHODOLOGY: The Roche Ashley 4800 system uses automated extraction, simultaneous amplification of HPV (L1 region) and beta-globin, followed by real time detection of fluorescent labeled HPV and beta globin using specific oligonucleotide probes. The test specifically identifies types HPV 16 DNA and HPV 18 DNA while concurrently detecting the rest of the high risk types (31, 33, 35, 39, 45, 51, 52, 56, 58, 59, 66 or 68).    COMMENTS: This test is not intended for use as a screening device for woman under age 30 with normal cervical cytology. Results should be correlated with cytologic and histologic findings. Close clinical followup is recommended.           If you have any questions, please call the clinic to make an appointment with me for a clinic  visit.    Sincerely,    Khushi Cadena MD

## 2023-10-18 LAB
BKR LAB AP GYN ADEQUACY: NORMAL
BKR LAB AP GYN INTERPRETATION: NORMAL
BKR LAB AP HPV REFLEX: NORMAL
BKR LAB AP PREVIOUS ABNORMAL: NORMAL
PATH REPORT.COMMENTS IMP SPEC: NORMAL
PATH REPORT.COMMENTS IMP SPEC: NORMAL
PATH REPORT.RELEVANT HX SPEC: NORMAL

## 2023-10-19 LAB
HUMAN PAPILLOMA VIRUS 16 DNA: NEGATIVE
HUMAN PAPILLOMA VIRUS 18 DNA: NEGATIVE
HUMAN PAPILLOMA VIRUS FINAL DIAGNOSIS: NORMAL
HUMAN PAPILLOMA VIRUS OTHER HR: NEGATIVE

## 2023-10-20 ENCOUNTER — TELEPHONE (OUTPATIENT)
Dept: PSYCHIATRY | Facility: CLINIC | Age: 43
End: 2023-10-20
Payer: COMMERCIAL

## 2023-10-20 DIAGNOSIS — F25.1 SCHIZOAFFECTIVE DISORDER, DEPRESSIVE TYPE (H): ICD-10-CM

## 2023-10-20 RX ORDER — RISPERIDONE 2 MG/1
2 TABLET ORAL AT BEDTIME
Qty: 30 TABLET | Refills: 0 | Status: SHIPPED | OUTPATIENT
Start: 2023-10-20 | End: 2023-11-05

## 2023-10-20 RX ORDER — RISPERIDONE 2 MG/1
2 TABLET ORAL AT BEDTIME
Qty: 90 TABLET | OUTPATIENT
Start: 2023-10-20

## 2023-10-20 NOTE — TELEPHONE ENCOUNTER
Last seen: 9/14/23  RTC: 4-6 weeks  Cancel: none  No-show: none  Next appt: 11/2/23     Incoming refill from [facility] pharmacy via fax for 90 d/s refill request     Medication requested: risperiDONE (RISPERDAL) 2 MG tablet   Directions: Take 1 tablet (2 mg) by mouth At Bedtime Take with 0.25 mg tablet for total daily dose of 2.25 mg   Qty: 30  Last refilled: 9/22/23 per outside med rec     Medication refill approved per refill protocol, but will only fill 30 d/s due to patient's upcoming appt on 11/2. Will include message on Rx that pt must attend 11/2 appt for further refills.    Rayne Higuera RN on 10/20/2023 at 11:10 AM

## 2023-10-20 NOTE — PROGRESS NOTES
Assessment & Plan     Essential hypertension with goal blood pressure less than 130/85  Essential hypertension  Labs today and refills  Stable  - Omega-3 1000 MG capsule; Take 2 capsules (2 g) by mouth daily  - multivitamin w/minerals (THERA-VIT-M) tablet; Take 1 tablet by mouth daily  - amLODIPine (NORVASC) 10 MG tablet; Take 1 tablet (10 mg) by mouth daily  - Hemoglobin A1c; Future  - Comprehensive metabolic panel; Future  - CBC with Platelets and Reflex to Iron Studies; Future  - Lipid panel; Future  - Hemoglobin A1c  - Comprehensive metabolic panel  - CBC with Platelets and Reflex to Iron Studies  - Lipid panel  - propranolol ER (INDERAL LA) 60 MG 24 hr capsule; Take 1 capsule (60 mg) by mouth daily    Vitamin deficiency  - multivitamin w/minerals (THERA-VIT-M) tablet; Take 1 tablet by mouth daily    Gastroesophageal reflux disease with esophagitis, unspecified whether hemorrhage  - famotidine (PEPCID) 20 MG tablet; TAKE 1 TABLET BY MOUTH TWICE A DAY    Seasonal allergic rhinitis, unspecified trigger  - diphenhydrAMINE (BENADRYL) 25 MG tablet; Take 1-2 tablets (25-50 mg) by mouth nightly as needed for itching or allergies  - cetirizine (ZYRTEC) 10 MG tablet; Take 1 tablet (10 mg) by mouth daily    Chronic tension-type headache, not intractable  Pain  - ibuprofen (ADVIL/MOTRIN) 400 MG tablet; Take 1 tablet (400 mg) by mouth every 4 hours as needed for moderate pain  - acetaminophen (TYLENOL) 325 MG tablet; Take 2 tablets (650 mg) by mouth every 4 hours as needed for mild pain    Encounter for screening mammogram for breast cancer  - Screening Mammogram Digital Bilateral; Future    Screening for cervical cancer  - Pap screen with HPV - recommended age 30 - 65 years  - HPV Hold (Lab Only)  - HPV High Risk Types DNA Cervical    Schizoaffective disorder, depressive type (H)  PTSD (post-traumatic stress disorder)  Major depressive disorder, recurrent episode, moderate (H)  Stable - good follow up with psych  -  "multivitamin w/minerals (THERA-VIT-M) tablet; Take 1 tablet by mouth daily  - Magnesium Oxide ([employer] MAGNESIUM OXIDE) 250 MG tablet; Take 2 tablets (500 mg) by mouth daily    Diagnosis or treatment significantly limited by social determinants of health - language barrier  Ordering of each unique test  Prescription drug management  45 minutes spent by me on the date of the encounter doing chart review, history and exam, documentation and further activities per the note       BMI:   Estimated body mass index is 34.49 kg/m  as calculated from the following:    Height as of 11/14/22: 1.6 m (5' 3\").    Weight as of this encounter: 88.3 kg (194 lb 11.2 oz).           No follow-ups on file.    Khushi Cadena MD  [facility] NEHEMIAH Morales is a 43 year old, presenting for the following health issues:  RECHECK (Wants to have A!C drawn)        10/16/2023    10:17 AM   Additional Questions   Roomed by fabi   Accompanied by self       HPI       Hypertension Follow-up    Do you check your blood pressure regularly outside of the clinic? No   Are you following a low salt diet? Yes  Are your blood pressures ever more than 140 on the top number (systolic) OR more   than 90 on the bottom number (diastolic), for example 140/90? No    Mental health - stable  - following with psych team, they will monitor her medication refills    Allergies - stable, no complaints    HCM  - due for pap, willing to do today  - due for mammogram  - declines vaccines today, but will get them this year    No new questions/concerns    Due for refills        Review of Systems   Constitutional, HEENT, cardiovascular, pulmonary, gi and gu systems are negative, except as otherwise noted.      Objective    /84   Pulse 95   Resp 17   Wt 88.3 kg (194 lb 11.2 oz)   SpO2 100%   BMI 34.49 kg/m    Body mass index is 34.49 kg/m .  Physical Exam  Constitutional:       Appearance: Normal appearance.   Eyes:      Extraocular Movements: " Extraocular movements intact.   Cardiovascular:      Rate and Rhythm: Normal rate and regular rhythm.      Heart sounds: Normal heart sounds.   Pulmonary:      Effort: Pulmonary effort is normal.      Breath sounds: Normal breath sounds.   Genitourinary:     General: Normal vulva.      Vagina: No vaginal discharge.      Comments: Pap/HPV collected  Skin:     General: Skin is warm.   Neurological:      General: No focal deficit present.      Mental Status: She is alert and oriented to person, place, and time. Mental status is at baseline.   Psychiatric:         Mood and Affect: Mood normal.         Behavior: Behavior normal.         Thought Content: Thought content normal.         Judgment: Judgment normal.

## 2023-11-02 ENCOUNTER — APPOINTMENT (OUTPATIENT)
Dept: INTERPRETER SERVICES | Facility: CLINIC | Age: 43
End: 2023-11-02
Payer: COMMERCIAL

## 2023-11-02 ENCOUNTER — OFFICE VISIT (OUTPATIENT)
Dept: PSYCHIATRY | Facility: CLINIC | Age: 43
End: 2023-11-02
Attending: STUDENT IN AN ORGANIZED HEALTH CARE EDUCATION/TRAINING PROGRAM
Payer: COMMERCIAL

## 2023-11-02 VITALS
SYSTOLIC BLOOD PRESSURE: 120 MMHG | HEART RATE: 92 BPM | BODY MASS INDEX: 33.87 KG/M2 | WEIGHT: 191.2 LBS | DIASTOLIC BLOOD PRESSURE: 78 MMHG

## 2023-11-02 DIAGNOSIS — F25.1 SCHIZOAFFECTIVE DISORDER, DEPRESSIVE TYPE (H): ICD-10-CM

## 2023-11-02 DIAGNOSIS — F43.10 PTSD (POST-TRAUMATIC STRESS DISORDER): ICD-10-CM

## 2023-11-02 PROCEDURE — 99214 OFFICE O/P EST MOD 30 MIN: CPT | Mod: GC

## 2023-11-02 PROCEDURE — G0463 HOSPITAL OUTPT CLINIC VISIT: HCPCS

## 2023-11-02 PROCEDURE — 90833 PSYTX W PT W E/M 30 MIN: CPT | Mod: GC

## 2023-11-02 ASSESSMENT — ANXIETY QUESTIONNAIRES
2. NOT BEING ABLE TO STOP OR CONTROL WORRYING: MORE THAN HALF THE DAYS
5. BEING SO RESTLESS THAT IT IS HARD TO SIT STILL: MORE THAN HALF THE DAYS
1. FEELING NERVOUS, ANXIOUS, OR ON EDGE: MORE THAN HALF THE DAYS
GAD7 TOTAL SCORE: 12
7. FEELING AFRAID AS IF SOMETHING AWFUL MIGHT HAPPEN: NOT AT ALL
3. WORRYING TOO MUCH ABOUT DIFFERENT THINGS: NEARLY EVERY DAY
IF YOU CHECKED OFF ANY PROBLEMS ON THIS QUESTIONNAIRE, HOW DIFFICULT HAVE THESE PROBLEMS MADE IT FOR YOU TO DO YOUR WORK, TAKE CARE OF THINGS AT HOME, OR GET ALONG WITH OTHER PEOPLE: SOMEWHAT DIFFICULT
GAD7 TOTAL SCORE: 12
6. BECOMING EASILY ANNOYED OR IRRITABLE: MORE THAN HALF THE DAYS
4. TROUBLE RELAXING: SEVERAL DAYS

## 2023-11-02 ASSESSMENT — PATIENT HEALTH QUESTIONNAIRE - PHQ9
10. IF YOU CHECKED OFF ANY PROBLEMS, HOW DIFFICULT HAVE THESE PROBLEMS MADE IT FOR YOU TO DO YOUR WORK, TAKE CARE OF THINGS AT HOME, OR GET ALONG WITH OTHER PEOPLE: EXTREMELY DIFFICULT
SUM OF ALL RESPONSES TO PHQ QUESTIONS 1-9: 12
SUM OF ALL RESPONSES TO PHQ QUESTIONS 1-9: 12

## 2023-11-02 ASSESSMENT — PAIN SCALES - GENERAL: PAINLEVEL: SEVERE PAIN (7)

## 2023-11-02 NOTE — NURSING NOTE
Chief Complaint   Patient presents with    Recheck Medication     PTSD (post-traumatic stress disorder)     - Randolph Alexander, Visit Facilitator
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
## 2023-11-02 NOTE — PATIENT INSTRUCTIONS
**For crisis resources, please see the information at the end of this document**   Patient Education    Thank you for coming to the [facility] [place] [facility].     Lab Testing:  If you had lab testing today and your results are reassuring or normal they will be mailed to you or sent through [employer] within 7 days. If the lab tests need quick action we will call you with the results. The phone number we will call with results is # [phone]. If this is not the best number please call our clinic and change the number.     Medication Refills:  If you need any refills please call your pharmacy and they will contact us. Our fax number for refills is [phone].   Three business days of notice are needed for general medication refill requests.   Five business days of notice are needed for controlled substance refill requests.   If you need to change to a different pharmacy, please contact the new pharmacy directly. The new pharmacy will help you get your medications transferred.     Contact Us:  Please call [phone] during business hours (8-5:00 M-F).   If you have medication related questions after clinic hours, or on the weekend, please call [phone].     Financial Assistance [phone]   Medical Records [phone]       MENTAL HEALTH CRISIS RESOURCES:  For a emergency help, please call 911 or go to the nearest Emergency Department.     Emergency Walk-In Options:   EmPATH Unit @ [facility] ([place]): [phone] - Specialized mental health emergency area designed to be calming  [facility] ([place]): [phone]  [facility] ([place]): [phone]  [facility]): [phone]    [facility] Crisis Information:   [place]: [phone]  [place]: [phone]  [place] ([place] - Adult: [phone]     Child: [phone]  [place] - Adult: [phone]     Child: [phone]  [place]:  [phone]  List of all [facility] resources:   https://[place].gov/dhs/people-we-serve/adults/health-care/mental-health/resources/crisis-contacts.jsp    National Crisis Information:   Crisis Text Line: Text  [place]  to 720665  Suicide & Crisis Lifeline: 988  National Suicide Prevention Lifeline: 5-131-788-TALK (1-966.907.3866)       For online chat options, visit https://suicidepreventionlifeline.org/chat/  Poison Control Center: 1-317-582-0910  Trans Lifeline: 7-869-330-3463 - Hotline for transgender people of all ages  The [employer]: 4-766-183-2432 - Hotline for LGBT youth     For Non-Emergency Support:   Fast Tracker: Mental Health & Substance Use Disorder Resources -   https://www.[employer]n.org/

## 2023-11-05 DIAGNOSIS — F33.1 MAJOR DEPRESSIVE DISORDER, RECURRENT EPISODE, MODERATE (H): ICD-10-CM

## 2023-11-05 RX ORDER — MIRTAZAPINE 15 MG/1
15 TABLET, FILM COATED ORAL AT BEDTIME
Qty: 30 TABLET | Refills: 2 | Status: SHIPPED | OUTPATIENT
Start: 2023-11-05 | End: 2024-01-18

## 2023-11-05 RX ORDER — RISPERIDONE 2 MG/1
2 TABLET ORAL AT BEDTIME
Qty: 30 TABLET | Refills: 2 | Status: SHIPPED | OUTPATIENT
Start: 2023-11-05 | End: 2024-01-18

## 2023-11-05 RX ORDER — TRAZODONE HYDROCHLORIDE 50 MG/1
100 TABLET, FILM COATED ORAL AT BEDTIME
Qty: 60 TABLET | Refills: 2 | Status: SHIPPED | OUTPATIENT
Start: 2023-11-05 | End: 2024-01-18

## 2023-11-05 RX ORDER — PRAZOSIN HYDROCHLORIDE 1 MG/1
3 CAPSULE ORAL AT BEDTIME
Qty: 90 CAPSULE | Refills: 2 | Status: SHIPPED | OUTPATIENT
Start: 2023-11-05 | End: 2024-01-18

## 2023-11-05 RX ORDER — RISPERIDONE 0.25 MG/1
0.25 TABLET ORAL AT BEDTIME
Qty: 30 TABLET | Refills: 2 | Status: SHIPPED | OUTPATIENT
Start: 2023-11-05 | End: 2024-01-18

## 2023-11-05 RX ORDER — ARIPIPRAZOLE 2 MG/1
8 TABLET ORAL DAILY
Qty: 120 TABLET | Refills: 2 | Status: SHIPPED | OUTPATIENT
Start: 2023-11-05 | End: 2024-01-18

## 2023-11-08 RX ORDER — PNV NO.95/FERROUS FUM/FOLIC AC 28MG-0.8MG
500 TABLET ORAL DAILY
Qty: 180 TABLET | Refills: 4 | Status: SHIPPED | OUTPATIENT
Start: 2023-11-08

## 2024-01-18 ENCOUNTER — OFFICE VISIT (OUTPATIENT)
Dept: PSYCHIATRY | Facility: CLINIC | Age: 44
End: 2024-01-18
Attending: PSYCHIATRY & NEUROLOGY
Payer: COMMERCIAL

## 2024-01-18 VITALS
BODY MASS INDEX: 33.2 KG/M2 | SYSTOLIC BLOOD PRESSURE: 137 MMHG | DIASTOLIC BLOOD PRESSURE: 85 MMHG | WEIGHT: 187.4 LBS | HEART RATE: 89 BPM

## 2024-01-18 DIAGNOSIS — F25.1 SCHIZOAFFECTIVE DISORDER, DEPRESSIVE TYPE (H): ICD-10-CM

## 2024-01-18 DIAGNOSIS — F43.10 PTSD (POST-TRAUMATIC STRESS DISORDER): ICD-10-CM

## 2024-01-18 PROCEDURE — G0463 HOSPITAL OUTPT CLINIC VISIT: HCPCS

## 2024-01-18 PROCEDURE — 90833 PSYTX W PT W E/M 30 MIN: CPT | Mod: GC

## 2024-01-18 PROCEDURE — 99214 OFFICE O/P EST MOD 30 MIN: CPT | Mod: GC

## 2024-01-18 RX ORDER — PRAZOSIN HYDROCHLORIDE 1 MG/1
3 CAPSULE ORAL AT BEDTIME
Qty: 90 CAPSULE | Refills: 2 | Status: SHIPPED | OUTPATIENT
Start: 2024-01-18 | End: 2024-02-26

## 2024-01-18 RX ORDER — RISPERIDONE 0.25 MG/1
0.25 TABLET ORAL AT BEDTIME
Qty: 30 TABLET | Refills: 2 | Status: SHIPPED | OUTPATIENT
Start: 2024-01-18 | End: 2024-02-26

## 2024-01-18 RX ORDER — RISPERIDONE 2 MG/1
2 TABLET ORAL AT BEDTIME
Qty: 30 TABLET | Refills: 2 | Status: SHIPPED | OUTPATIENT
Start: 2024-01-18 | End: 2024-02-26

## 2024-01-18 RX ORDER — ARIPIPRAZOLE 2 MG/1
8 TABLET ORAL DAILY
Qty: 120 TABLET | Refills: 2 | Status: SHIPPED | OUTPATIENT
Start: 2024-01-18 | End: 2024-02-26

## 2024-01-18 RX ORDER — TRAZODONE HYDROCHLORIDE 50 MG/1
100 TABLET, FILM COATED ORAL AT BEDTIME
Qty: 60 TABLET | Refills: 2 | Status: SHIPPED | OUTPATIENT
Start: 2024-01-18 | End: 2024-02-26

## 2024-01-18 RX ORDER — MIRTAZAPINE 15 MG/1
15 TABLET, FILM COATED ORAL AT BEDTIME
Qty: 30 TABLET | Refills: 2 | Status: SHIPPED | OUTPATIENT
Start: 2024-01-18 | End: 2024-02-26

## 2024-01-18 ASSESSMENT — PATIENT HEALTH QUESTIONNAIRE - PHQ9
SUM OF ALL RESPONSES TO PHQ QUESTIONS 1-9: 9
SUM OF ALL RESPONSES TO PHQ QUESTIONS 1-9: 9
10. IF YOU CHECKED OFF ANY PROBLEMS, HOW DIFFICULT HAVE THESE PROBLEMS MADE IT FOR YOU TO DO YOUR WORK, TAKE CARE OF THINGS AT HOME, OR GET ALONG WITH OTHER PEOPLE: NOT DIFFICULT AT ALL

## 2024-01-18 ASSESSMENT — PAIN SCALES - GENERAL: PAINLEVEL: NO PAIN (0)

## 2024-01-18 NOTE — NURSING NOTE
Chief Complaint   Patient presents with    Recheck Medication     PTSD (post-traumatic stress disorder)     Did not ask PVS, pt was not alone.    - Randolph Alexander, Visit Facilitator
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
## 2024-01-18 NOTE — PATIENT INSTRUCTIONS
**For crisis resources, please see the information at the end of this document**   Patient Education    Thank you for coming to the [facility] [place] [facility].     Lab Testing:  If you had lab testing today and your results are reassuring or normal they will be mailed to you or sent through [employer] within 7 days. If the lab tests need quick action we will call you with the results. The phone number we will call with results is # [phone]. If this is not the best number please call our clinic and change the number.     Medication Refills:  If you need any refills please call your pharmacy and they will contact us. Our fax number for refills is [phone].   Three business days of notice are needed for general medication refill requests.   Five business days of notice are needed for controlled substance refill requests.   If you need to change to a different pharmacy, please contact the new pharmacy directly. The new pharmacy will help you get your medications transferred.     Contact Us:  Please call [phone] during business hours (8-5:00 M-F).   If you have medication related questions after clinic hours, or on the weekend, please call [phone].     Financial Assistance [phone]   Medical Records [phone]       MENTAL HEALTH CRISIS RESOURCES:  For a emergency help, please call 911 or go to the nearest Emergency Department.     Emergency Walk-In Options:   EmPATH Unit @ [facility] ([place]): [phone] - Specialized mental health emergency area designed to be calming  [facility] ([place]): [phone]  [facility] ([place]): [phone]  [facility]): [phone]    [facility] Crisis Information:   [place]: [phone]  [place]: [phone]  [place] ([place]) - Adult: [phone]     Child: [phone]  [place] - Adult: [phone]     Child: [phone]  [place]:  [phone]  List of all [facility] resources:   https://[place].gov/dhs/people-we-serve/adults/health-care/mental-health/resources/crisis-contacts.jsp    National Crisis Information:   Crisis Text Line: Text  [place]  to 576425  Suicide & Crisis Lifeline: 988  National Suicide Prevention Lifeline: 3-234-373-TALK (1-777.377.7520)       For online chat options, visit https://suicidepreventionlifeline.org/chat/  Poison Control Center: 1-267-781-4442  Trans Lifeline: 2-568-751-0970 - Hotline for transgender people of all ages  [employer]: 0-128-133-6488 - Hotline for LGBT youth     For Non-Emergency Support:   Fast Tracker: Mental Health & Substance Use Disorder Resources -   https://www.[employer].org/

## 2024-02-26 DIAGNOSIS — F43.10 PTSD (POST-TRAUMATIC STRESS DISORDER): ICD-10-CM

## 2024-02-26 DIAGNOSIS — F25.1 SCHIZOAFFECTIVE DISORDER, DEPRESSIVE TYPE (H): ICD-10-CM

## 2024-02-26 NOTE — TELEPHONE ENCOUNTER
[facility] Call Center    Phone Message    May a detailed message be left on voicemail: yes     Reason for Call: Medication Refill Request    Has the patient contacted the pharmacy for the refill? Yes   Name of medication being requested: Abilify 2mg, gabapentin 300mg, mirtazapine 15mg, prazosin 1mg, risperidone 0.25mg, risperidone 2mg, trazodone 50mg  Provider who prescribed the medication: Dr. Evans  Pharmacy:    [facility]/PHARMACY #[id number] [facility] [address]   Date medication is needed: Caller was with patient, and stated the patient needed to reschedule her appointment with provider due to a conflict with their family schedule. Due to limited availability, patient is scheduled out to April. Caller and patient asked for a refill of all medications to help get patient through until next appointment. Caller stated he did not want her to run out of medication.    Action Taken: Message routed to:  Other: [facility] Nurse jhonny evangelista    Travel Screening: Not Applicable

## 2024-02-26 NOTE — TELEPHONE ENCOUNTER
Last seen: 01/18/2024  RTC: 8 weeks  Cancel: 02/29/2024  No-show: None  Next appt: 04/18/2024     Incoming refill from Patient via phone    Medication requested:   Pending Prescriptions:                       Disp   Refills    prazosin (MINIPRESS) 1 MG capsule         90 cap*0            Sig: Take 3 capsules (3 mg) by mouth at bedtime Take           3mg (3 capsule) at bedtime    mirtazapine (REMERON) 15 MG tablet        30 tab*0            Sig: Take 1 tablet (15 mg) by mouth at bedtime    ARIPiprazole (ABILIFY) 2 MG tablet        120 ta*0            Sig: Take 4 tablets (8 mg) by mouth daily    traZODone (DESYREL) 50 MG tablet          60 tab*0            Sig: Take 2 tablets (100 mg) by mouth at bedtime    risperiDONE (RISPERDAL) 2 MG tablet       30 tab*0            Sig: Take 1 tablet (2 mg) by mouth at bedtime Take           with 0.25 mg tablet for total daily dose of 2.25           mg    risperiDONE (RISPERDAL) 0.25 MG tablet    30 tab*0            Sig: Take 1 tablet (0.25 mg) by mouth at bedtime with           2 mg  for total daily dose of 2.25 mg      From chart note:   1) Meds-  - Increase prazosin to 3 mg  - Continue mirtazapine 15 mg  - Continue aripiprazole 8 mg daily   - Continue trazodone 100 mg qhs  - Continue risperidone 2.25 mg qhs        Medication unable to be refilled by RN due to criteria not met as indicated.                 []Eligibility - not seen in the last year              []Supervision - no future appointment              [x]Compliance - no shows, cancellations or lapse in therapy              []Verification - order discrepancy              []Controlled medication              []Medication not included in policy              []90-day supply request              []Other:

## 2024-02-27 RX ORDER — RISPERIDONE 0.25 MG/1
0.25 TABLET ORAL AT BEDTIME
Qty: 30 TABLET | Refills: 0 | Status: SHIPPED | OUTPATIENT
Start: 2024-02-27 | End: 2024-04-02

## 2024-02-27 RX ORDER — TRAZODONE HYDROCHLORIDE 50 MG/1
100 TABLET, FILM COATED ORAL AT BEDTIME
Qty: 60 TABLET | Refills: 0 | Status: SHIPPED | OUTPATIENT
Start: 2024-02-27 | End: 2024-04-02

## 2024-02-27 RX ORDER — MIRTAZAPINE 15 MG/1
15 TABLET, FILM COATED ORAL AT BEDTIME
Qty: 30 TABLET | Refills: 0 | Status: SHIPPED | OUTPATIENT
Start: 2024-02-27 | End: 2024-04-02

## 2024-02-27 RX ORDER — RISPERIDONE 2 MG/1
2 TABLET ORAL AT BEDTIME
Qty: 30 TABLET | Refills: 0 | Status: SHIPPED | OUTPATIENT
Start: 2024-02-27 | End: 2024-04-02

## 2024-02-27 RX ORDER — ARIPIPRAZOLE 2 MG/1
8 TABLET ORAL DAILY
Qty: 120 TABLET | Refills: 0 | Status: SHIPPED | OUTPATIENT
Start: 2024-02-27 | End: 2024-04-02

## 2024-02-27 RX ORDER — PRAZOSIN HYDROCHLORIDE 1 MG/1
3 CAPSULE ORAL AT BEDTIME
Qty: 90 CAPSULE | Refills: 0 | Status: SHIPPED | OUTPATIENT
Start: 2024-02-27 | End: 2024-04-02

## 2024-04-02 DIAGNOSIS — F25.1 SCHIZOAFFECTIVE DISORDER, DEPRESSIVE TYPE (H): ICD-10-CM

## 2024-04-02 DIAGNOSIS — F43.10 PTSD (POST-TRAUMATIC STRESS DISORDER): ICD-10-CM

## 2024-04-02 NOTE — TELEPHONE ENCOUNTER
Last seen: 01/18/2024  RTC: 8 weeks  Cancel: 1  No-show: 0  Next appt: 04/18/2024     Incoming refill from Patient via phone    Medication requested:   Pending Prescriptions:                       Disp   Refills    prazosin (MINIPRESS) 1 MG capsule         90 cap*0            Sig: Take 3 capsules (3 mg) by mouth at bedtime Take           3mg (3 capsule) at bedtime    mirtazapine (REMERON) 15 MG tablet        30 tab*0            Sig: Take 1 tablet (15 mg) by mouth at bedtime    ARIPiprazole (ABILIFY) 2 MG tablet        120 ta*0            Sig: Take 4 tablets (8 mg) by mouth daily    traZODone (DESYREL) 50 MG tablet          60 tab*0            Sig: Take 2 tablets (100 mg) by mouth at bedtime    risperiDONE (RISPERDAL) 0.25 MG tablet    30 tab*0            Sig: Take 1 tablet (0.25 mg) by mouth at bedtime with           2 mg  for total daily dose of 2.25 mg    risperiDONE (RISPERDAL) 2 MG tablet       30 tab*0            Sig: Take 1 tablet (2 mg) by mouth at bedtime Take           with 0.25 mg tablet for total daily dose of 2.25           mg    From chart note:   - Increase prazosin to 3 mg  - Continue mirtazapine 15 mg  - Continue aripiprazole 8 mg daily   - Continue trazodone 100 mg qhs  - Continue risperidone 2.25 mg qhs        Medication unable to be refilled by RN due to criteria not met as indicated.                 []Eligibility - not seen in the last year              []Supervision - no future appointment              [x]Compliance - no shows, cancellations or lapse in therapy              []Verification - order discrepancy              []Controlled medication              []Medication not included in policy              []90-day supply request              []Other:

## 2024-04-02 NOTE — TELEPHONE ENCOUNTER
"[facility] Call Center    Phone Message    May a detailed message be left on voicemail: yes     Reason for Call: Medication Refill Request    Has the patient contacted the pharmacy for the refill? Yes   Name of medication being requested: \"All medications\"  Provider who prescribed the medication:   Dr. Evans  Pharmacy:   [facility]/pharmacy #[id number] - [place], [place] - [address]     Date medication is needed: ASAP      Action Taken: Message routed to:  Other: P PSYCHIATRY NURSE-[facility]    Travel Screening: Not Applicable                                                                   "

## 2024-04-03 RX ORDER — PRAZOSIN HYDROCHLORIDE 1 MG/1
3 CAPSULE ORAL AT BEDTIME
Qty: 90 CAPSULE | Refills: 0 | Status: SHIPPED | OUTPATIENT
Start: 2024-04-03 | End: 2024-04-18

## 2024-04-03 RX ORDER — RISPERIDONE 0.25 MG/1
0.25 TABLET ORAL AT BEDTIME
Qty: 30 TABLET | Refills: 0 | Status: SHIPPED | OUTPATIENT
Start: 2024-04-03 | End: 2024-04-18

## 2024-04-03 RX ORDER — MIRTAZAPINE 15 MG/1
15 TABLET, FILM COATED ORAL AT BEDTIME
Qty: 30 TABLET | Refills: 0 | Status: SHIPPED | OUTPATIENT
Start: 2024-04-03 | End: 2024-04-18

## 2024-04-03 RX ORDER — RISPERIDONE 2 MG/1
2 TABLET ORAL AT BEDTIME
Qty: 30 TABLET | Refills: 0 | Status: SHIPPED | OUTPATIENT
Start: 2024-04-03 | End: 2024-04-18

## 2024-04-03 RX ORDER — ARIPIPRAZOLE 2 MG/1
8 TABLET ORAL DAILY
Qty: 120 TABLET | Refills: 0 | Status: SHIPPED | OUTPATIENT
Start: 2024-04-03 | End: 2024-04-18 | Stop reason: DRUGHIGH

## 2024-04-03 RX ORDER — TRAZODONE HYDROCHLORIDE 50 MG/1
100 TABLET, FILM COATED ORAL AT BEDTIME
Qty: 60 TABLET | Refills: 0 | Status: SHIPPED | OUTPATIENT
Start: 2024-04-03 | End: 2024-04-18

## 2024-04-18 ENCOUNTER — TELEPHONE (OUTPATIENT)
Dept: PSYCHIATRY | Facility: CLINIC | Age: 44
End: 2024-04-18
Payer: COMMERCIAL

## 2024-04-18 ENCOUNTER — VIRTUAL VISIT (OUTPATIENT)
Dept: PSYCHIATRY | Facility: CLINIC | Age: 44
End: 2024-04-18
Attending: STUDENT IN AN ORGANIZED HEALTH CARE EDUCATION/TRAINING PROGRAM
Payer: COMMERCIAL

## 2024-04-18 VITALS — BODY MASS INDEX: 29.57 KG/M2 | WEIGHT: 184 LBS | HEIGHT: 66 IN

## 2024-04-18 DIAGNOSIS — F43.10 PTSD (POST-TRAUMATIC STRESS DISORDER): ICD-10-CM

## 2024-04-18 DIAGNOSIS — F25.1 SCHIZOAFFECTIVE DISORDER, DEPRESSIVE TYPE (H): ICD-10-CM

## 2024-04-18 PROCEDURE — 90833 PSYTX W PT W E/M 30 MIN: CPT | Mod: 95

## 2024-04-18 PROCEDURE — 99214 OFFICE O/P EST MOD 30 MIN: CPT | Mod: 95

## 2024-04-18 RX ORDER — RISPERIDONE 2 MG/1
2 TABLET ORAL AT BEDTIME
Qty: 30 TABLET | Refills: 1 | Status: SHIPPED | OUTPATIENT
Start: 2024-04-18 | End: 2024-06-13

## 2024-04-18 RX ORDER — ARIPIPRAZOLE 5 MG/1
5 TABLET ORAL DAILY
Qty: 30 TABLET | Refills: 1 | Status: SHIPPED | OUTPATIENT
Start: 2024-04-18 | End: 2024-06-13

## 2024-04-18 RX ORDER — TRAZODONE HYDROCHLORIDE 50 MG/1
100 TABLET, FILM COATED ORAL AT BEDTIME
Qty: 60 TABLET | Refills: 1 | Status: SHIPPED | OUTPATIENT
Start: 2024-04-18 | End: 2024-06-13

## 2024-04-18 RX ORDER — RISPERIDONE 0.25 MG/1
0.25 TABLET ORAL AT BEDTIME
Qty: 30 TABLET | Refills: 1 | Status: SHIPPED | OUTPATIENT
Start: 2024-04-18 | End: 2024-06-13

## 2024-04-18 RX ORDER — PRAZOSIN HYDROCHLORIDE 1 MG/1
3 CAPSULE ORAL AT BEDTIME
Qty: 90 CAPSULE | Refills: 1 | Status: SHIPPED | OUTPATIENT
Start: 2024-04-18 | End: 2024-06-13

## 2024-04-18 RX ORDER — MIRTAZAPINE 15 MG/1
15 TABLET, FILM COATED ORAL AT BEDTIME
Qty: 30 TABLET | Refills: 1 | Status: SHIPPED | OUTPATIENT
Start: 2024-04-18 | End: 2024-06-13

## 2024-04-18 ASSESSMENT — PAIN SCALES - GENERAL: PAINLEVEL: NO PAIN (0)

## 2024-04-18 ASSESSMENT — PATIENT HEALTH QUESTIONNAIRE - PHQ9
10. IF YOU CHECKED OFF ANY PROBLEMS, HOW DIFFICULT HAVE THESE PROBLEMS MADE IT FOR YOU TO DO YOUR WORK, TAKE CARE OF THINGS AT HOME, OR GET ALONG WITH OTHER PEOPLE: VERY DIFFICULT
SUM OF ALL RESPONSES TO PHQ QUESTIONS 1-9: 15
SUM OF ALL RESPONSES TO PHQ QUESTIONS 1-9: 15

## 2024-04-18 NOTE — TELEPHONE ENCOUNTER
[facility]    Phone Message    May a detailed message be left on voicemail: yes     Reason for Call: Medication Refill Request    Has the patient contacted the pharmacy for the refill? Yes   Name of medication being requested: All of the medications  Provider who prescribed the medication: Dr. Evans  Pharmacy:    [facility]/PHARMACY #[id number] - [place], [place] - [address]   Date medication is needed: N/A - Norris came into the clinic and had patient on the phone. Patient stated she has an appointment today, but it needed to be changed to virtual because she cannot make it in to the clinic. The phone call ended. Norris stated the patient needs refills of all medications but did not state how much of any medication the patient has remaining.    Action Taken: Message routed to:  Other: [facility] Psychiatry Clinic Nurse pool    Travel Screening: Not Applicable

## 2024-04-18 NOTE — NURSING NOTE
Is the patient currently in the state of [place]? YES    Visit mode:VIDEO    If the visit is dropped, the patient can be reconnected by: VIDEO VISIT: Text to cell phone:   Telephone Information:   Mobile [phone]       Will anyone else be joining the visit? NO  (If patient encounters technical issues they should call [phone]    How would you like to obtain your AVS? MyChart    Are changes needed to the allergy or medication list? No    Are refills needed on medications prescribed by this physician? NO    Reason for visit: RECHECK    Minerva GUARDADO

## 2024-04-18 NOTE — PATIENT INSTRUCTIONS
**For crisis resources, please see the information at the end of this document**   Patient Education    Thank you for coming to the [facility].     Lab Testing:  If you had lab testing today and your results are reassuring or normal they will be mailed to you or sent through [employer] within 7 days. If the lab tests need quick action we will call you with the results. The phone number we will call with results is # [phone]. If this is not the best number please call our clinic and change the number.     Medication Refills:  If you need any refills please call your pharmacy and they will contact us. Our fax number for refills is [phone].   Three business days of notice are needed for general medication refill requests.   Five business days of notice are needed for controlled substance refill requests.   If you need to change to a different pharmacy, please contact the new pharmacy directly. The new pharmacy will help you get your medications transferred.     Contact Us:  Please call [phone] during business hours (8-5:00 M-F).   If you have medication related questions after clinic hours, or on the weekend, please call [phone].     Financial Assistance [phone]   Medical Records [phone]       MENTAL HEALTH CRISIS RESOURCES:  For a emergency help, please call 911 or go to the nearest Emergency Department.     Emergency Walk-In Options:   EmPATH Unit @ [facility] ([place]): [phone] - Specialized mental health emergency area designed to be calming  [facility] ([place]): [phone]  [facility] ([place]): [phone]  [facility]): [phone]    [facility] Crisis Information:   [place]: [phone]  [place]: [phone]  [place] ([place]) - Adult: [phone]     Child: [phone]  [place] - Adult: [phone]     Child: [phone]  [place]:  [phone]  List of all [facility] resources:   https://[place].gov/dhs/people-we-serve/adults/health-care/mental-health/resources/crisis-contacts.jsp    National Crisis Information:   Crisis Text Line: Text  [place]  to 020134  Suicide & Crisis Lifeline: 988  National Suicide Prevention Lifeline: 7-122-143-TALK (1-943.208.1389)       For online chat options, visit https://suicidepreventionlifeline.org/chat/  Poison Control Center: 2-786-081-4580  Trans Lifeline: 7-857-268-1377 - Hotline for transgender people of all ages  The [employer] Project: 1-401-082-2629 - Hotline for LGBT youth     For Non-Emergency Support:   Fast Tracker: Mental Health & Substance Use Disorder Resources -   https://www.[employer].org/
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
## 2024-05-17 DIAGNOSIS — F25.1 SCHIZOAFFECTIVE DISORDER, DEPRESSIVE TYPE (H): ICD-10-CM

## 2024-05-17 DIAGNOSIS — F43.10 PTSD (POST-TRAUMATIC STRESS DISORDER): ICD-10-CM

## 2024-05-20 RX ORDER — ARIPIPRAZOLE 5 MG/1
5 TABLET ORAL DAILY
Qty: 90 TABLET | Refills: 1 | OUTPATIENT
Start: 2024-05-20

## 2024-06-01 DIAGNOSIS — F25.1 SCHIZOAFFECTIVE DISORDER, DEPRESSIVE TYPE (H): ICD-10-CM

## 2024-06-03 RX ORDER — TRAZODONE HYDROCHLORIDE 50 MG/1
100 TABLET, FILM COATED ORAL AT BEDTIME
Qty: 180 TABLET | Refills: 1 | OUTPATIENT
Start: 2024-06-03

## 2024-06-13 ENCOUNTER — TELEPHONE (OUTPATIENT)
Dept: PSYCHIATRY | Facility: CLINIC | Age: 44
End: 2024-06-13
Payer: COMMERCIAL

## 2024-06-13 DIAGNOSIS — F25.1 SCHIZOAFFECTIVE DISORDER, DEPRESSIVE TYPE (H): ICD-10-CM

## 2024-06-13 DIAGNOSIS — F43.10 PTSD (POST-TRAUMATIC STRESS DISORDER): ICD-10-CM

## 2024-06-13 RX ORDER — RISPERIDONE 0.25 MG/1
0.25 TABLET ORAL AT BEDTIME
Qty: 30 TABLET | Refills: 0 | Status: SHIPPED | OUTPATIENT
Start: 2024-06-13 | End: 2024-09-05

## 2024-06-13 RX ORDER — ARIPIPRAZOLE 5 MG/1
5 TABLET ORAL DAILY
Qty: 30 TABLET | Refills: 0 | Status: SHIPPED | OUTPATIENT
Start: 2024-06-13 | End: 2024-07-11

## 2024-06-13 RX ORDER — PRAZOSIN HYDROCHLORIDE 1 MG/1
3 CAPSULE ORAL AT BEDTIME
Qty: 90 CAPSULE | Refills: 0 | Status: SHIPPED | OUTPATIENT
Start: 2024-06-13 | End: 2024-09-05

## 2024-06-13 RX ORDER — MIRTAZAPINE 15 MG/1
15 TABLET, FILM COATED ORAL AT BEDTIME
Qty: 30 TABLET | Refills: 0 | Status: SHIPPED | OUTPATIENT
Start: 2024-06-13 | End: 2024-07-11

## 2024-06-13 RX ORDER — TRAZODONE HYDROCHLORIDE 50 MG/1
100 TABLET, FILM COATED ORAL AT BEDTIME
Qty: 60 TABLET | Refills: 0 | Status: SHIPPED | OUTPATIENT
Start: 2024-06-13 | End: 2024-09-05

## 2024-06-13 RX ORDER — RISPERIDONE 2 MG/1
2 TABLET ORAL AT BEDTIME
Qty: 30 TABLET | Refills: 0 | Status: SHIPPED | OUTPATIENT
Start: 2024-06-13 | End: 2024-09-05

## 2024-06-13 NOTE — TELEPHONE ENCOUNTER
Health Call Center    Phone Message    May a detailed message be left on voicemail: yes     Reason for Call: Medication Refill Request    Has the patient contacted the pharmacy for the refill? Yes   Name of medication being requested: All meds  Provider who prescribed the medication:   Pharmacy: [facility] on file  Date medication is needed: ASAP. She's out of a few. Pt had a an appt today, but provider called out sick and the schedule isn't confirmed yet. Scheduling will call the pt to schedule with their new resident once schedules are confirmed.     Action Taken: Other: [facility]    Travel Screening: Not Applicable     Date of Service:

## 2024-06-13 NOTE — TELEPHONE ENCOUNTER
Last seen: 4/18/24  RTC: 8 weeks   Cancel: provider out ill   No-show: 0   Next appt: on wait list for new resident      Incoming refill from Patient via phone    Medication requested:   Pending Prescriptions:                       Disp   Refills    ARIPiprazole (ABILIFY) 5 MG tablet        30 tab*0            Sig: Take 1 tablet (5 mg) by mouth daily    mirtazapine (REMERON) 15 MG tablet        30 tab*0            Sig: Take 1 tablet (15 mg) by mouth at bedtime    prazosin (MINIPRESS) 1 MG capsule         90 cap*0            Sig: Take 3 capsules (3 mg) by mouth at bedtime Take           3mg (3 capsule) at bedtime    risperiDONE (RISPERDAL) 0.25 MG tablet    30 tab*0            Sig: Take 1 tablet (0.25 mg) by mouth at bedtime with           2 mg  for total daily dose of 2.25 mg    risperiDONE (RISPERDAL) 2 MG tablet       30 tab*0            Sig: Take 1 tablet (2 mg) by mouth at bedtime Take           with 0.25 mg tablet for total daily dose of 2.25           mg    traZODone (DESYREL) 50 MG tablet          60 tab*0            Sig: Take 2 tablets (100 mg) by mouth at bedtime          From chart note:      - Continue prazosin 3 mg  - Continue mirtazapine 15 mg  - Decrease aripiprazole from 8 mg to 5 mg daily   - Continue trazodone 100 mg qhs  - Continue risperidone 2.25 mg qhs       Medication refill approved per refill protocol.

## 2024-06-24 ENCOUNTER — OFFICE VISIT (OUTPATIENT)
Dept: FAMILY MEDICINE | Facility: CLINIC | Age: 44
End: 2024-06-24
Payer: COMMERCIAL

## 2024-06-24 VITALS
BODY MASS INDEX: 29.25 KG/M2 | HEART RATE: 104 BPM | SYSTOLIC BLOOD PRESSURE: 146 MMHG | WEIGHT: 182 LBS | TEMPERATURE: 97.8 F | HEIGHT: 66 IN | OXYGEN SATURATION: 98 % | DIASTOLIC BLOOD PRESSURE: 91 MMHG | RESPIRATION RATE: 17 BRPM

## 2024-06-24 DIAGNOSIS — M54.50 CHRONIC LOW BACK PAIN WITHOUT SCIATICA, UNSPECIFIED BACK PAIN LATERALITY: ICD-10-CM

## 2024-06-24 DIAGNOSIS — R00.0 SINUS TACHYCARDIA: Primary | ICD-10-CM

## 2024-06-24 DIAGNOSIS — Z51.81 ENCOUNTER FOR THERAPEUTIC DRUG MONITORING: ICD-10-CM

## 2024-06-24 DIAGNOSIS — G89.29 CHRONIC LOW BACK PAIN WITHOUT SCIATICA, UNSPECIFIED BACK PAIN LATERALITY: ICD-10-CM

## 2024-06-24 LAB
ERYTHROCYTE [DISTWIDTH] IN BLOOD BY AUTOMATED COUNT: 12.9 % (ref 10–15)
HBA1C MFR BLD: 5.5 % (ref 0–5.6)
HCT VFR BLD AUTO: 41.3 % (ref 35–47)
HGB BLD-MCNC: 13.3 G/DL (ref 11.7–15.7)
HOLD SPECIMEN: NORMAL
MCH RBC QN AUTO: 27.5 PG (ref 26.5–33)
MCHC RBC AUTO-ENTMCNC: 32.2 G/DL (ref 31.5–36.5)
MCV RBC AUTO: 85 FL (ref 78–100)
PLATELET # BLD AUTO: 269 10E3/UL (ref 150–450)
RBC # BLD AUTO: 4.84 10E6/UL (ref 3.8–5.2)
TSH SERPL DL<=0.005 MIU/L-ACNC: 1.46 UIU/ML (ref 0.3–4.2)
WBC # BLD AUTO: 6.3 10E3/UL (ref 4–11)

## 2024-06-24 PROCEDURE — 99214 OFFICE O/P EST MOD 30 MIN: CPT | Performed by: FAMILY MEDICINE

## 2024-06-24 PROCEDURE — 84443 ASSAY THYROID STIM HORMONE: CPT | Performed by: FAMILY MEDICINE

## 2024-06-24 PROCEDURE — 83036 HEMOGLOBIN GLYCOSYLATED A1C: CPT | Performed by: FAMILY MEDICINE

## 2024-06-24 PROCEDURE — 85027 COMPLETE CBC AUTOMATED: CPT | Performed by: FAMILY MEDICINE

## 2024-06-24 PROCEDURE — 36415 COLL VENOUS BLD VENIPUNCTURE: CPT | Performed by: FAMILY MEDICINE

## 2024-06-24 PROCEDURE — G2211 COMPLEX E/M VISIT ADD ON: HCPCS | Performed by: FAMILY MEDICINE

## 2024-06-24 ASSESSMENT — PATIENT HEALTH QUESTIONNAIRE - PHQ9
SUM OF ALL RESPONSES TO PHQ QUESTIONS 1-9: 11
SUM OF ALL RESPONSES TO PHQ QUESTIONS 1-9: 11
10. IF YOU CHECKED OFF ANY PROBLEMS, HOW DIFFICULT HAVE THESE PROBLEMS MADE IT FOR YOU TO DO YOUR WORK, TAKE CARE OF THINGS AT HOME, OR GET ALONG WITH OTHER PEOPLE: NOT DIFFICULT AT ALL

## 2024-06-24 ASSESSMENT — PAIN SCALES - GENERAL: PAINLEVEL: EXTREME PAIN (8)

## 2024-06-24 NOTE — PROGRESS NOTES
"  Assessment & Plan     1. Sinus tachycardia  Slightly elevated heart rate today, but not irregular  Consider an EKG and/or an event monitor if this does not improve by next month  Labs today  Follow up in 1 month  Monitor symptoms  - TSH with free T4 reflex; Future  - CBC with Platelets and Reflex to Iron Studies; Future    2. Chronic low back pain without sciatica, unspecified back pain laterality  Reassurance  Ok to consider massage (look into community massage in the [place] area)  Increase exercise and ROM at day program  Ibuprofen as needed    3. Encounter for therapeutic drug monitoring  Uses antipsychotic medications  Noted extrapyramidal symptoms today  Screen for DM today  - Hemoglobin A1c; Future    The longitudinal plan of care for the diagnosis(es)/condition(s) as documented were addressed during this visit. Due to the added complexity in care, I will continue to support Carmen in the subsequent management and with ongoing continuity of care.    35 minutes spent by me on the date of the encounter doing chart review, history and exam, documentation and further activities per the note    BMI  Estimated body mass index is 29.38 kg/m  as calculated from the following:    Height as of this encounter: 1.676 m (5' 6\").    Weight as of this encounter: 82.6 kg (182 lb).             Return in about 4 weeks (around 7/22/2024) for follow up on irregular heartbeat.    Heriberto Morales is a 44 year old, presenting for the following health issues:  Back Pain (Left arm is painful, lower back pain has been going on for awhile. )      6/24/2024    10:50 AM   Additional Questions   Roomed by Anne   Accompanied by cousin         6/24/2024    Information    services provided? Yes   Language R[place]an   Type of interpretation provided Face-to-face    name [facility]    Agency [employer]        HPI     Back pain  - chronic  - low back  - notes some pain to both knees  - not in any exercise " "programs  - takes the stairs at her apartment  - has done some physical therapy, but still had the pain, did not fully go away; has felt better with massage  - ibuprofen does help  - does attend adult  with some exercise there    Irregular heart rate  - just this week has been noticeable with the stairs  - no recent URI  - no CP (unless she pushes on her chest)  - no SOB  - not drinking much water  - drinks juice  - drinks tea  - not associated with dizziness or a feeling of passing out                    Objective    BP (!) 146/91   Pulse 104   Temp 97.8  F (36.6  C) (Oral)   Resp 17   Ht 1.676 m (5' 6\")   Wt 82.6 kg (182 lb)   SpO2 98%   BMI 29.38 kg/m    Body mass index is 29.38 kg/m .  Physical Exam  Constitutional:       Appearance: Normal appearance.   Cardiovascular:      Rate and Rhythm: Normal rate and regular rhythm.      Heart sounds: Normal heart sounds.   Pulmonary:      Effort: Pulmonary effort is normal.      Breath sounds: Normal breath sounds.   Musculoskeletal:      Lumbar back: Spasms and tenderness (lumbar spasm and tenderness) present. No bony tenderness. Negative right straight leg raise test and negative left straight leg raise test. Scoliosis present.        Back:    Neurological:      General: No focal deficit present.      Mental Status: She is alert and oriented to person, place, and time.   Psychiatric:         Mood and Affect: Mood normal.         Behavior: Behavior normal.         Thought Content: Thought content normal.         Judgment: Judgment normal.                    Signed Electronically by: Khushi Cadena MD    Answers submitted by the patient for this visit:  Patient Health Questionnaire (Submitted on 6/24/2024)  If you checked off any problems, how difficult have these problems made it for you to do your work, take care of things at home, or get along with other people?: Not difficult at all  PHQ9 TOTAL SCORE: 11    "

## 2024-06-24 NOTE — LETTER
July 11, 2024      Carmen Mccoy [place]  [address]  [place] [address]        Dear Carmen,    Thank you for getting your care at [facility]. Please see below for your test results. These are all normal.    Resulted Orders   TSH with free T4 reflex   Result Value Ref Range    TSH 1.46 0.30 - 4.20 uIU/mL   Hemoglobin A1c   Result Value Ref Range    Hemoglobin A1C 5.5 0.0 - 5.6 %      Comment:      Normal <5.7%   Prediabetes 5.7-6.4%    Diabetes 6.5% or higher     Note: Adopted from ADA consensus guidelines.   CBC with Platelets and Reflex to Iron Studies   Result Value Ref Range    WBC Count 6.3 4.0 - 11.0 10e3/uL    RBC Count 4.84 3.80 - 5.20 10e6/uL    Hemoglobin 13.3 11.7 - 15.7 g/dL    Hematocrit 41.3 35.0 - 47.0 %    MCV 85 78 - 100 fL    MCH 27.5 26.5 - 33.0 pg    MCHC 32.2 31.5 - 36.5 g/dL    RDW 12.9 10.0 - 15.0 %    Platelet Count 269 150 - 450 10e3/uL   Extra Green Top (Lithium Heparin) Tube   Result Value Ref Range    Hold Specimen JIC        If you have any concerns about these results please call and leave a message for me or send a PerfectPostt message to the clinic.    Sincerely,    Khushi Cadena MD

## 2024-06-24 NOTE — PATIENT INSTRUCTIONS
Here is the plan from today's visit    1. Sinus tachycardia  Slightly elevated heart rate today, but not irregular  Consider an EKG and/or an event monitor if this does not improve by next month  Labs today  Follow up in 1 month  Monitor symptoms  - TSH with free T4 reflex; Future  - CBC with Platelets and Reflex to Iron Studies; Future    2. Chronic low back pain without sciatica, unspecified back pain laterality  Reassurance  Ok to consider massage (look into community massage in the [place] area)  Increase exercise and ROM at day program  Ibuprofen as needed    3. Encounter for therapeutic drug monitoring  Uses antipsychotic medications  Noted extrapyramidal symptoms today  Screen for DM today  - Hemoglobin A1c; Future    Please call or return to clinic if your symptoms don't go away.    Follow up plan - 1 month to recheck symptoms    Thank you for coming to [facility] today.  Lab Testing:  **If you had lab testing today and your results are reassuring or normal they will be mailed to you or sent through [employer] within 7 days.   **If the lab tests need quick action we will call you with the results.  The phone number we will call with results is # [phone] (home) . If this is not the best number please call our clinic and change the number.  Medication Refills:  If you need any refills please call your pharmacy and they will contact us.   If you need to  your refill at a new pharmacy, please contact the new pharmacy directly. The new pharmacy will help you get your medications transferred faster.   Scheduling:  If you have any concerns about today's visit or wish to schedule another appointment please call our office during normal business hours [phone] (8-5:00 M-F)  If a referral was made to a [facility] and you don't get a call from central scheduling please call [phone].  If a Mammogram was ordered for you at [facility] call [phone] to schedule or  change your appointment.  If you had an XRay/CT/Ultrasound/MRI ordered the number is [phone] to schedule or change your radiology appointment.   Medical Concerns:  If you have urgent medical concerns please call [phone] at any time of the day.  If you have a medical emergency please call [phone].

## 2024-07-08 RX ORDER — ARIPIPRAZOLE 2 MG/1
TABLET ORAL
COMMUNITY
Start: 2024-05-17

## 2024-07-08 RX ORDER — LATANOPROST 50 UG/ML
SOLUTION/ DROPS OPHTHALMIC
COMMUNITY
Start: 2024-02-19

## 2024-07-08 RX ORDER — CYCLOSPORINE 0.5 MG/ML
EMULSION OPHTHALMIC
COMMUNITY
Start: 2024-01-19

## 2024-07-08 RX ORDER — SODIUM CHLORIDE 5 %
DROPS OPHTHALMIC (EYE)
COMMUNITY
Start: 2024-03-23

## 2024-07-08 RX ORDER — DOCUSATE SODIUM 50MG AND SENNOSIDES 8.6MG 8.6; 5 MG/1; MG/1
TABLET, FILM COATED ORAL
COMMUNITY
Start: 2024-05-17

## 2024-07-11 DIAGNOSIS — F25.1 SCHIZOAFFECTIVE DISORDER, DEPRESSIVE TYPE (H): ICD-10-CM

## 2024-07-11 DIAGNOSIS — F43.10 PTSD (POST-TRAUMATIC STRESS DISORDER): ICD-10-CM

## 2024-07-11 RX ORDER — MIRTAZAPINE 15 MG/1
15 TABLET, FILM COATED ORAL AT BEDTIME
Qty: 30 TABLET | Refills: 1 | Status: SHIPPED | OUTPATIENT
Start: 2024-07-11 | End: 2024-09-05

## 2024-07-11 RX ORDER — ARIPIPRAZOLE 5 MG/1
5 TABLET ORAL DAILY
Qty: 30 TABLET | Refills: 1 | Status: SHIPPED | OUTPATIENT
Start: 2024-07-11 | End: 2024-09-05

## 2024-07-11 NOTE — TELEPHONE ENCOUNTER
mirtazapine (REMERON) 15 MG tablet 30 tablet 0 6/13/2024     Last seen: 4/18/24  RTC: 8 weeks  Cancel: x 1 -provider ill  No-show: 0  Next appt: 9/5/24     Refill decision:   Refilled for 30 days per protocol.

## 2024-07-11 NOTE — TELEPHONE ENCOUNTER
Medication requested:   ARIPiprazole (ABILIFY) 5 MG tablet 30 tablet 0 6/13/2024         Last seen: 4/18/24  RTC: 8 weeks  Cancel: x 1 -provider ill  No-show: 0  Next appt: 9/5/24    Refill decision:   Refilled for 30 days per protocol.

## 2024-08-15 ENCOUNTER — DOCUMENTATION ONLY (OUTPATIENT)
Dept: FAMILY MEDICINE | Facility: CLINIC | Age: 44
End: 2024-08-15
Payer: COMMERCIAL

## 2024-08-15 NOTE — PROGRESS NOTES
"When opening a documentation only encounter, be sure to enter in \"Chief Complaint\" Forms and in \" Comments\" Title of form, description if needed.    Carmen is a 44 year old  female  Form received via: Fax  Form now resides in: Provider Ready    Anne Corral      Form has been completed by provider.     Form sent out via: Fax to accord at Fax Number: [phone]  Patient informed: No, Reason:  Output date: August 16, 2024    Anne Corral      **Please close the encounter**          "

## 2024-09-05 ENCOUNTER — APPOINTMENT (OUTPATIENT)
Dept: INTERPRETER SERVICES | Facility: CLINIC | Age: 44
End: 2024-09-05
Payer: COMMERCIAL

## 2024-09-05 ENCOUNTER — OFFICE VISIT (OUTPATIENT)
Dept: PSYCHIATRY | Facility: CLINIC | Age: 44
End: 2024-09-05
Attending: STUDENT IN AN ORGANIZED HEALTH CARE EDUCATION/TRAINING PROGRAM
Payer: COMMERCIAL

## 2024-09-05 VITALS
DIASTOLIC BLOOD PRESSURE: 84 MMHG | SYSTOLIC BLOOD PRESSURE: 140 MMHG | BODY MASS INDEX: 29.31 KG/M2 | WEIGHT: 181.6 LBS | HEART RATE: 89 BPM

## 2024-09-05 DIAGNOSIS — F25.1 SCHIZOAFFECTIVE DISORDER, DEPRESSIVE TYPE (H): ICD-10-CM

## 2024-09-05 DIAGNOSIS — F43.10 PTSD (POST-TRAUMATIC STRESS DISORDER): ICD-10-CM

## 2024-09-05 PROCEDURE — 90792 PSYCH DIAG EVAL W/MED SRVCS: CPT | Mod: GC

## 2024-09-05 RX ORDER — RISPERIDONE 2 MG/1
2 TABLET ORAL AT BEDTIME
Qty: 30 TABLET | Refills: 1 | Status: SHIPPED | OUTPATIENT
Start: 2024-09-05

## 2024-09-05 RX ORDER — PRAZOSIN HYDROCHLORIDE 2 MG/1
4 CAPSULE ORAL AT BEDTIME
Qty: 60 CAPSULE | Refills: 1 | Status: SHIPPED | OUTPATIENT
Start: 2024-09-05

## 2024-09-05 RX ORDER — RISPERIDONE 0.25 MG/1
0.25 TABLET ORAL AT BEDTIME
Qty: 30 TABLET | Refills: 1 | Status: SHIPPED | OUTPATIENT
Start: 2024-09-05

## 2024-09-05 RX ORDER — ARIPIPRAZOLE 5 MG/1
2.5 TABLET ORAL DAILY
Qty: 7 TABLET | Refills: 0 | Status: SHIPPED | OUTPATIENT
Start: 2024-09-05 | End: 2024-09-19

## 2024-09-05 RX ORDER — TRAZODONE HYDROCHLORIDE 50 MG/1
100 TABLET, FILM COATED ORAL AT BEDTIME
Qty: 60 TABLET | Refills: 1 | Status: SHIPPED | OUTPATIENT
Start: 2024-09-05

## 2024-09-05 RX ORDER — MIRTAZAPINE 15 MG/1
15 TABLET, FILM COATED ORAL AT BEDTIME
Qty: 30 TABLET | Refills: 1 | Status: SHIPPED | OUTPATIENT
Start: 2024-09-05
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
## 2024-09-05 NOTE — PATIENT INSTRUCTIONS
Medication: Changes:   -Decrease Abilify from 5mg to 2.5mg. Take this dose for 2 weeks, then stop Abilify completely.   -We will increase the Prazosin dose from 3mg to 4mg.     Follow Up:   1 Month in person

## 2024-09-25 RX ORDER — PRAZOSIN HYDROCHLORIDE 1 MG/1
CAPSULE ORAL
Qty: 90 CAPSULE | Refills: 1 | OUTPATIENT
Start: 2024-09-25

## 2024-09-25 NOTE — TELEPHONE ENCOUNTER
prazosin (MINIPRESS) 2 MG capsule 60 capsule 1 9/5/2024 -- No   Sig - Route: Take 2 capsules (4 mg) by mouth at bedtime. Take 4mg (2 capsules) at bedtime - Oral     [facility] #[id number] - [place], [place] - [address] AT [facility]

## 2024-09-26 ENCOUNTER — OFFICE VISIT (OUTPATIENT)
Dept: FAMILY MEDICINE | Facility: CLINIC | Age: 44
End: 2024-09-26
Payer: COMMERCIAL

## 2024-09-26 VITALS
DIASTOLIC BLOOD PRESSURE: 94 MMHG | OXYGEN SATURATION: 96 % | RESPIRATION RATE: 24 BRPM | HEIGHT: 66 IN | HEART RATE: 99 BPM | BODY MASS INDEX: 30.05 KG/M2 | WEIGHT: 187 LBS | TEMPERATURE: 98 F | SYSTOLIC BLOOD PRESSURE: 156 MMHG

## 2024-09-26 DIAGNOSIS — G44.209 TENSION HEADACHE: ICD-10-CM

## 2024-09-26 DIAGNOSIS — I10 ESSENTIAL HYPERTENSION: Primary | ICD-10-CM

## 2024-09-26 DIAGNOSIS — Z79.899 MEDICATION MANAGEMENT: ICD-10-CM

## 2024-09-26 DIAGNOSIS — Z12.11 SCREEN FOR COLON CANCER: ICD-10-CM

## 2024-09-26 RX ORDER — HYDROCHLOROTHIAZIDE 25 MG/1
25 TABLET ORAL DAILY
Qty: 90 TABLET | Refills: 3 | Status: SHIPPED | OUTPATIENT
Start: 2024-09-26

## 2024-09-26 NOTE — PROGRESS NOTES
"  Assessment & Plan     Essential hypertension  Labs today  Resume hydrochlorothiazide  Referred to MTM for comprehensive med review (not sure if she's really taking her propranolol??)  Advised to RTC in 2 wks  - Comprehensive metabolic panel; Future  - Albumin Random Urine Quantitative with Creat Ratio; Future  - hydrochlorothiazide (HYDRODIURIL) 25 MG tablet; Take 1 tablet (25 mg) by mouth daily.  - Med Therapy Management Referral  - Comprehensive metabolic panel  - Albumin Random Urine Quantitative with Creat Ratio    Tension headache  OTC meds ok  Will see if this improves after treating underlying HTN  Increase fluid intake    Screen for colon cancer  Open to cologuard  - COLOGUARD(EXACT SCIENCES); Future    Medication management  Med review and BP follow up  - Med Therapy Management Referral    The longitudinal plan of care for the diagnosis(es)/condition(s) as documented were addressed during this visit. Due to the added complexity in care, I will continue to support Carmen in the subsequent management and with ongoing continuity of care.    BMI  Estimated body mass index is 30.18 kg/m  as calculated from the following:    Height as of this encounter: 1.676 m (5' 6\").    Weight as of this encounter: 84.8 kg (187 lb).             Return in about 2 weeks (around 10/10/2024) for PharmD Visit.    Subjective   Carmen is a 44 year old, presenting for the following health issues:  OTHER (Pt have migraine and fast heart palpation ), RECHECK (BP), and Refill Request (All meds)      9/26/2024     4:34 PM   Additional Questions   Roomed by Ohn   Accompanied by Cousin         9/26/2024    Information    services provided? Yes   Language Wallisian   Type of interpretation provided Face-to-face    name María    [facility] [facility]        [facility]       Hypertension Follow-up    Do you check your blood pressure regularly outside of the clinic? No   Are you following a low salt diet? No  Are " "your blood pressures ever more than 140 on the top number (systolic) OR more   than 90 on the bottom number (diastolic), for example 140/90? Unsure - but we've noticed it higher in the clinic lately    Does not have meds with her today  Aware she is on many psych meds  Not sure about others   - for BP, should be on propanolol and amlodipine  - HR is a little high given the propranolol  Stopped a combo of lisinopril/hydrochlorothiazide about 5 years ago because she wasn't taking them, and her BP was fine, no known h/o side effects    Concern - Headache  Intermittent  Bilateral squeezing at the crown  Maybe 1/week??   Takes OTC pain meds which help  No vision changes  Has been worse in the past year    Tachycardia  Still having intermittent bouts of noticeable palpitations  No dizziness or associated fainting  Goes away quickly on its own    Very low water intake daily - doesn't really like it  - some lemonade, 1 coffee in AM, occasional tea at night                Objective    BP (!) 156/94   Pulse 99   Temp 98  F (36.7  C) (Oral)   Resp 24   Ht 1.676 m (5' 6\")   Wt 84.8 kg (187 lb)   SpO2 96%   BMI 30.18 kg/m    Body mass index is 30.18 kg/m .  Physical Exam  Constitutional:       Appearance: Normal appearance.   HENT:      Head: Normocephalic and atraumatic.   Eyes:      Extraocular Movements: Extraocular movements intact.   Cardiovascular:      Rate and Rhythm: Normal rate and regular rhythm.      Heart sounds: Murmur heard.      Comments: Borderline tachycardia  Pulmonary:      Effort: Pulmonary effort is normal.      Breath sounds: Normal breath sounds.   Musculoskeletal:         General: Normal range of motion.   Neurological:      General: No focal deficit present.      Mental Status: She is alert and oriented to person, place, and time.   Psychiatric:         Mood and Affect: Mood normal.         Behavior: Behavior normal.                    Signed Electronically by: Khushi Cadena MD    "

## 2024-09-26 NOTE — PATIENT INSTRUCTIONS
Here is the plan from today's visit    1. Essential hypertension  Start new medication (hydrochlorothiazide)  Follow up in 2 weeks for a pharmacy visit - bring ALL of your medications with you  - Comprehensive metabolic panel; Future  - Albumin Random Urine Quantitative with Creat Ratio; Future  - hydrochlorothiazide (HYDRODIURIL) 25 MG tablet; Take 1 tablet (25 mg) by mouth daily.  Dispense: 90 tablet; Refill: 3  - Med Therapy Management Referral    2. Medication management  Follow up for comprehensive medication review and repeat BP to determine if medications need adjusting  - Med Therapy Management Referral    3. Screen for colon cancer  - DAVE(EXACT SCIENCES); Future    Baaritaanka Kansarka Mindhicirka Weyn: Lucia Barker  Colon Cancer Screening: Care Instructions  Angelika Hernandezyeelkaaga     Kansarka malawadka wuxuu ku dhacaa mindhicirka ama malawadka. Taasi waa qaybta hoose ee nidaamka dheef-shiidkaaga. Waa sababta labaad ee sababaha ugu horeeya ee sababa dhimashada kansarka Mareykanka. Waxay badanaa ku bilaabataa korriimo yaryar oo loo yaqaanno burooyin oo ka baxa mindhicirka ama malawadka. Burooyinka badanaa waxaa lagu ogaadaa baaritaanada. Iyadoo ay kuxirantahay nooca baaritaanka, wixii burooyin ah ee lagu helo baaritaanka ayaa la heydi karaa inta lagu jiro baaritaanka.  Kansarka malawadka badanaa ma sababo astaamo marka hore. Laakiin baaritaanada joogtada ah waxay gacan ka geysan karaan in goor hore la ogaado, ka hor inta aanu jirka ku faafin oo aysan adkaanin in la daweeyo.  Khatarta aad ugu jirto kansarka malawadka way sii kordheysaa inta aad sii weynaanayso. Khubarada qaar waxay ku taliyaan in dadka waeyn ay bilaabaan baaritaanka joogtada dieter cope da'da 50 ayna jootelloyahitesh da'da 75. Liz barry in la bilaabo kahor da'da 48 ama la sii wado kadib da'da 75. Ioana wolfatartaada iyo goorta la bilaabayo ama la joojinayo baaritaanka.  Lagayaabee inaad nina ama dhowr baaritaan marto.  Lasocoshada-Daryeelka waa qayb muhiim ah oo ka mid ah daaweyntaada iyo badbaadadaada. Hubso in aad sameysato oo aad tagto balamaha dhakhtarka oo cayla, waxaadna wacdaa dhakhtarkaaga haddaad dhib qabto. Sidoo kale waa fikrad fiican inaad ogaato natiijayada baaritaannada [facility] sameeyay, iyo inaad kaydsato liiska daawooyinka aad qaadato.  Maxay yihiin baaritaanada ugu muhiimsan ee baaritaanka kansarka mindhicirka weyn?  Baaritaanada waa:  Baaritaanka saxarada.  Kuwaan waxaa ka mid ah baaritaanka dhiiga saxarada ee loo yaqaan  guaiac fecal occult blood test' (gFOBT), baaritaanka kiimikada saxarada (FIT), iyo isku dhafka baaritaanka kiimikada saxarada iyo baaritaanka [facility] (FIT-DNA). Baadhitaanadani waxay muunadaha saxarada ka baaraan astaa[facility]. Haddii baaritaankaagu yahay mid togan, waxaad u baahan doontaa in [facility] baaritaanka walamadka.  Baaritaanka Xidimada.  Baaritaankan wuxuu dhakhtarkaaga u oggolaanayaa inuu fiiriyo dahaarka malawadkaaga iyo qaybta ugu hooseysa ee xiidankaaga. Dhakhtarkaagu wuxuu isticmaalayaa tuubo iftiin leh oo loo yaqaan 'sigmoidoscope'. Baaritaankani laguma ogaan stephanie kansar ama burooyinka qaybta kore ee xiidankaaga. Xaaladaha qaarkood, burooyinka la helo ayaa la phan heydi karaa. Langocin haddii dhakhtarkaagu uu helo burooyin, waxaad u baahan doontaa baaritaanka walamadka si loo baaro qaybta sare ee mindhicirkaaga weyn.  Baaritaanka walamadka.  Baaritaankani wuxuu dhakhtarkaaga u oggolaanayaa inuu fiiriyo dahaarka malawadkaaga iyo xiidankaaga oo cayla. Dhakhtarku wuxuu isticmaalayaa qalab khafiif ah oo jilicsan oo loo yaqaan  colonoscope'. Waxaa sidoo kale loo isticmaali karaa in lag phan saaro buryinka ama lagu helo unug muunad ahaan loo isticmaalo (biopsy).  Baaritaan aan caam aheyn ayaa ah midka la isticmaalo Kumbiyuutarka  CT colonography' (CTC). Waxaa sidoo kale loo yaqaan ' Virtual colonoscopy'.  Yay tahay in  "laga baaro kansarka malawadka?  Khatarta aad ugu jirto kansarka malawadka way sii kordheysaa inta aad sii weynaanayso. Khubarada qaar waxay ku taliyaan in dadka waaweyn ay bilaabaan baaritaanka joogtada ah markay jiraan da'da 50 ayna joojiyaan da'da 75. Qaar kale waxay ku taliyaan in la bilaabo kahor da'da 50 ama la sii wado kadib da'da 75. Ioana hadal [facility] wixii ku saabsan khatartaada iyo goorta la bilaabayo ama la joojinayo baaritaanka.  Inta jeer ee aad ugu baahan tahay baaritaanka waxay kuxirantahay nooca baaritaanka aad qaadanaysid:  Baaritaanka saxarada.  1kii ama 2dii sanaba mar ee FIT ama gFOBT.   3dii sanaba mar ee sDNA, sidoo kale loo yaqaan FIT-DNA.  Baaritaanada ee lagu eego gudaha mindhicir weynaha.  5tii ama 10kii sanaba mar ee sigmoidoscopy.  5tii sanaba mar CT colonigraphy (qalliinka mindhicir weynaha eris [facility]).  10-kii sanaba mar Baaritaanka mindhicir weynaha.  Khubarada ayaa isku raacay in dadka halista sare kujira ay u baahan karaan in [facility] loo [facility]. Dhruv waxaa ka mid ah dadka leh taariikh qoyseed oo adag oo ah kansarka mindhicirka weyn. Ioana hadal [facility] baaritaanka kuugu habboon iyo goorta lagu baarayo.  Gorma ayaad caawimaad u wacan kartaa?  Fiiro dhow u yeelo isbeddalka caafimaadkaaga, hubsana inaad dhakhtardanaaga la xiriirto hadii:  Ay isbedelo ku anamaadahitesh deckerdwillianaga dhaqdhaqaaqa lolaaga/saxaradaada  Aad qabto wax dhibaato ah  Halkeed ku shanna kartaa wax badan?  Tag   [email or web address]  Aria M541 Gudaha sanduuqa raadinta si loo kel wax badan oo ku daveyjazmin. \"Baaritaanka Kansarka Mindhicirka Weyn: Lucia Barker.\"  Hadda laga bilaabo: Juliette Duffnabogdan 25, 2023               Nooca Tusmadda: 14.0    [id number] [employer].   Lucia barker la qaatay hoosta ruqsada xirfad yaqaanhumberto monge Haddii aad qabto armendariz aalo criss daveyjazmin xaluizda glendy mendes had iyo joe xirfad yaqaankaaga " caafimataras. [facility] wax diiday racheal latoyastephen almanza.    Please call or return to clinic if your symptoms don't go away.    Follow up plan - 2 weeks    Thank you for coming to [facility] today.  Lab Testing:  **If you had lab testing today and your results are reassuring or normal they will be mailed to you or sent through Carvoyant within 7 days.   **If the lab tests need quick action we will call you with the results.  The phone number we will call with results is # [phone] (home) . If this is not the best number please call our clinic and change the number.  Medication Refills:  If you need any refills please call your pharmacy and they will contact us.   If you need to  your refill at a new pharmacy, please contact the new pharmacy directly. The new pharmacy will help you get your medications transferred faster.   Scheduling:  If you have any concerns about today's visit or wish to schedule another appointment please call our office during normal business hours [phone] (8-5:00 M-F)  If a referral was made to a [facility] and you don't get a call from central scheduling please call [phone].  If a Mammogram was ordered for you at [facility] call [phone] to schedule or change your appointment.  If you had an XRay/CT/Ultrasound/MRI ordered the number is [phone] to schedule or change your radiology appointment.   Medical Concerns:  If you have urgent medical concerns please call [phone] at any time of the day.  If you have a medical emergency please call [phone].     22

## 2024-09-26 NOTE — Clinical Note
Hi - I did refer Carmen for MTM, mainly to make sure she is taking all of her meds. I restarted hydrochlorothiazide which she used to be on. But I'm also wondering if she's been taking propranolol (since her HR is higher than I'd expect).  Lorene - can you make sure she is scheduled in 2 wks with PharmD team?  Thanks!

## 2024-09-26 NOTE — LETTER
October 9, 2024      Carmen Mccoy Beyer  [address]  [place] [address]        Dear Camren,    Thank you for getting your care at [facility]. Please see below for your test results. These all look normal.    Resulted Orders   Comprehensive metabolic panel   Result Value Ref Range    Sodium 136 135 - 145 mmol/L    Potassium 4.2 3.4 - 5.3 mmol/L    Carbon Dioxide (CO2) 26 22 - 29 mmol/L    Anion Gap 9 7 - 15 mmol/L    Urea Nitrogen 10.6 6.0 - 20.0 mg/dL    Creatinine 0.76 0.51 - 0.95 mg/dL    GFR Estimate >90 >60 mL/min/1.73m2      Comment:      eGFR calculated using 2021 CKD-EPI equation.    Calcium 9.7 8.8 - 10.4 mg/dL      Comment:      Reference intervals for this test were updated on 7/16/2024 to reflect our healthy population more accurately. There may be differences in the flagging of prior results with similar values performed with this method. Those prior results can be interpreted in the context of the updated reference intervals.    Chloride 101 98 - 107 mmol/L    Glucose 110 (H) 70 - 99 mg/dL    Alkaline Phosphatase 106 40 - 150 U/L    AST 28 0 - 45 U/L    ALT 15 0 - 50 U/L    Protein Total 8.3 6.4 - 8.3 g/dL    Albumin 3.8 3.5 - 5.2 g/dL    Bilirubin Total 0.2 <=1.2 mg/dL   Albumin Random Urine Quantitative with Creat Ratio   Result Value Ref Range    Creatinine Urine mg/dL 33.3 mg/dL      Comment:      The reference ranges have not been established in urine creatinine. The results should be integrated into the clinical context for interpretation.    Albumin Urine mg/L <12.0 mg/L      Comment:      The reference ranges have not been established in urine albumin. The results should be integrated into the clinical context for interpretation.    Albumin Urine mg/g Cr        Comment:      Unable to calculate, urine albumin and/or urine creatinine is outside detectable limits.  Microalbuminuria is defined as an albumin:creatinine ratio of 17 to 299 for males and 25 to 299 for females. A ratio of  albumin:creatinine of 300 or higher is indicative of overt proteinuria.  Due to biologic variability, positive results should be confirmed by a second, first-morning random or 24-hour timed urine specimen. If there is discrepancy, a third specimen is recommended. When 2 out of 3 results are in the microalbuminuria range, this is evidence for incipient nephropathy and warrants increased efforts at glucose control, blood pressure control, and institution of therapy with an angiotensin-converting-enzyme (ACE) inhibitor (if the patient can tolerate it).         If you have any concerns about these results please call and leave a message for me or send a Nexopiat message to the clinic.    Sincerely,    Khushi Cadena MD

## 2024-09-27 ENCOUNTER — ORDERS ONLY (AUTO-RELEASED) (OUTPATIENT)
Dept: FAMILY MEDICINE | Facility: CLINIC | Age: 44
End: 2024-09-27
Payer: COMMERCIAL

## 2024-09-27 DIAGNOSIS — Z12.11 SCREEN FOR COLON CANCER: ICD-10-CM

## 2024-09-27 LAB
ALBUMIN SERPL BCG-MCNC: 3.8 G/DL (ref 3.5–5.2)
ALP SERPL-CCNC: 106 U/L (ref 40–150)
ALT SERPL W P-5'-P-CCNC: 15 U/L (ref 0–50)
ANION GAP SERPL CALCULATED.3IONS-SCNC: 9 MMOL/L (ref 7–15)
AST SERPL W P-5'-P-CCNC: 28 U/L (ref 0–45)
BILIRUB SERPL-MCNC: 0.2 MG/DL
BUN SERPL-MCNC: 10.6 MG/DL (ref 6–20)
CALCIUM SERPL-MCNC: 9.7 MG/DL (ref 8.8–10.4)
CHLORIDE SERPL-SCNC: 101 MMOL/L (ref 98–107)
CREAT SERPL-MCNC: 0.76 MG/DL (ref 0.51–0.95)
CREAT UR-MCNC: 33.3 MG/DL
EGFRCR SERPLBLD CKD-EPI 2021: >90 ML/MIN/1.73M2
GLUCOSE SERPL-MCNC: 110 MG/DL (ref 70–99)
HCO3 SERPL-SCNC: 26 MMOL/L (ref 22–29)
MICROALBUMIN UR-MCNC: <12 MG/L
MICROALBUMIN/CREAT UR: NORMAL MG/G{CREAT}
POTASSIUM SERPL-SCNC: 4.2 MMOL/L (ref 3.4–5.3)
PROT SERPL-MCNC: 8.3 G/DL (ref 6.4–8.3)
SODIUM SERPL-SCNC: 136 MMOL/L (ref 135–145)

## 2024-09-28 ASSESSMENT — ABNORMAL INVOLUNTARY MOVEMENT SCALE (AIMS)
AIMS_PATIENT_AWARENESS: NO AWARENESS
TONGUE: 2
LIPS_PARIETAL: MILD

## 2024-10-11 ENCOUNTER — OFFICE VISIT (OUTPATIENT)
Dept: PHARMACY | Facility: CLINIC | Age: 44
End: 2024-10-11
Payer: COMMERCIAL

## 2024-10-11 VITALS — HEART RATE: 94 BPM | DIASTOLIC BLOOD PRESSURE: 88 MMHG | SYSTOLIC BLOOD PRESSURE: 143 MMHG

## 2024-10-11 DIAGNOSIS — I10 ESSENTIAL HYPERTENSION WITH GOAL BLOOD PRESSURE LESS THAN 130/85: ICD-10-CM

## 2024-10-11 DIAGNOSIS — I10 ESSENTIAL HYPERTENSION: Primary | ICD-10-CM

## 2024-10-11 DIAGNOSIS — K21.9 GASTROESOPHAGEAL REFLUX DISEASE WITHOUT ESOPHAGITIS: ICD-10-CM

## 2024-10-11 DIAGNOSIS — F25.1 SCHIZOAFFECTIVE DISORDER, DEPRESSIVE TYPE (H): ICD-10-CM

## 2024-10-11 DIAGNOSIS — G89.29 CHRONIC PAIN OF LEFT KNEE: ICD-10-CM

## 2024-10-11 DIAGNOSIS — K59.00 CONSTIPATION, UNSPECIFIED CONSTIPATION TYPE: ICD-10-CM

## 2024-10-11 DIAGNOSIS — F33.1 MODERATE EPISODE OF RECURRENT MAJOR DEPRESSIVE DISORDER (H): ICD-10-CM

## 2024-10-11 DIAGNOSIS — M25.562 CHRONIC PAIN OF LEFT KNEE: ICD-10-CM

## 2024-10-11 PROCEDURE — 99605 MTMS BY PHARM NP 15 MIN: CPT | Performed by: PHARMACIST

## 2024-10-11 PROCEDURE — 99607 MTMS BY PHARM ADDL 15 MIN: CPT | Performed by: PHARMACIST

## 2024-10-11 RX ORDER — DOCUSATE SODIUM 50MG AND SENNOSIDES 8.6MG 8.6; 5 MG/1; MG/1
1 TABLET, FILM COATED ORAL DAILY
Qty: 30 TABLET | Refills: 0 | Status: SHIPPED | OUTPATIENT
Start: 2024-10-11

## 2024-10-11 RX ORDER — MIRTAZAPINE 15 MG/1
15 TABLET, FILM COATED ORAL AT BEDTIME
Qty: 30 TABLET | Refills: 5 | Status: SHIPPED | OUTPATIENT
Start: 2024-10-11

## 2024-10-11 RX ORDER — CHLORAL HYDRATE 500 MG
2 CAPSULE ORAL DAILY
Qty: 180 CAPSULE | Refills: 3 | Status: SHIPPED | OUTPATIENT
Start: 2024-10-11

## 2024-10-11 ASSESSMENT — PATIENT HEALTH QUESTIONNAIRE - PHQ9: SUM OF ALL RESPONSES TO PHQ QUESTIONS 1-9: 11

## 2024-10-11 NOTE — PATIENT INSTRUCTIONS
Medication List            Morning Afternoon Evening Bedtime    acetaminophen 325 MG tablet  Also known as: TYLENOL  Take 2 tablets (650 mg) by mouth every 4 hours as needed for mild pain                     cetirizine 10 MG tablet  Also known as: zyrTEC  Take 1 tablet (10 mg) by mouth daily                     Vitamin  D3 50 MCG (2000 UT) Caps  TAKE 1 CAPSULE BY MOUTH EVERY DAY  Generic drug: cholecalciferol                     Melatonin 3 MG tablet  TAKE 2 TABLETS (6 MG) BY MOUTH AT BEDTIME  Generic drug: melatonin     ^^SLEEP:  may purchase over the counter                  diclofenac 1 % topical gel  Also known as: VOLTAREN  Apply 2 g topically 4 times daily     **Knee Pain                  famotidine 20 MG tablet  Also known as: PEPCID  TAKE 1 TABLET BY MOUTH TWICE A DAY     **Acid Reflux:  GAS                    hydrochlorothiazide 25 MG tablet  Also known as: HYDRODIURIL  Take 1 tablet (25 mg) by mouth daily.     **Blood pressure                   mirtazapine 15 MG tablet  Also known as: REMERON  Take 1 tablet (15 mg) by mouth at bedtime.     **SLEEP                    multivitamin w/minerals tablet  Take 1 tablet by mouth daily                     Omega-3 1000 MG capsule  Take 2 capsules (2 g) by mouth daily     **EYE HEALTH     XX             prazosin 2 MG capsule  Also known as: MINIPRESS  Take 2 capsules (4 mg) by mouth at bedtime. Take 4mg (2 capsules) at bedtime       **SLEEP               XX    * risperiDONE 0.25 MG tablet  Also known as: risperDAL  Take 1 tablet (0.25 mg) by mouth at bedtime. with 2 mg  for total daily dose of 2.25 mg                 X    * risperiDONE 2 MG tablet  Also known as: risperDAL  Take 1 tablet (2 mg) by mouth at bedtime. Take with 0.25 mg tablet for total daily dose of 2.25 mg                 X    Senexon-S 8.6-50 MG tablet  1 TABLET ORALLY DAILY IN THE EVENING AS NEEDED  Generic drug: senna-docusate     ^^CONSTIPATION      X             traZODone 50 MG tablet  Also  known as: DESYREL  Take 2 tablets (100 mg) by mouth at bedtime.     **SLEEP            XX

## 2024-10-11 NOTE — Clinical Note
Dr. Cadena, We met up with Carmen and reviewed the meds she had available.  Ultimately, she didn't have any BP meds and likely has HCTZ at home. I do not believe she is taking a beta blocker.  There were still some meds missing and unanswered questions, but I let them know what meds I believe they still have at home and provided a paper grid in the AVS with instructions and indications.  I'll follow up with them by phone in a few weeks, but she doesn't otherwise have a visit with you or FM scheduled.    Cruz Torres, Pharm.D.

## 2024-10-11 NOTE — PROGRESS NOTES
Medication Therapy Management (MTM) Encounter    ASSESSMENT:                            Medication Adherence/Access: See below for considerations.   Patient brings in medications only associated to mental health diagnoses.  It appears that medications are kept in different locations in the home.  As well as different people helping remind Carmen to take her medications.     Essential hypertension  Uncontrolled  Blood pressure medication, hydrochlorothiazide, family states that this has not been picked up from the pharmacy.  There is some confusion of availability.  I called the pharmacy to clarify. (Pharmacy states that this was sold to the patient on Oct 3rd)  Blood pressure appears to be improved.  Possibly a result of the increased prazosin dose.    Gastroesophageal reflux disease without esophagitis  Controlled  Famotidine prescription was filled at pharmacy and family told that this bottle is likely at home.  They agree to look for the prescription bottle.      Moderate episode of recurrent major depressive disorder (H)  Uncontrolled  Patient is missing mirtazapine.  Appears to not have been filled since the summer.  Is tolerating the risperidone dose as well as the increased prazosin dose.  Family would like to resume this medication - Psychiatry note on 9/5/24 also notes that this should be continued.  Refill sent to pharmacy     Constipation:   Patient reports that having produces stool in the past 3 days.  Will resume senna plus.  1 prescription sent with no refills.  Instructed to discuss with provider at next visit if further refills are needed.    PLAN:                            Patient provided a list of medications to help organize her medications as well as list the indication for each medication.  Family has been instructed to look for prescriptions such as famotidine and hydrochlorothiazide that has been purchased at the pharmacy.  New prescriptions have been sent to the pharmacy.    Follow-up: with  PCP in 4 weeks. Pharmacy will follow up in 3 weeks by phone     SUBJECTIVE/OBJECTIVE:                          Carmen Connolly is a 44 year old female seen for an initial visit. She was referred to me from Khushi Cadena MD .      Reason for visit: Medication review.    Pt is here today with a family member (cousin).    Carmen and family are worriwed abvout missing medications.  HA meds missing  BP meds missing  GERD med missing     Last rx sent at PCP visit was not received by pharmacy.         Allergies/ADRs: Reviewed in chart  Past Medical History: Reviewed in chart  Tobacco: She reports that she has never smoked. She has never used smokeless tobacco.  Alcohol: none    Medication Adherence/Access:   Patient takes medications 2 time(s) per day.   Per patient, misses medication 0 time(s) per week.     Separates meds at home    [facility] [address]  [place]  Goes to pharmacy to  meds    Daughter helps with medications.   Takes from the bottle.    Hypertension   Amlodipine 10 mg last sold in March   HCTZ sold at pharmacy on Sept 26th #90 day.  Does not have bottle with them today.     Patient reports no current medication side effects  Patient does not self-monitor blood pressure.       GERD    Famotidine 20 mg twice daily  (empty bottle presented)  Family was not clear which medication was for GERD  - Family states that the famotidine was not purchased and is nto at home.         Mental Health   Prazosin 4 mg QHS  Was taking 2.5 mg of risparidone and now will take 2.25 as directed.      Has not been taking mirtazapine QHS since this summer.   Patient reports no current medication side effects.     Cosntipation :   BM every 2-3   Senna - currently out and refills needed at pharmacy.     Eye health:  Needs fish oil tablets refills     Supplment :   Needs refill for vit D.     Insomnia :   Would like to restart melaonin  Trazodone 100 mg QHS  pain:   IBU used as need for pain.  Uses 1 tablet at a  time.   Diclofenac topical used for knees.  Will send to pharmacy     Today's Vitals: BP (!) 143/88   Pulse 94   ----------------      I spent 45 minutes with this patient today. All changes were made via collaborative practice agreement with Khushi Cadena MD. A copy of the visit note was provided to the patient's provider(s).    A summary of these recommendations was given to the patient.    Cruz Torres, Pharm.D.         Medication Therapy Recommendations  Essential hypertension    Current Medication: hydrochlorothiazide (HYDRODIURIL) 25 MG tablet   Rationale: Patient forgets to take - Adherence - Adherence   Recommendation: Provide Education - medication likely lost at home and pt instructed to find to resume taking this.   Status: Patient Agreed - Adherence/Education

## 2024-10-30 DIAGNOSIS — F43.10 PTSD (POST-TRAUMATIC STRESS DISORDER): ICD-10-CM

## 2024-10-30 DIAGNOSIS — F25.1 SCHIZOAFFECTIVE DISORDER, DEPRESSIVE TYPE (H): ICD-10-CM

## 2024-10-30 RX ORDER — TRAZODONE HYDROCHLORIDE 50 MG/1
100 TABLET, FILM COATED ORAL AT BEDTIME
Qty: 60 TABLET | Refills: 1 | Status: SHIPPED | OUTPATIENT
Start: 2024-10-30

## 2024-10-30 RX ORDER — PRAZOSIN HYDROCHLORIDE 2 MG/1
4 CAPSULE ORAL AT BEDTIME
Qty: 60 CAPSULE | Refills: 1 | Status: SHIPPED | OUTPATIENT
Start: 2024-10-30

## 2024-10-30 RX ORDER — RISPERIDONE 2 MG/1
2 TABLET ORAL AT BEDTIME
Qty: 30 TABLET | Refills: 1 | Status: SHIPPED | OUTPATIENT
Start: 2024-10-30

## 2024-10-30 RX ORDER — RISPERIDONE 0.25 MG/1
0.25 TABLET ORAL AT BEDTIME
Qty: 30 TABLET | Refills: 1 | Status: SHIPPED | OUTPATIENT
Start: 2024-10-30

## 2024-10-30 NOTE — TELEPHONE ENCOUNTER
Last seen: 9/5  RTC: 4 weeks  Cancel: 10/17, 10/31  No-show: None  Next appt: 11/21       Medication requested:   prazosin (MINIPRESS) 2 MG capsule   risperiDONE (RISPERDAL) 0.25 MG tablet   risperiDONE (RISPERDAL) 2 MG tablet   traZODone (DESYREL) 50 MG tablet     From chart note:   1) Meds-  - Decrease aripiprazole from 5mg to 2.5mg. Take 2.5mg for two weeks then stop Abilify completely.   - Increase prazosin from 3 mg to 4mg  - Continue mirtazapine 15 mg  - Continue trazodone 100 mg qhs  - Continue risperidone 2.25 mg qhs       Refills sent to RN for final review

## 2024-10-30 NOTE — TELEPHONE ENCOUNTER
"[facility] Call Center    Phone Message    May a detailed message be left on voicemail: yes     Reason for Call: Medication Refill Request    Has the patient contacted the pharmacy for the refill? Yes   Name of medication being requested: \"most of her meds\"  Provider who prescribed the medication:   Pharmacy: [facility] on file  Date medication is needed: unknown. Pt will need refills for Nov. Next appt schedule 11/21.     Action Taken: Other: [facility]    Travel Screening: Not Applicable     Date of Service:                                                                     "

## 2024-10-30 NOTE — TELEPHONE ENCOUNTER
Medication unable to be refilled by RN due to criteria not met as indicated.                 []Eligibility - not seen in the last year              []Supervision - no future appointment              [x]Compliance - no shows, cancellations or lapse in therapy              []Verification - order discrepancy              []Controlled medication              []Medication not included in policy              []90-day supply request              []Other:

## 2024-11-21 ENCOUNTER — VIRTUAL VISIT (OUTPATIENT)
Dept: PSYCHIATRY | Facility: CLINIC | Age: 44
End: 2024-11-21
Attending: STUDENT IN AN ORGANIZED HEALTH CARE EDUCATION/TRAINING PROGRAM
Payer: COMMERCIAL

## 2024-11-21 DIAGNOSIS — Z79.899 LONG TERM CURRENT USE OF ANTIPSYCHOTIC MEDICATION: Primary | ICD-10-CM

## 2024-11-21 DIAGNOSIS — F25.1 SCHIZOAFFECTIVE DISORDER, DEPRESSIVE TYPE (H): ICD-10-CM

## 2024-11-21 DIAGNOSIS — F43.10 PTSD (POST-TRAUMATIC STRESS DISORDER): ICD-10-CM

## 2024-11-21 PROCEDURE — G2211 COMPLEX E/M VISIT ADD ON: HCPCS | Mod: 95

## 2024-11-21 PROCEDURE — 99214 OFFICE O/P EST MOD 30 MIN: CPT | Mod: 95

## 2024-11-21 ASSESSMENT — PAIN SCALES - GENERAL: PAINLEVEL_OUTOF10: NO PAIN (0)

## 2024-11-21 NOTE — NURSING NOTE
Current patient location: [address] [place] [address] N  [facility] [address]    Is the patient currently in the state of [place]? YES    Visit mode:VIDEO    If the visit is dropped, the patient can be reconnected by:VIDEO VISIT: Text to cell phone:   Telephone Information:   Mobile [phone]       Will anyone else be joining the visit? NO  (If patient encounters technical issues they should call [phone]    Are changes needed to the allergy or medication list? No    Are refills needed on medications prescribed by this physician? Discuss with provider    Rooming Documentation:  Unable to complete questionnaire(s) due to time    Reason for visit: NOEMI GUARDADO

## 2024-11-21 NOTE — PATIENT INSTRUCTIONS
Medication Changes: None today. Please resume mirtazapine    Other: Please follow up with primary care as soon as possible regarding ongoing abdominal and back pain. If this progresses, I recommend going to the emergency room.     Follow Up: 1 month.     **For crisis resources, please see the information at the end of this document**   Patient Education    Thank you for coming to the [facility] [place] [facility].     Lab Testing:  If you had lab testing today and your results are reassuring or normal they will be mailed to you or sent through 24h00 within 7 days. If the lab tests need quick action we will call you with the results. The phone number we will call with results is # [phone]. If this is not the best number please call our clinic and change the number.     Medication Refills:  If you need any refills please call your pharmacy and they will contact us. Our fax number for refills is [phone].   Three business days of notice are needed for general medication refill requests.   Five business days of notice are needed for controlled substance refill requests.   If you need to change to a different pharmacy, please contact the new pharmacy directly. The new pharmacy will help you get your medications transferred.     Contact Us:  Please call [phone] during business hours (8-5:00 M-F).   If you have medication related questions after clinic hours, or on the weekend, please call [phone].     Financial Assistance [phone]   Medical Records [phone]       MENTAL HEALTH CRISIS RESOURCES:  For a emergency help, please call 911 or go to the nearest Emergency Department.     Emergency Walk-In Options:   EmPATH Unit @ [facility] ([place]): [phone] - Specialized mental health emergency area designed to be calming  [facility] ([place]): [phone]  [facility] ([place]): [phone]  [place]  [facility]): [phone]    [facility] Crisis Information:   [place]: [phone]  [place]: [phone]  [place] BUR[place]) - Adult: [phone]     Child: [phone]  [place] - Adult: [phone]     Child: [phone]  [place]: [phone]  List of all [facility] resources:   https://[place].[facility]/dhs/people-we-serve/adults/health-care/mental-health/resources/crisis-contacts.jsp    National Crisis Information:   Crisis Text Line: Text  [place]  to 519605  Suicide & Crisis Lifeline: 988  National Suicide Prevention Lifeline: 7-832-612-WQIB (1-322.131.5470)       For online chat options, visit https://suicidepreventionlifeline.org/chat/  Poison Control Center: 1-724.710.5484  Trans Lifeline: 1-522.720.2488 - Hotline for transgender people of all ages  The [employer] Project: 7-518-064-7970 - Hotline for LGBT youth     For Non-Emergency Support:   Fast Tracker: Mental Health & Substance Use Disorder Resources -   https://www.[employer].org/

## 2024-11-21 NOTE — PROGRESS NOTES
"Virtual Visit Details    Type of service:  Video Visit   Video Start Time: {video visit start/end time for provider to select:330656}  Video End Time:{video visit start/end time for provider to select:754969}    Originating Location (pt. Location): {video visit patient location:320201::\"Home\"}  {PROVIDER LOCATION On-site should be selected for visits conducted from your clinic location or adjoining [facility] hospital, academic office, or other nearby [facility] building. Off-site should be selected for all other provider locations, including home:795722}  Distant Location (provider location):  {virtual location provider:852280}  Platform used for Video Visit: {Virtual Visit Platforms:822890::\"[employer]ra\"}  "
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
## 2024-11-22 RX ORDER — RISPERIDONE 2 MG/1
2 TABLET ORAL AT BEDTIME
Qty: 30 TABLET | Refills: 2 | Status: SHIPPED | OUTPATIENT
Start: 2024-11-22

## 2024-11-22 RX ORDER — PRAZOSIN HYDROCHLORIDE 2 MG/1
4 CAPSULE ORAL AT BEDTIME
Qty: 60 CAPSULE | Refills: 2 | Status: SHIPPED | OUTPATIENT
Start: 2024-11-22

## 2024-11-22 RX ORDER — RISPERIDONE 0.25 MG/1
0.25 TABLET ORAL AT BEDTIME
Qty: 30 TABLET | Refills: 2 | Status: SHIPPED | OUTPATIENT
Start: 2024-11-22

## 2024-11-22 RX ORDER — TRAZODONE HYDROCHLORIDE 50 MG/1
100 TABLET, FILM COATED ORAL AT BEDTIME
Qty: 60 TABLET | Refills: 2 | Status: SHIPPED | OUTPATIENT
Start: 2024-11-22

## 2024-12-19 ENCOUNTER — APPOINTMENT (OUTPATIENT)
Dept: INTERPRETER SERVICES | Facility: CLINIC | Age: 44
End: 2024-12-19
Payer: COMMERCIAL

## 2024-12-19 ENCOUNTER — VIRTUAL VISIT (OUTPATIENT)
Dept: INTERPRETER SERVICES | Facility: CLINIC | Age: 44
End: 2024-12-19
Payer: COMMERCIAL

## 2024-12-19 ENCOUNTER — VIRTUAL VISIT (OUTPATIENT)
Dept: PSYCHIATRY | Facility: CLINIC | Age: 44
End: 2024-12-19
Attending: STUDENT IN AN ORGANIZED HEALTH CARE EDUCATION/TRAINING PROGRAM
Payer: COMMERCIAL

## 2024-12-19 DIAGNOSIS — Z79.899 LONG TERM CURRENT USE OF ANTIPSYCHOTIC MEDICATION: ICD-10-CM

## 2024-12-19 DIAGNOSIS — F25.1 SCHIZOAFFECTIVE DISORDER, DEPRESSIVE TYPE (H): ICD-10-CM

## 2024-12-19 DIAGNOSIS — F43.10 PTSD (POST-TRAUMATIC STRESS DISORDER): ICD-10-CM

## 2024-12-19 DIAGNOSIS — K59.03 DRUG-INDUCED CONSTIPATION: Primary | ICD-10-CM

## 2024-12-19 PROCEDURE — 99214 OFFICE O/P EST MOD 30 MIN: CPT | Mod: 95

## 2024-12-19 PROCEDURE — G2211 COMPLEX E/M VISIT ADD ON: HCPCS | Mod: 95

## 2024-12-19 ASSESSMENT — PAIN SCALES - GENERAL: PAINLEVEL_OUTOF10: NO PAIN (0)
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
## 2024-12-19 NOTE — PROGRESS NOTES
"Virtual Visit Details    Type of service:  Video Visit   Video Start Time: {video visit start/end time for provider to select:128105}  Video End Time:{video visit start/end time for provider to select:763470}    Originating Location (pt. Location): {video visit patient location:332196::\"Home\"}  {PROVIDER LOCATION On-site should be selected for visits conducted from your clinic location or adjoining [facility] hospital, academic office, or other nearby [facility] building. Off-site should be selected for all other provider locations, including home:784688}  Distant Location (provider location):  {virtual location provider:759319}  Platform used for Video Visit: {Virtual Visit Platforms:521184::\"WeGather\"}    "

## 2024-12-19 NOTE — PATIENT INSTRUCTIONS
Medication Changes:   Increase Mirtazipine to 30mg (2 tablets)    **For crisis resources, please see the information at the end of this document**   Patient Education    Thank you for coming to the [facility] [place] [facility].     Lab Testing:  If you had lab testing today and your results are reassuring or normal they will be mailed to you or sent through [employer] within 7 days. If the lab tests need quick action we will call you with the results. The phone number we will call with results is # [phone]. If this is not the best number please call our clinic and change the number.     Medication Refills:  If you need any refills please call your pharmacy and they will contact us. Our fax number for refills is [phone].   Three business days of notice are needed for general medication refill requests.   Five business days of notice are needed for controlled substance refill requests.   If you need to change to a different pharmacy, please contact the new pharmacy directly. The new pharmacy will help you get your medications transferred.     Contact Us:  Please call [phone] during business hours (8-5:00 M-F).   If you have medication related questions after clinic hours, or on the weekend, please call [phone].     Financial Assistance [phone]   Medical Records [phone]       MENTAL HEALTH CRISIS RESOURCES:  For a emergency help, please call 911 or go to the nearest Emergency Department.     Emergency Walk-In Options:   EmPATH Unit @ [facility] ([place]): [phone] - Specialized mental health emergency area designed to be calming  [facility] ([place]): [phone]  [facility] ([place]): [phone]  [facility] ([place]): [phone]    [facility] Crisis Information:   [place]: [phone]  [place]: [phone]  [place] ([place]) - Adult: [phone]     Child: [phone]  [place] - Adult:  [phone]     Child: [phone]  [place]: [phone]  List of all [facility] resources:   https://[place].gov/dhs/people-we-serve/adults/health-care/mental-health/resources/crisis-contacts.jsp    National Crisis Information:   Crisis Text Line: Text  [place]  to 995901  Suicide & Crisis Lifeline: 988  National Suicide Prevention Lifeline: 9-577-876-TALK (1-837.744.5410)       For online chat options, visit https://suicidepreventionlifeline.org/chat/  Poison Control Center: 1-831-568-2721  Trans Lifeline: 9-965-800-1483 - Hotline for transgender people of all ages  The Randy Project: 0-612-613-7369 - Hotline for LGBT youth     For Non-Emergency Support:   Fast Tracker: Mental Health & Substance Use Disorder Resources -   https://www.[employer].org/

## 2024-12-19 NOTE — NURSING NOTE
Current patient location: [address]  [place] [address]    Is the patient currently in the state of [place]? YES    Visit mode:VIDEO    If the visit is dropped, the patient can be reconnected by:VIDEO VISIT: Text to cell phone:   Telephone Information:   Mobile [phone]       Will anyone else be joining the visit? Cousin Ignacio  (If patient encounters technical issues they should call [phone] :[phone])    Are changes needed to the allergy or medication list? No    Are refills needed on medications prescribed by this physician? Patient thinks they need refills on all medications    Rooming Documentation:  Unable to complete questionnaire(s) due to time    Reason for visit: RECHDARIEL SCHUSTERF

## 2024-12-20 RX ORDER — SENNOSIDES 8.6 MG
1 TABLET ORAL DAILY
Qty: 30 TABLET | Refills: 1 | Status: SHIPPED | OUTPATIENT
Start: 2024-12-20

## 2024-12-20 RX ORDER — MIRTAZAPINE 15 MG/1
30 TABLET, FILM COATED ORAL AT BEDTIME
Qty: 60 TABLET | Refills: 2 | Status: SHIPPED | OUTPATIENT
Start: 2024-12-20

## 2024-12-22 DIAGNOSIS — K21.00 GASTROESOPHAGEAL REFLUX DISEASE WITH ESOPHAGITIS, UNSPECIFIED WHETHER HEMORRHAGE: ICD-10-CM

## 2024-12-23 RX ORDER — FAMOTIDINE 20 MG/1
TABLET, FILM COATED ORAL
Qty: 180 TABLET | Refills: 3 | Status: SHIPPED | OUTPATIENT
Start: 2024-12-23

## 2024-12-23 NOTE — TELEPHONE ENCOUNTER
"Request for medication refill:  famotidine (PEPCID) 20 MG tablet     Providers if patient needs an appointment and you are willing to give a one month supply please refill for one month and  send a letter/MyChart using \".SMILLIMITEDREFILL\" .smillimited and route chart to \"P [facility] \" (Giving one month refill in non controlled medications is strongly recommended before denial)    If refill has been denied, meaning absolutely no refills without visit, please complete the smart phrase \".smirxrefuse\" and route it to the \"P [facility] MED REFILLS\"  pool to inform the patient and the pharmacy.    Suresh Driscoll, [facility]      "

## 2025-01-09 NOTE — PROGRESS NOTES
The longitudinal plan of care for the diagnosis(es)/condition(s) as documented were addressed during this visit. Due to the added complexity in care, I will continue to support Carmen in the subsequent management and with ongoing continuity of care.    [place] Catherine  PGY-3 Psychiatry  [facility]

## 2025-02-13 ENCOUNTER — OFFICE VISIT (OUTPATIENT)
Dept: FAMILY MEDICINE | Facility: CLINIC | Age: 45
End: 2025-02-13
Payer: COMMERCIAL

## 2025-02-13 VITALS
SYSTOLIC BLOOD PRESSURE: 155 MMHG | BODY MASS INDEX: 33.79 KG/M2 | DIASTOLIC BLOOD PRESSURE: 100 MMHG | RESPIRATION RATE: 20 BRPM | OXYGEN SATURATION: 96 % | TEMPERATURE: 98.5 F | WEIGHT: 190.7 LBS | HEART RATE: 97 BPM | HEIGHT: 63 IN

## 2025-02-13 DIAGNOSIS — I10 ESSENTIAL HYPERTENSION: ICD-10-CM

## 2025-02-13 DIAGNOSIS — F25.0 SCHIZOAFFECTIVE DISORDER, BIPOLAR TYPE (H): ICD-10-CM

## 2025-02-13 DIAGNOSIS — Z60.3 IMMIGRANT WITH LANGUAGE DIFFICULTY: ICD-10-CM

## 2025-02-13 DIAGNOSIS — M25.562 PATELLOFEMORAL ARTHRALGIA OF LEFT KNEE: ICD-10-CM

## 2025-02-13 DIAGNOSIS — M54.50 ACUTE BILATERAL LOW BACK PAIN WITHOUT SCIATICA: ICD-10-CM

## 2025-02-13 DIAGNOSIS — M25.562 ACUTE PAIN OF LEFT KNEE: Primary | ICD-10-CM

## 2025-02-13 DIAGNOSIS — M22.2X2 PATELLOFEMORAL PAIN SYNDROME OF LEFT KNEE: ICD-10-CM

## 2025-02-13 DIAGNOSIS — M25.562 ACUTE PAIN OF LEFT KNEE: ICD-10-CM

## 2025-02-13 DIAGNOSIS — R52 PAIN: ICD-10-CM

## 2025-02-13 DIAGNOSIS — M54.50 ACUTE BILATERAL LOW BACK PAIN WITHOUT SCIATICA: Primary | ICD-10-CM

## 2025-02-13 RX ORDER — ACETAMINOPHEN 325 MG/1
650 TABLET ORAL EVERY 4 HOURS PRN
Qty: 200 TABLET | Refills: 11 | Status: SHIPPED | OUTPATIENT
Start: 2025-02-13

## 2025-02-13 RX ORDER — B-COMPLEX WITH VITAMIN C
TABLET ORAL
COMMUNITY
Start: 2025-01-31

## 2025-02-13 SDOH — SOCIAL STABILITY - SOCIAL INSECURITY: ACCULTURATION DIFFICULTY: Z60.3

## 2025-02-13 NOTE — PROGRESS NOTES
"History of present illness  - Carmen Coombs, 62-year-old female.  - Back and shoulder pain for about two weeks.  - Similar back pain occurred a little over a year ago, sometimes better, sometimes worse.  - Previous treatment with a muscle relaxer for back pain.  - Knee pain occurs when standing up, not while walking or kneeling.  - Knee X-ray performed about 4 years ago, diagnosed with issues related to kneecap alignment.  Also needs form that says that she isn't able to work.   - Blood pressure has been high in the past, sometimes reaching 150.   BP (!) 155/100   Pulse 97   Temp 98.5  F (36.9  C) (Oral)   Resp 20   Ht 1.6 m (5' 3\")   Wt 86.5 kg (190 lb 11.2 oz)   SpO2 96%   BMI 33.78 kg/m     Physical exam  - CARDIOVASCULAR: Elevated blood pressure noted.  - MUSCULOSKELETAL: Observed hyperlordosis in the back. No swelling of the knee. Tenderness in the back and knee upon palpation.     Results  - Knee X-ray from 4 years ago showed a little bit of arthritis, mostly on the patella.     Assessment & Plan  Disorder of low back and left knee.  Knee likely patellofemoral syndrome.  Back has been on and off.  Has hyperlordosis.    - Back pain with hyperlordosis noted. Previous similar back pain over a year ago. No new knee X-ray needed as knees appeared in good shape a few years ago.  - Referral to physical therapy to strengthen back and abdomen. Refill Tylenol for pain management. If physical therapy is not effective, consider consultation with sports medicine doctor, Dr. Carlson, for further evaluation.    Elevated BP/essential hypertension. Will get bmp but also renin/aldosterone and lipid per PCP.  Consider switch to losartan/hydrochlorothiazide combo pill.      Form: schizoaffective disorder--will not be able to work likely for the near future.  Form filled out.    Immigrant with language difficulty.       "

## 2025-02-20 ENCOUNTER — VIRTUAL VISIT (OUTPATIENT)
Dept: INTERPRETER SERVICES | Facility: CLINIC | Age: 45
End: 2025-02-20
Payer: COMMERCIAL

## 2025-02-20 ENCOUNTER — OFFICE VISIT (OUTPATIENT)
Dept: PSYCHIATRY | Facility: CLINIC | Age: 45
End: 2025-02-20
Attending: STUDENT IN AN ORGANIZED HEALTH CARE EDUCATION/TRAINING PROGRAM
Payer: COMMERCIAL

## 2025-02-20 VITALS
SYSTOLIC BLOOD PRESSURE: 156 MMHG | DIASTOLIC BLOOD PRESSURE: 89 MMHG | HEART RATE: 88 BPM | BODY MASS INDEX: 33.55 KG/M2 | WEIGHT: 189.4 LBS

## 2025-02-20 DIAGNOSIS — F43.10 PTSD (POST-TRAUMATIC STRESS DISORDER): ICD-10-CM

## 2025-02-20 DIAGNOSIS — F25.1 SCHIZOAFFECTIVE DISORDER, DEPRESSIVE TYPE (H): ICD-10-CM

## 2025-02-20 DIAGNOSIS — K59.03 DRUG-INDUCED CONSTIPATION: ICD-10-CM

## 2025-02-20 PROCEDURE — G2211 COMPLEX E/M VISIT ADD ON: HCPCS

## 2025-02-20 PROCEDURE — 99214 OFFICE O/P EST MOD 30 MIN: CPT | Mod: GC

## 2025-02-20 PROCEDURE — 3079F DIAST BP 80-89 MM HG: CPT

## 2025-02-20 PROCEDURE — 1126F AMNT PAIN NOTED NONE PRSNT: CPT

## 2025-02-20 PROCEDURE — 3077F SYST BP >= 140 MM HG: CPT

## 2025-02-20 PROCEDURE — G0463 HOSPITAL OUTPT CLINIC VISIT: HCPCS

## 2025-02-20 PROCEDURE — T1013 SIGN LANG/ORAL INTERPRETER: HCPCS | Mod: U4,TEL,95

## 2025-02-20 RX ORDER — RISPERIDONE 2 MG/1
2 TABLET ORAL AT BEDTIME
Qty: 30 TABLET | Refills: 2 | Status: SHIPPED | OUTPATIENT
Start: 2025-02-20

## 2025-02-20 RX ORDER — OLANZAPINE AND SAMIDORPHAN L-MALATE 5; 10 MG/1; MG/1
1 TABLET, FILM COATED ORAL AT BEDTIME
Qty: 30 TABLET | Refills: 1 | Status: SHIPPED | OUTPATIENT
Start: 2025-02-20

## 2025-02-20 RX ORDER — SENNOSIDES 8.6 MG
1 TABLET ORAL DAILY
Qty: 30 TABLET | Refills: 1 | Status: SHIPPED | OUTPATIENT
Start: 2025-02-20

## 2025-02-20 RX ORDER — PRAZOSIN HYDROCHLORIDE 2 MG/1
4 CAPSULE ORAL AT BEDTIME
Qty: 60 CAPSULE | Refills: 2 | Status: SHIPPED | OUTPATIENT
Start: 2025-02-20

## 2025-02-20 RX ORDER — TRAZODONE HYDROCHLORIDE 50 MG/1
100 TABLET ORAL AT BEDTIME
Qty: 60 TABLET | Refills: 2 | Status: SHIPPED | OUTPATIENT
Start: 2025-02-20

## 2025-02-20 RX ORDER — MIRTAZAPINE 15 MG/1
30 TABLET, FILM COATED ORAL AT BEDTIME
Qty: 60 TABLET | Refills: 2 | Status: SHIPPED | OUTPATIENT
Start: 2025-02-20

## 2025-02-20 ASSESSMENT — PAIN SCALES - GENERAL: PAINLEVEL_OUTOF10: NO PAIN (0)

## 2025-02-20 NOTE — PATIENT INSTRUCTIONS
Medication:   -Decrease Risperidone from 2.25 to 2mg.   -Start Lybalvi 5-10mg tablet once daily at bedtime.     Follow Up: 4 weeks
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
## 2025-02-27 ENCOUNTER — TELEPHONE (OUTPATIENT)
Dept: PSYCHIATRY | Facility: CLINIC | Age: 45
End: 2025-02-27

## 2025-03-04 NOTE — TELEPHONE ENCOUNTER
PA Initiation    Medication: OLANZAPINE-SAMIDORPHAN 5-10 MG PO TABS  Insurance Company: [employer] - Phone [phone] Fax [phone]  Pharmacy Filling the Rx: [id number] - [place], [place] - [address] AT [address] [facility]  Filling Pharmacy Phone: [phone]  Filling Pharmacy Fax: [phone]  Start Date: 3/3/2025

## 2025-03-06 ENCOUNTER — MYC REFILL (OUTPATIENT)
Dept: PSYCHIATRY | Facility: CLINIC | Age: 45
End: 2025-03-06

## 2025-03-06 NOTE — TELEPHONE ENCOUNTER
"Refill request r'cd from [facility] via fax for mirtazapine 15 mg tabs with pharmacy comment \"more refills please.\"  There were also 3 other faxes requesting the same for prazosin, trazodone, and risperidone.   These were denied due to fills on file.     These 4 medications all had prescriptions written February 20, 2025 for one month supply with 2 refills.    Pt should not need a new prescriptions until around May 20, 2025.    Next f/u: 3/27/25 with DEAN Catherine RN spoke with pharmacy staff to clarify request for more refills.  Staff did not know why this was requested, said patient picked up 3 month supply of these medications.  They deleted the requests.             "

## 2025-03-07 NOTE — TELEPHONE ENCOUNTER
Prior Authorization Approval    Medication: OLANZAPINE-SAMIDORPHAN 5-10 MG PO TABS  Authorization Effective Date: 3/4/2025  Authorization Expiration Date: 3/4/2026  Approved Dose/Quantity:       Reference #:     Insurance Company: [employer] - Phone [phone] Fax [phone]  Expected CoPay: $    CoPay Card Available:      Financial Assistance Needed:   Which Pharmacy is filling the prescription: [id number] - [place], [place] - [address] AT [address] [facility]  Pharmacy Notified: YES  Patient Notified: Instructed pharmacy to notify patient once order is ready.

## 2025-03-12 NOTE — PROGRESS NOTES
The longitudinal plan of care for the diagnosis(es)/condition(s) as documented were addressed during this visit. Due to the added complexity in care, I will continue to support Carmen in the subsequent management and with ongoing continuity of care.    Oneida Catherine MD  PGY-3 Psychiatry  [facility]

## 2025-03-28 NOTE — PROGRESS NOTES
Request for Synthroid which has been refilled for 90 days but no follow-up on file please have patient make an appointment with Dr. STEVEN for a 6-month follow-up in August   Telehealth Details  Type of service:  medication management  Time of service:    Start Time:  2:59 PM    End Time:  3:23 PM    Distant Site (provider location):  On-site  Mode of Communication:  Phone           [facility]  Psychiatry Clinic  MEDICAL PROGRESS NOTE     Carmen Connolly is a 42 year old patient who prefers the name Carmen and uses  pronouns she, her, hers.       DIAGNOSIS   Schizoaffective Disorder, Depressive type, in partial remission re: mood with active psychotic features      ASSESSMENT   Carmen reported continued improvement in hallucinations and paranoia with the current doses of risperidone and aripiprazole.  They feel her symptoms of psychosis have never been better controlled than they are now.  We discussed her concern that risperidone and aripriprazole would lead to the same metabolic side effects that lead to us trying to change from risperidone last year.  She and cousin were not interested in exploring alternative antipsychotics.  We discussed the possibility of trying to minimize aripiprazole dose to reduce the likelihood of metabolic side effects, and they would like to wait till next visit.  We are hoping to reduce risk of side effects while not having an increase of symptoms of psychosis, so we are going slowly with the decreases of Abilify.    Orthostatic symptoms completely resolved once prazosin had been stopped.    No safety concerns at this time.     Future considerations:  - Hopefully can eliminate mirtazapine to support metabolic health.  -Perhaps a selective serotonin reuptake inhibitor might be helpful with trauma symptom cluster. She was on Effexor for a long time [id number], but the only other serotonergic medication in her chart is a short period on citalopram before that.     [place] review was not needed today.      PLAN                                                                                                                 1) Meds-  -  Continue mirtazapine 15 mg  - Continue aripiprazole 8 mg daily   - Continue trazodone  mg hs/prn (for insomnia)  - continue risperidone 2 mg qhs     2) Psychotherapy- Continue with therapist who visits home twice weekly     3) Next due-  Labs- SGA 2/23  EKG- as indicated  Rating scales- N/A     4) Referrals-  None     5) Dispo- 4 weeks      PERTINENT BACKGROUND                           [most recent eval 8/5/22]   Previous diagnoses include Schizoaffective disorder, depression, generalized anxiety disorder, and PTSD. Symptoms began after the passing of her  while they were refugees living in [facility]. She did not receive any psychiatric care until after she immigrated to the [place] in [place].     Psych pertinent item history includes psychosis [sxs include paranoia, responding to internal stimuli, negative symptoms] and trauma hx     SANDY Morales and her cousin were interviewed with the aid of a [facility]  on the phone.    -Things are the same.  -Wakes up twice or 3 times at night. Not feeling like she's in danger.  -Appetite is low.  -Day program continues to be good.  -Continues to have AH occasionally during the day, and more frequently at night.  -Energy level is the same.  -This is the best she's been in several years.      Recent Psych Symptoms:   Depression:  low energy and poor concentration /memory  Elevated:  none  Psychosis:  auditory hallucinations, visual hallucinations and negative symptoms (avolition, affective flattening, anhedonia, alogia, apathy, See HPI)  Anxiety:  None  Trauma Related:  fear, nightmares, avoidance, trauma trigger psychological / physiological response, detached, startle response and hypervigilance    Recent Substance Use:     -alcohol: No   -cannabis: No   -tobacco: No  -caffeine:  No   -opioids: No   Narcan Kit currrent: No   -other: none    Pertinent Negatives: No suicidal or violent ideation, self-injury, shine, aggression and harmful substance use  Adverse  Effects: None reported     PAST MED TRIALS     Medication  Dose   (mg) Effect  Dates of Use   risperidone 2-7 mg Helped with sleep ?- 4/2022; restarted 8/2022 - present   venlafaxine 225 mg Helpful for mood 2014 - ?   temazepam 15 mg Helpful for sleep 2016 - 2018   citalopram         nortriptyline 10 mg HS   2016 - 2017   Hydroxyzine 25-50 mg    2016 - 2018   Abilify 15 mg  8/21-present   Mirtazapine 15 mg  4/21-present            PSYCH and SUBSTANCE USE Critical Summary Points since July 2022 8/5/22: Transfer of care diagnostic assessment.  Increased auditory hallucinations, family strongly wants to return to risperidone, so 1 mg of risperidone was restarted, Abilify continued.  9/2/22: Continued auditory hallucinations.  Significant PTSD symptoms in the form of hypervigilance, nightmares.  Increased risperidone from 1 mg to 2 mg, decreased Abilify from 15 mg to 10 mg, and started 1 mg of prazosin at bedtime.  9/16/22: Had symptoms of lightheadedness with risperidone increase and prazosin initiation. Slight improvement in psychosis symptoms. Stopping prazosin, could reconsider once on a stable dose of risperidone.  10/7/22: Dizziness has resolved. Psychosis is better controlled than it ever has been. Decreased aripiprazole to 8 mg.  11/28/22: No changes.     MEDICAL HISTORY and ALLERGY     ALLERGIES: Patient has no known allergies.    Patient Active Problem List   Diagnosis     Essential hypertension     Gastroesophageal reflux disease without esophagitis     Shortened IN interval     Vitamin D deficiency     Posttraumatic stress disorder     Female circumcision     Chronic tension-type headache, not intractable     Schizoaffective disorder, bipolar type (H)     Neurocognitive deficits     Arthritis     Extra digits     Immigrant with language difficulty     Pulmonary tuberculosis confirmed by sputum microscopy     Recurrent major depression (H)     Morbid obesity (H)     Insomnia due to medical condition      Counseling regarding advanced directives     *Prediabetes       MEDICAL REVIEW OF SYSTEMS     none in addition to that documented above     MEDICATIONS     Current Outpatient Medications   Medication Sig Dispense Refill     acetaminophen (TYLENOL) 325 MG tablet Take 2 tablets (650 mg) by mouth every 4 hours as needed for mild pain 200 tablet 11     amLODIPine (NORVASC) 10 MG tablet Take 1 tablet (10 mg) by mouth daily 90 tablet 3     ARIPiprazole (ABILIFY) 2 MG tablet Take 4 tablets (8 mg) by mouth daily Take with 120 tablet 1     cetirizine (ZYRTEC) 10 MG tablet Take 1 tablet (10 mg) by mouth daily 90 tablet 3     [employer] D3 50 MCG (2000 UT) CAPS TAKE 1 CAPSULE BY MOUTH EVERY DAY 30 capsule 11     diclofenac (VOLTAREN) 1 % topical gel Apply 2 g topically 4 times daily 150 g 4     diphenhydrAMINE (BENADRYL) 25 MG tablet Take 1-2 tablets (25-50 mg) by mouth nightly as needed for itching or allergies 30 tablet 0     famotidine (PEPCID) 20 MG tablet TAKE 1 TABLET BY MOUTH TWICE A  tablet 3     fish oil-omega-3 fatty acids 1000 MG capsule Take 2 capsules (2 g) by mouth daily 180 capsule 3     fluticasone (FLONASE) 50 MCG/ACT nasal spray Spray 2 sprays into both nostrils daily 16 mL 11     ibuprofen (ADVIL/MOTRIN) 400 MG tablet Take 1 tablet (400 mg) by mouth every 4 hours as needed for moderate pain (4-6) 120 tablet 4     Incontinence Supply Disposable (DEPEND UNDERGARMENTS) MISC Use daily as needed 200 each 11     Magnesium Oxide ([employer] MAGNESIUM OXIDE) 250 MG tablet Take 2 tablets (500 mg) by mouth daily 180 tablet 3     melatonin 3 MG tablet Take 2 tablets (6 mg) by mouth At Bedtime 60 tablet 1     mirtazapine (REMERON) 15 MG tablet Take 1 tablet (15 mg) by mouth At Bedtime 30 tablet 1     multivitamin w/minerals (THERA-VIT-M) tablet Take 1 tablet by mouth daily 100 tablet 3     propranolol ER (INDERAL LA) 60 MG 24 hr capsule Take 1 capsule (60 mg) by mouth daily 90 capsule 3     risperiDONE (RISPERDAL) 2 MG tablet  Take 1 tablet (2 mg) by mouth At Bedtime 30 tablet 1     traZODone (DESYREL) 50 MG tablet Take 1-2 tablets ( mg) by mouth nightly as needed for sleep 60 tablet 1      VITALS   There were no vitals taken for this visit.     Pulse Readings from Last 3 Encounters:   11/14/22 103   10/07/22 105   09/28/22 90     Wt Readings from Last 3 Encounters:   11/14/22 90.7 kg (200 lb)   10/07/22 91.2 kg (201 lb)   09/28/22 90.5 kg (199 lb 9.6 oz)     BP Readings from Last 3 Encounters:   11/14/22 (!) 148/94   10/07/22 132/87   09/28/22 129/85      MENTAL STATUS EXAM     Alertness: slow to respond  Appearance: N/A (phone visit)  Behavior/Demeanor: passive, with N/A (phone visit) eye contact, suspect culturally appropriate  Speech: Poverty of speech  Language: [place] speaking, prefers to let cousin speak for her  Psychomotor: normal or unremarkable  Mood: The same  Affect: N/A (phone visit)  Thought Process/Associations: unremarkable  Thought Content:  Reports none;  Denies suicidal ideation and violent ideation  Perception:  Reports auditory hallucinations and visual hallucinations;  Denies none  Insight: limited  Judgment: adequate for safety  Cognition: does  appear grossly intact; formal cognitive testing was not done  Gait and Station: N/A (telehealth)     LABS and DATA     PHQ 12/3/2018 9/3/2019 4/28/2020   PHQ-9 Total Score 10 6 11   Q9: Thoughts of better off dead/self-harm past 2 weeks Not at all Not at all Not at all       Recent Labs   Lab Test 02/23/22  1022 07/28/21  1114   CR 0.80 0.71   GFRESTIMATED >90 >90     Recent Labs   Lab Test 02/23/22  1023 02/23/22  1022 07/28/21  1114   GLC  --  112* 151*   A1C 5.8*  --  5.9*     Recent Labs   Lab Test 02/23/22 1022 07/28/21  1114   CHOL 205* 212*   TRIG 184* 154*   * 135*   HDL 40* 46*     Recent Labs   Lab Test 02/23/22 1022 06/26/20  1042   AST 26 23.8   ALT 25 28.8   ALKPHOS 104 88.5     Recent Labs   Lab Test 02/23/22  1022 07/28/21  1114 02/14/17  1518  04/11/16  1507 03/31/16  1534 01/31/16  1401   WBC 5.6 6.3  --  7.0  --  6.4   ANEU  --   --   --  3.0  --  2.4   HGB 13.7 12.8   < > 12.1   < > 14.0    214  --  282  --  301    < > = values in this interval not displayed.       ECG 7/26/21 QTc = 413ms     PSYCHOTROPIC DRUG INTERACTIONS                                                       PSYCHCLINICDDI   ARIPIPRAZOLE, RISPERIDONE, propranolol & AMLODIPINE- Blood Pressure Lowering Agents may enhance the hypotensive effect of Antipsychotic Agents (Second Generation [Atypical]).   ARIPIPRAZOLE, MIRTAZAPINE, & TRAZODONE- Serotonergic Agents (High Risk) may enhance the adverse/toxic effect of Antipsychotic Agents. Specifically, serotonergic agents may enhance dopamine blockade, possibly increasing the risk for neuroleptic malignant syndrome. Antipsychotic Agents may enhance the serotonergic effect of Serotonergic Agents (High Risk). This could result in serotonin syndrome.    CETIRIZINE, MIRTAZAPINE, RISPERIDONE and DIPHENHYDRAMINE may result in increased risk of CNS depression.      MANAGEMENT:  Monitoring for adverse effects     RISK STATEMENT for SAFETY     Carmen did not appear to be an imminent safety risk to self or others.    TREATMENT RISK STATEMENT: The risks, benefits, alternatives and potential adverse effects have been discussed and are understood by the pt. The pt understands the risks of using street drugs or alcohol. There are no medical contraindications, the pt agrees to treatment with the ability to do so. The pt knows to call the clinic for any problems or to access emergency care if needed.  Medical and substance use concerns are documented above.  Psychotropic drug interaction check was done, including changes made today.     PROVIDER: Roger Camacho MD    Patient staffed in clinic with Dr. Zamora who will sign the note.  Supervisor is Dr. Dangelo.       MEDICAL DECISION MAKING        (SmartPhrase .[id number])     - At least 1 chronic  problem that is not stable    - Engaged in prescription drug management during visit (discussed any medication benefits, side effects, alternatives, etc.)

## 2025-04-08 ENCOUNTER — ANCILLARY PROCEDURE (OUTPATIENT)
Dept: GENERAL RADIOLOGY | Facility: CLINIC | Age: 45
End: 2025-04-08
Attending: FAMILY MEDICINE
Payer: COMMERCIAL

## 2025-04-08 DIAGNOSIS — S69.91XS RIGHT WRIST INJURY, SEQUELA: ICD-10-CM

## 2025-04-08 PROCEDURE — 73110 X-RAY EXAM OF WRIST: CPT | Mod: RT | Performed by: RADIOLOGY

## 2025-04-10 ENCOUNTER — OFFICE VISIT (OUTPATIENT)
Dept: PSYCHIATRY | Facility: CLINIC | Age: 45
End: 2025-04-10
Attending: STUDENT IN AN ORGANIZED HEALTH CARE EDUCATION/TRAINING PROGRAM
Payer: COMMERCIAL

## 2025-04-10 VITALS
BODY MASS INDEX: 33.48 KG/M2 | SYSTOLIC BLOOD PRESSURE: 146 MMHG | WEIGHT: 189 LBS | DIASTOLIC BLOOD PRESSURE: 89 MMHG | HEART RATE: 86 BPM

## 2025-04-10 DIAGNOSIS — F43.10 PTSD (POST-TRAUMATIC STRESS DISORDER): ICD-10-CM

## 2025-04-10 DIAGNOSIS — G47.00 PERSISTENT DISORDER OF INITIATING OR MAINTAINING SLEEP: Primary | ICD-10-CM

## 2025-04-10 DIAGNOSIS — F25.1 SCHIZOAFFECTIVE DISORDER, DEPRESSIVE TYPE (H): Primary | ICD-10-CM

## 2025-04-10 PROCEDURE — G0463 HOSPITAL OUTPT CLINIC VISIT: HCPCS

## 2025-04-10 PROCEDURE — 1125F AMNT PAIN NOTED PAIN PRSNT: CPT

## 2025-04-10 PROCEDURE — 3079F DIAST BP 80-89 MM HG: CPT

## 2025-04-10 PROCEDURE — 3077F SYST BP >= 140 MM HG: CPT

## 2025-04-10 PROCEDURE — 99214 OFFICE O/P EST MOD 30 MIN: CPT | Mod: GC

## 2025-04-10 PROCEDURE — T1013 SIGN LANG/ORAL INTERPRETER: HCPCS | Mod: U3

## 2025-04-10 RX ORDER — TRAZODONE HYDROCHLORIDE 50 MG/1
100 TABLET ORAL AT BEDTIME
Qty: 60 TABLET | Refills: 2 | Status: SHIPPED | OUTPATIENT
Start: 2025-04-10

## 2025-04-10 RX ORDER — MIRTAZAPINE 30 MG/1
30 TABLET, FILM COATED ORAL AT BEDTIME
Qty: 30 TABLET | Refills: 1 | Status: SHIPPED | OUTPATIENT
Start: 2025-04-10

## 2025-04-10 RX ORDER — PRAZOSIN HYDROCHLORIDE 2 MG/1
4 CAPSULE ORAL AT BEDTIME
Qty: 60 CAPSULE | Refills: 2 | Status: SHIPPED | OUTPATIENT
Start: 2025-04-10

## 2025-04-10 RX ORDER — RISPERIDONE 2 MG/1
2 TABLET ORAL AT BEDTIME
Qty: 30 TABLET | Refills: 2 | Status: SHIPPED | OUTPATIENT
Start: 2025-04-10

## 2025-04-10 RX ORDER — MIRTAZAPINE 15 MG/1
30 TABLET, FILM COATED ORAL AT BEDTIME
Qty: 60 TABLET | Refills: 2 | Status: CANCELLED | OUTPATIENT
Start: 2025-04-10

## 2025-04-10 RX ORDER — OLANZAPINE AND SAMIDORPHAN L-MALATE 5; 10 MG/1; MG/1
1 TABLET, FILM COATED ORAL AT BEDTIME
Qty: 30 TABLET | Refills: 1 | Status: CANCELLED | OUTPATIENT
Start: 2025-04-10

## 2025-04-10 ASSESSMENT — PATIENT HEALTH QUESTIONNAIRE - PHQ9
10. IF YOU CHECKED OFF ANY PROBLEMS, HOW DIFFICULT HAVE THESE PROBLEMS MADE IT FOR YOU TO DO YOUR WORK, TAKE CARE OF THINGS AT HOME, OR GET ALONG WITH OTHER PEOPLE: VERY DIFFICULT
SUM OF ALL RESPONSES TO PHQ QUESTIONS 1-9: 11
SUM OF ALL RESPONSES TO PHQ QUESTIONS 1-9: 11

## 2025-04-10 ASSESSMENT — PAIN SCALES - GENERAL: PAINLEVEL_OUTOF10: SEVERE PAIN (10)
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
## 2025-04-10 NOTE — PATIENT INSTRUCTIONS
No changes today. The Lybalvi should be available for pickup.   -Please continue current medications at the same  dose and start the Lybalvi 5-10mg tablet at bedtime.     Other:   Try to drink more water.   Please let us know if you are feeling more lightheaded.     Follow Up: 4 weeks.

## 2025-04-10 NOTE — NURSING NOTE
Chief Complaint   Patient presents with    Recheck Medication     Schizoaffective disorder, depressive type      Pt has In person  with for today's appt.    - Randolph Alexander, Visit Facilitator

## 2025-05-02 ENCOUNTER — APPOINTMENT (OUTPATIENT)
Dept: CT IMAGING | Facility: CLINIC | Age: 45
End: 2025-05-02
Attending: STUDENT IN AN ORGANIZED HEALTH CARE EDUCATION/TRAINING PROGRAM
Payer: COMMERCIAL

## 2025-05-02 ENCOUNTER — APPOINTMENT (OUTPATIENT)
Dept: GENERAL RADIOLOGY | Facility: CLINIC | Age: 45
End: 2025-05-02
Attending: STUDENT IN AN ORGANIZED HEALTH CARE EDUCATION/TRAINING PROGRAM
Payer: COMMERCIAL

## 2025-05-02 ENCOUNTER — HOSPITAL ENCOUNTER (OUTPATIENT)
Facility: CLINIC | Age: 45
Setting detail: OBSERVATION
Discharge: HOME | End: 2025-05-03
Attending: STUDENT IN AN ORGANIZED HEALTH CARE EDUCATION/TRAINING PROGRAM | Admitting: STUDENT IN AN ORGANIZED HEALTH CARE EDUCATION/TRAINING PROGRAM
Payer: COMMERCIAL

## 2025-05-02 DIAGNOSIS — R00.2 PALPITATIONS: ICD-10-CM

## 2025-05-02 DIAGNOSIS — R79.89 ELEVATED SERUM CREATININE: ICD-10-CM

## 2025-05-02 DIAGNOSIS — H81.13 BENIGN PAROXYSMAL POSITIONAL VERTIGO, BILATERAL: Primary | ICD-10-CM

## 2025-05-02 DIAGNOSIS — H53.8 BLURRED VISION: ICD-10-CM

## 2025-05-02 DIAGNOSIS — R79.89 ELEVATED TROPONIN: ICD-10-CM

## 2025-05-02 DIAGNOSIS — R07.9 RIGHT-SIDED CHEST PAIN: ICD-10-CM

## 2025-05-02 LAB
ALBUMIN SERPL BCG-MCNC: 4.1 G/DL (ref 3.5–5.2)
ALBUMIN UR-MCNC: 20 MG/DL
ALP SERPL-CCNC: 99 U/L (ref 40–150)
ALT SERPL W P-5'-P-CCNC: 26 U/L (ref 0–50)
ANION GAP SERPL CALCULATED.3IONS-SCNC: 15 MMOL/L (ref 7–15)
APPEARANCE UR: CLEAR
AST SERPL W P-5'-P-CCNC: 37 U/L (ref 0–45)
BASOPHILS # BLD AUTO: 0 10E3/UL (ref 0–0.2)
BASOPHILS NFR BLD AUTO: 0 %
BILIRUB SERPL-MCNC: 0.2 MG/DL
BILIRUB UR QL STRIP: NEGATIVE
BUN SERPL-MCNC: 14.8 MG/DL (ref 6–20)
CALCIUM SERPL-MCNC: 9.4 MG/DL (ref 8.8–10.4)
CHLORIDE SERPL-SCNC: 96 MMOL/L (ref 98–107)
COLOR UR AUTO: ABNORMAL
CREAT SERPL-MCNC: 0.99 MG/DL (ref 0.51–0.95)
D DIMER PPP FEU-MCNC: 0.51 UG/ML FEU (ref 0–0.5)
EGFRCR SERPLBLD CKD-EPI 2021: 70 ML/MIN/1.73M2
EOSINOPHIL # BLD AUTO: 0.1 10E3/UL (ref 0–0.7)
EOSINOPHIL NFR BLD AUTO: 1 %
ERYTHROCYTE [DISTWIDTH] IN BLOOD BY AUTOMATED COUNT: 12.6 % (ref 10–15)
FLUAV RNA SPEC QL NAA+PROBE: NEGATIVE
FLUBV RNA RESP QL NAA+PROBE: NEGATIVE
GLUCOSE SERPL-MCNC: 111 MG/DL (ref 70–99)
GLUCOSE UR STRIP-MCNC: NEGATIVE MG/DL
HCG SERPL QL: NEGATIVE
HCG UR QL: NEGATIVE
HCO3 SERPL-SCNC: 25 MMOL/L (ref 22–29)
HCT VFR BLD AUTO: 41.7 % (ref 35–47)
HGB BLD-MCNC: 13.9 G/DL (ref 11.7–15.7)
HGB UR QL STRIP: NEGATIVE
HYALINE CASTS: 4 /LPF
IMM GRANULOCYTES # BLD: 0 10E3/UL
IMM GRANULOCYTES NFR BLD: 0 %
INR PPP: 0.95 (ref 0.85–1.15)
INTERNAL QC OK POCT: NORMAL
KETONES UR STRIP-MCNC: NEGATIVE MG/DL
LEUKOCYTE ESTERASE UR QL STRIP: NEGATIVE
LYMPHOCYTES # BLD AUTO: 3.6 10E3/UL (ref 0.8–5.3)
LYMPHOCYTES NFR BLD AUTO: 64 %
MAGNESIUM SERPL-MCNC: 2.4 MG/DL (ref 1.7–2.3)
MCH RBC QN AUTO: 27.5 PG (ref 26.5–33)
MCHC RBC AUTO-ENTMCNC: 33.3 G/DL (ref 31.5–36.5)
MCV RBC AUTO: 83 FL (ref 78–100)
MONOCYTES # BLD AUTO: 0.6 10E3/UL (ref 0–1.3)
MONOCYTES NFR BLD AUTO: 11 %
MUCOUS THREADS #/AREA URNS LPF: PRESENT /LPF
NEUTROPHILS # BLD AUTO: 1.4 10E3/UL (ref 1.6–8.3)
NEUTROPHILS NFR BLD AUTO: 24 %
NITRATE UR QL: NEGATIVE
NRBC # BLD AUTO: 0 10E3/UL
NRBC BLD AUTO-RTO: 0 /100
NT-PROBNP SERPL-MCNC: <36 PG/ML (ref 0–450)
PH UR STRIP: 5.5 [PH] (ref 5–7)
PLATELET # BLD AUTO: 255 10E3/UL (ref 150–450)
POCT KIT EXPIRATION DATE: NORMAL
POCT KIT LOT NUMBER: NORMAL
POTASSIUM SERPL-SCNC: 3.8 MMOL/L (ref 3.4–5.3)
PROT SERPL-MCNC: 9.1 G/DL (ref 6.4–8.3)
PROTHROMBIN TIME: 13.2 SECONDS (ref 11.8–14.8)
RBC # BLD AUTO: 5.05 10E6/UL (ref 3.8–5.2)
RBC URINE: <1 /HPF
RSV RNA SPEC NAA+PROBE: NEGATIVE
SARS-COV-2 RNA RESP QL NAA+PROBE: NEGATIVE
SODIUM SERPL-SCNC: 136 MMOL/L (ref 135–145)
SP GR UR STRIP: 1.01 (ref 1–1.03)
SQUAMOUS EPITHELIAL: 3 /HPF
TROPONIN T SERPL HS-MCNC: 28 NG/L
TROPONIN T SERPL HS-MCNC: 29 NG/L
TROPONIN T SERPL HS-MCNC: 34 NG/L
TSH SERPL DL<=0.005 MIU/L-ACNC: 1.52 UIU/ML (ref 0.3–4.2)
UROBILINOGEN UR STRIP-MCNC: NORMAL MG/DL
WBC # BLD AUTO: 5.7 10E3/UL (ref 4–11)
WBC URINE: <1 /HPF

## 2025-05-02 PROCEDURE — 93010 ELECTROCARDIOGRAM REPORT: CPT | Performed by: STUDENT IN AN ORGANIZED HEALTH CARE EDUCATION/TRAINING PROGRAM

## 2025-05-02 PROCEDURE — 84484 ASSAY OF TROPONIN QUANT: CPT | Performed by: STUDENT IN AN ORGANIZED HEALTH CARE EDUCATION/TRAINING PROGRAM

## 2025-05-02 PROCEDURE — 84443 ASSAY THYROID STIM HORMONE: CPT | Performed by: STUDENT IN AN ORGANIZED HEALTH CARE EDUCATION/TRAINING PROGRAM

## 2025-05-02 PROCEDURE — G0378 HOSPITAL OBSERVATION PER HR: HCPCS

## 2025-05-02 PROCEDURE — 250N000013 HC RX MED GY IP 250 OP 250 PS 637: Performed by: STUDENT IN AN ORGANIZED HEALTH CARE EDUCATION/TRAINING PROGRAM

## 2025-05-02 PROCEDURE — 258N000003 HC RX IP 258 OP 636: Performed by: STUDENT IN AN ORGANIZED HEALTH CARE EDUCATION/TRAINING PROGRAM

## 2025-05-02 PROCEDURE — 83880 ASSAY OF NATRIURETIC PEPTIDE: CPT | Performed by: STUDENT IN AN ORGANIZED HEALTH CARE EDUCATION/TRAINING PROGRAM

## 2025-05-02 PROCEDURE — 85610 PROTHROMBIN TIME: CPT | Performed by: STUDENT IN AN ORGANIZED HEALTH CARE EDUCATION/TRAINING PROGRAM

## 2025-05-02 PROCEDURE — 80053 COMPREHEN METABOLIC PANEL: CPT | Performed by: STUDENT IN AN ORGANIZED HEALTH CARE EDUCATION/TRAINING PROGRAM

## 2025-05-02 PROCEDURE — 85379 FIBRIN DEGRADATION QUANT: CPT | Performed by: STUDENT IN AN ORGANIZED HEALTH CARE EDUCATION/TRAINING PROGRAM

## 2025-05-02 PROCEDURE — 81025 URINE PREGNANCY TEST: CPT | Performed by: STUDENT IN AN ORGANIZED HEALTH CARE EDUCATION/TRAINING PROGRAM

## 2025-05-02 PROCEDURE — 84703 CHORIONIC GONADOTROPIN ASSAY: CPT | Performed by: STUDENT IN AN ORGANIZED HEALTH CARE EDUCATION/TRAINING PROGRAM

## 2025-05-02 PROCEDURE — 87637 SARSCOV2&INF A&B&RSV AMP PRB: CPT | Performed by: STUDENT IN AN ORGANIZED HEALTH CARE EDUCATION/TRAINING PROGRAM

## 2025-05-02 PROCEDURE — 250N000011 HC RX IP 250 OP 636: Performed by: STUDENT IN AN ORGANIZED HEALTH CARE EDUCATION/TRAINING PROGRAM

## 2025-05-02 PROCEDURE — 99285 EMERGENCY DEPT VISIT HI MDM: CPT | Mod: 25

## 2025-05-02 PROCEDURE — 93005 ELECTROCARDIOGRAM TRACING: CPT

## 2025-05-02 PROCEDURE — 99285 EMERGENCY DEPT VISIT HI MDM: CPT | Performed by: STUDENT IN AN ORGANIZED HEALTH CARE EDUCATION/TRAINING PROGRAM

## 2025-05-02 PROCEDURE — 71046 X-RAY EXAM CHEST 2 VIEWS: CPT

## 2025-05-02 PROCEDURE — 250N000009 HC RX 250: Performed by: STUDENT IN AN ORGANIZED HEALTH CARE EDUCATION/TRAINING PROGRAM

## 2025-05-02 PROCEDURE — 36415 COLL VENOUS BLD VENIPUNCTURE: CPT | Performed by: STUDENT IN AN ORGANIZED HEALTH CARE EDUCATION/TRAINING PROGRAM

## 2025-05-02 PROCEDURE — 96361 HYDRATE IV INFUSION ADD-ON: CPT

## 2025-05-02 PROCEDURE — 96360 HYDRATION IV INFUSION INIT: CPT

## 2025-05-02 PROCEDURE — 83735 ASSAY OF MAGNESIUM: CPT | Performed by: STUDENT IN AN ORGANIZED HEALTH CARE EDUCATION/TRAINING PROGRAM

## 2025-05-02 PROCEDURE — 81001 URINALYSIS AUTO W/SCOPE: CPT | Performed by: STUDENT IN AN ORGANIZED HEALTH CARE EDUCATION/TRAINING PROGRAM

## 2025-05-02 PROCEDURE — 85004 AUTOMATED DIFF WBC COUNT: CPT | Performed by: STUDENT IN AN ORGANIZED HEALTH CARE EDUCATION/TRAINING PROGRAM

## 2025-05-02 PROCEDURE — 71275 CT ANGIOGRAPHY CHEST: CPT

## 2025-05-02 RX ORDER — IBUPROFEN 600 MG/1
600 TABLET, FILM COATED ORAL EVERY 6 HOURS PRN
Status: DISCONTINUED | OUTPATIENT
Start: 2025-05-02 | End: 2025-05-02

## 2025-05-02 RX ORDER — IOPAMIDOL 755 MG/ML
100 INJECTION, SOLUTION INTRAVASCULAR ONCE
Status: COMPLETED | OUTPATIENT
Start: 2025-05-02 | End: 2025-05-02

## 2025-05-02 RX ORDER — AMOXICILLIN 250 MG
1 CAPSULE ORAL 2 TIMES DAILY PRN
Status: DISCONTINUED | OUTPATIENT
Start: 2025-05-02 | End: 2025-05-02

## 2025-05-02 RX ORDER — BISACODYL 10 MG
10 SUPPOSITORY, RECTAL RECTAL DAILY PRN
Status: DISCONTINUED | OUTPATIENT
Start: 2025-05-02 | End: 2025-05-02

## 2025-05-02 RX ORDER — ACETAMINOPHEN 650 MG/1
650 SUPPOSITORY RECTAL EVERY 4 HOURS PRN
Status: DISCONTINUED | OUTPATIENT
Start: 2025-05-02 | End: 2025-05-02

## 2025-05-02 RX ORDER — ACETAMINOPHEN 500 MG
1000 TABLET ORAL ONCE
Status: COMPLETED | OUTPATIENT
Start: 2025-05-02 | End: 2025-05-02

## 2025-05-02 RX ORDER — POLYETHYLENE GLYCOL 3350 17 G/17G
17 POWDER, FOR SOLUTION ORAL 2 TIMES DAILY PRN
Status: DISCONTINUED | OUTPATIENT
Start: 2025-05-02 | End: 2025-05-02

## 2025-05-02 RX ORDER — AMOXICILLIN 250 MG
2 CAPSULE ORAL 2 TIMES DAILY PRN
Status: DISCONTINUED | OUTPATIENT
Start: 2025-05-02 | End: 2025-05-02

## 2025-05-02 RX ORDER — SODIUM CHLORIDE, SODIUM LACTATE, POTASSIUM CHLORIDE, CALCIUM CHLORIDE 600; 310; 30; 20 MG/100ML; MG/100ML; MG/100ML; MG/100ML
INJECTION, SOLUTION INTRAVENOUS CONTINUOUS
Status: DISCONTINUED | OUTPATIENT
Start: 2025-05-02 | End: 2025-05-03 | Stop reason: HOSPADM

## 2025-05-02 RX ORDER — ACETAMINOPHEN 325 MG/1
650 TABLET ORAL EVERY 4 HOURS PRN
Status: DISCONTINUED | OUTPATIENT
Start: 2025-05-02 | End: 2025-05-02

## 2025-05-02 RX ADMIN — IOPAMIDOL 128 ML: 755 INJECTION, SOLUTION INTRAVENOUS at 18:21

## 2025-05-02 RX ADMIN — SODIUM CHLORIDE, SODIUM LACTATE, POTASSIUM CHLORIDE, AND CALCIUM CHLORIDE 1000 ML: .6; .31; .03; .02 INJECTION, SOLUTION INTRAVENOUS at 16:19

## 2025-05-02 RX ADMIN — SODIUM CHLORIDE 177.3 ML: 9 INJECTION, SOLUTION INTRAVENOUS at 18:21

## 2025-05-02 RX ADMIN — SODIUM CHLORIDE, SODIUM LACTATE, POTASSIUM CHLORIDE, AND CALCIUM CHLORIDE: .6; .31; .03; .02 INJECTION, SOLUTION INTRAVENOUS at 17:00

## 2025-05-02 RX ADMIN — ACETAMINOPHEN 1000 MG: 500 TABLET ORAL at 16:17

## 2025-05-02 ASSESSMENT — COLUMBIA-SUICIDE SEVERITY RATING SCALE - C-SSRS
2. HAVE YOU ACTUALLY HAD ANY THOUGHTS OF KILLING YOURSELF IN THE PAST MONTH?: NO
1. IN THE PAST MONTH, HAVE YOU WISHED YOU WERE DEAD OR WISHED YOU COULD GO TO SLEEP AND NOT WAKE UP?: NO
6. HAVE YOU EVER DONE ANYTHING, STARTED TO DO ANYTHING, OR PREPARED TO DO ANYTHING TO END YOUR LIFE?: NO

## 2025-05-02 ASSESSMENT — ACTIVITIES OF DAILY LIVING (ADL)
ADLS_ACUITY_SCORE: 41

## 2025-05-02 NOTE — ED TRIAGE NOTES
Pt presents with dizziness and chest pain. Pt state that her chest hurts really bad. Pt also feels dizzy and is having difficulty seeing. Pt reports feeling weak as well.   Triage Assessment (Adult)       Row Name 05/02/25 1507          Triage Assessment    Airway WDL WDL        Respiratory WDL    Respiratory WDL WDL        Skin Circulation/Temperature WDL    Skin Circulation/Temperature WDL WDL        Cardiac WDL    Cardiac WDL X;chest pain        Chest Pain Assessment    Chest Pain Location midsternal     Precipitating Factors nothing     Alleviating Factors nothing     Associated Signs/Symptoms dizziness        Peripheral/Neurovascular WDL    Peripheral Neurovascular WDL WDL        Cognitive/Neuro/Behavioral WDL    Cognitive/Neuro/Behavioral WDL all     Level of Consciousness alert     Arousal Level opens eyes spontaneously     Speech spontaneous     Mood/Behavior cooperative;calm        Pupils (CN II)    Pupil PERRLA yes     Pupil Size Left 3 mm     Pupil Size Right 3 mm        Bev Coma Scale    Best Motor Response 6-->(M6) obeys commands     Best Verbal Response 5-->(V5) oriented     Assessment Qualifiers patient not sedated/intubated        Motor Response    Motor Response general motor response     General Motor Response purposeful motor response     Left Motor Response purposeful motor response     Right Motor Response purposeful motor response     LUE Motor Response purposeful motor response     RUE Motor Response purposeful motor response     LLE Motor Response purposeful motor response     RLE Motor Response purposeful motor response

## 2025-05-02 NOTE — ED PROVIDER NOTES
ED Provider Note  [facility]      History     Chief Complaint   Patient presents with    Chest Pain    Palpitations     HPI  Carmen Connolly is a 45 year old female with a history of HTN who presents for evaluation of palpitations and blurriness.  The patient reported that earlier in the week she was feeling ill with fevers and chills with decreased appetite.  No nausea and vomiting.  She describes blurry vision.  She describes chest pain under the right breast worse with inspiration.  She is not having productive cough.  The pain is not exertional.  She is not having significant dyspnea.  Denying abdominal pain.    Past Medical History  Past Medical History:   Diagnosis Date    Depressive disorder     Vaginal delivery     6 deliveries     No past surgical history on file.  cetirizine (ZYRTEC) 10 MG tablet  cholecalciferol (CVS D3) 50 MCG (2000 UT) CAPS  cyclobenzaprine (FLEXERIL) 5 MG tablet  famotidine (PEPCID) 20 MG tablet  hydrochlorothiazide (HYDRODIURIL) 25 MG tablet  multivitamin w/minerals (THERA-VIT-M) tablet  sennosides (SENOKOT) 8.6 MG tablet  traZODone (DESYREL) 50 MG tablet  acetaminophen (TYLENOL) 325 MG tablet  cholestyramine (QUESTRAN) 4 GM/DOSE powder  CVS SODIUM CHLORIDE 5 % ophthalmic solution  cycloSPORINE (RESTASIS) 0.05 % ophthalmic emulsion  diclofenac (VOLTAREN) 1 % topical gel  fluticasone (FLONASE) 50 MCG/ACT nasal spray  ibuprofen (ADVIL/MOTRIN) 400 MG tablet  Incontinence Supply Disposable (DEPEND UNDERGARMENTS) MISC  latanoprost (XALATAN) 0.005 % ophthalmic solution  melatonin (CVS MELATONIN) 3 MG tablet  mirtazapine (REMERON) 30 MG tablet  OLANZapine-samidorphan (LYBALVI) 5-10 MG tablet  Omega-3 1000 MG capsule  prazosin (MINIPRESS) 2 MG capsule  risperiDONE (RISPERDAL) 2 MG tablet  VITAMIN  B COMPLEX tablet      No Known Allergies  Family History  Family History   Problem Relation Age of Onset    Diabetes No family hx of     Coronary Artery Disease No family  hx of     Hypertension No family hx of     Other Cancer No family hx of     Breast Cancer No family hx of     Colon Cancer No family hx of      Social History   Social History     Tobacco Use    Smoking status: Never    Smokeless tobacco: Never   Vaping Use    Vaping status: Never Used   Substance Use Topics    Alcohol use: Never    Drug use: Never      Past medical history, past surgical history, medications, allergies, family history, and social history were reviewed with the patient. No additional pertinent items.   A medically appropriate review of systems was performed with pertinent positives and negatives noted in the HPI, and all other systems negative.    Physical Exam   BP: 125/79  Pulse: 94  Temp: 98.3  F (36.8  C)  Resp: 18  SpO2: 93 %  Lying Orthostatic BP: 140/94  Lying Orthostatic Pulse: 78 bpm  Sitting Orthostatic BP: 147/101  Sitting Orthostatic Pulse: 92 bpm  Standing Orthostatic BP: 135/92  Standing Orthostatic Pulse: 96 bpm  Physical Exam  Vital Signs Reviewed  Gen: Well nourished, well developed, resting comfortably, no acute distress  HEENT: NC/AT, PERRL, EOMI, MMM  Neck: Supple, FROM  CV: Regular Rate, no murmur/rub/gallop  Lungs/Chest: Normal Effort, scattered coarse lung sounds upper left field otherwise clear to auscultation with good air movement.  Abd: Non-distended, non-tender  MSK/Back: FROM, no visible deformity  Neuro: A&Ox3, GCS 15, CN II-XII unremarkable  Skin: Warm, Dry, Intact, no visible lesions    ED Course, Procedures, & Data      Procedures            EKG Interpretation:      Interpreted by Dequan Abdul MD  Time reviewed: 1515  Symptoms at time of EKG: Chest pain   Rhythm: normal sinus   Rate: Normal  Axis: Normal  Ectopy: none  Conduction: normal  ST Segments/ T Waves: No ST-T wave changes and No acute ischemic changes  Q Waves: none  Comparison to prior: Unchanged    Clinical Impression: normal EKG           Results for orders placed or performed during the hospital  encounter of 05/02/25   XR Chest 2 Views     Status: None    Narrative    EXAM: XR CHEST 2 VIEWS  LOCATION: [facility]  DATE: 5/2/2025    INDICATION: chest pain and palpitations  COMPARISON: PA and lateral views the chest 4/11/2016      Impression    IMPRESSION:     Normal cardiac and mediastinal borders. Normal lung vascularity.    The lungs are symmetrically inflated. There are unchanged lucencies adjacent to the left ventricular heart border consistent with scarring/bronchiectasis in the lingula. Minimal fine linear scarring is present in the apices. No new airspace or   interstitial lung opacities. Diaphragm curvature is preserved. No pleural effusion.   CT Chest Pulmonary Embolism w Contrast     Status: None    Narrative    EXAM: CT CHEST PULMONARY EMBOLISM W CONTRAST  LOCATION: [facility]  DATE: 5/2/2025    INDICATION: Pleuritic chest pain, palpitaitons, + dimer  COMPARISON: Same date chest radiograph, /16  TECHNIQUE: CT chest pulmonary angiogram during arterial phase injection of IV contrast. Multiplanar reformats and MIP reconstructions were performed. Dose reduction techniques were used.   CONTRAST: 64mL Isovue 370    FINDINGS:  ANGIOGRAM CHEST: Pulmonary arteries are normal caliber and negative for pulmonary emboli. Thoracic aorta is negative for dissection. No CT evidence of right heart strain.    LUNGS AND PLEURA: There is some subtle bronchial wall thickening and mucous plugging with some subtle patchy opacities in the left lower lobe. No rebekah lobar consolidation, pleural effusion or pneumothorax. Stable bronchiectasis and volume loss in the   lingula.    MEDIASTINUM/AXILLAE: No suspicious lymphadenopathy. No pericardial effusion.    CORONARY ARTERY CALCIFICATION: None.    UPPER ABDOMEN: Unremarkable.    MUSCULOSKELETAL: No acute bony abnormality.      Impression    IMPRESSION:  1.  Mild bronchial wall  thickening with some mucus plugging and opacities in the left lung base, suggestive of aspiration, bronchitis or other infectious/inflammatory process.  2.  No lobar airspace consolidation or pleural effusion.  3.  No pulmonary embolus.   D dimer quantitative     Status: Abnormal   Result Value Ref Range    D-Dimer Quantitative 0.51 (H) 0.00 - 0.50 ug/mL FEU    Narrative    This D-dimer assay is intended for use in conjunction with a clinical pretest probability assessment model to exclude pulmonary embolism (PE) and deep venous thrombosis (DVT) in outpatients suspected of PE or DVT. The cut-off value is 0.50 ug/mL FEU.   INR     Status: Normal   Result Value Ref Range    INR 0.95 0.85 - 1.15    PT 13.2 11.8 - 14.8 Seconds   Comprehensive metabolic panel     Status: Abnormal   Result Value Ref Range    Sodium 136 135 - 145 mmol/L    Potassium 3.8 3.4 - 5.3 mmol/L    Carbon Dioxide (CO2) 25 22 - 29 mmol/L    Anion Gap 15 7 - 15 mmol/L    Urea Nitrogen 14.8 6.0 - 20.0 mg/dL    Creatinine 0.99 (H) 0.51 - 0.95 mg/dL    GFR Estimate 70 >60 mL/min/1.73m2    Calcium 9.4 8.8 - 10.4 mg/dL    Chloride 96 (L) 98 - 107 mmol/L    Glucose 111 (H) 70 - 99 mg/dL    Alkaline Phosphatase 99 40 - 150 U/L    AST 37 0 - 45 U/L    ALT 26 0 - 50 U/L    Protein Total 9.1 (H) 6.4 - 8.3 g/dL    Albumin 4.1 3.5 - 5.2 g/dL    Bilirubin Total 0.2 <=1.2 mg/dL   Troponin T, High Sensitivity     Status: Abnormal   Result Value Ref Range    Troponin T, High Sensitivity 34 (H) <=14 ng/L   Magnesium     Status: Abnormal   Result Value Ref Range    Magnesium 2.4 (H) 1.7 - 2.3 mg/dL   Nt probnp inpatient (BNP)     Status: Normal   Result Value Ref Range    N terminal Pro BNP Inpatient <36 0 - 450 pg/mL   TSH with free T4 reflex     Status: Normal   Result Value Ref Range    TSH 1.52 0.30 - 4.20 uIU/mL   UA with Microscopic reflex to Culture     Status: Abnormal    Specimen: Urine, Clean Catch   Result Value Ref Range    Color Urine Light Yellow  Colorless, Straw, Light Yellow, Yellow    Appearance Urine Clear Clear    Glucose Urine Negative Negative mg/dL    Bilirubin Urine Negative Negative    Ketones Urine Negative Negative mg/dL    Specific Gravity Urine 1.011 1.003 - 1.035    Blood Urine Negative Negative    pH Urine 5.5 5.0 - 7.0    Protein Albumin Urine 20 (A) Negative mg/dL    Urobilinogen Urine Normal Normal mg/dL    Nitrite Urine Negative Negative    Leukocyte Esterase Urine Negative Negative    Mucus Urine Present (A) None Seen /LPF    RBC Urine <1 <=2 /HPF    WBC Urine <1 <=5 /HPF    Squamous Epithelials Urine 3 (H) <=1 /HPF    Hyaline Casts Urine 4 (H) <=2 /LPF    Narrative    Urine Culture not indicated   HCG qualitative Blood     Status: Normal   Result Value Ref Range    hCG Serum Qualitative Negative Negative   CBC with platelets and differential     Status: Abnormal   Result Value Ref Range    WBC Count 5.7 4.0 - 11.0 10e3/uL    RBC Count 5.05 3.80 - 5.20 10e6/uL    Hemoglobin 13.9 11.7 - 15.7 g/dL    Hematocrit 41.7 35.0 - 47.0 %    MCV 83 78 - 100 fL    MCH 27.5 26.5 - 33.0 pg    MCHC 33.3 31.5 - 36.5 g/dL    RDW 12.6 10.0 - 15.0 %    Platelet Count 255 150 - 450 10e3/uL    % Neutrophils 24 %    % Lymphocytes 64 %    % Monocytes 11 %    % Eosinophils 1 %    % Basophils 0 %    % Immature Granulocytes 0 %    NRBCs per 100 WBC 0 <1 /100    Absolute Neutrophils 1.4 (L) 1.6 - 8.3 10e3/uL    Absolute Lymphocytes 3.6 0.8 - 5.3 10e3/uL    Absolute Monocytes 0.6 0.0 - 1.3 10e3/uL    Absolute Eosinophils 0.1 0.0 - 0.7 10e3/uL    Absolute Basophils 0.0 0.0 - 0.2 10e3/uL    Absolute Immature Granulocytes 0.0 <=0.4 10e3/uL    Absolute NRBCs 0.0 10e3/uL   Influenza A/B, RSV and SARS-CoV2 PCR (COVID-19) Nasopharyngeal     Status: Normal    Specimen: Nasopharyngeal; Swab   Result Value Ref Range    Influenza A PCR Negative Negative    Influenza B PCR Negative Negative    RSV PCR Negative Negative    SARS CoV2 PCR Negative Negative    Narrative    Testing  was performed using the Xpert Xpress CoV2/Flu/RSV Assay on the [employer] GeneXpert Instrument. This test should be ordered for the detection of SARS-CoV2, influenza, and RSV viruses in individuals with signs and symptoms of respiratory tract infection. This test is for in vitro diagnostic use under the [place] FDA for laboratories certified under CLIA to perform high or moderate complexity testing. This test has been [place] FDA cleared. A negative result does not rule out the presence of PCR inhibitors in the specimen or target RNA in concentration below the limit of detection for the assay. If only one viral target is positive but coinfection with multiple targets is suspected, the sample should be re-tested with another FDA cleared, approved, or authorized test, if coninfection would change clinical management. This test was validated by the [facility]. These laboratories are certified under the Clinical Laboratory Improvement Amendments of 1988 (CLIA-88) as qualified to perfom high complexity laboratory testing.   Troponin T, High Sensitivity     Status: Abnormal   Result Value Ref Range    Troponin T, High Sensitivity 29 (H) <=14 ng/L   Troponin T, High Sensitivity     Status: Abnormal   Result Value Ref Range    Troponin T, High Sensitivity 28 (H) <=14 ng/L   EKG 12-lead, tracing only     Status: None (Preliminary result)   Result Value Ref Range    Systolic Blood Pressure  mmHg    Diastolic Blood Pressure  mmHg    Ventricular Rate 90 BPM    Atrial Rate 90 BPM    WV Interval 118 ms    QRS Duration 86 ms     ms    QTc 413 ms    P Axis 55 degrees    R AXIS -10 degrees    T Axis 42 degrees    Interpretation ECG       Sinus rhythm  Nonspecific T wave abnormality  Abnormal ECG     hCG qual urine POCT     Status: Normal   Result Value Ref Range    HCG Qual Urine Negative Negative    Internal QC Check POCT Valid Valid    POCT Kit Lot Number [id number]     POCT Kit Expiration Date 11/07/2026    CBC with  platelets differential     Status: Abnormal    Narrative    The following orders were created for panel order CBC with platelets differential.  Procedure                               Abnormality         Status                     ---------                               -----------         ------                     CBC with platelets and ...[[id number]]  Abnormal            Final result                 Please view results for these tests on the individual orders.     Medications   lactated ringers infusion (0 mLs Intravenous Stopped 5/2/25 1815)   senna-docusate (SENOKOT-S/PERICOLACE) 8.6-50 MG per tablet 1 tablet (has no administration in time range)     Or   senna-docusate (SENOKOT-S/PERICOLACE) 8.6-50 MG per tablet 2 tablet (has no administration in time range)   acetaminophen (TYLENOL) tablet 650 mg (has no administration in time range)     Or   acetaminophen (TYLENOL) Suppository 650 mg (has no administration in time range)   ibuprofen (ADVIL/MOTRIN) tablet 600 mg (has no administration in time range)   polyethylene glycol (MIRALAX) Packet 17 g (has no administration in time range)   bisacodyl (DULCOLAX) suppository 10 mg (has no administration in time range)   lactated ringers BOLUS 1,000 mL (0 mLs Intravenous Stopped 5/2/25 1657)   acetaminophen (TYLENOL) tablet 1,000 mg (1,000 mg Oral $Given 5/2/25 1617)   iopamidol (ISOVUE-370) solution 100 mL (128 mLs Intravenous $Given 5/2/25 1821)   sodium chloride 0.9 % bag for CT scan flush (177.3 mLs Intravenous $Given 5/2/25 1821)     Labs Ordered and Resulted from Time of ED Arrival to Time of ED Departure   D DIMER QUANTITATIVE - Abnormal       Result Value    D-Dimer Quantitative 0.51 (*)    COMPREHENSIVE METABOLIC PANEL - Abnormal    Sodium 136      Potassium 3.8      Carbon Dioxide (CO2) 25      Anion Gap 15      Urea Nitrogen 14.8      Creatinine 0.99 (*)     GFR Estimate 70      Calcium 9.4      Chloride 96 (*)     Glucose 111 (*)     Alkaline Phosphatase 99       AST 37      ALT 26      Protein Total 9.1 (*)     Albumin 4.1      Bilirubin Total 0.2     TROPONIN T, HIGH SENSITIVITY - Abnormal    Troponin T, High Sensitivity 34 (*)    MAGNESIUM - Abnormal    Magnesium 2.4 (*)    ROUTINE UA WITH MICROSCOPIC REFLEX TO CULTURE - Abnormal    Color Urine Light Yellow      Appearance Urine Clear      Glucose Urine Negative      Bilirubin Urine Negative      Ketones Urine Negative      Specific Gravity Urine 1.011      Blood Urine Negative      pH Urine 5.5      Protein Albumin Urine 20 (*)     Urobilinogen Urine Normal      Nitrite Urine Negative      Leukocyte Esterase Urine Negative      Mucus Urine Present (*)     RBC Urine <1      WBC Urine <1      Squamous Epithelials Urine 3 (*)     Hyaline Casts Urine 4 (*)    CBC WITH PLATELETS AND DIFFERENTIAL - Abnormal    WBC Count 5.7      RBC Count 5.05      Hemoglobin 13.9      Hematocrit 41.7      MCV 83      MCH 27.5      MCHC 33.3      RDW 12.6      Platelet Count 255      % Neutrophils 24      % Lymphocytes 64      % Monocytes 11      % Eosinophils 1      % Basophils 0      % Immature Granulocytes 0      NRBCs per 100 WBC 0      Absolute Neutrophils 1.4 (*)     Absolute Lymphocytes 3.6      Absolute Monocytes 0.6      Absolute Eosinophils 0.1      Absolute Basophils 0.0      Absolute Immature Granulocytes 0.0      Absolute NRBCs 0.0     TROPONIN T, HIGH SENSITIVITY - Abnormal    Troponin T, High Sensitivity 29 (*)    TROPONIN T, HIGH SENSITIVITY - Abnormal    Troponin T, High Sensitivity 28 (*)    INR - Normal    INR 0.95      PT 13.2     NT PROBNP INPATIENT - Normal    N terminal Pro BNP Inpatient <36     TSH WITH FREE T4 REFLEX - Normal    TSH 1.52     HCG QUALITATIVE PREGNANCY - Normal    hCG Serum Qualitative Negative     INFLUENZA A/B, RSV AND SARS-COV2 PCR - Normal    Influenza A PCR Negative      Influenza B PCR Negative      RSV PCR Negative      SARS CoV2 PCR Negative     HCG QUALITATIVE URINE POCT - Normal    HCG Qual  Urine Negative      Internal QC Check POCT Valid      POCT Kit Lot Number [id number]      POCT Kit Expiration Date 11/07/2026     TROPONIN T, HIGH SENSITIVITY     CT Chest Pulmonary Embolism w Contrast   Final Result   IMPRESSION:   1.  Mild bronchial wall thickening with some mucus plugging and opacities in the left lung base, suggestive of aspiration, bronchitis or other infectious/inflammatory process.   2.  No lobar airspace consolidation or pleural effusion.   3.  No pulmonary embolus.      XR Chest 2 Views   Final Result   IMPRESSION:       Normal cardiac and mediastinal borders. Normal lung vascularity.      The lungs are symmetrically inflated. There are unchanged lucencies adjacent to the left ventricular heart border consistent with scarring/bronchiectasis in the lingula. Minimal fine linear scarring is present in the apices. No new airspace or    interstitial lung opacities. Diaphragm curvature is preserved. No pleural effusion.      Echo Complete    (Results Pending)          Critical care was not performed.     Medical Decision Making  The patient's presentation was of high complexity (an acute health issue posing potential threat to life or bodily function).    The patient's evaluation involved:  ordering and/or review of 3+ test(s) in this encounter (see separate area of note for details)  discussion of management or test interpretation with another health professional (Medicine)    The patient's management necessitated high risk (a decision regarding hospitalization).    Assessment & Plan    Carmen Connolly is a 45 year old female with a history of HTN who presents for evaluation of palpitations and blurriness.  The patient is afebrile with reassuring vital signs.  Her examination is notable for some coarse lung sounds in left upper fields with good air movement.  EKG is reassuring with a normal sinus rhythm and no significant ischemia appreciated.  Broad biochemical workup initiated including evaluation  "for fluid overload pulmonary embolism myocardial injury.  Will also obtain chest x-ray.  Give some Tylenol and IV fluids.  Patient is asking if her son can bring some food which is reassuring regarding her lack of appetite.  Disposition pending workup.    Patient remained stable in the ED.  Labs were notable for elevated creatinine to 0.99, normal in the 0.7 range.  CBC is overall reassuring.  Initial troponin elevated to 34, downtrending to 29 on repeat.  D-dimer slightly elevated.  BNP within normal limits.  Chest x-ray without significant acute pathology.    Due to elevated D-dimer and troponin elevation a CT scan was obtained.  Initial CT scan with extravasation of contrast.  No sign of compartment syndrome or severe complications in the left arm.  Repeat scan successful and diagnostic.  There is no pulmonary embolus.  No signs of pneumonia.  There is some bronchial wall thickening possible bronchitis or inflammatory change.  Without leukocytosis or fever think we can defer antibiotics for now.    Discussed observation admission with plan for echocardiogram in the morning with patient.  They initially wanted to try to discharge home with outpatient cardiology follow-up.  Referrals were placed.  When attempting to discharge patient became dizzy and lightheaded and felt like her heart was \"beating slow.  Had return of chest pain. She was placed back on the monitor with normal sinus rhythm.  Third troponin was obtained which is essentially stable at 28. ACS is unlikely, but appears to have had some sort of myocardial injury.    Etiology of symptoms unclear.  Possible sequelae of viral syndrome with early APURVA which could explain some of the troponinemia.  Given her concerns for palpitations and dizziness possible she has been flipping into and out of some sort of arrhythmia.  I will admit patient to observation status on telemetry with plan for echocardiogram in the morning.  Patient and family agreeable with this " plan.  Will continue fluid resuscitation overnight.    New Prescriptions    No medications on file       Final diagnoses:   Elevated troponin   Elevated serum creatinine   Palpitations   Right-sided chest pain       Dequan Abdul Jr., MD   [facility] EMERGENCY DEPARTMENT  5/2/2025     Dequan Abdul MD  05/02/25 [id number]       Dequan Abdul MD  05/03/25 0054

## 2025-05-03 ENCOUNTER — ORDERS ONLY (AUTO-RELEASED) (OUTPATIENT)
Dept: EMERGENCY MEDICINE | Facility: CLINIC | Age: 45
End: 2025-05-03
Payer: COMMERCIAL

## 2025-05-03 VITALS
OXYGEN SATURATION: 98 % | SYSTOLIC BLOOD PRESSURE: 129 MMHG | RESPIRATION RATE: 18 BRPM | TEMPERATURE: 98.4 F | HEART RATE: 79 BPM | DIASTOLIC BLOOD PRESSURE: 77 MMHG

## 2025-05-03 DIAGNOSIS — R00.2 PALPITATIONS: ICD-10-CM

## 2025-05-03 DIAGNOSIS — R79.89 ELEVATED TROPONIN: ICD-10-CM

## 2025-05-03 PROBLEM — R07.9 RIGHT-SIDED CHEST PAIN: Status: RESOLVED | Noted: 2025-05-02 | Resolved: 2025-05-03

## 2025-05-03 PROBLEM — H81.13 BENIGN PAROXYSMAL POSITIONAL VERTIGO, BILATERAL: Status: ACTIVE | Noted: 2025-05-03

## 2025-05-03 LAB
ATRIAL RATE - MUSE: 90 BPM
DIASTOLIC BLOOD PRESSURE - MUSE: NORMAL MMHG
INTERPRETATION ECG - MUSE: NORMAL
P AXIS - MUSE: 55 DEGREES
PR INTERVAL - MUSE: 118 MS
QRS DURATION - MUSE: 86 MS
QT - MUSE: 338 MS
QTC - MUSE: 413 MS
R AXIS - MUSE: -10 DEGREES
SYSTOLIC BLOOD PRESSURE - MUSE: NORMAL MMHG
T AXIS - MUSE: 42 DEGREES
TROPONIN T SERPL HS-MCNC: 29 NG/L
VENTRICULAR RATE- MUSE: 90 BPM

## 2025-05-03 PROCEDURE — 36415 COLL VENOUS BLD VENIPUNCTURE: CPT | Performed by: STUDENT IN AN ORGANIZED HEALTH CARE EDUCATION/TRAINING PROGRAM

## 2025-05-03 PROCEDURE — 84484 ASSAY OF TROPONIN QUANT: CPT | Performed by: STUDENT IN AN ORGANIZED HEALTH CARE EDUCATION/TRAINING PROGRAM

## 2025-05-03 PROCEDURE — G0378 HOSPITAL OBSERVATION PER HR: HCPCS

## 2025-05-03 ASSESSMENT — ACTIVITIES OF DAILY LIVING (ADL)
ADLS_ACUITY_SCORE: 41
ADLS_ACUITY_SCORE: 41

## 2025-05-03 NOTE — DISCHARGE INSTRUCTIONS
Thank you for coming to the [facility].  You were seen in the emergency department.      We discussed the possibility of admission and monitoring on telemetry and additional cardiac evaluation in the hospital.  If you notice pain palpitations or any other concerning symptoms return before you are seen in clinic please immediately return to the emergency department for repeat EKG and cardiac evaluation.    We would also like you to contact your primary care clinic first thing Monday morning to schedule a follow-up appointment for repeat laboratory studies and evaluation.

## 2025-05-03 NOTE — DISCHARGE SUMMARY
"[facility]  History and Physical and Discharge Summary - Medicine & Pediatrics       Date of Admission:  5/2/2025  Date of Discharge:  5/2/2025  Discharging Provider: Dr. Ekta Germain  Discharge Service: [facility]    Discharge Diagnoses   # Benign positional vertigo  # Pleuritic chest pain - resolved    Clinically Significant Risk Factors     # Obesity: Estimated body mass index is 33.48 kg/m  as calculated from the following:    Height as of 4/3/25: 1.6 m (5' 3\").    Weight as of 4/10/25: 85.7 kg (189 lb).       Follow-ups Needed After Discharge   Follow-up Appointments       Hospital Follow-up with Existing Primary Care Provider (PCP)          Schedule Primary Care visit within: 7 Days   Recommended labs and Imaging (to be ordered by Primary Care Provider): complete metabolic panel             PCP  Recheck CMP for protein  Refer to PT if vertigo persistent  No medication changes were made this hospitalization    Discharge Disposition   Discharged to home  Condition at discharge: Stable    Hospital Course   Carmen Connolly was admitted on 5/2/2025 for observation for dizziness.  The following problems were addressed during her hospitalization:    # Acute peripheral vertigo  # Palpitations  Presented to the ED with 1 day of R sided pleuritic chest pain, blurry vision, and weakness but had negative workup for cardiac ischemia, HTN emergency, PE, electrolyte derangement, APUVRA, infection, rib fracture, and normal orthostatic VS in the ED. She was on the path to discharge but had an episode of dizziness while ambulating, so was admitted to observation status for telemetry and echocardiogram. After detailed history and physical, determined to be peripheral vertigo rather than presyncope. Given absence of presyncope, chest pain, dyspnea on exertion, and no signs of central etiology for vertigo, I feel it is safe for her to discharge home. Offered " overnight observation with telemetry for assurance, she would prefer to discharge tonight. Upon discharge, her chest pain, blurry vision, and vertigo have resolved.   - Gave detailed return precautions on signs of stroke, including vertigo that is not relieved with repositioning.   - Discharge with a ziopatch due to palpations and occasional PCVs on telemetry.   - PCP follow up    # Incidental bronchial wall thickening  ED CT for PE incidentally noted bronchial wall thickening and mucous plugging with some subtle patchy opacities in the left lower lobe, stable bronchiectasis. She has a history of pulmonary TB, which may explain findings.   - NTD as she is asymptomatic    # Elevated total protein  Incidental on ED CMP. Normal albumin  - Repeat CMP outpatient    # Elevated Cr without APURVA   Cr 0.99 compared to baseline of 0.73. Does not meet criteria for APURVA. Likely mild elevation due to decrease PO intake. S/p 1L LR in the ED and feeling well.   - encouraged oral intake         Consultations This Hospital Stay   None    Code Status   Full Code       The patient was discussed with MD [facility]  [facility] EMERGENCY DEPARTMENT  [address] [address]  Phone: [phone]  Fax: [phone]  ______________________________________________________________________    Physical Exam   Vital Signs: Temp: 98.3  F (36.8  C) Temp src: Oral BP: (!) 151/82 Pulse: 74   Resp: 18 SpO2: 98 % O2 Device: None (Room air)    Weight: 0 lbs 0 oz  Physical Exam  Constitutional:       General: She is not in acute distress.     Appearance: Normal appearance. She is not ill-appearing, toxic-appearing or diaphoretic.   Eyes:      General: No visual field deficit.     Extraocular Movements: Extraocular movements intact.      Conjunctiva/sclera: Conjunctivae normal.      Pupils: Pupils are equal, round, and reactive to light.   Cardiovascular:      Rate and Rhythm: Normal rate and regular  "rhythm.      Pulses: Normal pulses.      Heart sounds: Normal heart sounds. No murmur heard.     No friction rub. No gallop.   Pulmonary:      Effort: Pulmonary effort is normal. No respiratory distress.      Breath sounds: Normal breath sounds. No stridor. No wheezing, rhonchi or rales.   Chest:      Chest wall: No tenderness.   Abdominal:      General: Abdomen is flat.      Palpations: Abdomen is soft.   Musculoskeletal:      Cervical back: Normal range of motion and neck supple.      Right lower leg: No edema.      Left lower leg: No edema.   Lymphadenopathy:      Cervical: No cervical adenopathy.   Skin:     General: Skin is warm and dry.      Capillary Refill: Capillary refill takes less than 2 seconds.   Neurological:      General: No focal deficit present.      Mental Status: She is alert and oriented to person, place, and time.      Cranial Nerves: Cranial nerves 2-12 are intact. No cranial nerve deficit, dysarthria or facial asymmetry.      Motor: Motor function is intact. No weakness, tremor, atrophy, abnormal muscle tone or pronator drift.      Coordination: Coordination is intact. Romberg sign negative. Coordination normal. Finger-Nose-Finger Test and Heel to Shin Test normal. Rapid alternating movements normal.      Gait: Gait is intact. Gait and tandem walk normal.      Deep Tendon Reflexes: Reflexes normal.      Comments: No nystagmus  Sensation of vertigo resolves with repositioning   Normal head impulse test  Normal test-of-skew   Psychiatric:         Mood and Affect: Mood normal.         Behavior: Behavior normal.         Thought Content: Thought content normal.         Judgment: Judgment normal.           Primary Care Physician   Khushi Cadena    Discharge Orders      Reason for your hospital stay    You were seen in the ER for chest pain and dizziness. You were found to NOT have a heart attack or blood clot in your lungs. You have what is called \"benign positional vertigo\", which causes the " room to feel like it is spinning and sometimes nausea, but is not dangerous. Watch out for signs of a stroke and come to the ER if they occur.     Activity    Your activity upon discharge: activity as tolerated     Diet    Follow this diet upon discharge: Current Diet:Orders Placed This Encounter      Regular Diet Adult     Hospital Follow-up with Existing Primary Care Provider (PCP)          ZIO PATCH MAIL OUT     ZIO PATCH MAIL OUT       Significant Results and Procedures   Results for orders placed or performed during the hospital encounter of 05/02/25   XR Chest 2 Views    Narrative    EXAM: XR CHEST 2 VIEWS  LOCATION: [facility]  DATE: 5/2/2025    INDICATION: chest pain and palpitations  COMPARISON: PA and lateral views the chest 4/11/2016      Impression    IMPRESSION:     Normal cardiac and mediastinal borders. Normal lung vascularity.    The lungs are symmetrically inflated. There are unchanged lucencies adjacent to the left ventricular heart border consistent with scarring/bronchiectasis in the lingula. Minimal fine linear scarring is present in the apices. No new airspace or   interstitial lung opacities. Diaphragm curvature is preserved. No pleural effusion.   CT Chest Pulmonary Embolism w Contrast    Narrative    EXAM: CT CHEST PULMONARY EMBOLISM W CONTRAST  LOCATION: [facility]  DATE: 5/2/2025    INDICATION: Pleuritic chest pain, palpitaitons, + dimer  COMPARISON: Same date chest radiograph, /16  TECHNIQUE: CT chest pulmonary angiogram during arterial phase injection of IV contrast. Multiplanar reformats and MIP reconstructions were performed. Dose reduction techniques were used.   CONTRAST: 64mL Isovue 370    FINDINGS:  ANGIOGRAM CHEST: Pulmonary arteries are normal caliber and negative for pulmonary emboli. Thoracic aorta is negative for dissection. No CT evidence of right heart strain.    LUNGS AND  PLEURA: There is some subtle bronchial wall thickening and mucous plugging with some subtle patchy opacities in the left lower lobe. No rebekah lobar consolidation, pleural effusion or pneumothorax. Stable bronchiectasis and volume loss in the   lingula.    MEDIASTINUM/AXILLAE: No suspicious lymphadenopathy. No pericardial effusion.    CORONARY ARTERY CALCIFICATION: None.    UPPER ABDOMEN: Unremarkable.    MUSCULOSKELETAL: No acute bony abnormality.      Impression    IMPRESSION:  1.  Mild bronchial wall thickening with some mucus plugging and opacities in the left lung base, suggestive of aspiration, bronchitis or other infectious/inflammatory process.  2.  No lobar airspace consolidation or pleural effusion.  3.  No pulmonary embolus.       Discharge Medications   Current Discharge Medication List        CONTINUE these medications which have NOT CHANGED    Details   cetirizine (ZYRTEC) 10 MG tablet Take 1 tablet (10 mg) by mouth daily.  Qty: 90 tablet, Refills: 3    Associated Diagnoses: Seasonal allergic rhinitis, unspecified trigger      cholecalciferol ([employer]S D3) 50 MCG (2000 UT) CAPS Take 1 capsule by mouth daily.  Qty: 30 capsule, Refills: 11    Associated Diagnoses: Acute bilateral low back pain without sciatica      cyclobenzaprine (FLEXERIL) 5 MG tablet Take 1 tablet (5 mg) by mouth nightly as needed for muscle spasms.  Qty: 30 tablet, Refills: 3    Associated Diagnoses: Pain      famotidine (PEPCID) 20 MG tablet TAKE 1 TABLET BY MOUTH TWICE A DAY  Qty: 180 tablet, Refills: 3    Associated Diagnoses: Gastroesophageal reflux disease with esophagitis, unspecified whether hemorrhage      hydrochlorothiazide (HYDRODIURIL) 25 MG tablet Take 1 tablet (25 mg) by mouth daily.  Qty: 90 tablet, Refills: 3    Associated Diagnoses: Essential hypertension      multivitamin w/minerals (THERA-VIT-M) tablet Take 1 tablet by mouth daily.  Qty: 100 tablet, Refills: 3    Associated Diagnoses: Essential hypertension with goal  blood pressure less than 130/85; Vitamin deficiency; Major depressive disorder, recurrent episode, moderate (H)      sennosides (SENOKOT) 8.6 MG tablet Take 1 tablet by mouth daily. Hold if having loose stools  Qty: 30 tablet, Refills: 3    Associated Diagnoses: Constipation, unspecified constipation type      traZODone (DESYREL) 50 MG tablet Take 2 tablets (100 mg) by mouth at bedtime.  Qty: 60 tablet, Refills: 2    Associated Diagnoses: Schizoaffective disorder, depressive type (H)      acetaminophen (TYLENOL) 325 MG tablet Take 2 tablets (650 mg) by mouth every 4 hours as needed for mild pain.  Qty: 200 tablet, Refills: 11    Associated Diagnoses: Pain      cholestyramine (QUESTRAN) 4 GM/DOSE powder DISSOLVE ONE SCOOP (4 GRAMS) IN LIQUID AND TAKE BY MOUTH DAILY      CVS SODIUM CHLORIDE 5 % ophthalmic solution 1 DROP IN BOTH EYES EVERY MORNING AND NIGHT      cycloSPORINE (RESTASIS) 0.05 % ophthalmic emulsion INSTILL 1 DROP INTO AFFECTED EYE EVERY 12 HOURS      diclofenac (VOLTAREN) 1 % topical gel Apply 2 g topically 4 times daily.  Qty: 150 g, Refills: 4    Associated Diagnoses: Chronic pain of left knee      fluticasone (FLONASE) 50 MCG/ACT nasal spray Spray 2 sprays into both nostrils daily.  Qty: 16 mL, Refills: 11    Associated Diagnoses: Seasonal allergic rhinitis, unspecified trigger      ibuprofen (ADVIL/MOTRIN) 400 MG tablet Take 1 tablet (400 mg) by mouth every 4 hours as needed for moderate pain.  Qty: 120 tablet, Refills: 4    Associated Diagnoses: Pain      Incontinence Supply Disposable (DEPEND UNDERGARMENTS) MISC Use daily as needed  Qty: 200 each, Refills: 11    Associated Diagnoses: Female stress incontinence      latanoprost (XALATAN) 0.005 % ophthalmic solution INSTILL 1 DROP INTO BOTH EYES EVERY DAY IN THE EVENING      melatonin (CVS MELATONIN) 3 MG tablet Take 2 tablets (6 mg) by mouth at bedtime.  Qty: 60 tablet, Refills: 1    Associated Diagnoses: Schizoaffective disorder, depressive type  (H)      mirtazapine (REMERON) 30 MG tablet Take 1 tablet (30 mg) by mouth at bedtime.  Qty: 30 tablet, Refills: 1      OLANZapine-samidorphan (LYBALVI) 5-10 MG tablet Take 1 tablet by mouth at bedtime.  Qty: 30 tablet, Refills: 1    Associated Diagnoses: Schizoaffective disorder, depressive type (H)      Omega-3 1000 MG capsule Take 2 capsules (2 g) by mouth daily.  Qty: 180 capsule, Refills: 3    Associated Diagnoses: Essential hypertension with goal blood pressure less than 130/85      prazosin (MINIPRESS) 2 MG capsule Take 2 capsules (4 mg) by mouth at bedtime. Take 4mg (2 capsules) at bedtime  Qty: 60 capsule, Refills: 2    Associated Diagnoses: PTSD (post-traumatic stress disorder)      risperiDONE (RISPERDAL) 2 MG tablet Take 1 tablet (2 mg) by mouth at bedtime.  Qty: 30 tablet, Refills: 2    Associated Diagnoses: Schizoaffective disorder, depressive type (H)      VITAMIN  B COMPLEX tablet            Allergies   No Known Allergies

## 2025-05-03 NOTE — ED NOTES
"Went to the patient's room to go over discharge instructions and patient is reporting newly feeling, \"like my heart is beating slow.\" Patient denies chest pain but is endorsing dizziness. Provider notified and able to go see the patient at the bedside.   "

## 2025-05-03 NOTE — ED NOTES
Patient is having some swelling to the PIV left arm, per previous RN the IV infiltrated while down at CT and they were unable to complete the procedure but per report is saline as it was end of procedure.  Provider notified. Patient notes some discomfort.

## 2025-05-08 ENCOUNTER — OFFICE VISIT (OUTPATIENT)
Dept: PSYCHIATRY | Facility: CLINIC | Age: 45
End: 2025-05-08
Attending: STUDENT IN AN ORGANIZED HEALTH CARE EDUCATION/TRAINING PROGRAM
Payer: COMMERCIAL

## 2025-05-08 VITALS
DIASTOLIC BLOOD PRESSURE: 83 MMHG | WEIGHT: 188.6 LBS | SYSTOLIC BLOOD PRESSURE: 137 MMHG | BODY MASS INDEX: 33.41 KG/M2 | HEART RATE: 93 BPM

## 2025-05-08 DIAGNOSIS — F25.1 SCHIZOAFFECTIVE DISORDER, DEPRESSIVE TYPE (H): ICD-10-CM

## 2025-05-08 DIAGNOSIS — G47.00 PERSISTENT DISORDER OF INITIATING OR MAINTAINING SLEEP: Primary | ICD-10-CM

## 2025-05-08 DIAGNOSIS — F43.10 PTSD (POST-TRAUMATIC STRESS DISORDER): ICD-10-CM

## 2025-05-08 PROCEDURE — G0463 HOSPITAL OUTPT CLINIC VISIT: HCPCS

## 2025-05-08 RX ORDER — RISPERIDONE 2 MG/1
TABLET ORAL
Status: SHIPPED
Start: 2025-05-08

## 2025-05-08 RX ORDER — PRAZOSIN HYDROCHLORIDE 2 MG/1
4 CAPSULE ORAL AT BEDTIME
Qty: 60 CAPSULE | Refills: 2 | Status: SHIPPED | OUTPATIENT
Start: 2025-05-08

## 2025-05-08 RX ORDER — MIRTAZAPINE 30 MG/1
30 TABLET, FILM COATED ORAL AT BEDTIME
Qty: 30 TABLET | Refills: 1 | Status: SHIPPED | OUTPATIENT
Start: 2025-05-08

## 2025-05-08 RX ORDER — OLANZAPINE AND SAMIDORPHAN L-MALATE 10; 10 MG/1; MG/1
1 TABLET, FILM COATED ORAL AT BEDTIME
Qty: 30 TABLET | Refills: 1 | Status: SHIPPED | OUTPATIENT
Start: 2025-05-08

## 2025-05-08 RX ORDER — TRAZODONE HYDROCHLORIDE 50 MG/1
100 TABLET ORAL AT BEDTIME
Qty: 60 TABLET | Refills: 2 | Status: SHIPPED | OUTPATIENT
Start: 2025-05-08

## 2025-05-08 ASSESSMENT — PAIN SCALES - GENERAL: PAINLEVEL_OUTOF10: NO PAIN (0)
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
## 2025-05-08 NOTE — PATIENT INSTRUCTIONS
Medication Changes:  -Increase the Lybalvi to 10-10mg.I will send in a new prescription for this.    -Decrease Risperidone to 1mg (half tablet) for 1 week. Then if tolerating this lower dose, you can stop altogether.     Other:   -Please start using the Zio Patch.   -Please schedule a follow up appointment with your primary care doctor as soon as possible for follow up on the heart.     Follow Up: 4 weeks.

## 2025-06-05 ENCOUNTER — OFFICE VISIT (OUTPATIENT)
Dept: PSYCHIATRY | Facility: CLINIC | Age: 45
End: 2025-06-05
Attending: STUDENT IN AN ORGANIZED HEALTH CARE EDUCATION/TRAINING PROGRAM
Payer: COMMERCIAL

## 2025-06-05 ENCOUNTER — LAB (OUTPATIENT)
Dept: LAB | Facility: CLINIC | Age: 45
End: 2025-06-05
Payer: COMMERCIAL

## 2025-06-05 VITALS
SYSTOLIC BLOOD PRESSURE: 139 MMHG | BODY MASS INDEX: 33.37 KG/M2 | DIASTOLIC BLOOD PRESSURE: 88 MMHG | WEIGHT: 188.4 LBS | HEART RATE: 84 BPM

## 2025-06-05 DIAGNOSIS — Z79.899 LONG TERM CURRENT USE OF ANTIPSYCHOTIC MEDICATION: ICD-10-CM

## 2025-06-05 DIAGNOSIS — F43.10 PTSD (POST-TRAUMATIC STRESS DISORDER): ICD-10-CM

## 2025-06-05 DIAGNOSIS — F25.1 SCHIZOAFFECTIVE DISORDER, DEPRESSIVE TYPE (H): ICD-10-CM

## 2025-06-05 DIAGNOSIS — Z79.899 LONG TERM CURRENT USE OF ANTIPSYCHOTIC MEDICATION: Primary | ICD-10-CM

## 2025-06-05 DIAGNOSIS — G47.00 PERSISTENT DISORDER OF INITIATING OR MAINTAINING SLEEP: ICD-10-CM

## 2025-06-05 LAB
EST. AVERAGE GLUCOSE BLD GHB EST-MCNC: 105 MG/DL
HBA1C MFR BLD: 5.3 %

## 2025-06-05 PROCEDURE — 36415 COLL VENOUS BLD VENIPUNCTURE: CPT

## 2025-06-05 PROCEDURE — 83036 HEMOGLOBIN GLYCOSYLATED A1C: CPT

## 2025-06-05 PROCEDURE — 99214 OFFICE O/P EST MOD 30 MIN: CPT | Mod: GC

## 2025-06-05 PROCEDURE — G2211 COMPLEX E/M VISIT ADD ON: HCPCS

## 2025-06-05 PROCEDURE — 1126F AMNT PAIN NOTED NONE PRSNT: CPT

## 2025-06-05 PROCEDURE — 3079F DIAST BP 80-89 MM HG: CPT

## 2025-06-05 PROCEDURE — G0463 HOSPITAL OUTPT CLINIC VISIT: HCPCS

## 2025-06-05 PROCEDURE — 3075F SYST BP GE 130 - 139MM HG: CPT

## 2025-06-05 RX ORDER — MIRTAZAPINE 30 MG/1
30 TABLET, FILM COATED ORAL AT BEDTIME
Qty: 30 TABLET | Refills: 1 | Status: SHIPPED | OUTPATIENT
Start: 2025-06-05

## 2025-06-05 RX ORDER — TRAZODONE HYDROCHLORIDE 50 MG/1
100 TABLET ORAL AT BEDTIME
Qty: 60 TABLET | Refills: 2 | Status: SHIPPED | OUTPATIENT
Start: 2025-06-05

## 2025-06-05 RX ORDER — PRAZOSIN HYDROCHLORIDE 2 MG/1
4 CAPSULE ORAL AT BEDTIME
Qty: 60 CAPSULE | Refills: 2 | Status: SHIPPED | OUTPATIENT
Start: 2025-06-05

## 2025-06-05 RX ORDER — OLANZAPINE AND SAMIDORPHAN L-MALATE 15; 10 MG/1; MG/1
1 TABLET, FILM COATED ORAL AT BEDTIME
Qty: 30 TABLET | Refills: 1 | Status: SHIPPED | OUTPATIENT
Start: 2025-06-05

## 2025-06-05 ASSESSMENT — PATIENT HEALTH QUESTIONNAIRE - PHQ9
SUM OF ALL RESPONSES TO PHQ QUESTIONS 1-9: 7
SUM OF ALL RESPONSES TO PHQ QUESTIONS 1-9: 7
10. IF YOU CHECKED OFF ANY PROBLEMS, HOW DIFFICULT HAVE THESE PROBLEMS MADE IT FOR YOU TO DO YOUR WORK, TAKE CARE OF THINGS AT HOME, OR GET ALONG WITH OTHER PEOPLE: SOMEWHAT DIFFICULT

## 2025-06-05 ASSESSMENT — PAIN SCALES - GENERAL: PAINLEVEL_OUTOF10: NO PAIN (0)
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
## 2025-06-05 NOTE — NURSING NOTE
Chief Complaint   Patient presents with    Recheck Medication     Persistent disorder of initiating or maintaining sleep     - Piyush POSADA, Visit Facilitator

## 2025-06-05 NOTE — PATIENT INSTRUCTIONS
Medication Changes:  -Increase Lybalvi to 15mg.    Other:  -Please make sure to follow up with primary care provider.   -We will order an A1c.     Follow Up: 1 month with me

## 2025-06-16 ENCOUNTER — RESULTS FOLLOW-UP (OUTPATIENT)
Dept: FAMILY MEDICINE | Facility: CLINIC | Age: 45
End: 2025-06-16

## 2025-06-29 LAB — CV ZIO PRELIM RESULTS: NORMAL

## 2025-07-10 ENCOUNTER — VIRTUAL VISIT (OUTPATIENT)
Dept: PSYCHIATRY | Facility: CLINIC | Age: 45
End: 2025-07-10
Attending: PSYCHIATRY & NEUROLOGY
Payer: COMMERCIAL

## 2025-07-10 DIAGNOSIS — Z79.899 LONG TERM CURRENT USE OF ANTIPSYCHOTIC MEDICATION: ICD-10-CM

## 2025-07-10 DIAGNOSIS — F43.10 PTSD (POST-TRAUMATIC STRESS DISORDER): Primary | ICD-10-CM

## 2025-07-10 DIAGNOSIS — G47.00 PERSISTENT DISORDER OF INITIATING OR MAINTAINING SLEEP: ICD-10-CM

## 2025-07-10 DIAGNOSIS — F25.1 SCHIZOAFFECTIVE DISORDER, DEPRESSIVE TYPE (H): ICD-10-CM

## 2025-07-10 RX ORDER — TRAZODONE HYDROCHLORIDE 50 MG/1
100 TABLET ORAL AT BEDTIME
Qty: 60 TABLET | Refills: 2 | Status: SHIPPED | OUTPATIENT
Start: 2025-07-10

## 2025-07-10 RX ORDER — MIRTAZAPINE 30 MG/1
30 TABLET, FILM COATED ORAL AT BEDTIME
Qty: 30 TABLET | Refills: 1 | Status: SHIPPED | OUTPATIENT
Start: 2025-07-10

## 2025-07-10 RX ORDER — OLANZAPINE AND SAMIDORPHAN L-MALATE 15; 10 MG/1; MG/1
1 TABLET, FILM COATED ORAL AT BEDTIME
Qty: 30 TABLET | Refills: 1 | Status: SHIPPED | OUTPATIENT
Start: 2025-07-10

## 2025-07-10 RX ORDER — PRAZOSIN HYDROCHLORIDE 2 MG/1
4 CAPSULE ORAL AT BEDTIME
Qty: 60 CAPSULE | Refills: 2 | Status: SHIPPED | OUTPATIENT
Start: 2025-07-10

## 2025-07-10 RX ORDER — ESCITALOPRAM OXALATE 10 MG/1
10 TABLET ORAL DAILY
Qty: 30 TABLET | Refills: 1 | Status: SHIPPED | OUTPATIENT
Start: 2025-07-10

## 2025-07-10 ASSESSMENT — PATIENT HEALTH QUESTIONNAIRE - PHQ9
10. IF YOU CHECKED OFF ANY PROBLEMS, HOW DIFFICULT HAVE THESE PROBLEMS MADE IT FOR YOU TO DO YOUR WORK, TAKE CARE OF THINGS AT HOME, OR GET ALONG WITH OTHER PEOPLE: NOT DIFFICULT AT ALL
SUM OF ALL RESPONSES TO PHQ QUESTIONS 1-9: 10
SUM OF ALL RESPONSES TO PHQ QUESTIONS 1-9: 10

## 2025-07-10 ASSESSMENT — ANXIETY QUESTIONNAIRES
8. IF YOU CHECKED OFF ANY PROBLEMS, HOW DIFFICULT HAVE THESE MADE IT FOR YOU TO DO YOUR WORK, TAKE CARE OF THINGS AT HOME, OR GET ALONG WITH OTHER PEOPLE?: NOT DIFFICULT AT ALL
3. WORRYING TOO MUCH ABOUT DIFFERENT THINGS: SEVERAL DAYS
5. BEING SO RESTLESS THAT IT IS HARD TO SIT STILL: SEVERAL DAYS
2. NOT BEING ABLE TO STOP OR CONTROL WORRYING: MORE THAN HALF THE DAYS
1. FEELING NERVOUS, ANXIOUS, OR ON EDGE: MORE THAN HALF THE DAYS
GAD7 TOTAL SCORE: 8
IF YOU CHECKED OFF ANY PROBLEMS ON THIS QUESTIONNAIRE, HOW DIFFICULT HAVE THESE PROBLEMS MADE IT FOR YOU TO DO YOUR WORK, TAKE CARE OF THINGS AT HOME, OR GET ALONG WITH OTHER PEOPLE: NOT DIFFICULT AT ALL
GAD7 TOTAL SCORE: 8
7. FEELING AFRAID AS IF SOMETHING AWFUL MIGHT HAPPEN: NOT AT ALL
4. TROUBLE RELAXING: SEVERAL DAYS
6. BECOMING EASILY ANNOYED OR IRRITABLE: SEVERAL DAYS
7. FEELING AFRAID AS IF SOMETHING AWFUL MIGHT HAPPEN: NOT AT ALL
GAD7 TOTAL SCORE: 8

## 2025-07-10 ASSESSMENT — PAIN SCALES - GENERAL: PAINLEVEL_OUTOF10: NO PAIN (0)

## 2025-07-10 NOTE — NURSING NOTE
Current patient location: [address] [place] [address] N  [facility] [address]    Is the patient currently in the state of [place]? YES    Visit mode: VIDEO    If the visit is dropped, the patient can be reconnected by:VIDEO VISIT: Text to cell phone:   Telephone Information:   Mobile [phone]       Will anyone else be joining the visit? NO  (If patient encounters technical issues they should call [phone]    Are changes needed to the allergy or medication list? Pt stated no med changes    Are refills needed on medications prescribed by this physician? YES    Rooming Documentation:  Unable to complete questionnaire(s) due to time , Unable to finish the PROMIS    Reason for visit: RECHECK    Elvira GUARDADO
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
## 2025-07-10 NOTE — PATIENT INSTRUCTIONS
Medication Changes:   Start Lexapro 10mg daily. Recommend taking in the morning.     Other:     I recommend following up with your primary care provider about ongoing pain and Zio patch as soon as possible.     Follow Up: 4 weeks with me.     **For crisis resources, please see the information at the end of this document**   Patient Education    Thank you for coming to the [facility] [place] [facility].     Lab Testing:  If you had lab testing today and your results are reassuring or normal they will be mailed to you or sent through [facility] within 7 days. If the lab tests need quick action we will call you with the results. The phone number we will call with results is # [phone]. If this is not the best number please call our clinic and change the number.     Medication Refills:  If you need any refills please call your pharmacy and they will contact us. Our fax number for refills is [phone].   Three business days of notice are needed for general medication refill requests.   Five business days of notice are needed for controlled substance refill requests.   If you need to change to a different pharmacy, please contact the new pharmacy directly. The new pharmacy will help you get your medications transferred.     Contact Us:  Please call [phone] during business hours (8-5:00 M-F).   If you have medication related questions after clinic hours, or on the weekend, please call [phone].     Financial Assistance [phone]   Medical Records [phone]       MENTAL HEALTH CRISIS RESOURCES:  For a emergency help, please call 911 or go to the nearest Emergency Department.     Emergency Walk-In Options:   EmPATH Unit @ [facility] ([place]): [phone] - Specialized mental health emergency area designed to be calming  [facility] ([place]): [phone]  [facility] ([place]): [phone]  [facility]  [facility]): [phone]    [facility] Crisis Information:   [place]: [phone]  [place]: [phone]  [place] ([place]) - Adult: [phone]     Child: [phone]  [place] - Adult: [phone]     Child: [phone]  [place]: [phone]  List of all [facility] resources:   [email or web address]/[email or web address]-contacts.[email or web address]    National Crisis Information:   Crisis Text Line: Text  [place]  to 158121  Suicide & Crisis Lifeline: 988  National Suicide Prevention Lifeline: 7-485-675-AYSF (1-854.563.7539)       For online chat options, visit https://suicidepreventionlifeline.org/chat/  Poison Control Center: 1-768.733.9314  Trans Lifeline: 1-918.242.3221 - Hotline for transgender people of all ages  The Randy Project: 9-435-172-1292 - Hotline for LGBT youth     For Non-Emergency Support:   Fast Tracker: Mental Health & Substance Use Disorder Resources -   https://[email or web address].org/

## 2025-08-18 DIAGNOSIS — K59.00 CONSTIPATION, UNSPECIFIED CONSTIPATION TYPE: ICD-10-CM

## 2025-08-20 RX ORDER — SENNOSIDES 8.6 MG/1
TABLET, COATED ORAL
Qty: 30 TABLET | Refills: 3 | Status: SHIPPED | OUTPATIENT
Start: 2025-08-20

## 2025-08-26 ENCOUNTER — DOCUMENTATION ONLY (OUTPATIENT)
Dept: FAMILY MEDICINE | Facility: CLINIC | Age: 45
End: 2025-08-26
Payer: COMMERCIAL

## 2025-08-28 ENCOUNTER — VIRTUAL VISIT (OUTPATIENT)
Dept: PSYCHIATRY | Facility: CLINIC | Age: 45
End: 2025-08-28
Attending: PSYCHIATRY & NEUROLOGY
Payer: COMMERCIAL

## 2025-08-28 VITALS — WEIGHT: 178 LBS | BODY MASS INDEX: 31.53 KG/M2

## 2025-08-28 DIAGNOSIS — F25.1 SCHIZOAFFECTIVE DISORDER, DEPRESSIVE TYPE (H): ICD-10-CM

## 2025-08-28 DIAGNOSIS — Z79.899 LONG TERM CURRENT USE OF ANTIPSYCHOTIC MEDICATION: ICD-10-CM

## 2025-08-28 DIAGNOSIS — F43.10 PTSD (POST-TRAUMATIC STRESS DISORDER): ICD-10-CM

## 2025-08-28 RX ORDER — OLANZAPINE AND SAMIDORPHAN L-MALATE 15; 10 MG/1; MG/1
1 TABLET, FILM COATED ORAL AT BEDTIME
Qty: 30 TABLET | Refills: 1 | Status: SHIPPED | OUTPATIENT
Start: 2025-08-28

## 2025-08-28 RX ORDER — PRAZOSIN HYDROCHLORIDE 2 MG/1
4 CAPSULE ORAL AT BEDTIME
Qty: 60 CAPSULE | Refills: 2 | Status: SHIPPED | OUTPATIENT
Start: 2025-08-28

## 2025-08-28 RX ORDER — MIRTAZAPINE 30 MG/1
30 TABLET, FILM COATED ORAL AT BEDTIME
Qty: 30 TABLET | Refills: 1 | Status: SHIPPED | OUTPATIENT
Start: 2025-08-28

## 2025-08-28 RX ORDER — ESCITALOPRAM OXALATE 10 MG/1
10 TABLET ORAL DAILY
Qty: 30 TABLET | Refills: 1 | Status: SHIPPED | OUTPATIENT
Start: 2025-08-28

## 2025-08-28 RX ORDER — TRAZODONE HYDROCHLORIDE 50 MG/1
100 TABLET ORAL AT BEDTIME
Qty: 60 TABLET | Refills: 2 | Status: SHIPPED | OUTPATIENT
Start: 2025-08-28

## 2025-08-28 RX ORDER — ESCITALOPRAM OXALATE 5 MG/1
5 TABLET ORAL DAILY
Qty: 30 TABLET | Refills: 1 | Status: SHIPPED | OUTPATIENT
Start: 2025-08-28

## 2025-08-28 ASSESSMENT — ANXIETY QUESTIONNAIRES
GAD7 TOTAL SCORE: 14
GAD7 TOTAL SCORE: 14
3. WORRYING TOO MUCH ABOUT DIFFERENT THINGS: NEARLY EVERY DAY
GAD7 TOTAL SCORE: 14
1. FEELING NERVOUS, ANXIOUS, OR ON EDGE: SEVERAL DAYS
8. IF YOU CHECKED OFF ANY PROBLEMS, HOW DIFFICULT HAVE THESE MADE IT FOR YOU TO DO YOUR WORK, TAKE CARE OF THINGS AT HOME, OR GET ALONG WITH OTHER PEOPLE?: SOMEWHAT DIFFICULT
6. BECOMING EASILY ANNOYED OR IRRITABLE: SEVERAL DAYS
7. FEELING AFRAID AS IF SOMETHING AWFUL MIGHT HAPPEN: NEARLY EVERY DAY
7. FEELING AFRAID AS IF SOMETHING AWFUL MIGHT HAPPEN: NEARLY EVERY DAY
4. TROUBLE RELAXING: NEARLY EVERY DAY
5. BEING SO RESTLESS THAT IT IS HARD TO SIT STILL: NOT AT ALL
2. NOT BEING ABLE TO STOP OR CONTROL WORRYING: NEARLY EVERY DAY
IF YOU CHECKED OFF ANY PROBLEMS ON THIS QUESTIONNAIRE, HOW DIFFICULT HAVE THESE PROBLEMS MADE IT FOR YOU TO DO YOUR WORK, TAKE CARE OF THINGS AT HOME, OR GET ALONG WITH OTHER PEOPLE: SOMEWHAT DIFFICULT

## 2025-08-28 ASSESSMENT — PATIENT HEALTH QUESTIONNAIRE - PHQ9
SUM OF ALL RESPONSES TO PHQ QUESTIONS 1-9: 10
10. IF YOU CHECKED OFF ANY PROBLEMS, HOW DIFFICULT HAVE THESE PROBLEMS MADE IT FOR YOU TO DO YOUR WORK, TAKE CARE OF THINGS AT HOME, OR GET ALONG WITH OTHER PEOPLE: EXTREMELY DIFFICULT
SUM OF ALL RESPONSES TO PHQ QUESTIONS 1-9: 10

## 2025-08-28 ASSESSMENT — PAIN SCALES - GENERAL: PAINLEVEL_OUTOF10: NO PAIN (0)
